# Patient Record
Sex: FEMALE | Race: WHITE | NOT HISPANIC OR LATINO | Employment: FULL TIME | URBAN - NONMETROPOLITAN AREA
[De-identification: names, ages, dates, MRNs, and addresses within clinical notes are randomized per-mention and may not be internally consistent; named-entity substitution may affect disease eponyms.]

---

## 2017-03-23 DIAGNOSIS — M79.671 BILATERAL FOOT PAIN: Primary | ICD-10-CM

## 2017-03-23 DIAGNOSIS — M79.672 BILATERAL FOOT PAIN: Primary | ICD-10-CM

## 2017-03-23 DIAGNOSIS — M25.571 BILATERAL ANKLE PAIN, UNSPECIFIED CHRONICITY: ICD-10-CM

## 2017-03-23 DIAGNOSIS — M25.572 BILATERAL ANKLE PAIN, UNSPECIFIED CHRONICITY: ICD-10-CM

## 2017-03-24 ENCOUNTER — OFFICE VISIT (OUTPATIENT)
Dept: ORTHOPEDIC SURGERY | Facility: CLINIC | Age: 42
End: 2017-03-24

## 2017-03-24 VITALS — HEIGHT: 63 IN | RESPIRATION RATE: 19 BRPM | WEIGHT: 215.8 LBS | BODY MASS INDEX: 38.24 KG/M2

## 2017-03-24 DIAGNOSIS — M25.572 BILATERAL ANKLE PAIN, UNSPECIFIED CHRONICITY: ICD-10-CM

## 2017-03-24 DIAGNOSIS — M79.672 BILATERAL FOOT PAIN: Primary | ICD-10-CM

## 2017-03-24 DIAGNOSIS — M25.571 BILATERAL ANKLE PAIN, UNSPECIFIED CHRONICITY: ICD-10-CM

## 2017-03-24 DIAGNOSIS — M79.671 BILATERAL FOOT PAIN: Primary | ICD-10-CM

## 2017-03-24 PROCEDURE — 99204 OFFICE O/P NEW MOD 45 MIN: CPT | Performed by: PODIATRIST

## 2017-03-24 RX ORDER — VITAMIN E 268 MG
800 CAPSULE ORAL DAILY
COMMUNITY
End: 2020-01-22

## 2017-03-24 RX ORDER — METHYLPREDNISOLONE 4 MG/1
TABLET ORAL
Qty: 21 TABLET | Refills: 0 | Status: SHIPPED | OUTPATIENT
Start: 2017-03-24 | End: 2017-05-01

## 2017-03-24 RX ORDER — CLOBETASOL PROPIONATE 0.05 G/100ML
SHAMPOO TOPICAL
Refills: 0 | COMMUNITY
Start: 2017-01-23 | End: 2022-09-18

## 2017-03-24 RX ORDER — VALACYCLOVIR HYDROCHLORIDE 1 G/1
1000 TABLET, FILM COATED ORAL 2 TIMES DAILY
COMMUNITY
End: 2017-07-11

## 2017-03-24 RX ORDER — ONDANSETRON 4 MG/1
4 TABLET, FILM COATED ORAL EVERY 8 HOURS PRN
COMMUNITY
End: 2021-01-14

## 2017-03-24 RX ORDER — IBUPROFEN 600 MG/1
600 TABLET ORAL EVERY 6 HOURS PRN
COMMUNITY
End: 2019-04-04 | Stop reason: SDUPTHER

## 2017-03-24 RX ORDER — PANTOPRAZOLE SODIUM 40 MG/1
TABLET, DELAYED RELEASE ORAL
COMMUNITY
Start: 2017-02-23 | End: 2017-10-20

## 2017-03-24 RX ORDER — SUMATRIPTAN SUCC/NAPROXEN SOD 85MG-500MG
TABLET ORAL
COMMUNITY
Start: 2017-02-17 | End: 2018-10-04 | Stop reason: SDUPTHER

## 2017-03-24 RX ORDER — MELOXICAM 7.5 MG/1
7.5 TABLET ORAL DAILY
Qty: 30 TABLET | Refills: 2 | Status: SHIPPED | OUTPATIENT
Start: 2017-03-24 | End: 2018-02-19 | Stop reason: ALTCHOICE

## 2017-03-24 RX ORDER — VITAMINS AND MINERALS 1; 1000; 100; 400; 30; 3; 3; 15; 20; 12; 7; 200; 29; 20 MG/1; [IU]/1; MG/1; [IU]/1; [IU]/1; MG/1; MG/1; MG/1; MG/1; UG/1; MG/1; MG/1; MG/1; MG/1
TABLET, CHEWABLE ORAL
COMMUNITY
Start: 2017-03-13 | End: 2018-02-19

## 2017-03-24 NOTE — PROGRESS NOTES
Subjective   Patient ID: Renae Bolton is a 41 y.o. female   Pain and Edema of the Right Foot and Pain and Edema of the Left Foot   she states about 4 weeks ago she was walking outside her house in the dark and tripped and fell on the bottom step.  She says she landed on her feet but they both gave out on her and didn't hold her up.  She states she went down on her left side hitting her knee and hip as well.  She states that side her to ever since.  She says the left side is worse than the right.  Says she takes ibuprofen on occasion at night for it.  States she has a significant hard time walking.  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  She got a supportive ankle brace for the left side.  History of Present Illness    Review of Systems   Constitutional: Negative for diaphoresis, fever and unexpected weight change.   HENT: Negative for dental problem and sore throat.    Eyes: Negative for visual disturbance.   Respiratory: Negative for shortness of breath.    Cardiovascular: Negative for chest pain.   Gastrointestinal: Negative for abdominal pain, constipation, diarrhea, nausea and vomiting.   Genitourinary: Negative for difficulty urinating and frequency.   Musculoskeletal: Positive for arthralgias.   Neurological: Negative for headaches.   Hematological: Does not bruise/bleed easily.   All other systems reviewed and are negative.      Past Medical History:   Diagnosis Date   • Arrhythmia    • Depression    • GERD (gastroesophageal reflux disease)    • Migraine         Past Surgical History:   Procedure Laterality Date   • BLADDER SURGERY     • BREAST LUMPECTOMY Left    • CHOLECYSTECTOMY     • HYSTERECTOMY     • WISDOM TOOTH EXTRACTION         Allergies   Allergen Reactions   • Penicillins Shortness Of Breath and Palpitations   • Amoxicillin    • Demerol [Meperidine]    • Latex    • Morphine And Related        @/ reviewed@    Objective   Physical Exam    Constitutional: She is oriented to person, place, and time. She appears well-developed and well-nourished.   HENT:   Head: Normocephalic and atraumatic.   Eyes: EOM are normal. Pupils are equal, round, and reactive to light.   Neck: Normal range of motion.   Cardiovascular: Normal rate.    Pulmonary/Chest: Effort normal.   Abdominal: Soft. Bowel sounds are normal.   Musculoskeletal: Normal range of motion.   Neurological: She is alert and oriented to person, place, and time. She has normal reflexes.   Skin: Skin is warm.   Psychiatric: She has a normal mood and affect. Her behavior is normal. Judgment and thought content normal.     Ortho Exam  Ortho Exam  Bilateral Lower extremity exam:  Vascular: Pulses are palpable, capillary refill time to the left lower extremity seems to be somewhat delayed but still less than 5-7 seconds.  There is no significant edema that I can appreciate either lower extremity but the left side may be slightly erythematous but this blanches.  Neuro: Gross sensation and reflexes are intact.  She seems to be a little bit hypersensitive to both sides but the left more than the right  Derm: Skin is warm proximally but she does seem to get cooler distally towards the digits and this is slightly more noticeable on the left side.  MSK: She is tender globally bilaterally from the distal leg across the ankle and dorsal foot.  I cannot pinpoint one specific area that hurts her.  She complains everywhere from areas ranging from the anterior crest of the tibia across anterior ankle joint to the dorsal rear foot and forefoot and even out towards digits.    Assessment/Plan  bilateral lower extremity/foot and ankle pain of unknown origin  Independent Review of Radiographic Studies:      Laboratory and Other Studies:     Medical Decision Making:        Procedures  [] No procedures were performed in office today.    Renae was seen today for pain, edema, pain and edema.    Diagnoses and all orders for  this visit:    Bilateral foot pain    Bilateral ankle pain, unspecified chronicity    Other orders  -     MethylPREDNISolone (MEDROL, THADDEUS,) 4 MG tablet; Use as directed by package instructions  -     meloxicam (MOBIC) 7.5 MG tablet; Take 1 tablet by mouth Daily.        Recommendations/Plan:  I discussed with her the strangeness of this and that due to her complaining of pain globally I'm unable to give her a definitive diagnosis.  I discussed with her that part of it sounds like the beginnings of an early stage of RSD/CRPS but I can't definitively say that at this point.  I discussed with her that her x-rays are normal.  To start with, to put her on a Medrol Dosepak and then when she finishes that start her on meloxicam.  Really get her a boot to try to wear her on at least the left foot and the right as well she wishes.  I recommend she try to wear the boot and work and see how she tolerates it.  If that does not alleviate her symptoms and pain enough to make her comfortable at work and she may need to take a few days off.  I'm going to see her back in a couple weeks and see if she's noticed any difference on the medicines and if not at that point we may send her for MRI or bone scan.    Return in about 2 weeks (around 4/7/2017).  Patient agreeable to call or return sooner for any concerns.

## 2017-04-11 ENCOUNTER — OFFICE VISIT (OUTPATIENT)
Dept: ORTHOPEDIC SURGERY | Facility: CLINIC | Age: 42
End: 2017-04-11

## 2017-04-11 VITALS — HEIGHT: 63 IN | BODY MASS INDEX: 38.09 KG/M2 | WEIGHT: 215 LBS | RESPIRATION RATE: 18 BRPM

## 2017-04-11 DIAGNOSIS — M76.72 PERONEAL TENDONITIS OF LEFT LOWER EXTREMITY: Primary | ICD-10-CM

## 2017-04-11 DIAGNOSIS — M76.72 PERONEAL TENDINITIS, LEFT: ICD-10-CM

## 2017-04-11 PROCEDURE — 99213 OFFICE O/P EST LOW 20 MIN: CPT | Performed by: PODIATRIST

## 2017-04-11 NOTE — PROGRESS NOTES
Subjective   Patient ID: Renae Bolton is a 41 y.o. female   Pain and Follow-up of the Right Foot and Pain and Follow-up of the Left Foot   she returns for follow-up for a bilateral foot and ankle pain.  She states she thinks her right sides better and has been giving her much issue but the left side is definitely still giving her issues and pain.  She states she's wearing her ankle brace when she's at work but she can't wear the boot at work.  She wears the boot when she is at home and anywhere else.  She states she thinks the meloxicam has helped some as the stabbing pain is not so bad but it still hurts and aches quite a bit.    History of Present Illness    Review of Systems   Constitutional: Negative for diaphoresis, fever and unexpected weight change.   HENT: Negative for dental problem and sore throat.    Eyes: Negative for visual disturbance.   Respiratory: Negative for shortness of breath.    Cardiovascular: Negative for chest pain.   Gastrointestinal: Negative for abdominal pain, constipation, diarrhea, nausea and vomiting.   Genitourinary: Negative for difficulty urinating and frequency.   Neurological: Negative for headaches.   Hematological: Does not bruise/bleed easily.   All other systems reviewed and are negative.      Past Medical History:   Diagnosis Date   • Arrhythmia    • Depression    • GERD (gastroesophageal reflux disease)    • Migraine         Past Surgical History:   Procedure Laterality Date   • BLADDER SURGERY     • BREAST LUMPECTOMY Left    • CHOLECYSTECTOMY     • HYSTERECTOMY     • WISDOM TOOTH EXTRACTION         Allergies   Allergen Reactions   • Penicillins Shortness Of Breath and Palpitations   • Amoxicillin    • Demerol [Meperidine]    • Latex    • Morphine And Related            Objective   Physical Exam   Constitutional: She is oriented to person, place, and time. She appears well-developed and well-nourished.   HENT:   Head: Normocephalic and atraumatic.   Eyes: EOM are  normal. Pupils are equal, round, and reactive to light.   Neck: Normal range of motion.   Cardiovascular: Normal rate.    Pulmonary/Chest: Effort normal.   Abdominal: Soft. Bowel sounds are normal.   Musculoskeletal: Normal range of motion.   Neurological: She is alert and oriented to person, place, and time. She has normal reflexes.   Skin: Skin is warm.   Psychiatric: She has a normal mood and affect. Her behavior is normal. Judgment and thought content normal.     Ortho Exam  Ortho Exam  Left Lower extremity exam:  Vascular: pulses are palpable, pedal hair is noted mild edema is noted about the left lateral ankle in the lateral gutter as well as the distal fibula and posterior to the distal fibula along the peroneal tendon sheath.  Neuro: Gross sensations intact  Derm: No wound sores or lesions are noted, skin is warm and supple  MSK: She is tender to palpation posterior to the distal fibula.  She's tender roughly the distal 5-8 cm posterior to the distal fibula.  There is no significant pain from the distal tip of the fibula and the fifth metatarsal base.  There is some tenderness in the lateral gutter and ATFL.  There is pain with resisted plantarflexion and eversion.  There is pain with stressed inversion of the ankle.    Assessment/Plan  left peroneal tendon injury, possible lateral ankle sprain.  Independent Review of Radiographic Studies:      Laboratory and Other Studies:     Medical Decision Making:        Procedures  [] No procedures were performed in office today.    Renae was seen today for pain, follow-up, pain and follow-up.    Diagnoses and all orders for this visit:    Peroneal tendonitis of left lower extremity  -     MRI Ankle Left Without Contrast; Future  -     Ambulatory Referral to Physical Therapy Evaluate and treat    Peroneal tendinitis, left        Recommendations/Plan:  Now she is able to much more definitively pinpoint areas of pain making her exam much easier.  I discussed with her  this is in the proximal portion of her peroneal tendons and while this is a not typically location injury, she states this is where she felt something pull or pop when she rolled her ankle.  I'm send her for an MRI to evaluate the extent of any injury.  I want her to continue her anti-inflammatories.  Continue use of the ankle brace and boot.  I discussed with her that based off MRI findings we may need to consider corticosteroid injection but I think physical therapy will be a good modality to go ahead and start.  I discussed that if there is a gross tear or rupture that this may be something he needs to be fixed surgically.    Return after mri.  Patient agreeable to call or return sooner for any concerns.

## 2017-04-24 ENCOUNTER — APPOINTMENT (OUTPATIENT)
Dept: MRI IMAGING | Facility: HOSPITAL | Age: 42
End: 2017-04-24
Attending: PODIATRIST

## 2017-04-28 ENCOUNTER — OFFICE VISIT (OUTPATIENT)
Dept: ORTHOPEDIC SURGERY | Facility: CLINIC | Age: 42
End: 2017-04-28

## 2017-04-28 VITALS — HEIGHT: 63 IN | BODY MASS INDEX: 38.09 KG/M2 | RESPIRATION RATE: 18 BRPM | WEIGHT: 215 LBS

## 2017-04-28 DIAGNOSIS — M76.72 PERONEAL TENDINITIS, LEFT: Primary | ICD-10-CM

## 2017-04-28 DIAGNOSIS — M79.672 BILATERAL FOOT PAIN: ICD-10-CM

## 2017-04-28 DIAGNOSIS — M79.671 BILATERAL FOOT PAIN: ICD-10-CM

## 2017-04-28 PROCEDURE — 99213 OFFICE O/P EST LOW 20 MIN: CPT | Performed by: PODIATRIST

## 2017-04-28 NOTE — PROGRESS NOTES
Subjective   Patient ID: Renae Bolton is a 41 y.o. female   Follow-up and Pain of the Left Foot  She states that she hasn't started therapy yet because she is waiting to have the MRI done.  She says she had to stop using the spandex brace she had due to her latex allergy they were significantly irritating her.  She said she had a few days off work and she can move around and walk little bit and felt a little bit better but when she went back to work standing on it 12 hours a day it instantly began bothering her again.  She says they're throbbing very bad right now.  She says now that she's about 2 weeks and taking the Mobic she can tell little bit of a difference as well.    History of Present Illness    Review of Systems   Constitutional: Negative for fever.   HENT: Negative for voice change.    Eyes: Negative for visual disturbance.   Respiratory: Negative for shortness of breath.    Cardiovascular: Negative for chest pain.   Gastrointestinal: Negative for abdominal distention and abdominal pain.   Genitourinary: Negative for dysuria.   Musculoskeletal: Positive for arthralgias. Negative for gait problem and joint swelling.   Skin: Negative for rash.   Neurological: Negative for speech difficulty.   Hematological: Does not bruise/bleed easily.   Psychiatric/Behavioral: Negative for confusion.       Past Medical History:   Diagnosis Date   • Arrhythmia    • Depression    • GERD (gastroesophageal reflux disease)    • Migraine         Past Surgical History:   Procedure Laterality Date   • BLADDER SURGERY     • BREAST LUMPECTOMY Left    • CHOLECYSTECTOMY     • HYSTERECTOMY     • WISDOM TOOTH EXTRACTION         Allergies   Allergen Reactions   • Penicillins Shortness Of Breath and Palpitations   • Amoxicillin    • Demerol [Meperidine]    • Latex    • Morphine And Related          Objective   Physical Exam   Constitutional: She is oriented to person, place, and time. She appears well-developed and well-nourished.    HENT:   Head: Normocephalic and atraumatic.   Eyes: EOM are normal. Pupils are equal, round, and reactive to light.   Neck: Normal range of motion.   Cardiovascular: Normal rate.    Pulmonary/Chest: Effort normal.   Abdominal: Soft. Bowel sounds are normal.   Musculoskeletal: Normal range of motion.   Neurological: She is alert and oriented to person, place, and time. She has normal reflexes.   Skin: Skin is warm.   Psychiatric: She has a normal mood and affect. Her behavior is normal. Judgment and thought content normal.     Ortho Exam  Ortho Exam  Left Lower extremity exam:  Vascular: Pulses are palpable, pedal hair is noted, edema is noted to the left lateral ankle  Neuro: Gross sensation reflexes normal and intact  Derm: No changes  MSK: Tilt tender to palpation along the peroneal tendons and posterior to the fibula.  Still complains of pain radiating up the lateral leg.    Assessment/Plan left peroneal tendinitis, tenosynovitis, possible split tear  Independent Review of Radiographic Studies:    I reviewed her MRI from an outside open MRI facility in [place] and other imaging is lacking to say the least.  There is definitely fluid and inflammation and the entire peroneal tendon sheath.  There is only 1-2 slices that I can actually visualize the tendons very well.  It's difficult to tell if there is any split tearing but there is definitely some thinned areas and bulbous areas.  Laboratory and Other Studies:     Medical Decision Making:        Procedures  [] No procedures were performed in office today.    Renae was seen today for follow-up and pain.    Diagnoses and all orders for this visit:    Peroneal tendinitis, left    Bilateral foot pain        Recommendations/Plan:  I reviewed the MRI and discussed results with her.  She states she would add the MRI done here there significant cost difference versus the open MRI.  For now we discussed or ganglion have her start physical therapy.  She'll resume the  meloxicam.  I will get her a good lace up ankle brace to help splint support her.  We discussed injection but she's going to do 2 weeks of physical therapy first to see how that may help her.  If that's not really helping much then we'll give her an injection to help boost the treatment.  I will see how full course of therapy and this other conservative treatment does her.  I'm I explained if that doesn't help then I last resort would be surgical repair debridement of the tendons.,  See her back in 2 or 3 weeks and we'll go from there.    Return in about 3 weeks (around 5/19/2017).  Patient agreeable to call or return sooner for any concerns.

## 2017-05-01 ENCOUNTER — OFFICE VISIT (OUTPATIENT)
Dept: OBSTETRICS AND GYNECOLOGY | Facility: CLINIC | Age: 42
End: 2017-05-01

## 2017-05-01 VITALS
WEIGHT: 213 LBS | SYSTOLIC BLOOD PRESSURE: 132 MMHG | HEIGHT: 63 IN | BODY MASS INDEX: 37.74 KG/M2 | DIASTOLIC BLOOD PRESSURE: 74 MMHG

## 2017-05-01 DIAGNOSIS — A60.09 HERPES GENITALIS IN WOMEN: Primary | ICD-10-CM

## 2017-05-01 PROCEDURE — 99213 OFFICE O/P EST LOW 20 MIN: CPT | Performed by: OBSTETRICS & GYNECOLOGY

## 2017-05-01 RX ORDER — ACYCLOVIR 50 MG/G
OINTMENT TOPICAL
Qty: 30 G | Refills: 6 | Status: SHIPPED | OUTPATIENT
Start: 2017-05-01 | End: 2019-07-22 | Stop reason: SDUPTHER

## 2017-05-01 RX ORDER — FAMCICLOVIR 250 MG/1
250 TABLET ORAL 3 TIMES DAILY
Qty: 90 TABLET | Refills: 11 | Status: SHIPPED | OUTPATIENT
Start: 2017-05-01 | End: 2017-06-01 | Stop reason: SDUPTHER

## 2017-06-01 ENCOUNTER — TELEPHONE (OUTPATIENT)
Dept: OBSTETRICS AND GYNECOLOGY | Facility: CLINIC | Age: 42
End: 2017-06-01

## 2017-06-01 RX ORDER — FAMCICLOVIR 250 MG/1
250 TABLET ORAL 2 TIMES DAILY
Qty: 90 TABLET | Refills: 11 | Status: SHIPPED | OUTPATIENT
Start: 2017-06-01 | End: 2017-06-02 | Stop reason: SDUPTHER

## 2017-06-01 NOTE — TELEPHONE ENCOUNTER
----- Message from Kayla Byrne sent at 6/1/2017  4:04 PM EDT -----  Contact: PT  THIS IS DR BYRD'S PT.  SHE CALLED ABOUT HER RX FOR FAMCYCLOVIR 250MG.  [facility] SENT A REFILL REQUEST, BUT IT WAS SENT BACK TO TAKE 1 TABLET DAILY FOR 90 DAYS.  PT HAS BEEN TAKING THIS BID AND NEEDS NEW RX SENT IN.  THANKS

## 2017-06-02 RX ORDER — FAMCICLOVIR 250 MG/1
250 TABLET ORAL 2 TIMES DAILY
Qty: 180 TABLET | Refills: 3 | Status: SHIPPED | OUTPATIENT
Start: 2017-06-02 | End: 2017-10-06 | Stop reason: SDUPTHER

## 2017-06-02 RX ORDER — FAMCICLOVIR 250 MG/1
250 TABLET ORAL 2 TIMES DAILY
Qty: 90 TABLET | Refills: 11 | Status: SHIPPED | OUTPATIENT
Start: 2017-06-02 | End: 2017-06-02 | Stop reason: SDUPTHER

## 2017-06-09 ENCOUNTER — OFFICE VISIT (OUTPATIENT)
Dept: ORTHOPEDIC SURGERY | Facility: CLINIC | Age: 42
End: 2017-06-09

## 2017-06-09 VITALS — RESPIRATION RATE: 16 BRPM | HEIGHT: 63 IN | BODY MASS INDEX: 37.88 KG/M2 | WEIGHT: 213.8 LBS

## 2017-06-09 DIAGNOSIS — M79.672 BILATERAL FOOT PAIN: ICD-10-CM

## 2017-06-09 DIAGNOSIS — M79.671 BILATERAL FOOT PAIN: ICD-10-CM

## 2017-06-09 DIAGNOSIS — M25.572 ACUTE LEFT ANKLE PAIN: Primary | ICD-10-CM

## 2017-06-09 PROCEDURE — 20605 DRAIN/INJ JOINT/BURSA W/O US: CPT | Performed by: PODIATRIST

## 2017-06-09 PROCEDURE — 99213 OFFICE O/P EST LOW 20 MIN: CPT | Performed by: PODIATRIST

## 2017-06-09 RX ORDER — DEXAMETHASONE SODIUM PHOSPHATE 10 MG/ML
10 INJECTION INTRAMUSCULAR; INTRAVENOUS
Status: COMPLETED | OUTPATIENT
Start: 2017-06-09 | End: 2017-06-09

## 2017-06-09 RX ORDER — BUPIVACAINE HYDROCHLORIDE 5 MG/ML
1 INJECTION, SOLUTION EPIDURAL; INTRACAUDAL
Status: COMPLETED | OUTPATIENT
Start: 2017-06-09 | End: 2017-06-09

## 2017-06-09 RX ORDER — NARATRIPTAN 2.5 MG/1
TABLET ORAL
COMMUNITY
Start: 2017-05-23 | End: 2017-10-20 | Stop reason: ALTCHOICE

## 2017-06-09 RX ORDER — LIDOCAINE HYDROCHLORIDE 10 MG/ML
1 INJECTION, SOLUTION INFILTRATION; PERINEURAL
Status: COMPLETED | OUTPATIENT
Start: 2017-06-09 | End: 2017-06-09

## 2017-06-09 RX ADMIN — BUPIVACAINE HYDROCHLORIDE 1 ML: 5 INJECTION, SOLUTION EPIDURAL; INTRACAUDAL at 09:56

## 2017-06-09 RX ADMIN — LIDOCAINE HYDROCHLORIDE 1 ML: 10 INJECTION, SOLUTION INFILTRATION; PERINEURAL at 09:56

## 2017-06-09 RX ADMIN — DEXAMETHASONE SODIUM PHOSPHATE 10 MG: 10 INJECTION INTRAMUSCULAR; INTRAVENOUS at 09:56

## 2017-06-09 NOTE — PROGRESS NOTES
Subjective   Patient ID: Renae Bolton is a 42 y.o. female   Follow-up of the Left Ankle and Follow-up of the Right Ankle  She comes in today stating that she went to [facility] visits at [facility] and then the left and has been at [facility] ever since.  She says she's had 5 visits there and the doesn't really think any thinks helped much so far.  She says that they are really sore when she first started.  She also now complains of the right ankle bothering her and hurting her.  She says they're both really achy and sore.  She says it's the ankle on the left side and now it hurts and she doesn't have any pain on the outside    History of Present Illness    Review of Systems   Constitutional: Negative for diaphoresis, fever and unexpected weight change.   HENT: Negative for dental problem and sore throat.    Eyes: Negative for visual disturbance.   Respiratory: Negative for shortness of breath.    Cardiovascular: Negative for chest pain.   Gastrointestinal: Negative for abdominal pain, constipation, diarrhea, nausea and vomiting.   Genitourinary: Negative for difficulty urinating and frequency.   Musculoskeletal: Positive for arthralgias.   Neurological: Negative for headaches.   Hematological: Does not bruise/bleed easily.   All other systems reviewed and are negative.      Past Medical History:   Diagnosis Date   • Arrhythmia    • Depression    • Genital HSV    • GERD (gastroesophageal reflux disease)    • Migraine         Past Surgical History:   Procedure Laterality Date   • ANTERIOR AND POSTERIOR VAGINAL REPAIR     • BREAST LUMPECTOMY Left    • CHOLECYSTECTOMY     • CYSTOSCOPY      DMSO   • HYSTERECTOMY     • WISDOM TOOTH EXTRACTION         Allergies   Allergen Reactions   • Penicillins Shortness Of Breath and Palpitations   • Amoxicillin    • Demerol [Meperidine]    • Latex    • Morphine And Related        Family History   Problem Relation Age of Onset   • Diabetes Father    • Breast cancer Sister    • Hypertension  Maternal Grandmother    • Alzheimer's disease Maternal Grandmother    • Hyperlipidemia Other    • Gout Other    • Migraines Other        Objective   Physical Exam   Constitutional: She is oriented to person, place, and time. She appears well-developed and well-nourished.   HENT:   Head: Normocephalic and atraumatic.   Eyes: EOM are normal. Pupils are equal, round, and reactive to light.   Neck: Normal range of motion.   Cardiovascular: Normal rate.    Pulmonary/Chest: Effort normal.   Abdominal: Soft. Bowel sounds are normal.   Musculoskeletal: Normal range of motion.   Neurological: She is alert and oriented to person, place, and time. She has normal reflexes.   Skin: Skin is warm.   Psychiatric: She has a normal mood and affect. Her behavior is normal. Judgment and thought content normal.     Ortho Exam  Ortho Exam  biLateral Lower extremity exam:  Vascular: Pedal pulses are palpable, edema is noted slightly to both feet and ankles.  Pedal hair is noted capillary refill times brisk  Neuro: Sensations intact.  Reflexes are normal.  No positive Tinel signs noted over the nerves of the foot  Derm: No dermatological concerns.  No wounds or sores or lesions.  Skins with normal turgor temperature  MSK: Today she is tender to palpation directly over the anterior aspect of both ankles.  Both ankles have normal range of motion.  There is no anterior drawer or increased inversion.  She has no tenderness palpation along the peroneal tendons on the left side today.    Assessment/Plan bilateral ankle pain of unknown origin  Independent Review of Radiographic Studies:      Laboratory and Other Studies:     Medical Decision Making:        Medium Joint Arthrocentesis  Date/Time: 6/9/2017 9:56 AM  Consent given by: patient  Site marked: site marked  Timeout: Immediately prior to procedure a time out was called to verify the correct patient, procedure, equipment, support staff and site/side marked as required   Supporting  Documentation  Indications: pain and joint swelling   Procedure Details  Location: ankle - L ankle  Needle size: 25 G  Approach: anterolateral  Medications administered: 1 mL bupivacaine (PF) 0.5 %; 1 mL lidocaine 1 %; 10 mg dexamethasone 10 MG/ML  Patient tolerance: patient tolerated the procedure well with no immediate complications        [] No procedures were performed in office today.    Renae was seen today for follow-up and follow-up.    Diagnoses and all orders for this visit:    Acute left ankle pain  -     Medium Joint Arthrocentesis    Bilateral foot pain        Recommendations/Plan:  Pain now seems to be migratory and moving to different places.  She had an really complained of pain in the anterior ankle previously.,  See how the injection in the left side does her today and recommend she continue therapy.  She can continue anti-inflammatories and Rice breast as needed.  I'll see her back in 3 or 4 weeks and repeat evaluation at that time.  At this point I'm perplexed as to exactly what really bothering her and is a some type of migratory arthritis or that she had more some type of RSD or CRPS.  We may have to proceed with a more advanced studies next visit.    Return in about 4 weeks (around 7/7/2017).  Patient agreeable to call or return sooner for any concerns.

## 2017-07-11 ENCOUNTER — OFFICE VISIT (OUTPATIENT)
Dept: ORTHOPEDIC SURGERY | Facility: CLINIC | Age: 42
End: 2017-07-11

## 2017-07-11 VITALS — HEIGHT: 63 IN | RESPIRATION RATE: 16 BRPM | WEIGHT: 213 LBS | BODY MASS INDEX: 37.74 KG/M2

## 2017-07-11 DIAGNOSIS — M25.572 BILATERAL ANKLE PAIN, UNSPECIFIED CHRONICITY: Primary | ICD-10-CM

## 2017-07-11 DIAGNOSIS — M25.571 BILATERAL ANKLE PAIN, UNSPECIFIED CHRONICITY: Primary | ICD-10-CM

## 2017-07-11 PROCEDURE — 99213 OFFICE O/P EST LOW 20 MIN: CPT | Performed by: PODIATRIST

## 2017-07-11 NOTE — PROGRESS NOTES
Subjective   Patient ID: Renae Bolton is a 42 y.o. female   Follow-up of the Right Ankle and Follow-up of the Left Ankle  She comes back in today stating that the she is somewhat better.  She is finished with therapy on Wednesday.  She says she is slowly getting better and thinks the that what she's been doing is helping.  He said her biggest pain is at the end of the day when she gets off of it and placed down.  She says she thinks that heat nice and elevation of helped more than anything.    History of Present Illness    Review of Systems   Constitutional: Negative for diaphoresis, fever and unexpected weight change.   HENT: Negative for dental problem and sore throat.    Eyes: Negative for visual disturbance.   Respiratory: Negative for shortness of breath.    Cardiovascular: Negative for chest pain.   Gastrointestinal: Negative for abdominal pain, constipation, diarrhea, nausea and vomiting.   Genitourinary: Negative for difficulty urinating and frequency.   Neurological: Negative for headaches.   Hematological: Does not bruise/bleed easily.   All other systems reviewed and are negative.      Past Medical History:   Diagnosis Date   • Arrhythmia    • Depression    • Genital HSV    • GERD (gastroesophageal reflux disease)    • Migraine         Past Surgical History:   Procedure Laterality Date   • ANTERIOR AND POSTERIOR VAGINAL REPAIR     • BREAST LUMPECTOMY Left    • CHOLECYSTECTOMY     • CYSTOSCOPY      DMSO   • HYSTERECTOMY     • WISDOM TOOTH EXTRACTION         Allergies   Allergen Reactions   • Penicillins Shortness Of Breath and Palpitations   • Amoxicillin    • Demerol [Meperidine]    • Latex    • Morphine And Related        Family History   Problem Relation Age of Onset   • Diabetes Father    • Breast cancer Sister    • Hypertension Maternal Grandmother    • Alzheimer's disease Maternal Grandmother    • Hyperlipidemia Other    • Gout Other    • Migraines Other        Objective   Physical Exam    Constitutional: She is oriented to person, place, and time. She appears well-developed and well-nourished.   HENT:   Head: Normocephalic and atraumatic.   Eyes: EOM are normal. Pupils are equal, round, and reactive to light.   Neck: Normal range of motion.   Cardiovascular: Normal rate.    Pulmonary/Chest: Effort normal.   Abdominal: Soft. Bowel sounds are normal.   Musculoskeletal: Normal range of motion.   Neurological: She is alert and oriented to person, place, and time. She has normal reflexes.   Skin: Skin is warm.   Psychiatric: She has a normal mood and affect. Her behavior is normal. Judgment and thought content normal.     Ortho Exam  Ortho Exam  Left Lower extremity exam:  Vascular: Pulses palpable, CFT is brisk, pedal hair noted  Neuro: Gross sensation intact  Derm: Skin has normal turgor and temperature.  MSK: She  to palpation across anterior ankle joint line.  There is good range of motion of the ankle joint but tenderness anteriorly.    Assessment/Plan bilateral ankle pain, left peroneal tendon tear  Independent Review of Radiographic Studies:      Laboratory and Other Studies:     Medical Decision Making:        Procedures  [] No procedures were performed in office today.    Renae was seen today for follow-up and follow-up.    Diagnoses and all orders for this visit:    Bilateral ankle pain, unspecified chronicity        Recommendations/Plan:  Right now is too soon to give her any additional injection and she is definitely not willing to proceed with anything more invasive so I think her best course is to have her get finishing continue her therapy and then continue her exercises at home.  We discussed possibly a TENS unit for home but I think for now will just ache with her regimen as she has been.  We'll see her back in about 6 weeks unless she needs me sooner.  If she still having lingering pain at that point and I'm probably develop it sending her for other advanced imaging and a  full laboratory workup.    Return in about 6 weeks (around 8/22/2017).  Patient agreeable to call or return sooner for any concerns.

## 2017-08-29 ENCOUNTER — OFFICE VISIT (OUTPATIENT)
Dept: ORTHOPEDIC SURGERY | Facility: CLINIC | Age: 42
End: 2017-08-29

## 2017-08-29 VITALS — RESPIRATION RATE: 18 BRPM | BODY MASS INDEX: 37.74 KG/M2 | WEIGHT: 213 LBS | HEIGHT: 63 IN

## 2017-08-29 DIAGNOSIS — M19.079 ARTHRITIS OF ANKLE: Primary | ICD-10-CM

## 2017-08-29 DIAGNOSIS — M25.572 ACUTE LEFT ANKLE PAIN: ICD-10-CM

## 2017-08-29 DIAGNOSIS — M76.72 PERONEAL TENDINITIS, LEFT: ICD-10-CM

## 2017-08-29 PROCEDURE — 20605 DRAIN/INJ JOINT/BURSA W/O US: CPT | Performed by: PODIATRIST

## 2017-08-29 PROCEDURE — 99213 OFFICE O/P EST LOW 20 MIN: CPT | Performed by: PODIATRIST

## 2017-08-29 RX ORDER — METHYLPREDNISOLONE 4 MG/1
TABLET ORAL
Refills: 0 | COMMUNITY
Start: 2017-08-14 | End: 2017-10-06

## 2017-08-29 RX ORDER — CYCLOBENZAPRINE HCL 5 MG
TABLET ORAL
Refills: 0 | COMMUNITY
Start: 2017-08-14 | End: 2019-04-04 | Stop reason: SDUPTHER

## 2017-08-29 RX ORDER — BUPIVACAINE HYDROCHLORIDE 5 MG/ML
1 INJECTION, SOLUTION EPIDURAL; INTRACAUDAL
Status: COMPLETED | OUTPATIENT
Start: 2017-08-29 | End: 2017-08-29

## 2017-08-29 RX ORDER — OMEPRAZOLE 40 MG/1
CAPSULE, DELAYED RELEASE ORAL
COMMUNITY
Start: 2017-07-24 | End: 2019-04-04 | Stop reason: SDUPTHER

## 2017-08-29 RX ORDER — LIDOCAINE HYDROCHLORIDE 10 MG/ML
1 INJECTION, SOLUTION INFILTRATION; PERINEURAL
Status: COMPLETED | OUTPATIENT
Start: 2017-08-29 | End: 2017-08-29

## 2017-08-29 RX ADMIN — LIDOCAINE HYDROCHLORIDE 1 ML: 10 INJECTION, SOLUTION INFILTRATION; PERINEURAL at 08:25

## 2017-08-29 RX ADMIN — BUPIVACAINE HYDROCHLORIDE 1 ML: 5 INJECTION, SOLUTION EPIDURAL; INTRACAUDAL at 08:25

## 2017-09-22 ENCOUNTER — APPOINTMENT (OUTPATIENT)
Dept: CT IMAGING | Facility: HOSPITAL | Age: 42
End: 2017-09-22

## 2017-09-22 ENCOUNTER — HOSPITAL ENCOUNTER (EMERGENCY)
Facility: HOSPITAL | Age: 42
Discharge: HOME | End: 2017-09-23
Attending: STUDENT IN AN ORGANIZED HEALTH CARE EDUCATION/TRAINING PROGRAM | Admitting: STUDENT IN AN ORGANIZED HEALTH CARE EDUCATION/TRAINING PROGRAM

## 2017-09-22 VITALS
OXYGEN SATURATION: 99 % | WEIGHT: 200 LBS | HEART RATE: 64 BPM | DIASTOLIC BLOOD PRESSURE: 74 MMHG | HEIGHT: 63 IN | BODY MASS INDEX: 35.44 KG/M2 | TEMPERATURE: 97.7 F | SYSTOLIC BLOOD PRESSURE: 121 MMHG | RESPIRATION RATE: 18 BRPM

## 2017-09-22 DIAGNOSIS — S80.01XA CONTUSION OF RIGHT KNEE, INITIAL ENCOUNTER: ICD-10-CM

## 2017-09-22 DIAGNOSIS — S00.83XA FACIAL CONTUSION, INITIAL ENCOUNTER: Primary | ICD-10-CM

## 2017-09-22 DIAGNOSIS — S00.33XA CONTUSION OF NOSE, INITIAL ENCOUNTER: ICD-10-CM

## 2017-09-22 DIAGNOSIS — S16.1XXA ACUTE STRAIN OF NECK MUSCLE, INITIAL ENCOUNTER: ICD-10-CM

## 2017-09-22 DIAGNOSIS — S00.33XA NASAL CONTUSION: ICD-10-CM

## 2017-09-22 PROCEDURE — 70486 CT MAXILLOFACIAL W/O DYE: CPT

## 2017-09-22 PROCEDURE — 72125 CT NECK SPINE W/O DYE: CPT

## 2017-09-22 PROCEDURE — 99283 EMERGENCY DEPT VISIT LOW MDM: CPT

## 2017-09-22 PROCEDURE — 70450 CT HEAD/BRAIN W/O DYE: CPT

## 2017-09-22 RX ORDER — ONDANSETRON 4 MG/1
4 TABLET, ORALLY DISINTEGRATING ORAL ONCE
Status: COMPLETED | OUTPATIENT
Start: 2017-09-22 | End: 2017-09-22

## 2017-09-22 RX ORDER — TRAMADOL HYDROCHLORIDE 50 MG/1
50 TABLET ORAL EVERY 6 HOURS PRN
Qty: 10 TABLET | Refills: 0 | Status: SHIPPED | OUTPATIENT
Start: 2017-09-22 | End: 2017-10-20

## 2017-09-22 RX ORDER — HYDROCODONE BITARTRATE AND ACETAMINOPHEN 5; 325 MG/1; MG/1
1 TABLET ORAL ONCE
Status: COMPLETED | OUTPATIENT
Start: 2017-09-22 | End: 2017-09-22

## 2017-09-22 RX ADMIN — HYDROCODONE BITARTRATE AND ACETAMINOPHEN 1 TABLET: 5; 325 TABLET ORAL at 22:57

## 2017-09-22 RX ADMIN — ONDANSETRON 4 MG: 4 TABLET, ORALLY DISINTEGRATING ORAL at 22:57

## 2017-09-23 ENCOUNTER — APPOINTMENT (OUTPATIENT)
Dept: GENERAL RADIOLOGY | Facility: HOSPITAL | Age: 42
End: 2017-09-23

## 2017-09-23 PROCEDURE — 73562 X-RAY EXAM OF KNEE 3: CPT

## 2017-09-23 NOTE — DISCHARGE INSTRUCTIONS
If symptoms worsen, please return to the ER. Do not drive or operate machinery while taking the Tramadol.

## 2017-09-23 NOTE — ED PROVIDER NOTES
Subjective   History of Present Illness  42-year-old female who presents after standing on her porch swing tonight to adjust something and the porch swing flipped causing her to fall off landing on her nose and face.  She states that she did not have loss of consciousness.  She was quite nauseated.  She now has a frontal headache and the nose is swollen with pain.  She is also reporting that she has some posterior neck pain.  She denies any other injuries.  No arm, leg or lower back pain.  No chest pain or shortness of breath.  Review of Systems   All other systems reviewed and are negative.      Past Medical History:   Diagnosis Date   • Arrhythmia    • Depression    • Genital HSV    • GERD (gastroesophageal reflux disease)    • Migraine        Allergies   Allergen Reactions   • Penicillins Shortness Of Breath and Palpitations   • Amoxicillin    • Demerol [Meperidine]    • Latex    • Morphine And Related        Past Surgical History:   Procedure Laterality Date   • ANTERIOR AND POSTERIOR VAGINAL REPAIR     • BREAST LUMPECTOMY Left    • CHOLECYSTECTOMY     • CYSTOSCOPY      DMSO   • HYSTERECTOMY     • WISDOM TOOTH EXTRACTION         Family History   Problem Relation Age of Onset   • Diabetes Father    • Breast cancer Sister    • Hypertension Maternal Grandmother    • Alzheimer's disease Maternal Grandmother    • Hyperlipidemia Other    • Gout Other    • Migraines Other        Social History     Social History   • Marital status:      Spouse name: N/A   • Number of children: N/A   • Years of education: N/A     Occupational History   • 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 She appears  well-developed and well-nourished.   HENT:   Head: Normocephalic.   Mouth/Throat: Oropharynx is clear and moist.   She does have bruising started around her eyes as well as over the bridge of her nose.  No palpable deformities of her skull.  No sinus tenderness bilaterally.  TMs are clear bilaterally.  Nares are slightly swollen with dried blood bilaterally.  She has patency of each nostril.   Eyes: Conjunctivae and EOM are normal. Pupils are equal, round, and reactive to light.   Neck: Normal range of motion. Neck supple.   Complains of tenderness along the posterior cervical spine.   Cardiovascular: Normal rate, regular rhythm, normal heart sounds and intact distal pulses.    Pulmonary/Chest: Effort normal and breath sounds normal.   Musculoskeletal: Normal range of motion.   Neurological: She is alert and oriented to person, place, and time.   Skin: Skin is warm and dry.   Psychiatric: She has a normal mood and affect. Her behavior is normal. Judgment and thought content normal.   Nursing note and vitals reviewed.      Procedures         ED Course  ED Course   Comment By Time   When I went in to discharge the patient she indicated that she was also having pain in her right knee.  She had initially told me when she arrived that she had no other injuries other than the facial injury and neck injury.  She said when she went to stand up she remembered that her right knee hurt.  It is swollen and ecchymotic and she has point tenderness over the right patella.  We will get an x-ray before discharge. Malena Ellis, APRN 09/23 0012        X-ray of the right knee is negative for any acute abnormality.  Her discharge plan will remain the same with addition of icing and elevating the right knee.  She will follow-up with her primary care provider if her symptoms are not improving, if they worsen she'll return to the emergency department.          MDM    Final diagnoses:   Facial contusion, initial encounter   Nasal  contusion   Contusion of nose, initial encounter   Acute strain of neck muscle, initial encounter            Malena Ellis, APRN  09/23/17 [id number]

## 2017-09-28 ENCOUNTER — OFFICE VISIT (OUTPATIENT)
Dept: ORTHOPEDIC SURGERY | Facility: CLINIC | Age: 42
End: 2017-09-28

## 2017-09-28 VITALS — BODY MASS INDEX: 37.21 KG/M2 | WEIGHT: 210 LBS | RESPIRATION RATE: 16 BRPM | HEIGHT: 63 IN

## 2017-09-28 DIAGNOSIS — M19.079 ARTHRITIS OF ANKLE: Primary | ICD-10-CM

## 2017-09-28 DIAGNOSIS — M76.72 PERONEAL TENDONITIS OF LEFT LOWER EXTREMITY: ICD-10-CM

## 2017-09-28 PROCEDURE — 99213 OFFICE O/P EST LOW 20 MIN: CPT | Performed by: PODIATRIST

## 2017-09-28 NOTE — PROGRESS NOTES
Subjective   Patient ID: Renae Bolton is a 42 y.o. female   Follow-up and Pain of the Right Ankle and Follow-up and Pain of the Left Ankle  She comes in for follow-up on her ankle pain.  She recently had a fall at home off of a chair while trying to hang a wintertime and fractured her nose and suffered facial injuries as well as a rather large knee abrasion and contusion.  She says the injection that I gave her last time may have helped for about 18 hours but she is not even really sure.  She suffered a concussion with her recent fall and she says her memories a little bit foggy.  She says she still been taking the meloxicam and wonders if she should still continue.    History of Present Illness    Pain Score: 5  Pain Location: Ankle  Pain Orientation: Right, Left     Pain Descriptors: Aching  Pain Frequency: Constant/continuous           Aggravating Factors: Walking, Standing        Pain Intervention(s): Rest          Review of Systems   Constitutional: Negative for diaphoresis, fever and unexpected weight change.   HENT: Negative for dental problem and sore throat.    Eyes: Negative for visual disturbance.   Respiratory: Negative for shortness of breath.    Cardiovascular: Negative for chest pain.   Gastrointestinal: Negative for abdominal pain, constipation, diarrhea, nausea and vomiting.   Genitourinary: Negative for difficulty urinating and frequency.   Neurological: Negative for headaches.   Hematological: Does not bruise/bleed easily.   All other systems reviewed and are negative.      Past Medical History:   Diagnosis Date   • Arrhythmia    • Depression    • Genital HSV    • GERD (gastroesophageal reflux disease)    • Migraine         Past Surgical History:   Procedure Laterality Date   • ANTERIOR AND POSTERIOR VAGINAL REPAIR     • BREAST LUMPECTOMY Left    • CHOLECYSTECTOMY     • CYSTOSCOPY      DMSO   • HYSTERECTOMY     • WISDOM TOOTH EXTRACTION         Allergies   Allergen Reactions   • Penicillins  Shortness Of Breath and Palpitations   • Amoxicillin    • Demerol [Meperidine]    • Latex    • Morphine And Related        Family History   Problem Relation Age of Onset   • Diabetes Father    • Breast cancer Sister    • Hypertension Maternal Grandmother    • Alzheimer's disease Maternal Grandmother    • Hyperlipidemia Other    • Gout Other    • Migraines Other        Social History     Social History   • Marital status:      Spouse name: N/A   • Number of children: N/A   • Years of education: N/A     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  Objective   Physical Exam   Constitutional: She is oriented to person, place, and time. She appears well-developed and well-nourished.   HENT:   Head: Normocephalic and atraumatic.   Eyes: EOM are normal. Pupils are equal, round, and reactive to light.   Neck: Normal range of motion.   Cardiovascular: Normal rate.    Pulmonary/Chest: Effort normal.   Abdominal: Soft. Bowel sounds are normal.   Musculoskeletal: Normal range of motion.   Neurological: She is alert and oriented to person, place, and time. She has normal reflexes.   Skin: Skin is warm.   Psychiatric: She has a normal mood and affect. Her behavior is normal. Judgment and thought content normal.     Ortho Exam  Ortho Exam Vascular: Pulses are palpable, pedal hair noted, minimal edema noted  Neuro: Sensation intact  Derm: Normal turgor temperature.  MSK: She still has slightly increased inversion of the left ankle.  Slightly positive anterior drawer with slight pucker.  She's minimally tender in the anterior  lateral ankle gutter but primarily over the anterior ankle just deep to the extensor hallucis longus and extensor digitorum longus tendons.  She has no significant pain along the peroneal tendons     Assessment/Plan bilateral ankle pain  Independent Review of Radiographic Studies:      Laboratory and Other Studies:     Medical Decision Making:        Procedures  [] No procedures were performed in office today.    Renae was seen today for follow-up, pain, follow-up and pain.    Diagnoses and all orders for this visit:    Arthritis of ankle    Peroneal tendonitis of left lower extremity        Recommendations/Plan:  I had a long discussion with her today about her ankles and from my standpoint we essentially exhausted conservative therapy.  She can continue her bracing and anti-inflammatories.  I told her she could stop taking the anti-inflammatories and see how this felt to see if it helped or did not.  If she doesn't see a difference than there is no need and taking anti-inflammatories.  If it does help she can resume them.  I discussed ankle arthroscopy and the procedure as well as down and explained I think if she is wanting to consider anything else it's probably her next step.  She still seems that if the majority of her pain is across the anterior ankle joint more so than the peroneal tendons so I would recommend arthroscopic evaluation and debridement.  Due to her knee contusion I recommend she see one of the other practitioners in the office.  We will allow her to think about this for a while and see how she does and call me when she wants to return.    Return if symptoms worsen or fail to improve.  Patient agreeable to call or return sooner for any concerns.

## 2017-10-06 ENCOUNTER — OFFICE VISIT (OUTPATIENT)
Dept: OBSTETRICS AND GYNECOLOGY | Facility: CLINIC | Age: 42
End: 2017-10-06

## 2017-10-06 VITALS
SYSTOLIC BLOOD PRESSURE: 130 MMHG | BODY MASS INDEX: 37.74 KG/M2 | DIASTOLIC BLOOD PRESSURE: 70 MMHG | WEIGHT: 213 LBS | HEIGHT: 63 IN

## 2017-10-06 DIAGNOSIS — Z01.419 ENCOUNTER FOR GYNECOLOGICAL EXAMINATION WITHOUT ABNORMAL FINDING: Primary | ICD-10-CM

## 2017-10-06 DIAGNOSIS — B00.9 RECURRENT HSV (HERPES SIMPLEX VIRUS): ICD-10-CM

## 2017-10-06 PROCEDURE — 99396 PREV VISIT EST AGE 40-64: CPT | Performed by: OBSTETRICS & GYNECOLOGY

## 2017-10-06 RX ORDER — FAMCICLOVIR 250 MG/1
250 TABLET ORAL 3 TIMES DAILY
Qty: 240 TABLET | Refills: 4 | Status: SHIPPED | OUTPATIENT
Start: 2017-10-06 | End: 2018-11-28 | Stop reason: SDUPTHER

## 2017-10-06 NOTE — PROGRESS NOTES
Subjective  Chief Complaint   Patient presents with   • Gynecologic Exam     Patient is 42 y.o.  here for annual examination.  Pt doing well other than recent fall in which she had concussion and fractured nose.  Pt still with frequent outbreaks HSV.  Pt on famvir.  Pt did have MMG in July.    History  Past Medical History:   Diagnosis Date   • Arrhythmia    • Depression    • Genital HSV    • GERD (gastroesophageal reflux disease)    • Migraine    • Normal vaginal Papanicolaou smear in patient with history of hysterectomy 2016     Current Outpatient Prescriptions on File Prior to Visit   Medication Sig Dispense Refill   • acyclovir (ZOVIRAX) 5 % ointment Apply  topically Every 3 (Three) Hours. 30 g 6   • aspirin 81 MG EC tablet Take 81 mg by mouth Daily.     • cholecalciferol (VITAMIN D3) 1000 UNITS tablet Take 1,000 Units by mouth Daily.     • clobetasol propionate (CLOBEX) 0.05 % shampoo apply TO HAIR AND SCALP as directed  0   • cyclobenzaprine (FLEXERIL) 5 MG tablet take 1 tablet by mouth three times a day if needed  0   • DULoxetine (CYMBALTA) 60 MG capsule Take 60 mg by mouth Daily.     • Echinacea-Goldenseal (IMMUNE HEALTH BLEND PO) Take  by mouth.     • ibuprofen (ADVIL,MOTRIN) 600 MG tablet Take 600 mg by mouth Every 6 (Six) Hours As Needed for Mild Pain (1-3).     • Lysine 1000 MG tablet Take 2,000 mg by mouth.     • meloxicam (MOBIC) 7.5 MG tablet Take 1 tablet by mouth Daily. 30 tablet 2   • metoprolol succinate XL (TOPROL-XL) 50 MG 24 hr tablet Take 50 mg by mouth Daily.     • Mirabegron ER (MYRBETRIQ) 50 MG tablet sustained-release 24 hour Take  by mouth.     • Multiple Vitamins-Minerals (MULTIVITAMIN ADULT PO) Take  by mouth.     • naratriptan (AMERGE) 2.5 MG tablet      • omeprazole (priLOSEC) 40 MG capsule      • ondansetron (ZOFRAN) 4 MG tablet Take 4 mg by mouth Every 8 (Eight) Hours As Needed for Nausea or Vomiting.     • pantoprazole (PROTONIX) 40 MG EC tablet      • Prenatal Vit-Fe  Fumarate-FA (SE- 19) 29-1 MG chewable tablet      • Probiotic Product (PROBIOTIC PO) Take  by mouth.     • PSYLLIUM HUSK PO Take  by mouth.     • topiramate (TOPAMAX) 100 MG tablet Take 100 mg by mouth 2 (Two) Times a Day.     • traMADol (ULTRAM) 50 MG tablet Take 1 tablet by mouth Every 6 (Six) Hours As Needed for Moderate Pain . 10 tablet 0   • TREXIMET  MG per tablet      • triamcinolone (KENALOG) 0.1 % ointment apply SPARINGLY to affected area twice a day if needed  1   • vitamin E 400 UNIT capsule Take 800 Units by mouth Daily.     • [DISCONTINUED] famciclovir (FAMVIR) 250 MG tablet Take 1 tablet by mouth 2 (Two) Times a Day. 180 tablet 3   • [DISCONTINUED] MethylPREDNISolone (MEDROL, THADDEUS,) 4 MG tablet take by mouth with food as directed 6-5-4-3-2-1  0     No current facility-administered medications on file prior to visit.      Allergies   Allergen Reactions   • Penicillins Shortness Of Breath and Palpitations   • Amoxicillin    • Demerol [Meperidine]    • Latex    • Morphine And Related      Past Surgical History:   Procedure Laterality Date   • ANTERIOR AND POSTERIOR VAGINAL REPAIR  2010    DR EDIN BYRD   • BREAST LUMPECTOMY Left    • CHOLECYSTECTOMY     • CYSTOSCOPY      DR EDIN BYRD   • CYSTOSCOPY, DIMETHYL SULFOXIDE COCKTAIL  2011    WITH HYDRODISTENTION (DR AVINASH MADDOX)   • HYSTERECTOMY  2010    [facility] (DR EDIN BYRD)   • TRANSVAGINAL TAPING SUSPENSION WITH OBTURATOR  2010    DR EDIN BYRD   • WISDOM TOOTH EXTRACTION       Family History   Problem Relation Age of Onset   • Diabetes Father    • Breast cancer Sister    • Hypertension Maternal Grandmother    • Alzheimer's disease Maternal Grandmother    • Hyperlipidemia Other    • Gout Other    • Migraines Other      Social History     Social History   • Marital status:      Spouse name: N/A   • Number of children: N/A   • Years of education: N/A     Occupational History   • 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[employer]  "    Social History Main Topics   • Smoking status: Never Smoker   • Smokeless tobacco: Never Used   • Alcohol use No   • Drug use: No   • Sexual activity: Yes     Partners: Male     Other Topics Concern   • None     Social History Narrative     Review of Systems  The following systems were reviewed and negative:  constitution, eyes, ENT, respiratory, cardiovascular, gastrointestinal, genitourinary, integument, breast, hematologic / lymphatic, musculoskeletal, neurological, behavioral/psych, endocrine and allergies / immunologic     Objective  Vitals:    10/06/17 1351   BP: 130/70   Weight: 213 lb (96.6 kg)   Height: 63\" (160 cm)     Physical Exam:  General Appearance: alert, appears stated age and cooperative  Head: normocephalic, without obvious abnormality and atraumatic  Eyes: lids and lashes normal, conjunctivae and sclerae normal, no icterus, no pallor, corneas clear and PERRLA  Ears: ears appear intact with no abnormalities noted  Nose: nares normal, septum midline, mucosa normal and no drainage  Neck: suppple, trachea midline and no thyromegaly  Lungs: clear to auscultation, respirations regular, respirations even and respirations unlabored  Heart: regular rhythm and normal rate, normal S1, S2, no murmur, gallop, or rubs and no click  Breasts: Examined in supine position  Symmetric without masses or skin dimpling  Nipples normal without inversion, lesions or discharge  There are no palpable axillary nodes  Abdomen: normal bowel sounds, no masses, no hepatomegaly, no splenomegaly, soft non-tender, no guarding and no rebound tenderness  Pelvic: Clinical staff was present for exam  External genitalia:  normal appearance of the external genitalia including Bartholin's and [place]'s glands.  :  urethral meatus normal;  Vaginal:  normal pink mucosa without prolapse or lesions.  Cervix:  absent.  Uterus:  absent.  Adnexa:  normal bimanual exam of the adnexa.  Extremities: moves extremities well, no edema, no " cyanosis and no redness  Skin: no bleeding, bruising or rash and no lesions noted  Lymph Nodes: no palpable adenopathy  Psych: normal mood and affect, oriented to person, time and place, thought content organized and appropriate judgment    Lab Review   No data reviewed    Imaging   Mammogram report    Assessment/Plan    Problem List Items Addressed This Visit     None      Visit Diagnoses     Encounter for gynecological examination without abnormal finding    -  Primary  It is recommended per ACOG, for women at average risk to start annual mammogram screening at the age of 40 until the age of 75 and an individualized decision be made for women after age 75.  She was encouraged to continue getting yearly mammograms.  She should report any palpable breast lump(s) or skin changes regardless of mammographic findings.  I explained to Renae that notification regarding her mammogram results will come from the center performing the study.  Our office will not be routinely calling with mammogram results.  It is her responsibility to make sure that the results from the mammogram are communicated to her by the breast center.  If she has any questions about the results, she is welcome to call our office anytime.  MMG done.  I explained to Renae that the Pap smears are no longer recommended in patients after hysterectomy unless the patient has a history of DAMI 2 or higher in the past 20 years or have a history of cervical cancer.    I stressed to Renae that she still should be seen to be seen yearly for a full physical including breast and pelvic exam. In women with no risk factors as noted, cytology screening has a small chance of detecting an abnormality and primary vaginal cancer is a rare gynecologic malignancy.  For this reason, Renae has elected pap smear screening this year.    Recurrent HSV (herpes simplex virus)      Rx given as noted.  Pt to take bid x 6 months and increase if recurrent episodes.    Relevant  Medications    famciclovir (FAMVIR) 250 MG tablet        Follow up 1 year or prn     This note was electronically signed.  Shabana Hedrick M.D.

## 2017-10-09 ENCOUNTER — TRANSCRIBE ORDERS (OUTPATIENT)
Dept: ADMINISTRATIVE | Facility: HOSPITAL | Age: 42
End: 2017-10-09

## 2017-10-09 DIAGNOSIS — R51.9 NONINTRACTABLE HEADACHE, UNSPECIFIED CHRONICITY PATTERN, UNSPECIFIED HEADACHE TYPE: Primary | ICD-10-CM

## 2017-10-20 ENCOUNTER — OFFICE VISIT (OUTPATIENT)
Dept: NEUROLOGY | Facility: CLINIC | Age: 42
End: 2017-10-20

## 2017-10-20 VITALS
WEIGHT: 213 LBS | HEART RATE: 78 BPM | SYSTOLIC BLOOD PRESSURE: 128 MMHG | HEIGHT: 63 IN | OXYGEN SATURATION: 98 % | BODY MASS INDEX: 37.74 KG/M2 | DIASTOLIC BLOOD PRESSURE: 70 MMHG

## 2017-10-20 DIAGNOSIS — G44.209 TENSION HEADACHE: ICD-10-CM

## 2017-10-20 DIAGNOSIS — M79.18 MYOFASCIAL MUSCLE PAIN: ICD-10-CM

## 2017-10-20 DIAGNOSIS — G43.011 INTRACTABLE MIGRAINE WITHOUT AURA AND WITH STATUS MIGRAINOSUS: Primary | ICD-10-CM

## 2017-10-20 DIAGNOSIS — F07.81 POSTCONCUSSION SYNDROME: ICD-10-CM

## 2017-10-20 PROCEDURE — 99244 OFF/OP CNSLTJ NEW/EST MOD 40: CPT | Performed by: PSYCHIATRY & NEUROLOGY

## 2017-10-20 RX ORDER — AMITRIPTYLINE HYDROCHLORIDE 25 MG/1
25 TABLET, FILM COATED ORAL NIGHTLY
Qty: 30 TABLET | Refills: 3 | Status: SHIPPED | OUTPATIENT
Start: 2017-10-20 | End: 2017-11-15 | Stop reason: SDUPTHER

## 2017-10-20 NOTE — PROGRESS NOTES
[facility] NEUROLOGY [place] CONSULTATION   History of Present Illness     Date: 10/21/2017    Patient Identification  Renae Bolton is a 42 y.o. female.    Patient information was obtained from patient.  History/Exam limitations: none.    CONSULTATION requested by: Renae Beard*      Chief Complaint   Concussion (Pt in office for consult, pt was outside standing on a porchswing putting up a windchime on 9/22/17. 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  Patient was in her usual state of health until a month ago on September 22, 2017, while she was trying to put up a windchime while standing on the swing.  Patient fell and fall facedown planted on the concrete and the garbage can and broken her nose.  Patient was treated in the emergency room but since then patient be completed having headache, associated with blurry vision light sensitivity and noise sensitivity, difficulty with concentration, trouble with memory since the accident.  Patient reported that her headache is usually occur at the end of the day.      PMH:   Past Medical History:   Diagnosis Date   • Arrhythmia    • Depression    • Genital HSV    • GERD (gastroesophageal reflux disease)    • Migraine    • Normal vaginal Papanicolaou smear in patient with history of hysterectomy 09/16/2016       Past Surgical History:   Past Surgical History:   Procedure Laterality Date   • ANTERIOR AND POSTERIOR VAGINAL REPAIR  06/23/2010    DR EDIN BYRD   • BREAST LUMPECTOMY Left    • CHOLECYSTECTOMY     • CYSTOSCOPY  6/23/210    DR EDIN BYRD   • CYSTOSCOPY, DIMETHYL SULFOXIDE COCKTAIL  03/14/2011    WITH HYDRODISTENTION (DR AVINASH MADDOX)   • HYSTERECTOMY  06/23/2010    [facility] (DR JESUS  CARSON)   • TRANSVAGINAL TAPING SUSPENSION WITH OBTURATOR  06/23/2010    DR EDIN BYRD   • WISDOM TOOTH EXTRACTION         Family Hisotry:   Family History   Problem Relation Age of Onset   • Diabetes Father    • Breast cancer Sister    • Hypertension Maternal Grandmother    • Alzheimer's disease Maternal Grandmother    • Breast cancer Maternal Grandmother    • Heart disease Maternal Grandmother    • Hyperlipidemia Other    • Gout Other    • Migraines Other    • Breast cancer Maternal Aunt    • Stroke Maternal Grandfather    • Diabetes Paternal Grandfather    • Hypertension Paternal Grandfather        Social History:   Social History     Social History   • Marital status:      Spouse name: N/A   • Number of children: N/A   • Years of education: N/A     Occupational History   • 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[employer]     Social History Main Topics   • Smoking status: Never Smoker   • Smokeless tobacco: Never Used   • Alcohol use No   • Drug use: No   • Sexual activity: Yes     Partners: Male     Other Topics Concern   • Not on file     Social History Narrative       Medications:   Current Outpatient Prescriptions   Medication Sig Dispense Refill   • acyclovir (ZOVIRAX) 5 % ointment Apply  topically Every 3 (Three) Hours. 30 g 6   • aspirin 81 MG EC tablet Take 81 mg by mouth Daily.     • cholecalciferol (VITAMIN D3) 1000 UNITS tablet Take 1,000 Units by mouth Daily.     • clobetasol propionate (CLOBEX) 0.05 % shampoo apply TO HAIR AND SCALP as directed  0   • cyclobenzaprine (FLEXERIL) 5 MG tablet take 1 tablet by mouth three times a day if needed  0   • DULoxetine (CYMBALTA) 60 MG capsule Take 60 mg by mouth Daily.     • famciclovir (FAMVIR) 250 MG tablet Take 1 tablet by mouth 3 (Three) Times a Day. (Patient taking differently: Take 250 mg by mouth Daily.) 240 tablet 4   • ibuprofen (ADVIL,MOTRIN) 600 MG tablet Take 600 mg by mouth Every 6 (Six) Hours As Needed for Mild Pain (1-3).     • Lysine 1000 MG tablet Take  1,000 mg by mouth.     • meloxicam (MOBIC) 7.5 MG tablet Take 1 tablet by mouth Daily. 30 tablet 2   • metoprolol succinate XL (TOPROL-XL) 50 MG 24 hr tablet Take 50 mg by mouth Daily.     • Mirabegron ER (MYRBETRIQ) 50 MG tablet sustained-release 24 hour Take  by mouth.     • omeprazole (priLOSEC) 40 MG capsule      • ondansetron (ZOFRAN) 4 MG tablet Take 4 mg by mouth Every 8 (Eight) Hours As Needed for Nausea or Vomiting.     • Prenatal Vit-Fe Fumarate-FA (SE-MELVI 19) 29-1 MG chewable tablet      • Probiotic Product (PROBIOTIC PO) Take  by mouth.     • PSYLLIUM HUSK PO Take  by mouth.     • topiramate (TOPAMAX) 100 MG tablet Take 100 mg by mouth 2 (Two) Times a Day.     • TREXIMET  MG per tablet      • triamcinolone (KENALOG) 0.1 % ointment apply SPARINGLY to affected area twice a day if needed  1   • vitamin E 400 UNIT capsule Take 800 Units by mouth Daily.     • amitriptyline (ELAVIL) 25 MG tablet Take 1 tablet by mouth Every Night. 30 tablet 3     No current facility-administered medications for this visit.        Allergy:   Allergies   Allergen Reactions   • Penicillins Shortness Of Breath and Palpitations   • Amoxicillin    • Demerol [Meperidine]    • Latex    • Morphine And Related        Review of Systems:  Review of Systems   Constitutional: Negative for chills and fever.   HENT: Negative for congestion, ear pain, hearing loss, rhinorrhea and sore throat.    Eyes: Negative for pain, discharge and redness.   Respiratory: Negative for cough, shortness of breath, wheezing and stridor.    Cardiovascular: Negative for chest pain, palpitations and leg swelling.   Gastrointestinal: Negative for abdominal pain, constipation, nausea and vomiting.   Endocrine: Negative for cold intolerance, heat intolerance and polyphagia.   Genitourinary: Negative for dysuria, flank pain, frequency and urgency.   Musculoskeletal: Negative for joint swelling, myalgias, neck pain and neck stiffness.   Skin: Negative for  "pallor, rash and wound.   Allergic/Immunologic: Negative for environmental allergies.   Neurological: Positive for dizziness and headaches. Negative for tremors, seizures, syncope, facial asymmetry, speech difficulty, weakness, light-headedness and numbness.   Hematological: Negative for adenopathy.   Psychiatric/Behavioral: Positive for confusion, decreased concentration and sleep disturbance. Negative for hallucinations. The patient is not nervous/anxious.        Physical Exam     Vitals:    10/20/17 1458   BP: 128/70   Pulse: 78   SpO2: 98%   Weight: 213 lb (96.6 kg)   Height: 63\" (160 cm)     GENERAL: Patient is pleasant, cooperative, appears to be stated age.  Body habitus is endomorphic.  SKIN AND EXTREMITIES:  No skin rashes or lesions are noted.  No cyanosis, clubbing or edema of the extremities.    HEAD:  Head is normocephalic and atraumatic.    NECK: Neck are non-tender without thyromegaly or adenopathy.  Carotic upstrokes are 1+/4.  No cranial or cervical bruits.  The neck is supple with a full range of motion.   ENT: palate elevate symmetrically, no evidence of high arch palate, tongue midline erythema in posterior pharynx, Mallampati Classification Class III   CARDIOVASCULAR:  Regular rate and rhythm with normal S1 and S2 without rub or gallop.  RESPIRATORY:  Clear to auscultation without wheezes or crackle   ABDOMEN:  Soft and non-tender, positive bowel sound without hepatosplenomegaly  BACK:  Back is straight without midline defect.    PSYCH:  Higher cortical function/mental status:  The patient is alert.  She is oriented x3 to time, place and person.  Recent and the remote memory appear normal.  The patient has a good fund of knowledge.  There is no visual or auditory hallucination or suicidal or homicidal ideation.  SPEECH:There is no gross evidence of aphasia, dysarthria or agnosia.      CRANIAL NERVES:  Pupils are 4mm, equal round reactive to light, reacting briskly to 2mm without afferent " pupillary defect.  Visual fields are intact to confrontation testing.  Fundoscopic examination reveals sharp disk margins with normal vasculature.  No papilledema, hemorrhages or exudates.  Extraocular movements are full and smooth with normal pursuits and saccades.  No nystagmus noted.  The face is symmetric. palate elevate symmetrically, Tongue midline, positive gag reflex. The remainder of the cranial nerves are intact and symmetrical.    MOTOR: Strength is 5/5 throughout with normal tone and bulk with the following exceptions, 4/5 intrinsic muscles of the hands and feet.  No involuntary movements noted.    Deep Tendon Reflexes: are 2/4 and symmetrical in the upper extremities, 2/4 and symmetrical at the knees and 1/4 and symmetrical at the Achilles tendon.  Plantar responses were down-going bilaterally.    SENSATION:  Intact to pinprick, light touch, vibration and proprioception.  Coordination:  The patient normally performs finger-nose-finger, heel-to-knee-to-shin and rapid alternating movements in symmetrical fashion.    COORDINATION AND GAIT:  The patient walks with a narrow-based gait.  Patient is able to heel-toe and tandem walk forward and backwards without difficulty.  Romberg and monopedal  Romberg are negative.    MUSCULOSKELETAL: Range of motion normal, no clubbing, cyanosis, or edema.  No joint swelling.            Studies: I have personally reviewed the following and discussed with the patient.  Results for orders placed or performed during the hospital encounter of 02/18/17   POCT Influenza A/B   Result Value Ref Range    Rapid Influenza A Ag Negative     Rapid Influenza B Ag Negative     Internal Control Passed Passed    Lot Number [id number]     Expiration Date 10/18        Review of Imaging: I have personally reviewed the following images and discussed with the patient.  CT scan of head, CT scan of cervical spine, totally unremarkable    Records Reviewed: I have personally reviewed her previous  "medical record.    Renae was seen today for concussion, headache and blurred vision.    Diagnoses and all orders for this visit:    Intractable migraine without aura and with status migrainosus  -     Inject Trigger Points, > 3    Tension headache  -     Inject Trigger Points, > 3    Myofascial muscle pain  -     Inject Trigger Points, > 3    Postconcussion syndrome  -     Inject Trigger Points, > 3    Other orders  -     amitriptyline (ELAVIL) 25 MG tablet; Take 1 tablet by mouth Every Night.        Treatments:    1.  We'll schedule this patient to have trigger point injection  2.  Status post patient with 25 mg Elavil 1 by mouth suppertime  3.  Counseled patient extensively on migraine headache as follow  Migraine Headache  Migraine headaches are a major public health problem affecting more than 28 million persons in this country. Nearly 25 percent of women and 9 percent of men experience disabling migraines.    Migraine treatment depends on the duration and severity of pain, associated symptoms, degree of disability, and initial response to therapy.  A widely prescribed and effective class of medications for migraines is the 5-HT1 receptor-specific agonists (\"triptans\").  Contraindications to their use include ischemic vascular conditions, vasospastic coronary disease, uncontrolled hypertension, or other significant cardiovascular disease.  Following appropriate management of acute migraine, patients should be evaluated for initiation of preventive therapy. Factors that should prompt consideration of preventive therapy include the occurrence of two or more migraines per month with disability lasting three or more days per month; failure of, contraindication for, or adverse events from acute treatments; use of abortive medication more than twice per week; and uncommon migraine conditions (e.g., hemiplegic migraine, migraine with prolonged aura, migrainous infarction). Patient preference and cost also should be " considered.  Evidence consistently supports the use of the beta blocker propranolol (Inderal) in migraine prophylaxis. Amitriptyline is a first-line agent for migraine prophylaxis and is the only antidepressant with consistent evidence supporting its effectiveness for this use. Divalproex (Depakote) and sodium valproate are well supported by evidence for use in migraine prevention.  Topamax has also been studies for migraine prophylaxis  in open label studies and double blind studies. Evidence does not support the use of diltiazem (Cardizem) in migraine prevention, and the evidence for several other calcium channel blockers, such as nifedipine (Procardia), is poor and suggests only modest effect  Menstrual Migraine can present a challenge to clinician.  Estrogen withdrawal has been shown to precipitate migraine headaches, and a sustained elevated level of estrogen will postpone the migraine. Transdermal estrogen started just before menstruation can provide a sustained low level of estrogen, decreasing the degree of estrogen decline, and thus may prevent induction of migraines  4.  Counseled patient on post concussion syndrome  Symptoms of PCS, which is the most common entity to be diagnosed in people who have suffered TBI, may occur in 38-80% of mild head injuries.  The condition can cause a variety of symptoms: physical, such as headache; cognitive, such as difficulty concentrating; and emotional and behavioral, such as irritability.  The prognosis for PCS is generally considered excellent, with total resolution of symptoms in the large majority of cases. For most people, post-concussion symptoms go away within a few days to several weeks after the original injury occurs.   In others, symptoms may remain for three to six months, ]but evidence indicates that most cases are completely resolved within that time.   Symptoms are largely gone in about half of people with concussion one month after the injury, and about  two thirds of people with minor head trauma are symptom-free within three months.    This Document is signed by Kit Su MD, FAAN, FAASM October 21, 20179:03 PM

## 2017-10-30 ENCOUNTER — TELEPHONE (OUTPATIENT)
Dept: NEUROLOGY | Facility: CLINIC | Age: 42
End: 2017-10-30

## 2017-10-30 ENCOUNTER — PROCEDURE VISIT (OUTPATIENT)
Dept: NEUROLOGY | Facility: CLINIC | Age: 42
End: 2017-10-30

## 2017-10-30 VITALS
WEIGHT: 213 LBS | BODY MASS INDEX: 37.74 KG/M2 | SYSTOLIC BLOOD PRESSURE: 126 MMHG | HEART RATE: 84 BPM | OXYGEN SATURATION: 98 % | DIASTOLIC BLOOD PRESSURE: 68 MMHG | HEIGHT: 63 IN

## 2017-10-30 DIAGNOSIS — G44.209 TENSION HEADACHE: Primary | ICD-10-CM

## 2017-10-30 DIAGNOSIS — M54.2 NECK PAIN: ICD-10-CM

## 2017-10-30 DIAGNOSIS — M79.18 MYOFASCIAL MUSCLE PAIN: ICD-10-CM

## 2017-10-30 PROCEDURE — 20553 NJX 1/MLT TRIGGER POINTS 3/>: CPT | Performed by: PSYCHIATRY & NEUROLOGY

## 2017-10-30 RX ORDER — METHYLPREDNISOLONE ACETATE 40 MG/ML
200 INJECTION, SUSPENSION INTRA-ARTICULAR; INTRALESIONAL; INTRAMUSCULAR; SOFT TISSUE ONCE
Status: COMPLETED | OUTPATIENT
Start: 2017-10-30 | End: 2017-10-30

## 2017-10-30 RX ORDER — BUPIVACAINE HYDROCHLORIDE 7.5 MG/ML
5 INJECTION, SOLUTION EPIDURAL; RETROBULBAR ONCE
Status: DISCONTINUED | OUTPATIENT
Start: 2017-10-30 | End: 2019-06-25 | Stop reason: HOSPADM

## 2017-10-30 RX ORDER — TOLTERODINE 4 MG/1
1 CAPSULE, EXTENDED RELEASE ORAL DAILY
COMMUNITY
Start: 2017-10-21 | End: 2018-02-19 | Stop reason: ALTCHOICE

## 2017-10-30 RX ADMIN — METHYLPREDNISOLONE ACETATE 200 MG: 40 INJECTION, SUSPENSION INTRA-ARTICULAR; INTRALESIONAL; INTRAMUSCULAR; SOFT TISSUE at 20:06

## 2017-10-30 NOTE — TELEPHONE ENCOUNTER
Patient wanted to know if she should continue taking Elavil since she is getting Trigger injections.    Also she needs to know if she needs to extend her Short Term Disability.  Her Short Term Disability is scheduled to end on Monday, 11/06.    Please call her back and let her know.

## 2017-10-30 NOTE — PROGRESS NOTES
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  Despite medical management including amitriptyline 25 mg along with Mobic, Flexeril, Toprol and Topamax.  Patient continue to experience persistent headache.  Patient's Jose underwent the trigger point injection in today visit.    Procedure note  Patient is suffering from headache and myofacial pain syndrome. Risks and benefits of the Trigger point injection procedure have been explained to the patient.  Patient has no contraindications such as bleed diathesis, recent acute trauma at the muscle sites, anesthetic allergy or anticoagulation.  Mechanism of trigger point injection has been explained to patient.     Procedure Note:  1.         Patient was positioned comfortably  2.         Before injections are started, 10 Trigger Points sites are identified throughout the bilateral upper trapezius muscle, levator scapulae, and erector spinae muscles.    3.         Overlying skin area was cleaned with Alcohol swab                                                                                                              4.         Before injection, trigger points sites were palpated for local twitch and referred pain to confirms placement                         5.         Local anesthetic was mixed with Depo-Medrol (5 cc of Marcaine 0.5% mixed with 5 cc of Depo-Medrol at 40 mg/ml - for a total of 10 cc)  6.         30 gauge ½ inch needle was utilized to ensure patient comfort          7.         I started with the most tender spot in above mentioned Trigger Points   1.   Localize most tender spot within taut muscle-fibers  2.   Fix tender spot between fingers (1-2 cm in size)   1.   Prevents from rolling away from needle  2.   Controls subcutaneous bleeding  3.   Before injection, patient was instructed of possible pain on injection  4.   Direct needle at 30 degree angle off  skin   8.         Insert needle into skin 1-2 cm from Trigger Point   9.         Advance needle into Trigger Point   10.       Use 1cc anesthetic at each Trigger Points identified in step #2  11.       Redirect needle and reinject                                                                                              1.   Withdraw needle to subcutaneous tissue  2.   Redirect needle into adjacent tender areas  3.   Repeat until local twitch or tautness resolves  12.   After each of the 10 injections I held direct pressure at injection site for 2 minutes to prevents hematoma formation  13.   The same procedure was repeated for other Tender Points located in the upper trapezius muscle, levator scapulae and erector spinae bilaterally  14.   Patient was instructed to gently stretch injected areas to full active range of motion in all directions and was instructed to repeat range of motion three times after injection  15.   Post-Procedure patient was instructed to avoid over-using injected area for 3-4 days   1.   Maintain active range of motion of injected muscle  2.   Patient applies ice to injected areas for a few hours  3.   Anticipate post-injection soreness for 3-4 days  There was no evidence of complications from injection was noted such as local Skin Infection  at injection site or local hematoma at injection site      Marcaine lot #[id number]  Expiration date February 1, 2019    Depo-Medrol lots number [id number]  Expiration date April 2020    Kit Su MD, FAAN  10/30/2017

## 2017-10-31 NOTE — TELEPHONE ENCOUNTER
Yes.  She need to continue to take amitriptylline to prevent her headache and neck pain.  ONce her headache and neck pain improved then we will wean her off.

## 2017-10-31 NOTE — TELEPHONE ENCOUNTER
Pt informed that she should continue to take amitriptyline.     Per Dr Su- pt short term disability does not need to be extended, pt also informed of this.     Verbalized understanding

## 2017-11-15 ENCOUNTER — OFFICE VISIT (OUTPATIENT)
Dept: NEUROLOGY | Facility: CLINIC | Age: 42
End: 2017-11-15

## 2017-11-15 ENCOUNTER — TELEPHONE (OUTPATIENT)
Dept: OBSTETRICS AND GYNECOLOGY | Facility: CLINIC | Age: 42
End: 2017-11-15

## 2017-11-15 VITALS
SYSTOLIC BLOOD PRESSURE: 124 MMHG | DIASTOLIC BLOOD PRESSURE: 72 MMHG | OXYGEN SATURATION: 98 % | HEIGHT: 63 IN | WEIGHT: 213 LBS | BODY MASS INDEX: 37.74 KG/M2 | HEART RATE: 86 BPM

## 2017-11-15 DIAGNOSIS — M79.18 MYOFASCIAL MUSCLE PAIN: ICD-10-CM

## 2017-11-15 DIAGNOSIS — M54.2 NECK PAIN: ICD-10-CM

## 2017-11-15 DIAGNOSIS — G44.209 TENSION HEADACHE: Primary | ICD-10-CM

## 2017-11-15 PROCEDURE — 20553 NJX 1/MLT TRIGGER POINTS 3/>: CPT | Performed by: PSYCHIATRY & NEUROLOGY

## 2017-11-15 RX ORDER — BUPIVACAINE HYDROCHLORIDE 7.5 MG/ML
5 INJECTION, SOLUTION EPIDURAL; RETROBULBAR ONCE
Status: DISCONTINUED | OUTPATIENT
Start: 2017-11-15 | End: 2019-06-25 | Stop reason: HOSPADM

## 2017-11-15 RX ORDER — PROMETHAZINE HYDROCHLORIDE 25 MG/1
TABLET ORAL
Refills: 0 | COMMUNITY
Start: 2017-11-06 | End: 2019-04-04 | Stop reason: SDUPTHER

## 2017-11-15 RX ORDER — METHYLPREDNISOLONE ACETATE 40 MG/ML
200 INJECTION, SUSPENSION INTRA-ARTICULAR; INTRALESIONAL; INTRAMUSCULAR; SOFT TISSUE ONCE
Status: COMPLETED | OUTPATIENT
Start: 2017-11-15 | End: 2017-11-15

## 2017-11-15 RX ORDER — AMITRIPTYLINE HYDROCHLORIDE 25 MG/1
25 TABLET, FILM COATED ORAL NIGHTLY
Qty: 90 TABLET | Refills: 0 | Status: SHIPPED | OUTPATIENT
Start: 2017-11-15 | End: 2018-01-31 | Stop reason: SDUPTHER

## 2017-11-15 RX ADMIN — METHYLPREDNISOLONE ACETATE 200 MG: 40 INJECTION, SUSPENSION INTRA-ARTICULAR; INTRALESIONAL; INTRAMUSCULAR; SOFT TISSUE at 20:57

## 2017-11-15 NOTE — TELEPHONE ENCOUNTER
----- Message from Sunshine Garg sent at 11/15/2017 10:44 AM EST -----  Contact: PATIENT  Patient sees Dr. Hedrick, she has tried three medications now and none have worked as well as the myrbetriq, she is asking if she can get a prior auth on her insurance and have this [facility] if possible? Best number to reach her is [phone].

## 2017-11-16 NOTE — PROGRESS NOTES
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  Patient has benefited from trigger point injection but the symptoms recurred patient would like to have another trigger point injections today visit.    Procedure note  Patient is suffering from headache and myofacial pain syndrome. Risks and benefits of the Trigger point injection procedure have been explained to the patient.  Patient has no contraindications such as bleed diathesis, recent acute trauma at the muscle sites, anesthetic allergy or anticoagulation.  Mechanism of trigger point injection has been explained to patient.     Procedure Note:  1.         Patient was positioned comfortably  2.         Before injections are started, 10 Trigger Points sites are identified throughout the bilateral upper trapezius muscle, levator scapulae, and erector spinae muscles.    3.         Overlying skin area was cleaned with Alcohol swab                                                                                                              4.         Before injection, trigger points sites were palpated for local twitch and referred pain to confirms placement                         5.         Local anesthetic was mixed with Depo-Medrol (5 cc of Marcaine 0.5% mixed with 5 cc of Depo-Medrol at 40 mg/ml - for a total of 10 cc)  6.         30 gauge ½ inch needle was utilized to ensure patient comfort          7.         I started with the most tender spot in above mentioned Trigger Points   1.   Localize most tender spot within taut muscle-fibers  2.   Fix tender spot between fingers (1-2 cm in size)   1.   Prevents from rolling away from needle  2.   Controls subcutaneous bleeding  3.   Before injection, patient was instructed of possible pain on injection  4.   Direct  needle at 30 degree angle off skin   8.         Insert needle into skin 1-2 cm from Trigger Point   9.         Advance needle into Trigger Point   10.       Use 1cc anesthetic at each Trigger Points identified in step #2  11.       Redirect needle and reinject                                                                                              1.   Withdraw needle to subcutaneous tissue  2.   Redirect needle into adjacent tender areas  3.   Repeat until local twitch or tautness resolves  12.   After each of the 10 injections I held direct pressure at injection site for 2 minutes to prevents hematoma formation  13.   The same procedure was repeated for other Tender Points located in the upper trapezius muscle, levator scapulae and erector spinae bilaterally  14.   Patient was instructed to gently stretch injected areas to full active range of motion in all directions and was instructed to repeat range of motion three times after injection  15.   Post-Procedure patient was instructed to avoid over-using injected area for 3-4 days   1.   Maintain active range of motion of injected muscle  2.   Patient applies ice to injected areas for a few hours  3.   Anticipate post-injection soreness for 3-4 days  There was no evidence of complications from injection was noted such as local Skin Infection  at injection site or local hematoma at injection site      Marcaine lots #[id number]  Expiration date November 1, 2019  NDC numbers 0409-161 [id number]    Depo-Medrol lots number [id number]  Expiration date April 2020  NDC #0009-028 0-02    Kit Su MD, FAAN  11/15/2017

## 2018-01-31 RX ORDER — AMITRIPTYLINE HYDROCHLORIDE 25 MG/1
TABLET, FILM COATED ORAL
Qty: 90 TABLET | Refills: 0 | Status: SHIPPED | OUTPATIENT
Start: 2018-01-31 | End: 2018-04-10 | Stop reason: SDUPTHER

## 2018-02-19 ENCOUNTER — PROCEDURE VISIT (OUTPATIENT)
Dept: NEUROLOGY | Facility: CLINIC | Age: 43
End: 2018-02-19

## 2018-02-19 VITALS
BODY MASS INDEX: 37.74 KG/M2 | WEIGHT: 213 LBS | SYSTOLIC BLOOD PRESSURE: 126 MMHG | DIASTOLIC BLOOD PRESSURE: 62 MMHG | HEART RATE: 80 BPM | HEIGHT: 63 IN | OXYGEN SATURATION: 98 %

## 2018-02-19 DIAGNOSIS — G44.209 TENSION HEADACHE: Primary | ICD-10-CM

## 2018-02-19 DIAGNOSIS — M79.18 MYOFASCIAL MUSCLE PAIN: ICD-10-CM

## 2018-02-19 DIAGNOSIS — M54.2 NECK PAIN: ICD-10-CM

## 2018-02-19 PROCEDURE — 20553 NJX 1/MLT TRIGGER POINTS 3/>: CPT | Performed by: PSYCHIATRY & NEUROLOGY

## 2018-02-19 RX ORDER — METHYLPREDNISOLONE ACETATE 40 MG/ML
200 INJECTION, SUSPENSION INTRA-ARTICULAR; INTRALESIONAL; INTRAMUSCULAR; SOFT TISSUE ONCE
Status: COMPLETED | OUTPATIENT
Start: 2018-02-19 | End: 2018-02-19

## 2018-02-19 RX ORDER — BUPIVACAINE HYDROCHLORIDE 7.5 MG/ML
5 INJECTION, SOLUTION EPIDURAL; RETROBULBAR ONCE
Status: DISCONTINUED | OUTPATIENT
Start: 2018-02-19 | End: 2019-06-25 | Stop reason: HOSPADM

## 2018-02-19 RX ADMIN — METHYLPREDNISOLONE ACETATE 200 MG: 40 INJECTION, SUSPENSION INTRA-ARTICULAR; INTRALESIONAL; INTRAMUSCULAR; SOFT TISSUE at 20:10

## 2018-02-20 NOTE — PROGRESS NOTES
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  Patient had benefited from trigger point injection previously is is a repeat trigger point injection.    Procedure note  Patient is suffering from headache and myofacial pain syndrome. Risks and benefits of the Trigger point injection procedure have been explained to the patient.  Patient has no contraindications such as bleed diathesis, recent acute trauma at the muscle sites, anesthetic allergy or anticoagulation.  Mechanism of trigger point injection has been explained to patient.     Procedure Note:  1.         Patient was positioned comfortably  2.         Before injections are started, 10 Trigger Points sites are identified throughout the bilateral upper trapezius muscle, levator scapulae, and erector spinae muscles.    3.         Overlying skin area was cleaned with Alcohol swab                                                                                                              4.         Before injection, trigger points sites were palpated for local twitch and referred pain to confirms placement                         5.         Local anesthetic was mixed with Depo-Medrol (5 cc of Marcaine 0.5% mixed with 5 cc of Depo-Medrol at 40 mg/ml - for a total of 10 cc)  6.         30 gauge ½ inch needle was utilized to ensure patient comfort          7.         I started with the most tender spot in above mentioned Trigger Points   1.   Localize most tender spot within taut muscle-fibers  2.   Fix tender spot between fingers (1-2 cm in size)   1.   Prevents from rolling away from needle  2.   Controls subcutaneous bleeding  3.   Before injection, patient was instructed of possible pain on injection  4.   Direct needle at 30 degree angle off skin   8.         Insert needle into skin  1-2 cm from Trigger Point   9.         Advance needle into Trigger Point   10.       Use 1cc anesthetic at each Trigger Points identified in step #2  11.       Redirect needle and reinject                                                                                              1.   Withdraw needle to subcutaneous tissue  2.   Redirect needle into adjacent tender areas  3.   Repeat until local twitch or tautness resolves  12.   After each of the 10 injections I held direct pressure at injection site for 2 minutes to prevents hematoma formation  13.   The same procedure was repeated for other Tender Points located in the upper trapezius muscle, levator scapulae and erector spinae bilaterally  14.   Patient was instructed to gently stretch injected areas to full active range of motion in all directions and was instructed to repeat range of motion three times after injection  15.   Post-Procedure patient was instructed to avoid over-using injected area for 3-4 days   1.   Maintain active range of motion of injected muscle  2.   Patient applies ice to injected areas for a few hours  3.   Anticipate post-injection soreness for 3-4 days  There was no evidence of complications from injection was noted such as local Skin Infection  at injection site or local hematoma at injection site        Marcaine lots number [id number]  Expiration date October 2019  NDC #5515 0-1 6 9-1 0    Depo-Medrol NDC #0009-028 0-02  Lots number [id number]  Expiration date July 2020    Kit Su MD, FAAN  02/19/2018

## 2018-04-10 RX ORDER — AMITRIPTYLINE HYDROCHLORIDE 25 MG/1
TABLET, FILM COATED ORAL
Qty: 90 TABLET | Refills: 0 | Status: SHIPPED | OUTPATIENT
Start: 2018-04-10 | End: 2018-07-31 | Stop reason: SDUPTHER

## 2018-05-02 ENCOUNTER — TELEPHONE (OUTPATIENT)
Dept: OBSTETRICS AND GYNECOLOGY | Facility: CLINIC | Age: 43
End: 2018-05-02

## 2018-05-02 ENCOUNTER — RESULTS ENCOUNTER (OUTPATIENT)
Dept: OBSTETRICS AND GYNECOLOGY | Facility: CLINIC | Age: 43
End: 2018-05-02

## 2018-05-02 DIAGNOSIS — N39.0 URINARY TRACT INFECTION WITHOUT HEMATURIA, SITE UNSPECIFIED: ICD-10-CM

## 2018-05-02 DIAGNOSIS — N39.0 URINARY TRACT INFECTION WITHOUT HEMATURIA, SITE UNSPECIFIED: Primary | ICD-10-CM

## 2018-05-02 RX ORDER — NITROFURANTOIN 25; 75 MG/1; MG/1
100 CAPSULE ORAL 2 TIMES DAILY
Qty: 14 CAPSULE | Refills: 0 | Status: SHIPPED | OUTPATIENT
Start: 2018-05-02 | End: 2018-05-09

## 2018-05-02 RX ORDER — PHENAZOPYRIDINE HYDROCHLORIDE 200 MG/1
200 TABLET, FILM COATED ORAL 3 TIMES DAILY PRN
Qty: 20 TABLET | Refills: 0 | Status: SHIPPED | OUTPATIENT
Start: 2018-05-02 | End: 2019-04-04

## 2018-05-02 NOTE — TELEPHONE ENCOUNTER
Pt c/o urgency and frequency with burning with urination   Request macrobid   & pyridium   And ordered   Encourage to consider u/a C&S.

## 2018-05-27 ENCOUNTER — APPOINTMENT (OUTPATIENT)
Dept: GENERAL RADIOLOGY | Facility: HOSPITAL | Age: 43
End: 2018-05-27

## 2018-05-27 ENCOUNTER — HOSPITAL ENCOUNTER (EMERGENCY)
Facility: HOSPITAL | Age: 43
Discharge: HOME | End: 2018-05-27
Attending: EMERGENCY MEDICINE | Admitting: EMERGENCY MEDICINE

## 2018-05-27 VITALS
HEIGHT: 63 IN | SYSTOLIC BLOOD PRESSURE: 110 MMHG | OXYGEN SATURATION: 96 % | TEMPERATURE: 98.7 F | WEIGHT: 200 LBS | RESPIRATION RATE: 16 BRPM | DIASTOLIC BLOOD PRESSURE: 62 MMHG | HEART RATE: 77 BPM | BODY MASS INDEX: 35.44 KG/M2

## 2018-05-27 DIAGNOSIS — I49.3 PVC (PREMATURE VENTRICULAR CONTRACTION): ICD-10-CM

## 2018-05-27 DIAGNOSIS — R00.2 PALPITATIONS: Primary | ICD-10-CM

## 2018-05-27 LAB
ALBUMIN SERPL-MCNC: 4.5 G/DL (ref 3.5–5)
ALBUMIN/GLOB SERPL: 1.1 G/DL (ref 1–2)
ALP SERPL-CCNC: 75 U/L (ref 38–126)
ALT SERPL W P-5'-P-CCNC: 40 U/L (ref 13–69)
ANION GAP SERPL CALCULATED.3IONS-SCNC: 14.2 MMOL/L (ref 10–20)
AST SERPL-CCNC: 48 U/L (ref 15–46)
BASOPHILS # BLD AUTO: 0.05 10*3/MM3 (ref 0–0.2)
BASOPHILS NFR BLD AUTO: 0.5 % (ref 0–2.5)
BILIRUB SERPL-MCNC: 0.6 MG/DL (ref 0.2–1.3)
BUN BLD-MCNC: 20 MG/DL (ref 7–20)
BUN/CREAT SERPL: 22.2 (ref 7.1–23.5)
CALCIUM SPEC-SCNC: 9.3 MG/DL (ref 8.4–10.2)
CHLORIDE SERPL-SCNC: 108 MMOL/L (ref 98–107)
CO2 SERPL-SCNC: 25 MMOL/L (ref 26–30)
CREAT BLD-MCNC: 0.9 MG/DL (ref 0.6–1.3)
DEPRECATED RDW RBC AUTO: 42.8 FL (ref 37–54)
EOSINOPHIL # BLD AUTO: 0.35 10*3/MM3 (ref 0–0.7)
EOSINOPHIL NFR BLD AUTO: 3.6 % (ref 0–7)
ERYTHROCYTE [DISTWIDTH] IN BLOOD BY AUTOMATED COUNT: 12.5 % (ref 11.5–14.5)
GFR SERPL CREATININE-BSD FRML MDRD: 68 ML/MIN/1.73
GLOBULIN UR ELPH-MCNC: 4 GM/DL
GLUCOSE BLD-MCNC: 80 MG/DL (ref 74–98)
HCT VFR BLD AUTO: 43 % (ref 37–47)
HGB BLD-MCNC: 14.7 G/DL (ref 12–16)
HOLD SPECIMEN: NORMAL
HOLD SPECIMEN: NORMAL
IMM GRANULOCYTES # BLD: 0.03 10*3/MM3 (ref 0–0.06)
IMM GRANULOCYTES NFR BLD: 0.3 % (ref 0–0.6)
LYMPHOCYTES # BLD AUTO: 2.46 10*3/MM3 (ref 0.6–3.4)
LYMPHOCYTES NFR BLD AUTO: 25.6 % (ref 10–50)
MCH RBC QN AUTO: 32.2 PG (ref 27–31)
MCHC RBC AUTO-ENTMCNC: 34.2 G/DL (ref 30–37)
MCV RBC AUTO: 94.3 FL (ref 81–99)
MONOCYTES # BLD AUTO: 0.72 10*3/MM3 (ref 0–0.9)
MONOCYTES NFR BLD AUTO: 7.5 % (ref 0–12)
NEUTROPHILS # BLD AUTO: 5.99 10*3/MM3 (ref 2–6.9)
NEUTROPHILS NFR BLD AUTO: 62.5 % (ref 37–80)
NRBC BLD MANUAL-RTO: 0 /100 WBC (ref 0–0)
PLATELET # BLD AUTO: 294 10*3/MM3 (ref 130–400)
PMV BLD AUTO: 10.3 FL (ref 6–12)
POTASSIUM BLD-SCNC: 4.2 MMOL/L (ref 3.5–5.1)
PROT SERPL-MCNC: 8.5 G/DL (ref 6.3–8.2)
RBC # BLD AUTO: 4.56 10*6/MM3 (ref 4.2–5.4)
SODIUM BLD-SCNC: 143 MMOL/L (ref 137–145)
TROPONIN I SERPL-MCNC: 0.02 NG/ML (ref 0–0.05)
TROPONIN I SERPL-MCNC: <0.012 NG/ML (ref 0–0.03)
TROPONIN I SERPL-MCNC: <0.012 NG/ML (ref 0–0.03)
WBC NRBC COR # BLD: 9.6 10*3/MM3 (ref 4.8–10.8)
WHOLE BLOOD HOLD SPECIMEN: NORMAL
WHOLE BLOOD HOLD SPECIMEN: NORMAL

## 2018-05-27 PROCEDURE — 71045 X-RAY EXAM CHEST 1 VIEW: CPT

## 2018-05-27 PROCEDURE — 99284 EMERGENCY DEPT VISIT MOD MDM: CPT

## 2018-05-27 PROCEDURE — 93005 ELECTROCARDIOGRAM TRACING: CPT | Performed by: EMERGENCY MEDICINE

## 2018-05-27 PROCEDURE — 85025 COMPLETE CBC W/AUTO DIFF WBC: CPT | Performed by: EMERGENCY MEDICINE

## 2018-05-27 PROCEDURE — 80053 COMPREHEN METABOLIC PANEL: CPT | Performed by: EMERGENCY MEDICINE

## 2018-05-27 PROCEDURE — 84484 ASSAY OF TROPONIN QUANT: CPT | Performed by: EMERGENCY MEDICINE

## 2018-05-27 RX ORDER — SODIUM CHLORIDE 0.9 % (FLUSH) 0.9 %
10 SYRINGE (ML) INJECTION AS NEEDED
Status: DISCONTINUED | OUTPATIENT
Start: 2018-05-27 | End: 2018-05-27 | Stop reason: HOSPADM

## 2018-05-27 RX ORDER — METOPROLOL TARTRATE 5 MG/5ML
5 INJECTION INTRAVENOUS ONCE
Status: DISCONTINUED | OUTPATIENT
Start: 2018-05-27 | End: 2018-05-27

## 2018-05-27 RX ADMIN — METOPROLOL TARTRATE 25 MG: 25 TABLET ORAL at 02:05

## 2018-06-25 ENCOUNTER — PROCEDURE VISIT (OUTPATIENT)
Dept: NEUROLOGY | Facility: CLINIC | Age: 43
End: 2018-06-25

## 2018-06-25 VITALS
SYSTOLIC BLOOD PRESSURE: 108 MMHG | WEIGHT: 200 LBS | OXYGEN SATURATION: 99 % | HEIGHT: 63 IN | HEART RATE: 75 BPM | DIASTOLIC BLOOD PRESSURE: 64 MMHG | BODY MASS INDEX: 35.44 KG/M2

## 2018-06-25 DIAGNOSIS — M79.18 MYOFASCIAL MUSCLE PAIN: ICD-10-CM

## 2018-06-25 DIAGNOSIS — M54.2 NECK PAIN: Primary | ICD-10-CM

## 2018-06-25 DIAGNOSIS — G44.209 TENSION HEADACHE: ICD-10-CM

## 2018-06-25 PROCEDURE — 20553 NJX 1/MLT TRIGGER POINTS 3/>: CPT | Performed by: PSYCHIATRY & NEUROLOGY

## 2018-06-25 RX ORDER — BUPIVACAINE HYDROCHLORIDE 2.5 MG/ML
5 INJECTION, SOLUTION INFILTRATION; PERINEURAL ONCE
Status: DISCONTINUED | OUTPATIENT
Start: 2018-06-25 | End: 2019-06-25 | Stop reason: HOSPADM

## 2018-06-25 RX ORDER — METHYLPREDNISOLONE ACETATE 40 MG/ML
200 INJECTION, SUSPENSION INTRA-ARTICULAR; INTRALESIONAL; INTRAMUSCULAR; SOFT TISSUE ONCE
Status: COMPLETED | OUTPATIENT
Start: 2018-06-25 | End: 2018-06-25

## 2018-06-25 RX ADMIN — METHYLPREDNISOLONE ACETATE 200 MG: 40 INJECTION, SUSPENSION INTRA-ARTICULAR; INTRALESIONAL; INTRAMUSCULAR; SOFT TISSUE at 19:22

## 2018-06-25 NOTE — PROGRESS NOTES
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  Patient has benefited from trigger point injection previously.  Patient reports to be pain-free over the last 3 month until 4 days prior to the new injections.  Patient requests to have a repeat trigger point injection today visit    Procedure note  Patient is suffering from headache and myofacial pain syndrome. Risks and benefits of the Trigger point injection procedure have been explained to the patient.  Patient has no contraindications such as bleed diathesis, recent acute trauma at the muscle sites, anesthetic allergy or anticoagulation.  Mechanism of trigger point injection has been explained to patient.     Procedure Note:  1.         Patient was positioned comfortably  2.         Before injections are started, 10 Trigger Points sites are identified throughout the bilateral upper trapezius muscle, levator scapulae, and erector spinae muscles.    3.         Overlying skin area was cleaned with Alcohol swab                                                                                                              4.         Before injection, trigger points sites were palpated for local twitch and referred pain to confirms placement                         5.         Local anesthetic was mixed with Depo-Medrol (5 cc of Marcaine 0.5% mixed with 5 cc of Depo-Medrol at 40 mg/ml - for a total of 10 cc)  6.         30 gauge ½ inch needle was utilized to ensure patient comfort          7.         I started with the most tender spot in above mentioned Trigger Points   1.   Localize most tender spot within taut muscle-fibers  2.   Fix tender spot between fingers (1-2 cm in size)   1.   Prevents from rolling away from needle  2.   Controls subcutaneous bleeding  3.   Before injection,  patient was instructed of possible pain on injection  4.   Direct needle at 30 degree angle off skin   8.         Insert needle into skin 1-2 cm from Trigger Point   9.         Advance needle into Trigger Point   10.       Use 1cc anesthetic at each Trigger Points identified in step #2  11.       Redirect needle and reinject                                                                                              1.   Withdraw needle to subcutaneous tissue  2.   Redirect needle into adjacent tender areas  3.   Repeat until local twitch or tautness resolves  12.   After each of the 10 injections I held direct pressure at injection site for 2 minutes to prevents hematoma formation  13.   The same procedure was repeated for other Tender Points located in the upper trapezius muscle, levator scapulae and erector spinae bilaterally  14.   Patient was instructed to gently stretch injected areas to full active range of motion in all directions and was instructed to repeat range of motion three times after injection  15.   Post-Procedure patient was instructed to avoid over-using injected area for 3-4 days   1.   Maintain active range of motion of injected muscle  2.   Patient applies ice to injected areas for a few hours  3.   Anticipate post-injection soreness for 3-4 days  There was no evidence of complications from injection was noted such as local Skin Infection  at injection site or local hematoma at injection site      Marcaine  NDC #0409-116 0-1 8  Lots number [id number]  Expiration date January 1, 2020    Depo-Medrol  NDC #0009-028 0-02  Lots number [id number]  Expiration date July 2020    Kit Su MD, FAAN  06/25/2018

## 2018-08-01 RX ORDER — AMITRIPTYLINE HYDROCHLORIDE 25 MG/1
TABLET, FILM COATED ORAL
Qty: 90 TABLET | Refills: 1 | Status: SHIPPED | OUTPATIENT
Start: 2018-08-01 | End: 2018-10-04 | Stop reason: SDUPTHER

## 2018-10-04 ENCOUNTER — OFFICE VISIT (OUTPATIENT)
Dept: NEUROLOGY | Facility: CLINIC | Age: 43
End: 2018-10-04

## 2018-10-04 VITALS
HEART RATE: 78 BPM | HEIGHT: 63 IN | OXYGEN SATURATION: 99 % | SYSTOLIC BLOOD PRESSURE: 106 MMHG | DIASTOLIC BLOOD PRESSURE: 62 MMHG

## 2018-10-04 DIAGNOSIS — G43.009 MIGRAINE WITHOUT AURA AND WITHOUT STATUS MIGRAINOSUS, NOT INTRACTABLE: Primary | ICD-10-CM

## 2018-10-04 PROCEDURE — 99213 OFFICE O/P EST LOW 20 MIN: CPT | Performed by: PSYCHIATRY & NEUROLOGY

## 2018-10-04 RX ORDER — AMITRIPTYLINE HYDROCHLORIDE 25 MG/1
TABLET, FILM COATED ORAL
Qty: 90 TABLET | Refills: 1 | Status: SHIPPED | OUTPATIENT
Start: 2018-10-04 | End: 2018-10-04 | Stop reason: SDUPTHER

## 2018-10-04 RX ORDER — TOPIRAMATE 100 MG/1
100 TABLET, FILM COATED ORAL DAILY
Qty: 90 TABLET | Refills: 3 | Status: SHIPPED | OUTPATIENT
Start: 2018-10-04 | End: 2018-10-09

## 2018-10-04 RX ORDER — SUMATRIPTAN SUCC/NAPROXEN SOD 85MG-500MG
TABLET ORAL
Qty: 27 TABLET | Refills: 0 | Status: SHIPPED | OUTPATIENT
Start: 2018-10-04 | End: 2019-04-04 | Stop reason: SDUPTHER

## 2018-10-04 RX ORDER — AMITRIPTYLINE HYDROCHLORIDE 25 MG/1
TABLET, FILM COATED ORAL
Qty: 90 TABLET | Refills: 1 | Status: SHIPPED | OUTPATIENT
Start: 2018-10-04 | End: 2019-01-17 | Stop reason: SDUPTHER

## 2018-10-04 RX ORDER — TOPIRAMATE 100 MG/1
100 TABLET, FILM COATED ORAL DAILY
Qty: 60 TABLET | Refills: 3 | Status: SHIPPED | OUTPATIENT
Start: 2018-10-04 | End: 2018-10-04 | Stop reason: SDUPTHER

## 2018-10-04 RX ORDER — SUMATRIPTAN SUCC/NAPROXEN SOD 85MG-500MG
1 TABLET ORAL ONCE
Qty: 1 TABLET | Refills: 0 | Status: SHIPPED | OUTPATIENT
Start: 2018-10-04 | End: 2018-10-04 | Stop reason: SDUPTHER

## 2018-10-04 NOTE — PROGRESS NOTES
[facility] NEUROLOGY [place] CONSULTATION   History of Present Illness     Date: 10/6/2018    Patient Identification  Renae Bolton is a 43 y.o. female.    Patient information was obtained from patient.  History/Exam limitations: none.    CONSULTATION requested by: Renae Beard MD    Chief Complaint   Migraine (Pt in office today to discuss change in treatment. Pt no longer wants to do Trigger injections )      History of Present Illness     Patient has a history of chronic migraines recently exacerbated by a concussion in September of last year. She developed a constant headache afterwards. She describes the headache as focused on the left frontal area which is described as a sharp 9/10 which can radiate towards the right frontal area. There is associated photophobia, nausea, vomiting, and phonophobia. She describes no aura. She develops these headaches about once per week. Triggers are sleep pattern disturbance, changes in weather, and consumption of sugary foods.  She feels that the triggerpoint injections are not helping with her migraine symptoms. Amitriptyline, Topamax, and treximet helps.       PMH:   Past Medical History:   Diagnosis Date   • Arrhythmia    • Depression    • Genital HSV    • GERD (gastroesophageal reflux disease)    • Migraine    • Normal vaginal Papanicolaou smear in patient with history of hysterectomy 09/16/2016       Past Surgical History:   Past Surgical History:   Procedure Laterality Date   • ANTERIOR AND POSTERIOR VAGINAL REPAIR  06/23/2010    DR EDIN BYRD   • BREAST LUMPECTOMY Left    • CHOLECYSTECTOMY     • CYSTOSCOPY  6/23/210    DR EDIN BYRD   • CYSTOSCOPY, DIMETHYL SULFOXIDE COCKTAIL  03/14/2011    WITH HYDRODISTENTION (DR AVINASH MADDOX)   • HYSTERECTOMY  06/23/2010    [facility] (DR EDIN BYRD)   • TRANSVAGINAL TAPING SUSPENSION WITH OBTURATOR  06/23/2010    DR EDIN BYRD   • WISDOM TOOTH EXTRACTION         Family Hisotry:   Family History   Problem Relation Age of  Onset   • Diabetes Father    • Breast cancer Sister    • Hypertension Maternal Grandmother    • Alzheimer's disease Maternal Grandmother    • Breast cancer Maternal Grandmother    • Heart disease Maternal Grandmother    • Hyperlipidemia Other    • Gout Other    • Migraines Other    • Breast cancer Maternal Aunt    • Stroke Maternal Grandfather    • Diabetes Paternal Grandfather    • Hypertension Paternal Grandfather        Social History:   Social History     Social History   • Marital status:      Spouse name: N/A   • Number of children: N/A   • Years of education: N/A     Occupational History   • 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[employer]     Social History Main Topics   • Smoking status: Never Smoker   • Smokeless tobacco: Never Used   • Alcohol use No   • Drug use: No   • Sexual activity: Yes     Partners: Male     Other Topics Concern   • Not on file     Social History Narrative   • No narrative on file       Medications:   Current Outpatient Prescriptions   Medication Sig Dispense Refill   • acyclovir (ZOVIRAX) 5 % ointment Apply  topically Every 3 (Three) Hours. 30 g 6   • amitriptyline (ELAVIL) 25 MG tablet Use as directed 90 tablet 1   • aspirin 81 MG EC tablet Take 81 mg by mouth Daily.     • cholecalciferol (VITAMIN D3) 1000 UNITS tablet Take 1,000 Units by mouth Daily.     • clobetasol propionate (CLOBEX) 0.05 % shampoo apply TO HAIR AND SCALP as directed  0   • cyclobenzaprine (FLEXERIL) 5 MG tablet take 1 tablet by mouth three times a day if needed  0   • DULoxetine (CYMBALTA) 60 MG capsule Take 60 mg by mouth Daily.     • famciclovir (FAMVIR) 250 MG tablet Take 1 tablet by mouth 3 (Three) Times a Day. (Patient taking differently: Take 250 mg by mouth Daily.) 240 tablet 4   • ibuprofen (ADVIL,MOTRIN) 600 MG tablet Take 600 mg by mouth Every 6 (Six) Hours As Needed for Mild Pain (1-3).     • Lysine 1000 MG tablet Take 1,000 mg by mouth.     • metoprolol succinate XL (TOPROL-XL) 50 MG 24 hr tablet Take 50 mg  by mouth Daily.     • Mirabegron ER (MYRBETRIQ) 50 MG tablet sustained-release 24 hour 24 hr tablet Take 50 mg by mouth Daily. 90 tablet 3   • omeprazole (priLOSEC) 40 MG capsule      • ondansetron (ZOFRAN) 4 MG tablet Take 4 mg by mouth Every 8 (Eight) Hours As Needed for Nausea or Vomiting.     • phenazopyridine (PYRIDIUM) 200 MG tablet Take 1 tablet by mouth 3 (Three) Times a Day As Needed for bladder spasms for up to 20 doses. 20 tablet 0   • Probiotic Product (PROBIOTIC PO) Take  by mouth.     • promethazine (PHENERGAN) 25 MG tablet take 1 tablet by mouth every 6 hours if needed for nausea  0   • PSYLLIUM HUSK PO Take  by mouth.     • topiramate (TOPAMAX) 100 MG tablet Take 1 tablet by mouth Daily. 90 tablet 3   • TREXIMET  MG per tablet Take one tablet at onset of headache. May repeat dose one time in 2 hours if headache not relieved. 27 tablet 0   • triamcinolone (KENALOG) 0.1 % ointment apply SPARINGLY to affected area twice a day if needed  1   • vitamin E 400 UNIT capsule Take 800 Units by mouth Daily.       Current Facility-Administered Medications   Medication Dose Route Frequency Provider Last Rate Last Dose   • bupivacaine (MARCAINE) 0.25 % injection 5 mL  5 mL Injection Once Kit Su MD, FAAN       • bupivacaine PF (MARCAINE) 0.75 % injection 37.5 mg  5 mL Injection Once Kit Su MD, FAAN       • bupivacaine PF (MARCAINE) 0.75 % injection 37.5 mg  5 mL Injection Once Kit Su MD, FAAN       • bupivacaine PF (MARCAINE) 0.75 % injection 37.5 mg  5 mL Injection Once Kit Su MD, FAAN           Allergy:   Allergies   Allergen Reactions   • Penicillins Shortness Of Breath and Palpitations   • Amoxicillin    • Demerol [Meperidine]    • Latex    • Morphine And Related        Review of Systems:  Review of Systems   Constitutional: Negative for chills and fever.   HENT: Negative for congestion, ear pain, hearing loss, rhinorrhea and sore throat.    Eyes: Negative for pain,  "discharge and redness.   Respiratory: Negative for cough, shortness of breath, wheezing and stridor.    Cardiovascular: Negative for chest pain, palpitations and leg swelling.   Gastrointestinal: Negative for abdominal pain, constipation, nausea and vomiting.   Endocrine: Negative for cold intolerance, heat intolerance and polyphagia.   Genitourinary: Negative for dysuria, flank pain, frequency and urgency.   Musculoskeletal: Negative for joint swelling, myalgias, neck pain and neck stiffness.   Skin: Negative for pallor, rash and wound.   Allergic/Immunologic: Negative for environmental allergies.   Neurological: Positive for headaches. Negative for dizziness, tremors, seizures, syncope, facial asymmetry, speech difficulty, weakness, light-headedness and numbness.   Hematological: Negative for adenopathy.   Psychiatric/Behavioral: Positive for sleep disturbance. Negative for confusion and hallucinations. The patient is not nervous/anxious.        Physical Exam     Vitals:    10/04/18 0943   BP: 106/62   Pulse: 78   SpO2: 99%   Height: 160 cm (63\")     GENERAL: Patient is pleasant, cooperative, appears to be stated age.  Body habitus is endomorphic.  SKIN AND EXTREMITIES:  No skin rashes or lesions are noted.  No cyanosis, clubbing or edema of the extremities.    HEAD:  Head is normocephalic and atraumatic.    NECK: Neck are non-tender without thyromegaly or adenopathy.  Carotic upstrokes are 1+/4.  No cranial or cervical bruits.  The neck is supple with a full range of motion.   ENT: palate elevate symmetrically, no evidence of high arch palate, tongue midline erythema in posterior pharynx, Mallampati Classification Class III   CARDIOVASCULAR:  Regular rate and rhythm with normal S1 and S2 without rub or gallop.  RESPIRATORY:  Clear to auscultation without wheezes or crackle   ABDOMEN:  Soft and non-tender, positive bowel sound without hepatosplenomegaly  BACK:  Back is straight without midline defect.    PSYCH:  " Higher cortical function/mental status:  The patient is alert.  She is oriented x3 to time, place and person.  Recent and the remote memory appear normal.  The patient has a good fund of knowledge.  There is no visual or auditory hallucination or suicidal or homicidal ideation.  SPEECH:There is no gross evidence of aphasia, dysarthria or agnosia.      CRANIAL NERVES:  Pupils are 4mm, equal round reactive to light, reacting briskly to 2mm without afferent pupillary defect.  Visual fields are intact to confrontation testing.  Fundoscopic examination reveals sharp disk margins with normal vasculature.  No papilledema, hemorrhages or exudates.  Extraocular movements are full and smooth with normal pursuits and saccades.  No nystagmus noted.  The face is symmetric. palate elevate symmetrically, Tongue midline, positive gag reflex. The remainder of the cranial nerves are intact and symmetrical.    MOTOR: Strength is 5/5 throughout with normal tone and bulk with the following exceptions, 4/5 intrinsic muscles of the hands and feet.  No involuntary movements noted.    Deep Tendon Reflexes: are 2/4 and symmetrical in the upper extremities, 2/4 and symmetrical at the knees and 1/4 and symmetrical at the Achilles tendon.  Plantar responses were down-going bilaterally.    SENSATION:  Intact to pinprick, light touch, vibration and proprioception.  Coordination:  The patient normally performs finger-nose-finger, heel-to-knee-to-shin and rapid alternating movements in symmetrical fashion.    COORDINATION AND GAIT:  The patient walks with a narrow-based gait.  Patient is able to heel-toe and tandem walk forward and backwards without difficulty.  Romberg and monopedal  Romberg are negative.    MUSCULOSKELETAL: Range of motion normal, no clubbing, cyanosis, or edema.  No joint swelling.            Records Reviewed: I have personally reviewed her previous medical record.    Renae was seen today for migraine.    Diagnoses and all  "orders for this visit:    Migraine without aura and without status migrainosus, not intractable    Other orders  -     Discontinue: TREXIMET  MG per tablet; Take 1 tablet by mouth 1 (One) Time for 1 dose.  -     Discontinue: amitriptyline (ELAVIL) 25 MG tablet; Use as directed  -     Discontinue: topiramate (TOPAMAX) 100 MG tablet; Take 1 tablet by mouth Daily.  -     amitriptyline (ELAVIL) 25 MG tablet; Use as directed  -     topiramate (TOPAMAX) 100 MG tablet; Take 1 tablet by mouth Daily.  -     TREXIMET  MG per tablet; Take one tablet at onset of headache. May repeat dose one time in 2 hours if headache not relieved.        Treatments:    Discussion:  Migraine Headache  Migraine headaches are a major public health problem affecting more than 28 million persons in this country. Nearly 25 percent of women and 9 percent of men experience disabling migraines.    Migraine treatment depends on the duration and severity of pain, associated symptoms, degree of disability, and initial response to therapy.  A widely prescribed and effective class of medications for migraines is the 5-HT1 receptor-specific agonists (\"triptans\").  Contraindications to their use include ischemic vascular conditions, vasospastic coronary disease, uncontrolled hypertension, or other significant cardiovascular disease.  Following appropriate management of acute migraine, patients should be evaluated for initiation of preventive therapy. Factors that should prompt consideration of preventive therapy include the occurrence of two or more migraines per month with disability lasting three or more days per month; failure of, contraindication for, or adverse events from acute treatments; use of abortive medication more than twice per week; and uncommon migraine conditions (e.g., hemiplegic migraine, migraine with prolonged aura, migrainous infarction). Patient preference and cost also should be considered.  Evidence consistently supports " the use of the beta blocker propranolol (Inderal) in migraine prophylaxis. Amitriptyline is a first-line agent for migraine prophylaxis and is the only antidepressant with consistent evidence supporting its effectiveness for this use. Divalproex (Depakote) and sodium valproate are well supported by evidence for use in migraine prevention.  Topamax has also been studies for migraine prophylaxis  in open label studies and double blind studies. Evidence does not support the use of diltiazem (Cardizem) in migraine prevention, and the evidence for several other calcium channel blockers, such as nifedipine (Procardia), is poor and suggests only modest effect  Menstrual Migraine can present a challenge to clinician.  Estrogen withdrawal has been shown to precipitate migraine headaches, and a sustained elevated level of estrogen will postpone the migraine. Transdermal estrogen started just before menstruation can provide a sustained low level of estrogen, decreasing the degree of estrogen decline, and thus may prevent induction of migraines    This Document is signed by Kit Su MD, FAAN, FAASM October 6, 20187:41 PM

## 2018-10-09 RX ORDER — TOPIRAMATE 100 MG
100 TABLET ORAL DAILY
Qty: 90 TABLET | Refills: 1 | Status: SHIPPED | OUTPATIENT
Start: 2018-10-09 | End: 2018-10-15 | Stop reason: SDUPTHER

## 2018-10-15 ENCOUNTER — TELEPHONE (OUTPATIENT)
Dept: NEUROLOGY | Facility: CLINIC | Age: 43
End: 2018-10-15

## 2018-10-15 RX ORDER — TOPIRAMATE 100 MG
100 TABLET ORAL 2 TIMES DAILY
Qty: 60 TABLET | Refills: 5 | Status: SHIPPED | OUTPATIENT
Start: 2018-10-15 | End: 2018-10-15 | Stop reason: SDUPTHER

## 2018-10-15 RX ORDER — TOPIRAMATE 100 MG
100 TABLET ORAL 2 TIMES DAILY
Qty: 180 TABLET | Refills: 1 | Status: SHIPPED | OUTPATIENT
Start: 2018-10-15 | End: 2019-04-04 | Stop reason: SDUPTHER

## 2018-10-15 NOTE — TELEPHONE ENCOUNTER
Pt called wanting to have it added to her chart that she has to have the brand name and not the generic. Also Dr Su wants her to take it 2 times a day and when it was called in she only had enough for once a day.      Thanks

## 2018-11-28 ENCOUNTER — OFFICE VISIT (OUTPATIENT)
Dept: OBSTETRICS AND GYNECOLOGY | Facility: CLINIC | Age: 43
End: 2018-11-28

## 2018-11-28 VITALS
BODY MASS INDEX: 38.62 KG/M2 | DIASTOLIC BLOOD PRESSURE: 66 MMHG | HEIGHT: 63 IN | SYSTOLIC BLOOD PRESSURE: 128 MMHG | WEIGHT: 218 LBS

## 2018-11-28 DIAGNOSIS — Z01.419 ENCOUNTER FOR GYNECOLOGICAL EXAMINATION WITHOUT ABNORMAL FINDING: Primary | ICD-10-CM

## 2018-11-28 DIAGNOSIS — N32.81 OVERACTIVE BLADDER: ICD-10-CM

## 2018-11-28 DIAGNOSIS — B00.9 RECURRENT HSV (HERPES SIMPLEX VIRUS): ICD-10-CM

## 2018-11-28 DIAGNOSIS — Z12.39 ENCOUNTER FOR BREAST CANCER SCREENING OTHER THAN MAMMOGRAM: ICD-10-CM

## 2018-11-28 PROCEDURE — 99396 PREV VISIT EST AGE 40-64: CPT | Performed by: OBSTETRICS & GYNECOLOGY

## 2018-11-28 RX ORDER — FAMCICLOVIR 250 MG/1
250 TABLET ORAL 3 TIMES DAILY
Qty: 270 TABLET | Refills: 4 | Status: SHIPPED | OUTPATIENT
Start: 2018-11-28 | End: 2019-12-12 | Stop reason: SDUPTHER

## 2018-11-28 NOTE — PROGRESS NOTES
Subjective  Chief Complaint   Patient presents with   • Gynecologic Exam     Patient is 43 y.o.  here for annual examination.  Pt doing well.  Pt continues to take valtrex.  Pt also on myrbetriq doing well and desires to continue.  Pt had mmg done in August.  Pt see's Dr. Beard.    History  Past Medical History:   Diagnosis Date   • Arrhythmia    • Depression    • Genital HSV    • GERD (gastroesophageal reflux disease)    • Migraine    • Normal vaginal Papanicolaou smear in patient with history of hysterectomy 2016     Current Outpatient Medications on File Prior to Visit   Medication Sig Dispense Refill   • acyclovir (ZOVIRAX) 5 % ointment Apply  topically Every 3 (Three) Hours. 30 g 6   • amitriptyline (ELAVIL) 25 MG tablet Use as directed 90 tablet 1   • aspirin 81 MG EC tablet Take 81 mg by mouth Daily.     • cholecalciferol (VITAMIN D3) 1000 UNITS tablet Take 1,000 Units by mouth Daily.     • clobetasol propionate (CLOBEX) 0.05 % shampoo apply TO HAIR AND SCALP as directed  0   • cyclobenzaprine (FLEXERIL) 5 MG tablet take 1 tablet by mouth three times a day if needed  0   • DULoxetine (CYMBALTA) 60 MG capsule Take 60 mg by mouth Daily.     • ibuprofen (ADVIL,MOTRIN) 600 MG tablet Take 600 mg by mouth Every 6 (Six) Hours As Needed for Mild Pain (1-3).     • Lysine 1000 MG tablet Take 1,000 mg by mouth.     • metoprolol succinate XL (TOPROL-XL) 50 MG 24 hr tablet Take 50 mg by mouth Daily.     • omeprazole (priLOSEC) 40 MG capsule      • ondansetron (ZOFRAN) 4 MG tablet Take 4 mg by mouth Every 8 (Eight) Hours As Needed for Nausea or Vomiting.     • phenazopyridine (PYRIDIUM) 200 MG tablet Take 1 tablet by mouth 3 (Three) Times a Day As Needed for bladder spasms for up to 20 doses. 20 tablet 0   • Probiotic Product (PROBIOTIC PO) Take  by mouth.     • promethazine (PHENERGAN) 25 MG tablet take 1 tablet by mouth every 6 hours if needed for nausea  0   • PSYLLIUM HUSK PO Take  by mouth.     •  TOPAMAX 100 MG tablet Take 1 tablet by mouth 2 (Two) Times a Day. 180 tablet 1   • TREXIMET  MG per tablet Take one tablet at onset of headache. May repeat dose one time in 2 hours if headache not relieved. 27 tablet 0   • triamcinolone (KENALOG) 0.1 % ointment apply SPARINGLY to affected area twice a day if needed  1   • vitamin E 400 UNIT capsule Take 800 Units by mouth Daily.       Current Facility-Administered Medications on File Prior to Visit   Medication Dose Route Frequency Provider Last Rate Last Dose   • bupivacaine (MARCAINE) 0.25 % injection 5 mL  5 mL Injection Once Kit Su MD, FAAN       • bupivacaine PF (MARCAINE) 0.75 % injection 37.5 mg  5 mL Injection Once Kit Su MD, FAAN       • bupivacaine PF (MARCAINE) 0.75 % injection 37.5 mg  5 mL Injection Once Kit Su MD, FAAN       • bupivacaine PF (MARCAINE) 0.75 % injection 37.5 mg  5 mL Injection Once Kit Su MD, FAAN         Allergies   Allergen Reactions   • Penicillins Shortness Of Breath and Palpitations   • Amoxicillin    • Demerol [Meperidine]    • Latex    • Morphine And Related    • Topiramate Other (See Comments)     Pt states she can take NAME BRAND ONLY     Past Surgical History:   Procedure Laterality Date   • ANTERIOR AND POSTERIOR VAGINAL REPAIR  06/23/2010    DR EDIN BYRD   • BREAST LUMPECTOMY Left    • CHOLECYSTECTOMY     • CYSTOSCOPY  6/23/210    DR EDIN BYRD   • CYSTOSCOPY, DIMETHYL SULFOXIDE COCKTAIL  03/14/2011    WITH HYDRODISTENTION (DR AVINASH MADDOX)   • HYSTERECTOMY  06/23/2010    [facility] (DR EDIN BYRD)   • TRANSVAGINAL TAPING SUSPENSION WITH OBTURATOR  06/23/2010    DR EDIN BYRD   • WISDOM TOOTH EXTRACTION       Family History   Problem Relation Age of Onset   • Diabetes Father    • Breast cancer Sister    • Hypertension Maternal Grandmother    • Alzheimer's disease Maternal Grandmother    • Breast cancer Maternal Grandmother    • Heart disease Maternal Grandmother    • Hyperlipidemia Other    •  "Gout Other    • Migraines Other    • Breast cancer Maternal Aunt    • Stroke Maternal Grandfather    • Diabetes Paternal Grandfather    • Hypertension Paternal Grandfather      Social History     Socioeconomic History   • Marital status:      Spouse name: Not on file   • Number of children: Not on file   • Years of education: Not on file   • Highest education level: Not on file   Occupational History   • Occupation: 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Employer: [employer]   Tobacco Use   • Smoking status: Never Smoker   • Smokeless tobacco: Never Used   Substance and Sexual Activity   • Alcohol use: No   • Drug use: No   • Sexual activity: Yes     Partners: Male     Review of Systems  The following systems were reviewed and negative:  constitution, eyes, ENT, respiratory, cardiovascular, gastrointestinal, genitourinary, integument, breast, hematologic / lymphatic, musculoskeletal, neurological, behavioral/psych, endocrine and allergies / immunologic     Objective  Vitals:    11/28/18 1356   BP: 128/66   Weight: 98.9 kg (218 lb)   Height: 160 cm (63\")     Physical Exam:  General Appearance: alert, appears stated age and cooperative  Head: normocephalic, without obvious abnormality and atraumatic  Eyes: lids and lashes normal, conjunctivae and sclerae normal, no icterus, no pallor, corneas clear and PERRLA  Ears: ears appear intact with no abnormalities noted  Nose: nares normal, septum midline, mucosa normal and no drainage  Neck: suppple, trachea midline and no thyromegaly  Lungs: clear to auscultation, respirations regular, respirations even and respirations unlabored  Heart: regular rhythm and normal rate, normal S1, S2, no murmur, gallop, or rubs and no click  Breasts: Examined in supine position  Symmetric without masses or skin dimpling  Nipples normal without inversion, lesions or discharge  There are no palpable axillary nodes  Abdomen: normal bowel sounds, no masses, no hepatomegaly, no splenomegaly, soft " non-tender, no guarding and no rebound tenderness  Pelvic: Clinical staff was present for exam  External genitalia:  normal appearance of the external genitalia including Bartholin's and Galien's glands.  :  urethral meatus normal;  Vaginal:  atrophic mucosal changes are present;  Cervix:  absent.  Uterus:  absent.  Adnexa:  non palpable bilaterally.  Extremities: moves extremities well, no edema, no cyanosis and no redness  Skin: no bleeding, bruising or rash and no lesions noted  Lymph Nodes: no palpable adenopathy  Neuro: CN II-X grossly intact; sensation intact  Psych: normal mood and affect, oriented to person, time and place, thought content organized and appropriate judgment  Lab Review   No data reviewed    Imaging   Mammogram report    Assessment/Plan  Problem List Items Addressed This Visit     None      Visit Diagnoses     Encounter for gynecological examination without abnormal finding    -  Primary  Pap was done today.  If she does not receive the results of the Pap within 2 weeks  time, she was instructed to call to find out the results.  I explained to Renae that the recommendations for Pap smear interval in a low risk patient has lengthened to 3 years time if cytology alone normal or  5 years time if both cytology and HPV testing were normal.  I encouraged her to be seen yearly for a full physical exam including breast and pelvic exam even during the off years when PAP's will not be performed.    Relevant Orders    Pap IG, Rfx HPV ASCU    Recurrent HSV (herpes simplex virus)      Rx given as noted.    Relevant Medications    famciclovir (FAMVIR) 250 MG tablet    Encounter for breast cancer screening other than mammogram      Renae was counseled regarding having clinical breast exams and breast self-awareness.  Women aged 29-39 years of age should have clinical breast exams every 1-3 years and yearly aged 40 and older.  The patient was counseled regarding breast self-awareness focusing on having  a sense of what is normal for her breasts so that she can tell if there are changes.  Even small changes should be reported to provider.    It is recommended per ACOG, for women at average risk to start annual mammogram screening at the age of 40 until the age of 75 and an individualized decision be made for women after age 75.  She was encouraged to continue getting yearly mammograms.  She should report any palpable breast lump(s) or skin changes regardless of mammographic findings.  I explained to Renae that notification regarding her mammogram results will come from the center performing the study.  Our office will not be routinely calling with mammogram results.  It is her responsibility to make sure that the results from the mammogram are communicated to her by the breast center.  If she has any questions about the results, she is welcome to call our office anytime.  MMG done.    Overactive bladder      Rx given as noted.    Relevant Medications    Mirabegron ER (MYRBETRIQ) 50 MG tablet sustained-release 24 hour 24 hr tablet        Follow up as discussed/scheduled  This note was electronically signed.  Shabana Hedrick M.D.

## 2018-12-04 DIAGNOSIS — Z01.419 ENCOUNTER FOR GYNECOLOGICAL EXAMINATION WITHOUT ABNORMAL FINDING: ICD-10-CM

## 2019-01-09 NOTE — TELEPHONE ENCOUNTER
"[facility] CALLED STATING THEY NEED A NEW PRESCRIPTION FOR THE AMITRIPTYLINE BECAUSE THE INSTRUCTIONS ARE \"USE AS DIRECTED\"  "

## 2019-01-17 RX ORDER — AMITRIPTYLINE HYDROCHLORIDE 25 MG/1
25 TABLET, FILM COATED ORAL NIGHTLY
Qty: 90 TABLET | Refills: 1 | Status: SHIPPED | OUTPATIENT
Start: 2019-01-17 | End: 2019-08-21 | Stop reason: SDUPTHER

## 2019-01-17 RX ORDER — AMITRIPTYLINE HYDROCHLORIDE 25 MG/1
25 TABLET, FILM COATED ORAL NIGHTLY
Qty: 90 TABLET | Refills: 1 | Status: SHIPPED | OUTPATIENT
Start: 2019-01-17 | End: 2019-01-17 | Stop reason: SDUPTHER

## 2019-04-01 ENCOUNTER — HOSPITAL ENCOUNTER (OUTPATIENT)
Dept: GENERAL RADIOLOGY | Facility: HOSPITAL | Age: 44
Discharge: HOME | End: 2019-04-01
Admitting: FAMILY MEDICINE

## 2019-04-01 ENCOUNTER — TRANSCRIBE ORDERS (OUTPATIENT)
Dept: ADMINISTRATIVE | Facility: HOSPITAL | Age: 44
End: 2019-04-01

## 2019-04-01 DIAGNOSIS — R05.9 COUGH: ICD-10-CM

## 2019-04-01 DIAGNOSIS — R05.9 COUGH: Primary | ICD-10-CM

## 2019-04-01 PROCEDURE — 71046 X-RAY EXAM CHEST 2 VIEWS: CPT

## 2019-04-04 ENCOUNTER — OFFICE VISIT (OUTPATIENT)
Dept: NEUROLOGY | Facility: CLINIC | Age: 44
End: 2019-04-04

## 2019-04-04 VITALS
HEIGHT: 63 IN | OXYGEN SATURATION: 99 % | SYSTOLIC BLOOD PRESSURE: 108 MMHG | HEART RATE: 77 BPM | DIASTOLIC BLOOD PRESSURE: 70 MMHG | BODY MASS INDEX: 38.79 KG/M2

## 2019-04-04 DIAGNOSIS — G43.011 INTRACTABLE MIGRAINE WITHOUT AURA AND WITH STATUS MIGRAINOSUS: ICD-10-CM

## 2019-04-04 DIAGNOSIS — M54.2 NECK PAIN: ICD-10-CM

## 2019-04-04 DIAGNOSIS — G44.209 TENSION HEADACHE: Primary | ICD-10-CM

## 2019-04-04 DIAGNOSIS — M79.18 MYOFASCIAL MUSCLE PAIN: ICD-10-CM

## 2019-04-04 PROCEDURE — 99214 OFFICE O/P EST MOD 30 MIN: CPT | Performed by: PSYCHIATRY & NEUROLOGY

## 2019-04-04 NOTE — PROGRESS NOTES
[facility] NEUROLOGY [place] CONSULTATION   History of Present Illness     Date: 2019    Patient Identification  Renae Bolton is a 43 y.o. female.    Patient information was obtained from patient.  History/Exam limitations: none.    CONSULTATION requested by: Renae Beard MD    Chief Complaint   Migraine (Pt in office today for follow up appt)      History of Present Illness   Patient is a pleasant 43-year-old referred to [facility] neurology [facility] for evaluation of migraine headache.  Patient has been receiving trigger point injection for persistent tension headache migraine headache myofascial pain and neck pain.  In today's visit patient reports that her headache has improved since last visit.  We will continue patient on prophylactic treatment with Topamax 100 mg 1 pill twice a day and continue to schedule patient of trigger point injection.  Continue patient on Treximet for headache .    PMH:   Past Medical History:   Diagnosis Date   • Arrhythmia    • Depression    • Genital HSV    • GERD (gastroesophageal reflux disease)    • Migraine    • Normal vaginal Papanicolaou smear in patient with history of hysterectomy 2016       Past Surgical History:   Past Surgical History:   Procedure Laterality Date   • ANTERIOR AND POSTERIOR VAGINAL REPAIR  2010    DR EDIN BYRD   • BREAST LUMPECTOMY Left    • CHOLECYSTECTOMY     • CYSTOSCOPY      DR EDIN BYRD   • CYSTOSCOPY, DIMETHYL SULFOXIDE COCKTAIL  2011    WITH HYDRODISTENTION (DR AVINASH MADDOX)   • HYSTERECTOMY  2010    [facility] (DR EDIN BYRD)   • TRANSVAGINAL TAPING SUSPENSION WITH OBTURATOR  2010    DR EDIN BYRD   • WISDOM TOOTH EXTRACTION         Family Hisotry:   Family History   Problem Relation Age of Onset   • Diabetes Father    • Breast cancer Sister    • Hypertension Maternal Grandmother    • Alzheimer's disease Maternal Grandmother    • Breast cancer Maternal Grandmother    • Heart  disease Maternal Grandmother    • Hyperlipidemia Other    • Gout Other    • Migraines Other    • Breast cancer Maternal Aunt    • Stroke Maternal Grandfather    • Diabetes Paternal Grandfather    • Hypertension Paternal Grandfather        Social History:   Social History     Socioeconomic History   • Marital status:      Spouse name: Not on file   • Number of children: Not on file   • Years of education: Not on file   • Highest education level: Not on file   Occupational History   • Occupation: 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Employer: [employer]   Tobacco Use   • Smoking status: Never Smoker   • Smokeless tobacco: Never Used   Substance and Sexual Activity   • Alcohol use: No   • Drug use: No   • Sexual activity: Yes     Partners: Male       Medications:   Current Outpatient Medications   Medication Sig Dispense Refill   • acyclovir (ZOVIRAX) 5 % ointment Apply  topically Every 3 (Three) Hours. 30 g 6   • albuterol sulfate  (90 Base) MCG/ACT inhaler INHALE 1 TO 2 PUFFS BY MOUTH EVERY 4 TO 6 HOURS AS NEEDED. 8.5 g 3   • amitriptyline (ELAVIL) 25 MG tablet Take 1 tablet by mouth Every Night as directed 90 tablet 1   • aspirin 81 MG EC tablet Take 81 mg by mouth Daily.     • cholecalciferol (VITAMIN D3) 1000 UNITS tablet Take 1,000 Units by mouth Daily.     • clobetasol propionate (CLOBEX) 0.05 % shampoo apply TO HAIR AND SCALP as directed  0   • cyclobenzaprine (FLEXERIL) 5 MG tablet Take 1 tablet by mouth 3 (Three) Times a Day As Needed. 270 tablet 3   • doxycycline (MONODOX) 100 MG capsule Take 1 capsule by mouth Every 12 (Twelve) Hours. 20 capsule 0   • DULoxetine (CYMBALTA) 60 MG capsule Take 1 capsule by mouth Daily. 90 capsule 1   • famciclovir (FAMVIR) 250 MG tablet Take 1 tablet by mouth 3 (Three) Times a Day. 270 tablet 4   • ibuprofen (ADVIL,MOTRIN) 600 MG tablet Take 1 tablet by mouth Every 6 (Six) Hours with meals 360 tablet 3   • INNOPRAN  MG 24 hr capsule   0   • Lysine 1000 MG tablet Take  1,000 mg by mouth.     • methylPREDNISolone (MEDROL, THADDEUS,) 4 MG tablet Take as directed per package 21 tablet 0   • Mirabegron ER (MYRBETRIQ) 50 MG tablet sustained-release 24 hour 24 hr tablet Take 1 Tablet by mouth Daily. 90 tablet 3   • omeprazole (priLOSEC) 40 MG capsule Take 1 capsule by mouth Daily. 90 capsule 1   • ondansetron (ZOFRAN) 4 MG tablet Take 4 mg by mouth Every 8 (Eight) Hours As Needed for Nausea or Vomiting.     • Probiotic Product (PROBIOTIC PO) Take  by mouth.     • promethazine (PHENERGAN) 25 MG tablet Take 1 tablet by mouth Every 6 (Six) Hours As Needed for nausea 30 tablet 4   • propranolol (INDERAL) 40 MG tablet Take 2 tablets by mouth twice daily 360 tablet 3   • PSYLLIUM HUSK PO Take  by mouth.     • SUMAtriptan-naproxen (TREXIMET)  MG per tablet Take 1 Tablet by mouth at onset of Headache. May repeat in 2 hours as needed. (Max 2 tablets in 24 hours) 27 tablet 5   • TOPAMAX 100 MG tablet Take 1 tablet by mouth 2 (Two) Times a Day. (Patient taking differently: Take 100 mg by mouth Daily.) 180 tablet 1   • triamcinolone (KENALOG) 0.1 % ointment apply SPARINGLY to affected area twice a day if needed  1   • vitamin E 400 UNIT capsule Take 800 Units by mouth Daily.       Current Facility-Administered Medications   Medication Dose Route Frequency Provider Last Rate Last Dose   • bupivacaine (MARCAINE) 0.25 % injection 5 mL  5 mL Injection Once Kit Su MD, FAAN       • bupivacaine PF (MARCAINE) 0.75 % injection 37.5 mg  5 mL Injection Once Kit Su MD, FAAN       • bupivacaine PF (MARCAINE) 0.75 % injection 37.5 mg  5 mL Injection Once Kit Su MD, FAAN       • bupivacaine PF (MARCAINE) 0.75 % injection 37.5 mg  5 mL Injection Once Kit Su MD, FAAN           Allergy:   Allergies   Allergen Reactions   • Caffeine Arrhythmia   • Penicillins Shortness Of Breath and Palpitations   • Amoxicillin    • Demerol [Meperidine]    • Latex    • Morphine And Related    •  "Percocet [Oxycodone-Acetaminophen] Dizziness and Confusion     Disoriented    • Topiramate Other (See Comments)     Pt states she can take NAME BRAND ONLY       Review of Systems:  Review of Systems   Constitutional: Positive for fatigue. Negative for chills and fever.   HENT: Negative for congestion, ear pain, hearing loss, rhinorrhea and sore throat.    Eyes: Negative for pain, discharge and redness.   Respiratory: Negative for cough, shortness of breath, wheezing and stridor.    Cardiovascular: Negative for chest pain, palpitations and leg swelling.   Gastrointestinal: Negative for abdominal pain, constipation, nausea and vomiting.   Endocrine: Negative for cold intolerance, heat intolerance and polyphagia.   Genitourinary: Negative for dysuria, flank pain, frequency and urgency.   Musculoskeletal: Positive for neck pain and neck stiffness. Negative for joint swelling and myalgias.   Skin: Negative for pallor, rash and wound.   Allergic/Immunologic: Negative for environmental allergies.   Neurological: Positive for headaches. Negative for dizziness, tremors, seizures, syncope, facial asymmetry, speech difficulty, weakness, light-headedness and numbness.   Hematological: Negative for adenopathy.   Psychiatric/Behavioral: Positive for sleep disturbance. Negative for confusion and hallucinations. The patient is not nervous/anxious.        Physical Exam     Vitals:    04/04/19 1006   BP: 108/70   Pulse: 77   SpO2: 99%   Height: 160 cm (63\")     GENERAL: Patient is pleasant, cooperative, appears to be stated age.  Body habitus is endomorphic.  SKIN AND EXTREMITIES:  No skin rashes or lesions are noted.  No cyanosis, clubbing or edema of the extremities.    HEAD:  Head is normocephalic and atraumatic.    NECK: Nontender without thyromegaly or adenopathy.  Carotid upstrokes are 1+/4.  No cranial or cervical bruits.  The neck is supple with a full range of motion.   ENT: Palate elevates symmetrically.  No evidence of " high arch palate.  Tongue midline.  No erythema in posterior pharynx.  Mallampati Classification Class III   CARDIOVASCULAR:  Regular rate and rhythm with normal S1 and S2 without rub or gallop.  RESPIRATORY:  Clear to auscultation without wheezes or crackle   ABDOMEN:  Soft and nontender, positive bowel sound without hepatosplenomegaly  BACK:  Back is straight without midline defect.    PSYCH:  Higher cortical function/mental status:  The patient is alert.  The patient is oriented x3 to time, place and person.  Recent and the remote memory appear normal.  The patient has a good fund of knowledge.  There is no visual or auditory hallucination or suicidal or homicidal ideation.  SPEECH:There is no gross evidence of aphasia, dysarthria or agnosia.      CRANIAL NERVES:  Pupils are 4mm, equal round reactive to light, reacting briskly to 2mm without afferent pupillary defect.  Visual fields are intact to confrontation testing.  Funduscopic examination reveals sharp disc margins with normal vasculature.  No papilledema, hemorrhages or exudates.  Extraocular movements are full and smooth with normal pursuits and saccades.  No nystagmus noted.  The face is symmetric. Palate elevates symmetrically, Tongue midline, positive gag reflex. The remainder of the cranial nerves are intact and symmetrical.    MOTOR: Strength is 5/5 throughout with normal tone and bulk with the following exceptions, 4/5 intrinsic muscles of the hands and feet.  No involuntary movements noted.    Deep Tendon Reflexes: are 2/4 and symmetrical in the upper extremities, 2/4 and symmetrical at the knees and 1/4 and symmetrical at the Achilles tendon.  Plantar responses were down-going bilaterally.    SENSATION:  Intact to pinprick, light touch, vibration and proprioception.  Coordination:  The patient normally performs finger-nose-finger, heel-to-knee-to-shin and rapid alternating movements in symmetrical fashion.    COORDINATION AND GAIT:  The patient  "walks with a narrow-based gait.  Patient is able to heel-toe and tandem walk forward and backwards without difficulty.  Romberg and monopedal  Romberg are negative.    MUSCULOSKELETAL: Range of motion normal, no clubbing, cyanosis, or edema.  No joint swelling.            Records Reviewed: I have personally reviewed her previous medical record.    Renae was seen today for migraine.    Diagnoses and all orders for this visit:    Tension headache    Neck pain    Myofascial muscle pain    Intractable migraine without aura and with status migrainosus        Discussion:  In summary, 43-year-old referred to [facility] neurology [facility] for evaluation of migraine headache.  Patient has been receiving trigger point injection for persistent tension headache migraine headache myofascial pain and neck pain.  In today's visit patient reports that her headache has improved since last visit.  We will continue patient on prophylactic treatment with Topamax 100 mg 1 pill twice a day and continue to schedule patient of trigger point injection.  Continue patient on Treximet for headache .    Migraine Headache  Migraine headaches are a major public health problem affecting more than 28 million persons in this country. Nearly 25 percent of women and 9 percent of men experience disabling migraines.    Migraine treatment depends on the duration and severity of pain, associated symptoms, degree of disability, and initial response to therapy.  A widely prescribed and effective class of medications for migraines is the 5-HT1 receptor-specific agonists (\"triptans\").  Contraindications to their use include ischemic vascular conditions, vasospastic coronary disease, uncontrolled hypertension, or other significant cardiovascular disease.  Following appropriate management of acute migraine, patients should be evaluated for initiation of preventive therapy. Factors that should prompt consideration of preventive therapy include the " occurrence of two or more migraines per month with disability lasting three or more days per month; failure of, contraindication for, or adverse events from acute treatments; use of abortive medication more than twice per week; and uncommon migraine conditions (e.g., hemiplegic migraine, migraine with prolonged aura, migrainous infarction). Patient preference and cost also should be considered.  Evidence consistently supports the use of the beta blocker propranolol (Inderal) in migraine prophylaxis. Amitriptyline is a first-line agent for migraine prophylaxis and is the only antidepressant with consistent evidence supporting its effectiveness for this use. Divalproex (Depakote) and sodium valproate are well supported by evidence for use in migraine prevention.  Topamax has also been studies for migraine prophylaxis  in open label studies and double blind studies. Evidence does not support the use of diltiazem (Cardizem) in migraine prevention, and the evidence for several other calcium channel blockers, such as nifedipine (Procardia), is poor and suggests only modest effect  Menstrual Migraine can present a challenge to clinician.  Estrogen withdrawal has been shown to precipitate migraine headaches, and a sustained elevated level of estrogen will postpone the migraine. Transdermal estrogen started just before menstruation can provide a sustained low level of estrogen, decreasing the degree of estrogen decline, and thus may prevent induction of migraines    This Document is signed by Kit Su MD, FAAN, FAASM April 14, 201911:03 PM

## 2019-05-30 ENCOUNTER — OFFICE VISIT (OUTPATIENT)
Dept: UROLOGY | Facility: CLINIC | Age: 44
End: 2019-05-30

## 2019-05-30 VITALS
WEIGHT: 219 LBS | TEMPERATURE: 97.5 F | DIASTOLIC BLOOD PRESSURE: 80 MMHG | OXYGEN SATURATION: 96 % | HEIGHT: 63 IN | BODY MASS INDEX: 38.8 KG/M2 | HEART RATE: 79 BPM | SYSTOLIC BLOOD PRESSURE: 110 MMHG

## 2019-05-30 DIAGNOSIS — N39.3 STRESS INCONTINENCE: Primary | ICD-10-CM

## 2019-05-30 DIAGNOSIS — N32.81 OAB (OVERACTIVE BLADDER): ICD-10-CM

## 2019-05-30 DIAGNOSIS — N30.10 INTERSTITIAL CYSTITIS: ICD-10-CM

## 2019-05-30 LAB
BILIRUB BLD-MCNC: NEGATIVE MG/DL
CLARITY, POC: CLEAR
COLOR UR: YELLOW
GLUCOSE UR STRIP-MCNC: NEGATIVE MG/DL
KETONES UR QL: NEGATIVE
LEUKOCYTE EST, POC: NEGATIVE
NITRITE UR-MCNC: NEGATIVE MG/ML
PH UR: 6 [PH] (ref 5–8)
PROT UR STRIP-MCNC: NEGATIVE MG/DL
RBC # UR STRIP: NEGATIVE /UL
SP GR UR: 1.03 (ref 1–1.03)
UROBILINOGEN UR QL: NORMAL

## 2019-05-30 PROCEDURE — 81003 URINALYSIS AUTO W/O SCOPE: CPT | Performed by: UROLOGY

## 2019-05-30 PROCEDURE — 51798 US URINE CAPACITY MEASURE: CPT | Performed by: UROLOGY

## 2019-05-30 PROCEDURE — 99204 OFFICE O/P NEW MOD 45 MIN: CPT | Performed by: UROLOGY

## 2019-05-30 NOTE — PROGRESS NOTES
Chief Complaint  KULWINDER    HPI  Ms. Bolton is a 44 y.o. female Body mass index is 38.8 kg/m². presents with complaint of urinary incontinence for >5 years. Pt has primarily stress mixed urinary incontinence. She also says she has Interstitial Cystitis and has been in remission since a DMSO cocktail/hydrodistention in 2011.     Severity: Pt uses 2-3 pads a day (5-6 if she has a cough) and had a TOT sling with Dr. Byrd (2010) in the past.  Associated Sx: Pt has + h/o daytime frequency, urgency and nocturia x1.     No h/o poor stream, straining, hesitancy or incomplete voiding.     No h/o recurrent UTI, hematuria, nephrolithiasis    No vaginal discharge or bleeding.  No h/o something coming out of vagina   No fever/chills  No weight loss, appetite normal.  yes Constipation - she takes 2 fiber pills daily  2 History of vaginal deliveries    She has an arrythmia that is controlled with a BB    Past Medical History  Past Medical History:   Diagnosis Date   • Arrhythmia    • Depression    • Genital HSV    • GERD (gastroesophageal reflux disease)    • Migraine    • Normal vaginal Papanicolaou smear in patient with history of hysterectomy 09/16/2016       Past Surgical History  Past Surgical History:   Procedure Laterality Date   • ANTERIOR AND POSTERIOR VAGINAL REPAIR  06/23/2010    DR EDIN BYRD   • BREAST LUMPECTOMY Left    • CHOLECYSTECTOMY     • CYSTOSCOPY  6/23/210    DR EDIN BYRD   • CYSTOSCOPY, DIMETHYL SULFOXIDE COCKTAIL  03/14/2011    WITH HYDRODISTENTION (DR AVINASH MADDOX)   • HYSTERECTOMY  06/23/2010    [facility] (DR EDIN BYRD)   • TRANSVAGINAL TAPING SUSPENSION WITH OBTURATOR  06/23/2010    DR EDIN BYRD   • WISDOM TOOTH EXTRACTION         Medications  Current Outpatient Medications   Medication Sig Dispense Refill   • acyclovir (ZOVIRAX) 5 % ointment Apply  topically Every 3 (Three) Hours. 30 g 6   • albuterol sulfate  (90 Base) MCG/ACT inhaler INHALE 1 TO 2 PUFFS BY MOUTH EVERY 4 TO 6 HOURS AS NEEDED. 8.5 g 3    • amitriptyline (ELAVIL) 25 MG tablet Take 1 tablet by mouth Every Night as directed 90 tablet 1   • aspirin 81 MG EC tablet Take 81 mg by mouth Daily.     • cetirizine (zyrTEC) 10 MG tablet Take 1 tablet by mouth Daily. 90 tablet 3   • cholecalciferol (VITAMIN D3) 1000 UNITS tablet Take 1,000 Units by mouth Daily.     • clobetasol propionate (CLOBEX) 0.05 % shampoo apply TO HAIR AND SCALP as directed  0   • cyclobenzaprine (FLEXERIL) 5 MG tablet Take 1 tablet by mouth 3 (Three) Times a Day As Needed. 270 tablet 3   • doxycycline (MONODOX) 100 MG capsule Take 1 capsule by mouth Every 12 (Twelve) Hours. 20 capsule 0   • DULoxetine (CYMBALTA) 60 MG capsule Take 1 capsule by mouth Daily. 90 capsule 1   • famciclovir (FAMVIR) 250 MG tablet Take 1 tablet by mouth 3 (Three) Times a Day. 270 tablet 4   • ibuprofen (ADVIL,MOTRIN) 600 MG tablet Take 1 tablet by mouth Every 6 (Six) Hours with meals 360 tablet 3   • INNOPRAN  MG 24 hr capsule   0   • levoFLOXacin (LEVAQUIN) 500 MG tablet Take 1 tablet by mouth Daily. 10 tablet 0   • Lysine 1000 MG tablet Take 1,000 mg by mouth.     • methylPREDNISolone (MEDROL, THADDEUS,) 4 MG tablet Take by mouth as directed on package 21 tablet 0   • Mirabegron ER (MYRBETRIQ) 50 MG tablet sustained-release 24 hour 24 hr tablet Take 1 Tablet by mouth Daily. 90 tablet 3   • omeprazole (priLOSEC) 40 MG capsule Take 1 capsule by mouth Daily. 90 capsule 1   • ondansetron (ZOFRAN) 4 MG tablet Take 4 mg by mouth Every 8 (Eight) Hours As Needed for Nausea or Vomiting.     • Probiotic Product (PROBIOTIC PO) Take  by mouth.     • promethazine (PHENERGAN) 25 MG tablet Take 1 tablet by mouth Every 6 (Six) Hours As Needed for nausea 30 tablet 4   • propranolol (INDERAL) 40 MG tablet Take 2 tablets by mouth twice daily 360 tablet 3   • PSYLLIUM HUSK PO Take  by mouth.     • SUMAtriptan-naproxen (TREXIMET)  MG per tablet Take 1 Tablet by mouth at onset of Headache. May repeat in 2 hours as needed.  (Max 2 tablets in 24 hours) 27 tablet 5   • TOPAMAX 100 MG tablet Take 1 tablet by mouth 2 (Two) Times a Day. (Patient taking differently: Take 100 mg by mouth Daily.) 180 tablet 1   • triamcinolone (KENALOG) 0.1 % ointment apply SPARINGLY to affected area twice a day if needed  1   • vitamin E 400 UNIT capsule Take 800 Units by mouth Daily.       Current Facility-Administered Medications   Medication Dose Route Frequency Provider Last Rate Last Dose   • bupivacaine (MARCAINE) 0.25 % injection 5 mL  5 mL Injection Once Kit Su MD, FAAN       • bupivacaine PF (MARCAINE) 0.75 % injection 37.5 mg  5 mL Injection Once Kit Su MD, FAAN       • bupivacaine PF (MARCAINE) 0.75 % injection 37.5 mg  5 mL Injection Once Kit Su MD, FAAN       • bupivacaine PF (MARCAINE) 0.75 % injection 37.5 mg  5 mL Injection Once Kit Su MD, FAAN           Allergies  Allergies   Allergen Reactions   • Caffeine Arrhythmia   • Penicillins Shortness Of Breath and Palpitations   • Amoxicillin    • Demerol [Meperidine]    • Latex    • Morphine And Related    • Percocet [Oxycodone-Acetaminophen] Dizziness and Confusion     Disoriented    • Topiramate Other (See Comments)     Pt states she can take NAME BRAND ONLY       Social History  Social History     Socioeconomic History   • Marital status:      Spouse name: Not on file   • Number of children: Not on file   • Years of education: Not on file   • Highest education level: Not on file   Occupational History   • Occupation: 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Employer: [employer]   Tobacco Use   • Smoking status: Never Smoker   • Smokeless tobacco: Never Used   Substance and Sexual Activity   • Alcohol use: No   • Drug use: No   • Sexual activity: Yes     Partners: Male       Family History  She has no family history of bladder or kidney cancer  She has no family history of kidney stones    Review of Systems  Constitutional: No fevers or chills  Skin: Negative for  "rash  Endocrine: No heat/cold intolerance   Cardiovascular: Negative for chest pain or dyspnea on exertion  Respiratory: Negative for shortness of breath or wheezing  Gastrointestinal: No nausea or vomiting  Genitourinary: Negative for current gross hematuria.  Musculoskeletal: No flank pain  Neurological:  Negative for frequent headaches or dizziness  Lymph/Heme: Negative for leg swelling or calf pain.    Physical Exam  Visit Vitals  /80   Pulse 79   Temp 97.5 °F (36.4 °C)   Ht 160 cm (62.99\")   Wt 99.3 kg (219 lb)   SpO2 96%   BMI 38.80 kg/m²     Constitutional: NAD, WDWN  HEENT: NCAT. Conjunctivae normal  MMM  Cardiovascular: Regular rate  Pulmonary/Chest: Respirations are even and non-labored bilaterally  Abdominal: Soft with no distension, tenderness, masses, guarding or CVA tenderness  Neurological: A + O x 3,  cranial nerves II-XII grossly intact  Extremities: RACHEL x 4, warm, no clubbing, no cyanosis  Skin: Pink, warm, dry with no rash  Psychiatric:  Normal mood and affect    Genitourinary:   Deferred    Labs  Brief Urine Lab Results  (Last result in the past 365 days)      Color   Clarity   Blood   Leuk Est   Nitrite   Protein   CREAT   Urine HCG        05/30/19 1453 Yellow Clear Negative Negative Negative Negative               Lab Results   Component Value Date    GLUCOSE 80 05/27/2018    CALCIUM 9.3 05/27/2018     05/27/2018    K 4.2 05/27/2018    CO2 25.0 (L) 05/27/2018     (H) 05/27/2018    BUN 20 05/27/2018    CREATININE 0.90 05/27/2018    EGFRIFNONA 68 05/27/2018    BCR 22.2 05/27/2018    ANIONGAP 14.2 05/27/2018       Lab Results   Component Value Date    WBC 9.60 05/27/2018    HGB 14.7 05/27/2018    HCT 43.0 05/27/2018    MCV 94.3 05/27/2018     05/27/2018       PVR  Post-void residual performed by staff - 15    I have personally reviewed her labs and post void residual imaging.     Assessment  Ms. Bolton is a 44 y.o. female with recurrent FELICIANO after TOT sling in 2010.  She " also has a history of ICD that is well controlled with diet, and very mild OAB that is controlled with timed voiding and Myrbetriq.  She is currently using 2-3 pads per day.    We discussed her options, mainly urethral bulking agents and mesh sling excision with fascial pubovaginal sling.     Plan  1. FU in 1 week for cystoscopy and pelvic exam, leaning towards bulking agent.      Jose Galvan MD

## 2019-06-10 ENCOUNTER — PROCEDURE VISIT (OUTPATIENT)
Dept: UROLOGY | Facility: CLINIC | Age: 44
End: 2019-06-10

## 2019-06-10 ENCOUNTER — APPOINTMENT (OUTPATIENT)
Dept: PREADMISSION TESTING | Facility: HOSPITAL | Age: 44
End: 2019-06-10

## 2019-06-10 VITALS — BODY MASS INDEX: 38.8 KG/M2 | WEIGHT: 219 LBS | HEIGHT: 63 IN | RESPIRATION RATE: 16 BRPM

## 2019-06-10 VITALS — WEIGHT: 229.25 LBS | BODY MASS INDEX: 40.62 KG/M2 | HEIGHT: 63 IN

## 2019-06-10 DIAGNOSIS — N39.3 STRESS INCONTINENCE: ICD-10-CM

## 2019-06-10 DIAGNOSIS — N39.3 STRESS INCONTINENCE: Primary | ICD-10-CM

## 2019-06-10 LAB
ANION GAP SERPL CALCULATED.3IONS-SCNC: 13 MMOL/L (ref 10–20)
BILIRUB UR QL STRIP: NEGATIVE
BUN BLD-MCNC: 12 MG/DL (ref 7–20)
BUN/CREAT SERPL: 13.3 (ref 7.1–23.5)
CALCIUM SPEC-SCNC: 9 MG/DL (ref 8.4–10.2)
CHLORIDE SERPL-SCNC: 108 MMOL/L (ref 98–107)
CLARITY UR: ABNORMAL
CO2 SERPL-SCNC: 22 MMOL/L (ref 26–30)
COLOR UR: YELLOW
CREAT BLD-MCNC: 0.9 MG/DL (ref 0.6–1.3)
DEPRECATED RDW RBC AUTO: 42.9 FL (ref 37–54)
ERYTHROCYTE [DISTWIDTH] IN BLOOD BY AUTOMATED COUNT: 12.7 % (ref 12.3–15.4)
GFR SERPL CREATININE-BSD FRML MDRD: 67 ML/MIN/1.73
GLUCOSE BLD-MCNC: 100 MG/DL (ref 74–98)
GLUCOSE UR STRIP-MCNC: NEGATIVE MG/DL
HCT VFR BLD AUTO: 42.7 % (ref 34–46.6)
HGB BLD-MCNC: 14.5 G/DL (ref 12–15.9)
HGB UR QL STRIP.AUTO: NEGATIVE
KETONES UR QL STRIP: NEGATIVE
LEUKOCYTE ESTERASE UR QL STRIP.AUTO: NEGATIVE
MCH RBC QN AUTO: 31.5 PG (ref 26.6–33)
MCHC RBC AUTO-ENTMCNC: 34 G/DL (ref 31.5–35.7)
MCV RBC AUTO: 92.8 FL (ref 79–97)
NITRITE UR QL STRIP: NEGATIVE
PH UR STRIP.AUTO: 7.5 [PH] (ref 5–8)
PLATELET # BLD AUTO: 267 10*3/MM3 (ref 140–450)
PMV BLD AUTO: 10.7 FL (ref 6–12)
POTASSIUM BLD-SCNC: 4 MMOL/L (ref 3.5–5.1)
PROT UR QL STRIP: NEGATIVE
RBC # BLD AUTO: 4.6 10*6/MM3 (ref 3.77–5.28)
SODIUM BLD-SCNC: 139 MMOL/L (ref 137–145)
SP GR UR STRIP: 1.02 (ref 1–1.03)
UROBILINOGEN UR QL STRIP: ABNORMAL
WBC NRBC COR # BLD: 7.88 10*3/MM3 (ref 3.4–10.8)

## 2019-06-10 PROCEDURE — 80048 BASIC METABOLIC PNL TOTAL CA: CPT | Performed by: UROLOGY

## 2019-06-10 PROCEDURE — 52000 CYSTOURETHROSCOPY: CPT | Performed by: UROLOGY

## 2019-06-10 PROCEDURE — 36415 COLL VENOUS BLD VENIPUNCTURE: CPT

## 2019-06-10 PROCEDURE — 85027 COMPLETE CBC AUTOMATED: CPT | Performed by: UROLOGY

## 2019-06-10 PROCEDURE — 99213 OFFICE O/P EST LOW 20 MIN: CPT | Performed by: UROLOGY

## 2019-06-10 PROCEDURE — 81003 URINALYSIS AUTO W/O SCOPE: CPT | Performed by: UROLOGY

## 2019-06-10 PROCEDURE — 93005 ELECTROCARDIOGRAM TRACING: CPT | Performed by: UROLOGY

## 2019-06-10 RX ORDER — SODIUM CHLORIDE, SODIUM LACTATE, POTASSIUM CHLORIDE, CALCIUM CHLORIDE 600; 310; 30; 20 MG/100ML; MG/100ML; MG/100ML; MG/100ML
100 INJECTION, SOLUTION INTRAVENOUS CONTINUOUS
Status: CANCELLED | OUTPATIENT
Start: 2019-06-10

## 2019-06-10 NOTE — PAT
"Pt here for PAT for upcoming procedure on 6/25/19 with Dr. Galvan.  Pt states that with her last two surgeries, she was told that the CRNA had to use a \"lidocaine lollipop\" with intubation.  Pt states she doesn't remember exactly why they had to do this.  Called anesthesia float phone and spoke with Mike Early, CRNA.  Informed him regarding above.  Mike stated to make a note of this in pt's chart.  Also placed on the nursing hand off communication sheet.   "

## 2019-06-10 NOTE — H&P (VIEW-ONLY)
Chief Complaint  KULWINDER    HPI  Ms. Bolton is a 44 y.o. female Body mass index is 38.8 kg/m². presents with complaint of urinary incontinence for >5 years. Pt has primarily stress mixed urinary incontinence. She also says she has Interstitial Cystitis and has been in remission since a DMSO cocktail/hydrodistention in 2011.     She presents today for follow-up.  She says she continues to still have large amounts of stress urinary incontinence, and the decision was made to perform cystoscopy today.    Severity: Pt uses 2-3 pads a day (5-6 if she has a cough) and had a TOT sling with Dr. Byrd (2010) in the past.  Associated Sx: Pt has + h/o daytime frequency, urgency and nocturia x1.     No h/o poor stream, straining, hesitancy or incomplete voiding.     No h/o recurrent UTI, hematuria, nephrolithiasis    No vaginal discharge or bleeding.  No h/o something coming out of vagina   No fever/chills  No weight loss, appetite normal.  yes Constipation - she takes 2 fiber pills daily  2 History of vaginal deliveries    She has an arrythmia that is controlled with a BB    Past Medical History  Past Medical History:   Diagnosis Date   • Arrhythmia    • Depression    • Genital HSV    • GERD (gastroesophageal reflux disease)    • Migraine    • Normal vaginal Papanicolaou smear in patient with history of hysterectomy 09/16/2016       Past Surgical History  Past Surgical History:   Procedure Laterality Date   • ANTERIOR AND POSTERIOR VAGINAL REPAIR  06/23/2010    DR EDIN BYRD   • BREAST LUMPECTOMY Left    • CHOLECYSTECTOMY     • CYSTOSCOPY  6/23/210    DR EDIN BYRD   • CYSTOSCOPY, DIMETHYL SULFOXIDE COCKTAIL  03/14/2011    WITH HYDRODISTENTION (DR AVINASH MADDOX)   • HYSTERECTOMY  06/23/2010    [facility] (DR EDIN BYRD)   • TRANSVAGINAL TAPING SUSPENSION WITH OBTURATOR  06/23/2010    DR EDIN BYRD   • WISDOM TOOTH EXTRACTION         Medications  Current Outpatient Medications   Medication Sig Dispense Refill   • acyclovir (ZOVIRAX) 5 %  ointment Apply  topically Every 3 (Three) Hours. 30 g 6   • albuterol sulfate  (90 Base) MCG/ACT inhaler INHALE 1 TO 2 PUFFS BY MOUTH EVERY 4 TO 6 HOURS AS NEEDED. 8.5 g 3   • amitriptyline (ELAVIL) 25 MG tablet Take 1 tablet by mouth Every Night as directed 90 tablet 1   • aspirin 81 MG EC tablet Take 81 mg by mouth Daily.     • cetirizine (zyrTEC) 10 MG tablet Take 1 tablet by mouth Daily. 90 tablet 3   • cholecalciferol (VITAMIN D3) 1000 UNITS tablet Take 1,000 Units by mouth Daily.     • clobetasol propionate (CLOBEX) 0.05 % shampoo apply TO HAIR AND SCALP as directed  0   • cyclobenzaprine (FLEXERIL) 5 MG tablet Take 1 tablet by mouth 3 (Three) Times a Day As Needed. 270 tablet 3   • doxycycline (MONODOX) 100 MG capsule Take 1 capsule by mouth Every 12 (Twelve) Hours. 20 capsule 0   • DULoxetine (CYMBALTA) 60 MG capsule Take 1 capsule by mouth Daily. 90 capsule 1   • famciclovir (FAMVIR) 250 MG tablet Take 1 tablet by mouth 3 (Three) Times a Day. 270 tablet 4   • ibuprofen (ADVIL,MOTRIN) 600 MG tablet Take 1 tablet by mouth Every 6 (Six) Hours with meals 360 tablet 3   • INNOPRAN  MG 24 hr capsule   0   • levoFLOXacin (LEVAQUIN) 500 MG tablet Take 1 tablet by mouth Daily. 10 tablet 0   • Lysine 1000 MG tablet Take 1,000 mg by mouth.     • methylPREDNISolone (MEDROL, THADDEUS,) 4 MG tablet Take by mouth as directed on package 21 tablet 0   • Mirabegron ER (MYRBETRIQ) 50 MG tablet sustained-release 24 hour 24 hr tablet Take 1 Tablet by mouth Daily. 90 tablet 3   • omeprazole (priLOSEC) 40 MG capsule Take 1 capsule by mouth Daily. 90 capsule 1   • ondansetron (ZOFRAN) 4 MG tablet Take 4 mg by mouth Every 8 (Eight) Hours As Needed for Nausea or Vomiting.     • Probiotic Product (PROBIOTIC PO) Take  by mouth.     • promethazine (PHENERGAN) 25 MG tablet Take 1 tablet by mouth Every 6 (Six) Hours As Needed for nausea 30 tablet 4   • propranolol (INDERAL) 40 MG tablet Take 2 tablets by mouth twice daily 360  tablet 3   • PSYLLIUM HUSK PO Take  by mouth.     • SUMAtriptan-naproxen (TREXIMET)  MG per tablet Take 1 Tablet by mouth at onset of Headache. May repeat in 2 hours as needed. (Max 2 tablets in 24 hours) 27 tablet 5   • TOPAMAX 100 MG tablet Take 1 tablet by mouth 2 (Two) Times a Day. (Patient taking differently: Take 100 mg by mouth Daily.) 180 tablet 1   • triamcinolone (KENALOG) 0.1 % ointment apply SPARINGLY to affected area twice a day if needed  1   • vitamin E 400 UNIT capsule Take 800 Units by mouth Daily.       Current Facility-Administered Medications   Medication Dose Route Frequency Provider Last Rate Last Dose   • bupivacaine (MARCAINE) 0.25 % injection 5 mL  5 mL Injection Once Kit Su MD, FAAN       • bupivacaine PF (MARCAINE) 0.75 % injection 37.5 mg  5 mL Injection Once Kit Su MD, FAAN       • bupivacaine PF (MARCAINE) 0.75 % injection 37.5 mg  5 mL Injection Once Kit Su MD, FAAN       • bupivacaine PF (MARCAINE) 0.75 % injection 37.5 mg  5 mL Injection Once Kit Su MD, FAAN           Allergies  Allergies   Allergen Reactions   • Caffeine Arrhythmia   • Penicillins Shortness Of Breath and Palpitations   • Amoxicillin    • Demerol [Meperidine]    • Latex    • Morphine And Related    • Percocet [Oxycodone-Acetaminophen] Dizziness and Confusion     Disoriented    • Topiramate Other (See Comments)     Pt states she can take NAME BRAND ONLY       Social History  Social History     Socioeconomic History   • Marital status:      Spouse name: Not on file   • Number of children: Not on file   • Years of education: Not on file   • Highest education level: Not on file   Occupational History   • Occupation: 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Employer: [employer]   Tobacco Use   • Smoking status: Never Smoker   • Smokeless tobacco: Never Used   Substance and Sexual Activity   • Alcohol use: No   • Drug use: No   • Sexual activity: Yes     Partners: Male       Family History  She  "has no family history of bladder or kidney cancer  She has no family history of kidney stones    Review of Systems  Constitutional: No fevers or chills  Skin: Negative for rash  Endocrine: No heat/cold intolerance   Cardiovascular: Negative for chest pain or dyspnea on exertion  Respiratory: Negative for shortness of breath or wheezing  Gastrointestinal: No nausea or vomiting  Genitourinary: Negative for current gross hematuria.  Musculoskeletal: No flank pain  Neurological:  Negative for frequent headaches or dizziness  Lymph/Heme: Negative for leg swelling or calf pain.    Physical Exam  Visit Vitals  Resp 16   Ht 160 cm (62.99\")   Wt 99.3 kg (219 lb)   BMI 38.80 kg/m²     Constitutional: NAD, WDWN  HEENT: NCAT. Conjunctivae normal  MMM  Cardiovascular: Regular rate  Pulmonary/Chest: Respirations are even and non-labored bilaterally  Abdominal: Soft with no distension, tenderness, masses, guarding or CVA tenderness  Neurological: A + O x 3,  cranial nerves II-XII grossly intact  Extremities: RACHEL x 4, warm, no clubbing, no cyanosis  Skin: Pink, warm, dry with no rash  Psychiatric:  Normal mood and affect    Genitourinary:   No mesh extrusion into the vagina or any vaginal pain with palpation.  No major pelvic organ prolapse, only mild grade 1 cystocele.  Minimal urethral hypermobility, but positive large stress urinary incontinence with cough leak test with full bladder    Labs  Brief Urine Lab Results  (Last result in the past 365 days)      Color   Clarity   Blood   Leuk Est   Nitrite   Protein   CREAT   Urine HCG        05/30/19 1453 Yellow Clear Negative Negative Negative Negative               Lab Results   Component Value Date    GLUCOSE 80 05/27/2018    CALCIUM 9.3 05/27/2018     05/27/2018    K 4.2 05/27/2018    CO2 25.0 (L) 05/27/2018     (H) 05/27/2018    BUN 20 05/27/2018    CREATININE 0.90 05/27/2018    EGFRIFNONA 68 05/27/2018    BCR 22.2 05/27/2018    ANIONGAP 14.2 05/27/2018       Lab " Results   Component Value Date    WBC 9.60 05/27/2018    HGB 14.7 05/27/2018    HCT 43.0 05/27/2018    MCV 94.3 05/27/2018     05/27/2018       PVR  Post-void residual performed by staff in the past- 15    Preprocedure diagnosis  Lower urinary tract symptoms/stress urinary incontinence    Postprocedure diagnosis  same    Procedure  Flexible Cystourethroscopy    Attending surgeon  Jose Galvan MD    Anesthesia  2% lidocaine jelly intraurethrally    Complications  None    Indications  44 y.o. female undergoing a flexible cystoscopy for the above mentioned indications.    Informed consent was obtained.      Findings  Cystoscopy revealed one right and left ureteral orifice in the normal anatomic position, normal bladder mucosa and no tumors, masses or stones.  There was no mesh erosion    Procedure  The patient was placed in supine position and prepped and draped in sterile fashion with lidocaine jelly per urethra for anesthesia.  A timeout was performed.  The 14F flexible cystoscope was lubricated and gently placed through the urethra and into the bladder.  The bladder was completely visualized.  The cystoscope was retroflexed and the bladder neck visualized.  The scope was withdrawn and the procedure terminated.  The patient tolerated the procedure well.        I have personally reviewed her labs and post void residual imaging.     Assessment  Ms. Bolton is a 44 y.o. female with recurrent FELICIANO after TOT sling in 2010.  She also has a history of IC that is well controlled with diet, and very mild OAB that is controlled with timed voiding and Myrbetriq.  She is currently using 2-3 pads per day.    We discussed her options, mainly urethral bulking agents and mesh sling excision with fascial pubovaginal sling.  I informed her the risks, benefits, and alternatives to urethral bulking agent injection.  She asked appropriate questions, voiced understanding and wished to proceed.    Plan  1.  Schedule for  cystoscopy with urethral bulking agent injection on 6/25/2019      Jose Galvan MD

## 2019-06-10 NOTE — PROGRESS NOTES
Chief Complaint  KULWINDER    HPI  Ms. Bolton is a 44 y.o. female Body mass index is 38.8 kg/m². presents with complaint of urinary incontinence for >5 years. Pt has primarily stress mixed urinary incontinence. She also says she has Interstitial Cystitis and has been in remission since a DMSO cocktail/hydrodistention in 2011.     She presents today for follow-up.  She says she continues to still have large amounts of stress urinary incontinence, and the decision was made to perform cystoscopy today.    Severity: Pt uses 2-3 pads a day (5-6 if she has a cough) and had a TOT sling with Dr. Byrd (2010) in the past.  Associated Sx: Pt has + h/o daytime frequency, urgency and nocturia x1.     No h/o poor stream, straining, hesitancy or incomplete voiding.     No h/o recurrent UTI, hematuria, nephrolithiasis    No vaginal discharge or bleeding.  No h/o something coming out of vagina   No fever/chills  No weight loss, appetite normal.  yes Constipation - she takes 2 fiber pills daily  2 History of vaginal deliveries    She has an arrythmia that is controlled with a BB    Past Medical History  Past Medical History:   Diagnosis Date   • Arrhythmia    • Depression    • Genital HSV    • GERD (gastroesophageal reflux disease)    • Migraine    • Normal vaginal Papanicolaou smear in patient with history of hysterectomy 09/16/2016       Past Surgical History  Past Surgical History:   Procedure Laterality Date   • ANTERIOR AND POSTERIOR VAGINAL REPAIR  06/23/2010    DR EDIN BYRD   • BREAST LUMPECTOMY Left    • CHOLECYSTECTOMY     • CYSTOSCOPY  6/23/210    DR EDIN BYRD   • CYSTOSCOPY, DIMETHYL SULFOXIDE COCKTAIL  03/14/2011    WITH HYDRODISTENTION (DR AVINASH MADDOX)   • HYSTERECTOMY  06/23/2010    [facility] (DR EDIN BYRD)   • TRANSVAGINAL TAPING SUSPENSION WITH OBTURATOR  06/23/2010    DR EDIN BYRD   • WISDOM TOOTH EXTRACTION         Medications  Current Outpatient Medications   Medication Sig Dispense Refill   • acyclovir (ZOVIRAX) 5 %  ointment Apply  topically Every 3 (Three) Hours. 30 g 6   • albuterol sulfate  (90 Base) MCG/ACT inhaler INHALE 1 TO 2 PUFFS BY MOUTH EVERY 4 TO 6 HOURS AS NEEDED. 8.5 g 3   • amitriptyline (ELAVIL) 25 MG tablet Take 1 tablet by mouth Every Night as directed 90 tablet 1   • aspirin 81 MG EC tablet Take 81 mg by mouth Daily.     • cetirizine (zyrTEC) 10 MG tablet Take 1 tablet by mouth Daily. 90 tablet 3   • cholecalciferol (VITAMIN D3) 1000 UNITS tablet Take 1,000 Units by mouth Daily.     • clobetasol propionate (CLOBEX) 0.05 % shampoo apply TO HAIR AND SCALP as directed  0   • cyclobenzaprine (FLEXERIL) 5 MG tablet Take 1 tablet by mouth 3 (Three) Times a Day As Needed. 270 tablet 3   • doxycycline (MONODOX) 100 MG capsule Take 1 capsule by mouth Every 12 (Twelve) Hours. 20 capsule 0   • DULoxetine (CYMBALTA) 60 MG capsule Take 1 capsule by mouth Daily. 90 capsule 1   • famciclovir (FAMVIR) 250 MG tablet Take 1 tablet by mouth 3 (Three) Times a Day. 270 tablet 4   • ibuprofen (ADVIL,MOTRIN) 600 MG tablet Take 1 tablet by mouth Every 6 (Six) Hours with meals 360 tablet 3   • INNOPRAN  MG 24 hr capsule   0   • levoFLOXacin (LEVAQUIN) 500 MG tablet Take 1 tablet by mouth Daily. 10 tablet 0   • Lysine 1000 MG tablet Take 1,000 mg by mouth.     • methylPREDNISolone (MEDROL, THADDEUS,) 4 MG tablet Take by mouth as directed on package 21 tablet 0   • Mirabegron ER (MYRBETRIQ) 50 MG tablet sustained-release 24 hour 24 hr tablet Take 1 Tablet by mouth Daily. 90 tablet 3   • omeprazole (priLOSEC) 40 MG capsule Take 1 capsule by mouth Daily. 90 capsule 1   • ondansetron (ZOFRAN) 4 MG tablet Take 4 mg by mouth Every 8 (Eight) Hours As Needed for Nausea or Vomiting.     • Probiotic Product (PROBIOTIC PO) Take  by mouth.     • promethazine (PHENERGAN) 25 MG tablet Take 1 tablet by mouth Every 6 (Six) Hours As Needed for nausea 30 tablet 4   • propranolol (INDERAL) 40 MG tablet Take 2 tablets by mouth twice daily 360  tablet 3   • PSYLLIUM HUSK PO Take  by mouth.     • SUMAtriptan-naproxen (TREXIMET)  MG per tablet Take 1 Tablet by mouth at onset of Headache. May repeat in 2 hours as needed. (Max 2 tablets in 24 hours) 27 tablet 5   • TOPAMAX 100 MG tablet Take 1 tablet by mouth 2 (Two) Times a Day. (Patient taking differently: Take 100 mg by mouth Daily.) 180 tablet 1   • triamcinolone (KENALOG) 0.1 % ointment apply SPARINGLY to affected area twice a day if needed  1   • vitamin E 400 UNIT capsule Take 800 Units by mouth Daily.       Current Facility-Administered Medications   Medication Dose Route Frequency Provider Last Rate Last Dose   • bupivacaine (MARCAINE) 0.25 % injection 5 mL  5 mL Injection Once Kit Su MD, FAAN       • bupivacaine PF (MARCAINE) 0.75 % injection 37.5 mg  5 mL Injection Once Kit Su MD, FAAN       • bupivacaine PF (MARCAINE) 0.75 % injection 37.5 mg  5 mL Injection Once Kit Su MD, FAAN       • bupivacaine PF (MARCAINE) 0.75 % injection 37.5 mg  5 mL Injection Once Kit Su MD, FAAN           Allergies  Allergies   Allergen Reactions   • Caffeine Arrhythmia   • Penicillins Shortness Of Breath and Palpitations   • Amoxicillin    • Demerol [Meperidine]    • Latex    • Morphine And Related    • Percocet [Oxycodone-Acetaminophen] Dizziness and Confusion     Disoriented    • Topiramate Other (See Comments)     Pt states she can take NAME BRAND ONLY       Social History  Social History     Socioeconomic History   • Marital status:      Spouse name: Not on file   • Number of children: Not on file   • Years of education: Not on file   • Highest education level: Not on file   Occupational History   • Occupation: 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Employer: [employer]   Tobacco Use   • Smoking status: Never Smoker   • Smokeless tobacco: Never Used   Substance and Sexual Activity   • Alcohol use: No   • Drug use: No   • Sexual activity: Yes     Partners: Male       Family History  She  "has no family history of bladder or kidney cancer  She has no family history of kidney stones    Review of Systems  Constitutional: No fevers or chills  Skin: Negative for rash  Endocrine: No heat/cold intolerance   Cardiovascular: Negative for chest pain or dyspnea on exertion  Respiratory: Negative for shortness of breath or wheezing  Gastrointestinal: No nausea or vomiting  Genitourinary: Negative for current gross hematuria.  Musculoskeletal: No flank pain  Neurological:  Negative for frequent headaches or dizziness  Lymph/Heme: Negative for leg swelling or calf pain.    Physical Exam  Visit Vitals  Resp 16   Ht 160 cm (62.99\")   Wt 99.3 kg (219 lb)   BMI 38.80 kg/m²     Constitutional: NAD, WDWN  HEENT: NCAT. Conjunctivae normal  MMM  Cardiovascular: Regular rate  Pulmonary/Chest: Respirations are even and non-labored bilaterally  Abdominal: Soft with no distension, tenderness, masses, guarding or CVA tenderness  Neurological: A + O x 3,  cranial nerves II-XII grossly intact  Extremities: RACHEL x 4, warm, no clubbing, no cyanosis  Skin: Pink, warm, dry with no rash  Psychiatric:  Normal mood and affect    Genitourinary:   No mesh extrusion into the vagina or any vaginal pain with palpation.  No major pelvic organ prolapse, only mild grade 1 cystocele.  Minimal urethral hypermobility, but positive large stress urinary incontinence with cough leak test with full bladder    Labs  Brief Urine Lab Results  (Last result in the past 365 days)      Color   Clarity   Blood   Leuk Est   Nitrite   Protein   CREAT   Urine HCG        05/30/19 1453 Yellow Clear Negative Negative Negative Negative               Lab Results   Component Value Date    GLUCOSE 80 05/27/2018    CALCIUM 9.3 05/27/2018     05/27/2018    K 4.2 05/27/2018    CO2 25.0 (L) 05/27/2018     (H) 05/27/2018    BUN 20 05/27/2018    CREATININE 0.90 05/27/2018    EGFRIFNONA 68 05/27/2018    BCR 22.2 05/27/2018    ANIONGAP 14.2 05/27/2018       Lab " Results   Component Value Date    WBC 9.60 05/27/2018    HGB 14.7 05/27/2018    HCT 43.0 05/27/2018    MCV 94.3 05/27/2018     05/27/2018       PVR  Post-void residual performed by staff in the past- 15    Preprocedure diagnosis  Lower urinary tract symptoms/stress urinary incontinence    Postprocedure diagnosis  same    Procedure  Flexible Cystourethroscopy    Attending surgeon  Jose Galvan MD    Anesthesia  2% lidocaine jelly intraurethrally    Complications  None    Indications  44 y.o. female undergoing a flexible cystoscopy for the above mentioned indications.    Informed consent was obtained.      Findings  Cystoscopy revealed one right and left ureteral orifice in the normal anatomic position, normal bladder mucosa and no tumors, masses or stones.  There was no mesh erosion    Procedure  The patient was placed in supine position and prepped and draped in sterile fashion with lidocaine jelly per urethra for anesthesia.  A timeout was performed.  The 14F flexible cystoscope was lubricated and gently placed through the urethra and into the bladder.  The bladder was completely visualized.  The cystoscope was retroflexed and the bladder neck visualized.  The scope was withdrawn and the procedure terminated.  The patient tolerated the procedure well.        I have personally reviewed her labs and post void residual imaging.     Assessment  Ms. Bolton is a 44 y.o. female with recurrent FELICIANO after TOT sling in 2010.  She also has a history of IC that is well controlled with diet, and very mild OAB that is controlled with timed voiding and Myrbetriq.  She is currently using 2-3 pads per day.    We discussed her options, mainly urethral bulking agents and mesh sling excision with fascial pubovaginal sling.  I informed her the risks, benefits, and alternatives to urethral bulking agent injection.  She asked appropriate questions, voiced understanding and wished to proceed.    Plan  1.  Schedule for  cystoscopy with urethral bulking agent injection on 6/25/2019      Jose Galvan MD

## 2019-06-19 RX ORDER — TOPIRAMATE 100 MG
100 TABLET ORAL 2 TIMES DAILY
Qty: 180 TABLET | Refills: 0 | Status: SHIPPED | OUTPATIENT
Start: 2019-06-19 | End: 2020-01-08 | Stop reason: SDUPTHER

## 2019-06-20 ENCOUNTER — OFFICE VISIT (OUTPATIENT)
Dept: BARIATRICS/WEIGHT MGMT | Facility: CLINIC | Age: 44
End: 2019-06-20

## 2019-06-20 VITALS
TEMPERATURE: 97.8 F | SYSTOLIC BLOOD PRESSURE: 120 MMHG | BODY MASS INDEX: 39.35 KG/M2 | DIASTOLIC BLOOD PRESSURE: 74 MMHG | RESPIRATION RATE: 18 BRPM | OXYGEN SATURATION: 99 % | HEIGHT: 64 IN | HEART RATE: 84 BPM | WEIGHT: 230.5 LBS

## 2019-06-20 DIAGNOSIS — Z53.21 PATIENT LEFT WITHOUT BEING SEEN: Primary | ICD-10-CM

## 2019-06-25 ENCOUNTER — ANESTHESIA (OUTPATIENT)
Dept: PERIOP | Facility: HOSPITAL | Age: 44
End: 2019-06-25

## 2019-06-25 ENCOUNTER — ANESTHESIA EVENT (OUTPATIENT)
Dept: PERIOP | Facility: HOSPITAL | Age: 44
End: 2019-06-25

## 2019-06-25 ENCOUNTER — HOSPITAL ENCOUNTER (OUTPATIENT)
Facility: HOSPITAL | Age: 44
Setting detail: HOSPITAL OUTPATIENT SURGERY
Discharge: HOME | End: 2019-06-25
Attending: UROLOGY | Admitting: UROLOGY

## 2019-06-25 VITALS
TEMPERATURE: 98.4 F | DIASTOLIC BLOOD PRESSURE: 51 MMHG | OXYGEN SATURATION: 99 % | SYSTOLIC BLOOD PRESSURE: 104 MMHG | HEART RATE: 73 BPM | RESPIRATION RATE: 18 BRPM

## 2019-06-25 DIAGNOSIS — N39.3 STRESS INCONTINENCE: ICD-10-CM

## 2019-06-25 PROCEDURE — 25010000002 DEXAMETHASONE PER 1 MG: Performed by: NURSE ANESTHETIST, CERTIFIED REGISTERED

## 2019-06-25 PROCEDURE — L8606 SYNTHETIC IMPLNT URINARY 1ML: HCPCS | Performed by: UROLOGY

## 2019-06-25 PROCEDURE — 51715 ENDOSCOPIC INJECTION/IMPLANT: CPT | Performed by: UROLOGY

## 2019-06-25 PROCEDURE — 25010000002 PROMETHAZINE PER 50 MG: Performed by: NURSE ANESTHETIST, CERTIFIED REGISTERED

## 2019-06-25 PROCEDURE — 25010000002 ONDANSETRON PER 1 MG: Performed by: NURSE ANESTHETIST, CERTIFIED REGISTERED

## 2019-06-25 PROCEDURE — 25010000002 PROPOFOL 200 MG/20ML EMULSION: Performed by: NURSE ANESTHETIST, CERTIFIED REGISTERED

## 2019-06-25 PROCEDURE — 25010000002 FENTANYL CITRATE (PF) 100 MCG/2ML SOLUTION: Performed by: NURSE ANESTHETIST, CERTIFIED REGISTERED

## 2019-06-25 PROCEDURE — 25010000002 LEVOFLOXACIN PER 250 MG: Performed by: UROLOGY

## 2019-06-25 PROCEDURE — 25010000002 DIPHENHYDRAMINE PER 50 MG: Performed by: NURSE ANESTHETIST, CERTIFIED REGISTERED

## 2019-06-25 DEVICE — INJECTABLE BULKING AGENT
Type: IMPLANTABLE DEVICE | Site: BLADDER | Status: FUNCTIONAL
Brand: DURASPHERE ® EXP

## 2019-06-25 RX ORDER — LORAZEPAM 2 MG/ML
0.5 INJECTION INTRAMUSCULAR ONCE
Status: CANCELLED | OUTPATIENT
Start: 2019-06-25

## 2019-06-25 RX ORDER — ACETAMINOPHEN 500 MG
1000 TABLET ORAL ONCE
Status: COMPLETED | OUTPATIENT
Start: 2019-06-25 | End: 2019-06-25

## 2019-06-25 RX ORDER — IPRATROPIUM BROMIDE AND ALBUTEROL SULFATE 2.5; .5 MG/3ML; MG/3ML
3 SOLUTION RESPIRATORY (INHALATION) ONCE AS NEEDED
Status: CANCELLED | OUTPATIENT
Start: 2019-06-25

## 2019-06-25 RX ORDER — PROMETHAZINE HYDROCHLORIDE 25 MG/1
25 SUPPOSITORY RECTAL ONCE AS NEEDED
Status: CANCELLED | OUTPATIENT
Start: 2019-06-25

## 2019-06-25 RX ORDER — LIDOCAINE HYDROCHLORIDE 20 MG/ML
INJECTION, SOLUTION INFILTRATION; PERINEURAL AS NEEDED
Status: DISCONTINUED | OUTPATIENT
Start: 2019-06-25 | End: 2019-06-25 | Stop reason: HOSPADM

## 2019-06-25 RX ORDER — SODIUM CHLORIDE, SODIUM LACTATE, POTASSIUM CHLORIDE, CALCIUM CHLORIDE 600; 310; 30; 20 MG/100ML; MG/100ML; MG/100ML; MG/100ML
100 INJECTION, SOLUTION INTRAVENOUS CONTINUOUS
Status: DISCONTINUED | OUTPATIENT
Start: 2019-06-25 | End: 2019-06-25 | Stop reason: HOSPADM

## 2019-06-25 RX ORDER — FENTANYL CITRATE 50 UG/ML
INJECTION, SOLUTION INTRAMUSCULAR; INTRAVENOUS AS NEEDED
Status: DISCONTINUED | OUTPATIENT
Start: 2019-06-25 | End: 2019-06-25 | Stop reason: SURG

## 2019-06-25 RX ORDER — PROPOFOL 10 MG/ML
INJECTION, EMULSION INTRAVENOUS AS NEEDED
Status: DISCONTINUED | OUTPATIENT
Start: 2019-06-25 | End: 2019-06-25 | Stop reason: SURG

## 2019-06-25 RX ORDER — ONDANSETRON 2 MG/ML
INJECTION INTRAMUSCULAR; INTRAVENOUS AS NEEDED
Status: DISCONTINUED | OUTPATIENT
Start: 2019-06-25 | End: 2019-06-25 | Stop reason: SURG

## 2019-06-25 RX ORDER — PROMETHAZINE HYDROCHLORIDE 25 MG/ML
INJECTION, SOLUTION INTRAMUSCULAR; INTRAVENOUS AS NEEDED
Status: DISCONTINUED | OUTPATIENT
Start: 2019-06-25 | End: 2019-06-25 | Stop reason: SURG

## 2019-06-25 RX ORDER — METOCLOPRAMIDE HYDROCHLORIDE 5 MG/ML
10 INJECTION INTRAMUSCULAR; INTRAVENOUS ONCE AS NEEDED
Status: CANCELLED | OUTPATIENT
Start: 2019-06-25

## 2019-06-25 RX ORDER — PROMETHAZINE HYDROCHLORIDE 25 MG/ML
6.25 INJECTION, SOLUTION INTRAMUSCULAR; INTRAVENOUS ONCE AS NEEDED
Status: CANCELLED | OUTPATIENT
Start: 2019-06-25

## 2019-06-25 RX ORDER — PROMETHAZINE HYDROCHLORIDE 25 MG/1
25 TABLET ORAL ONCE AS NEEDED
Status: CANCELLED | OUTPATIENT
Start: 2019-06-25

## 2019-06-25 RX ORDER — LEVOFLOXACIN 5 MG/ML
500 INJECTION, SOLUTION INTRAVENOUS ONCE
Status: COMPLETED | OUTPATIENT
Start: 2019-06-25 | End: 2019-06-25

## 2019-06-25 RX ORDER — DIPHENHYDRAMINE HYDROCHLORIDE 50 MG/ML
INJECTION INTRAMUSCULAR; INTRAVENOUS AS NEEDED
Status: DISCONTINUED | OUTPATIENT
Start: 2019-06-25 | End: 2019-06-25 | Stop reason: SURG

## 2019-06-25 RX ORDER — IBUPROFEN 600 MG/1
600 TABLET ORAL EVERY 6 HOURS
Qty: 30 TABLET | Refills: 0 | Status: SHIPPED | OUTPATIENT
Start: 2019-06-25 | End: 2019-06-28

## 2019-06-25 RX ORDER — LIDOCAINE HYDROCHLORIDE 20 MG/ML
INJECTION, SOLUTION INTRAVENOUS AS NEEDED
Status: DISCONTINUED | OUTPATIENT
Start: 2019-06-25 | End: 2019-06-25 | Stop reason: SURG

## 2019-06-25 RX ORDER — MAGNESIUM HYDROXIDE 1200 MG/15ML
LIQUID ORAL AS NEEDED
Status: DISCONTINUED | OUTPATIENT
Start: 2019-06-25 | End: 2019-06-25 | Stop reason: HOSPADM

## 2019-06-25 RX ORDER — SCOLOPAMINE TRANSDERMAL SYSTEM 1 MG/1
1 PATCH, EXTENDED RELEASE TRANSDERMAL CONTINUOUS
Status: DISCONTINUED | OUTPATIENT
Start: 2019-06-25 | End: 2019-06-25 | Stop reason: HOSPADM

## 2019-06-25 RX ORDER — PHENAZOPYRIDINE HYDROCHLORIDE 100 MG/1
100 TABLET, FILM COATED ORAL 3 TIMES DAILY PRN
Qty: 20 TABLET | Refills: 0 | Status: SHIPPED | OUTPATIENT
Start: 2019-06-25 | End: 2019-12-13

## 2019-06-25 RX ORDER — SODIUM CHLORIDE 0.9 % (FLUSH) 0.9 %
3 SYRINGE (ML) INJECTION AS NEEDED
Status: DISCONTINUED | OUTPATIENT
Start: 2019-06-25 | End: 2019-06-25 | Stop reason: HOSPADM

## 2019-06-25 RX ORDER — SODIUM CHLORIDE, SODIUM LACTATE, POTASSIUM CHLORIDE, CALCIUM CHLORIDE 600; 310; 30; 20 MG/100ML; MG/100ML; MG/100ML; MG/100ML
1000 INJECTION, SOLUTION INTRAVENOUS CONTINUOUS
Status: DISCONTINUED | OUTPATIENT
Start: 2019-06-25 | End: 2019-06-25 | Stop reason: HOSPADM

## 2019-06-25 RX ORDER — ACETAMINOPHEN 325 MG/1
650 TABLET ORAL EVERY 6 HOURS
Qty: 30 TABLET | Refills: 0 | Status: SHIPPED | OUTPATIENT
Start: 2019-06-25 | End: 2019-06-28

## 2019-06-25 RX ORDER — ONDANSETRON 2 MG/ML
4 INJECTION INTRAMUSCULAR; INTRAVENOUS ONCE AS NEEDED
Status: CANCELLED | OUTPATIENT
Start: 2019-06-25

## 2019-06-25 RX ORDER — DEXAMETHASONE SODIUM PHOSPHATE 4 MG/ML
INJECTION, SOLUTION INTRA-ARTICULAR; INTRALESIONAL; INTRAMUSCULAR; INTRAVENOUS; SOFT TISSUE AS NEEDED
Status: DISCONTINUED | OUTPATIENT
Start: 2019-06-25 | End: 2019-06-25 | Stop reason: SURG

## 2019-06-25 RX ADMIN — PROMETHAZINE HYDROCHLORIDE 6.25 MG: 25 INJECTION INTRAMUSCULAR; INTRAVENOUS at 07:36

## 2019-06-25 RX ADMIN — LIDOCAINE HYDROCHLORIDE 60 MG: 20 INJECTION, SOLUTION INTRAVENOUS at 07:36

## 2019-06-25 RX ADMIN — FENTANYL CITRATE 50 MCG: 50 INJECTION, SOLUTION INTRAMUSCULAR; INTRAVENOUS at 07:36

## 2019-06-25 RX ADMIN — PROPOFOL 150 MG: 10 INJECTION, EMULSION INTRAVENOUS at 07:36

## 2019-06-25 RX ADMIN — SCOPALAMINE 1 PATCH: 1 PATCH, EXTENDED RELEASE TRANSDERMAL at 07:18

## 2019-06-25 RX ADMIN — FENTANYL CITRATE 50 MCG: 50 INJECTION, SOLUTION INTRAMUSCULAR; INTRAVENOUS at 07:51

## 2019-06-25 RX ADMIN — LEVOFLOXACIN 500 MG: 5 INJECTION, SOLUTION INTRAVENOUS at 07:19

## 2019-06-25 RX ADMIN — ACETAMINOPHEN 1000 MG: 500 TABLET, FILM COATED ORAL at 07:18

## 2019-06-25 RX ADMIN — SODIUM CHLORIDE, POTASSIUM CHLORIDE, SODIUM LACTATE AND CALCIUM CHLORIDE: 600; 310; 30; 20 INJECTION, SOLUTION INTRAVENOUS at 07:35

## 2019-06-25 RX ADMIN — DIPHENHYDRAMINE HYDROCHLORIDE 10 MG: 50 INJECTION INTRAMUSCULAR; INTRAVENOUS at 07:36

## 2019-06-25 RX ADMIN — DEXAMETHASONE SODIUM PHOSPHATE 10 MG: 4 INJECTION, SOLUTION INTRAMUSCULAR; INTRAVENOUS at 07:36

## 2019-06-25 RX ADMIN — SODIUM CHLORIDE, POTASSIUM CHLORIDE, SODIUM LACTATE AND CALCIUM CHLORIDE 1000 ML: 600; 310; 30; 20 INJECTION, SOLUTION INTRAVENOUS at 07:17

## 2019-06-25 RX ADMIN — ONDANSETRON 4 MG: 2 INJECTION INTRAMUSCULAR; INTRAVENOUS at 07:36

## 2019-06-25 NOTE — OP NOTE
Preoperative diagnosis  Stress urinary incontinence    Postoperative diagnosis  Same    Procedure performed  1.  Rigid cystoscopy  2.  Injection of urethral bulking agent, Durasphere    Surgeon  Jose Galvan MD    Anesthesia  Monitored anesthesia care  Lidocaine 2% intraurethral injection    EBL  Minimal    Complications  None    Specimen  None    Findings  Cystoscopy revealed good coaptation of the urethra at the end.    Indications  44 y.o. female agreed to undergo the above named procedure after discussion of the alternatives, risks and benefits.  Informed consent was obtained.      Procedure  The patient was taken to the operating room and placed supine on the operating table.  Pre-operative antibiotics were administered.  Bilateral lower extremity SCDs were placed.  After induction of general anesthesia the patient was positioned in dorsal lithotomy.  A time-out was performed. The patient was prepped and draped in a sterile fashion.      A anyone Fr rigid resectoscope was introduced into the bladder under direct vision.      Formal rigid cystoscopy was performed next with the aid of the 30 degree lens with findings described above.  We then injected 1.5 cc of percent lidocaine at 3 and 9:00 in the proximal urethra for local anesthesia.  The needle was then primed with the Durasphere bulking agent, the needle was retracted back to the more distal mid urethra, and injections were performed on both sides at 3 and 9:00 as well, with excellent coaptation of the urethra.  We used 2 syringes total.     The patient tolerated the procedure well, was awakened and transferred to the recovery room in stable condition.    Plan  The patient will follow up with me in 2 weeks with postvoid residual

## 2019-06-25 NOTE — ANESTHESIA PROCEDURE NOTES
Airway  Urgency: elective    Airway not difficult    General Information and Staff    Patient location during procedure: OR  CRNA: Erik Soler CRNA    Indications and Patient Condition  Indications for airway management: airway protection    Preoxygenated: yes  MILS maintained throughout  Mask difficulty assessment: 1 - vent by mask    Final Airway Details  Final airway type: supraglottic airway      Successful airway: unique  Size 4    Number of attempts at approach: 1    Additional Comments  LMA placed easily without trauma. Dentition and lips as noted pre-induction. Factory cuff pressure to minimal occlusive pressure.

## 2019-06-25 NOTE — ANESTHESIA PREPROCEDURE EVALUATION
Anesthesia Evaluation     Patient summary reviewed and Nursing notes reviewed   history of anesthetic complications: PONV difficult airway  NPO Solid Status: > 8 hours  NPO Liquid Status: > 8 hours           Airway   Mallampati: II  TM distance: >3 FB  Neck ROM: full  Difficult intubation highly probable  Dental - normal exam     Pulmonary - normal exam   (+) asthma,   Cardiovascular - normal exam    (+) dysrhythmias Tachycardia,   (-) hypertension      Neuro/Psych  (+) headaches, psychiatric history Depression,     GI/Hepatic/Renal/Endo    (+) obesity,  GERD,      Musculoskeletal (-) negative ROS    Abdominal  - normal exam    Bowel sounds: normal.   Substance History - negative use     OB/GYN negative ob/gyn ROS         Other                        Anesthesia Plan    ASA 3     general and MAC   (Severe PONV requiring overnight hospitalization after Lap Brittny)  intravenous induction   Anesthetic plan, all risks, benefits, and alternatives have been provided, discussed and informed consent has been obtained with: patient.

## 2019-06-25 NOTE — DISCHARGE INSTRUCTIONS
Please follow all post op instructions and follow up appointment time from your physician's office included in your discharge packet.    Rest today  No pushing,pulling,tugging,heavy lifting, or strenuous activity   No major decision making,driving,or drinking alcoholic beverages for 24 hours due to the sedation you received  Always use good hand hygiene/washing technique  No driving on pain medication.    To assist you in voiding:  Drink plenty of fluids  Listen to running water while attempting to void.    If you are unable to urinate and you have an uncomfortable urge to void or it has been   6 hours since you were discharged, return to the Emergency Room.          Home Care After bulking agent  The following instructions will help you care for yourself, or be cared for upon your return home today.    These are guidelines for your care right after surgery only.     Diet  Drink plenty of liquids and eat light meals today.    Start your regular diet tomorrow.    Activity  Start normal activities in twenty-four (24) hours.    Wound Care and Hygiene  No restrictions, start normal routine.    Anesthesia Precautions & Expectations  After anesthesia, rest for 24 hours.    Do not drive, drink alcoholic beverages or make any important decisions during this time.  General anesthesia may cause a sore throat, jaw discomfort or muscle aches.    These symptoms can last for one or two days.     What to Expect after Surgery  Mild pain with voiding.  Frequency or urgency.  Bladder cramps.  Minimal bleeding with voiding.    Call your Doctor  Passing clots in urine preventing bladder emptying  Severe pain not controlled by oral medication  Temperature above 101.5 degrees  Inability to urinate within eight (8) hours after surgery    Other Contacts  Urology Office:  [address] [facility] [facility] [address]   [place] [id number] [phone] office  [phone] fax    Follow up Appointment  Please call Dr. Galvan's office to make a follow-up  appointment with him in 2 weeks

## 2019-06-25 NOTE — ANESTHESIA POSTPROCEDURE EVALUATION
Patient: Renae Bolton    Procedure Summary     Date:  06/25/19 Room / Location:  [facility] /  [facility] OR    Anesthesia Start:  0731 Anesthesia Stop:  0814    Procedure:  CYSTOSCOPY WITH URETHRAL  BULKING AGENT INJECTION (N/A ) Diagnosis:       Stress incontinence      (Stress incontinence [N39.3])    Surgeon:  Jose Galvan MD Provider:  Erik Soler CRNA    Anesthesia Type:  general, MAC ASA Status:  3          Anesthesia Type: general, MAC  Last vitals  BP   118/82 @ 0814 6/25/2019   Temp 97.0   Pulse 86   Resp 17   SpO2 99%     Post Anesthesia Care and Evaluation    Patient location during evaluation: PACU  Patient participation: complete - patient participated  Level of consciousness: awake  Pain management: adequate  Airway patency: patent  Anesthetic complications: No anesthetic complications  PONV Status: none  Cardiovascular status: acceptable  Respiratory status: acceptable, face mask, spontaneous ventilation and nonlabored ventilation  Hydration status: acceptable

## 2019-06-25 NOTE — BRIEF OP NOTE
CYSTOSCOPY WITH BULKING AGENT INJECTION  Progress Note    Renae Bolton  6/25/2019    Pre-op Diagnosis:   Stress incontinence [N39.3]       Post-Op Diagnosis Codes:     * Stress incontinence [N39.3]    Procedure/CPT® Codes:      Procedure(s):  CYSTOSCOPY WITH URETHRAL  BULKING AGENT INJECTION    Surgeon(s):  Jose Galvan MD    Anesthesia: General    Staff:   Circulator: Tegan Abad RN  Scrub Person: Raz Gerardo; Shannon Flynn    Estimated Blood Loss: 0 mL    Urine Voided: 0 mL    Specimens:                None          Drains:      Findings: Good coaptation at the end    Complications: None      Jose Galvan MD     Date: 6/25/2019  Time: 9:21 AM

## 2019-07-11 ENCOUNTER — OFFICE VISIT (OUTPATIENT)
Dept: UROLOGY | Facility: CLINIC | Age: 44
End: 2019-07-11

## 2019-07-11 VITALS — HEIGHT: 64 IN | WEIGHT: 230 LBS | RESPIRATION RATE: 16 BRPM | BODY MASS INDEX: 39.27 KG/M2

## 2019-07-11 DIAGNOSIS — N39.3 STRESS INCONTINENCE: ICD-10-CM

## 2019-07-11 PROCEDURE — 99213 OFFICE O/P EST LOW 20 MIN: CPT | Performed by: UROLOGY

## 2019-07-11 PROCEDURE — 52000 CYSTOURETHROSCOPY: CPT | Performed by: UROLOGY

## 2019-07-11 NOTE — PROGRESS NOTES
"Chief Complaint  KULWINDER    HPI  Ms. Bolton is a 44 y.o. female Body mass index is 39.46 kg/m². presents with complaint of urinary incontinence for >5 years. Pt has primarily stress mixed urinary incontinence. She also says she has Interstitial Cystitis and has been in remission since a DMSO cocktail/hydrodistention in 2011.     She presents today for follow-up.  She is overall very happy with her treatment after the bulking agent injections in terms her stress urinary incontinence.  She barely uses 1 pad per day, only a panty liner.  She does still have urgency and frequency, and admits to holding her urine.    Severity: Pt uses 2-3 pads a day (5-6 if she has a cough) and had a TOT sling with Dr. Byrd (2010) in the past.  Associated Sx: Pt has + h/o daytime frequency, urgency and nocturia x1.     No h/o poor stream, straining, hesitancy or incomplete voiding.     No h/o recurrent UTI, hematuria, nephrolithiasis    No vaginal discharge or bleeding.  No h/o something coming out of vagina   No fever/chills  No weight loss, appetite normal.  yes Constipation - she takes 2 fiber pills daily  2 History of vaginal deliveries    She has an arrythmia that is controlled with a BB    Past Medical History  Past Medical History:   Diagnosis Date   • Arrhythmia    • Asthma     states is questionable   • Bronchitis    • Depression    • Genital HSV    • GERD (gastroesophageal reflux disease)    • Hard to intubate     states used a \"lidocaine lollipop\".  states doesn't remember exactly.     • Interstitial cystitis    • Migraine    • Normal vaginal Papanicolaou smear in patient with history of hysterectomy 09/16/2016   • Painful joint    • PONV (postoperative nausea and vomiting)    • Stress incontinence    • Wears contact lenses        Past Surgical History  Past Surgical History:   Procedure Laterality Date   • ANTERIOR AND POSTERIOR VAGINAL REPAIR  06/23/2010    DR EDIN BYRD   • BREAST LUMPECTOMY Left     benign-states was " pre-cancerous   • CHOLECYSTECTOMY     • CYSTOSCOPY  6/23/210    DR EDIN BYRD   • CYSTOSCOPY W/ BULKING AGENT INJECTION N/A 6/25/2019    Procedure: CYSTOSCOPY WITH URETHRAL  BULKING AGENT INJECTION;  Surgeon: Jose Galvan MD;  Location: [facility];  Service: Urology   • CYSTOSCOPY, DIMETHYL SULFOXIDE COCKTAIL  03/14/2011    WITH HYDRODISTENTION (DR AVINASH MADDOX)   • HYSTERECTOMY  06/23/2010    [facility] (DR EDIN BYRD)   • TRANSVAGINAL TAPING SUSPENSION WITH OBTURATOR  06/23/2010    DR EDIN BYRD   • WISDOM TOOTH EXTRACTION         Medications  Current Outpatient Medications   Medication Sig Dispense Refill   • acyclovir (ZOVIRAX) 5 % ointment Apply  topically Every 3 (Three) Hours. (Patient taking differently: Apply 1 application topically to the appropriate area as directed 2 (Two) Times a Day As Needed.) 30 g 6   • albuterol sulfate  (90 Base) MCG/ACT inhaler INHALE 1 TO 2 PUFFS BY MOUTH EVERY 4 TO 6 HOURS AS NEEDED. (Patient taking differently: Inhale 1-2 puffs.) 8.5 g 3   • amitriptyline (ELAVIL) 25 MG tablet Take 1 tablet by mouth Every Night as directed 90 tablet 1   • aspirin 81 MG EC tablet Take 81 mg by mouth Daily.     • cetirizine (zyrTEC) 10 MG tablet Take 1 tablet by mouth Daily. 90 tablet 3   • cholecalciferol (VITAMIN D3) 1000 UNITS tablet Take 1,000 Units by mouth Daily.     • clobetasol propionate (CLOBEX) 0.05 % shampoo apply TO HAIR AND SCALP as directed  0   • cyclobenzaprine (FLEXERIL) 5 MG tablet Take 1 tablet by mouth 3 (Three) Times a Day As Needed. 270 tablet 3   • DULoxetine (CYMBALTA) 60 MG capsule Take 1 capsule by mouth Daily. 90 capsule 1   • famciclovir (FAMVIR) 250 MG tablet Take 1 tablet by mouth 3 (Three) Times a Day. (Patient taking differently: Take 250 mg by mouth 2 (Two) Times a Day.) 270 tablet 4   • ibuprofen (ADVIL,MOTRIN) 600 MG tablet Take 1 tablet by mouth Every 6 (Six) Hours with meals 360 tablet 3   • Lysine 1000 MG tablet Take 1,000 mg by mouth.     •  Mirabegron ER (MYRBETRIQ) 50 MG tablet sustained-release 24 hour 24 hr tablet Take 1 Tablet by mouth Daily. 90 tablet 3   • omeprazole (priLOSEC) 40 MG capsule Take 1 capsule by mouth Daily. 90 capsule 1   • ondansetron (ZOFRAN) 4 MG tablet Take 4 mg by mouth Every 8 (Eight) Hours As Needed for Nausea or Vomiting.     • phenazopyridine (PYRIDIUM) 100 MG tablet Take 1 tablet by mouth 3 (Three) Times a Day As Needed for bladder spasms. 20 tablet 0   • Probiotic Product (PROBIOTIC PO) Take 1 capsule by mouth Daily.     • promethazine (PHENERGAN) 25 MG tablet Take 1 tablet by mouth Every 6 (Six) Hours As Needed for nausea 30 tablet 4   • propranolol (INDERAL) 40 MG tablet Take 2 tablets by mouth twice daily (Patient taking differently: Take 80 mg by mouth 2 (Two) Times a Day.) 360 tablet 3   • PSYLLIUM HUSK PO Take 1 capsule by mouth Daily.     • SUMAtriptan-naproxen (TREXIMET)  MG per tablet Take 1 Tablet by mouth at onset of Headache. May repeat in 2 hours as needed. (Max 2 tablets in 24 hours) (Patient taking differently: Take 1 tablet by mouth 1 (One) Time As Needed.) 27 tablet 5   • TOPAMAX 100 MG tablet Take 1 tablet by mouth 2 (Two) Times a Day. 180 tablet 0   • vitamin E 400 UNIT capsule Take 800 Units by mouth Daily.       No current facility-administered medications for this visit.        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  Never actually taken it internally.   • Morphine And Related Other (See Comments)     Rash (if gets on skin).  Never taken it internally   • Topiramate Other (See Comments)     Pt states she can take NAME BRAND ONLY.  Generic  "brand \"doesn't work\"       Social History  Social History     Socioeconomic History   • Marital status:      Spouse name: Not on file   • Number of children: Not on file   • Years of education: Not on file   • Highest education level: Not on file   Occupational History   • Occupation: 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Employer: [employer]   Tobacco Use   • Smoking status: Never Smoker   • Smokeless tobacco: Never Used   Substance and Sexual Activity   • Alcohol use: No   • Drug use: No   • Sexual activity: Defer       Family History  She has no family history of bladder or kidney cancer  She has no family history of kidney stones    Review of Systems  Constitutional: No fevers or chills  Skin: Negative for rash  Endocrine: No heat/cold intolerance   Cardiovascular: Negative for chest pain or dyspnea on exertion  Respiratory: Negative for shortness of breath or wheezing  Gastrointestinal: No nausea or vomiting  Genitourinary: Negative for current gross hematuria.  Musculoskeletal: No flank pain  Neurological:  Negative for frequent headaches or dizziness  Lymph/Heme: Negative for leg swelling or calf pain.    Physical Exam  Visit Vitals  Resp 16   Ht 162.6 cm (64.02\")   Wt 104 kg (230 lb)   BMI 39.46 kg/m²     Constitutional: NAD, WDWN  HEENT: NCAT. Conjunctivae normal  MMM  Cardiovascular: Regular rate  Pulmonary/Chest: Respirations are even and non-labored bilaterally  Abdominal: Soft with no distension, tenderness, masses, guarding or CVA tenderness  Neurological: A + O x 3,  cranial nerves II-XII grossly intact  Extremities: RACHEL x 4, warm, no clubbing, no cyanosis  Skin: Pink, warm, dry with no rash  Psychiatric:  Normal mood and affect    Genitourinary:   No mesh extrusion into the vagina or any vaginal pain with palpation.  No major pelvic organ prolapse, only mild grade 1 cystocele.  Minimal urethral hypermobility, but positive large stress urinary incontinence with cough leak test with full " bladder    Labs  Brief Urine Lab Results  (Last result in the past 365 days)      Color   Clarity   Blood   Leuk Est   Nitrite   Protein   CREAT   Urine HCG        06/10/19 1134 Yellow Cloudy Negative Negative Negative Negative               Lab Results   Component Value Date    GLUCOSE 100 (H) 06/10/2019    CALCIUM 9.0 06/10/2019     06/10/2019    K 4.0 06/10/2019    CO2 22.0 (L) 06/10/2019     (H) 06/10/2019    BUN 12 06/10/2019    CREATININE 0.90 06/10/2019    EGFRIFNONA 67 06/10/2019    BCR 13.3 06/10/2019    ANIONGAP 13.0 06/10/2019       Lab Results   Component Value Date    WBC 7.88 06/10/2019    HGB 14.5 06/10/2019    HCT 42.7 06/10/2019    MCV 92.8 06/10/2019     06/10/2019       PVR  Post-void residual performed by staff in the past- 15    Preprocedure diagnosis  Lower urinary tract symptoms/stress urinary incontinence    Postprocedure diagnosis  same    Procedure  Flexible Cystourethroscopy    Attending surgeon  Jose Galvan MD    Anesthesia  2% lidocaine jelly intraurethrally    Complications  None    Indications  44 y.o. female undergoing a flexible cystoscopy for the above mentioned indications.    Informed consent was obtained.      Findings  Cystoscopy revealed one right and left ureteral orifice in the normal anatomic position, normal bladder mucosa and no tumors, masses or stones.  There was no mesh erosion    Procedure  The patient was placed in supine position and prepped and draped in sterile fashion with lidocaine jelly per urethra for anesthesia.  A timeout was performed.  The 14F flexible cystoscope was lubricated and gently placed through the urethra and into the bladder.  The bladder was completely visualized.  The cystoscope was retroflexed and the bladder neck visualized.  The scope was withdrawn and the procedure terminated.  The patient tolerated the procedure well.        I have personally reviewed her labs and post void residual imaging.     Assessment  Ms.  Che is a 44 y.o. female with recurrent FELICIANO after TOT sling in 2010.  She also has a history of IC that is well controlled with diet, and very mild OAB that is controlled with Myrbetriq.  She was using 2-3 pads per day and now is using a panty liner. She is s/p urethral bulking agent injection on 6/25/2019    Plan  1.  She wants to try timed voids for now in addition to myrbetriq; she watched [employer] video  2.  Follow-up in 6 months      Jose Galvan MD

## 2019-07-23 RX ORDER — ACYCLOVIR 50 MG/G
OINTMENT TOPICAL
Qty: 30 G | Refills: 6 | Status: SHIPPED | OUTPATIENT
Start: 2019-07-23 | End: 2022-06-22 | Stop reason: SDUPTHER

## 2019-07-31 DIAGNOSIS — N32.81 OVERACTIVE BLADDER: ICD-10-CM

## 2019-08-22 ENCOUNTER — OFFICE VISIT (OUTPATIENT)
Dept: PULMONOLOGY | Facility: CLINIC | Age: 44
End: 2019-08-22

## 2019-08-22 VITALS
WEIGHT: 228 LBS | RESPIRATION RATE: 16 BRPM | BODY MASS INDEX: 38.93 KG/M2 | DIASTOLIC BLOOD PRESSURE: 78 MMHG | HEART RATE: 78 BPM | HEIGHT: 64 IN | OXYGEN SATURATION: 98 % | SYSTOLIC BLOOD PRESSURE: 122 MMHG

## 2019-08-22 DIAGNOSIS — G47.33 OBSTRUCTIVE SLEEP APNEA: Primary | ICD-10-CM

## 2019-08-22 DIAGNOSIS — R06.83 SNORING: ICD-10-CM

## 2019-08-22 DIAGNOSIS — E66.9 OBESITY (BMI 30-39.9): ICD-10-CM

## 2019-08-22 DIAGNOSIS — G47.19 EXCESSIVE DAYTIME SLEEPINESS: ICD-10-CM

## 2019-08-22 PROCEDURE — 99244 OFF/OP CNSLTJ NEW/EST MOD 40: CPT | Performed by: INTERNAL MEDICINE

## 2019-08-22 RX ORDER — AMITRIPTYLINE HYDROCHLORIDE 25 MG/1
25 TABLET, FILM COATED ORAL NIGHTLY
Qty: 30 TABLET | Refills: 2 | Status: SHIPPED | OUTPATIENT
Start: 2019-08-22 | End: 2019-11-19 | Stop reason: SDUPTHER

## 2019-08-22 RX ORDER — PROPRANOLOL HYDROCHLORIDE 120 MG/1
120 CAPSULE, EXTENDED RELEASE ORAL DAILY
COMMUNITY
End: 2019-10-03

## 2019-08-22 NOTE — PROGRESS NOTES
"CONSULT NOTE    Requested by:   Renae Beard Jessica Abney, MD      Chief Complaint   Patient presents with   • Consult   • Sleeping Problem       Subjective:  Renae Bolton is a 44 y.o. female.     History of Present Illness   Patient was sent in today for evaluation of sleep disturbance. Patient says that for the past few years, she has had trouble with snoring.     Patient says that she feels somewhat tired in the morning after waking up. she is also complaining of occasionally feeling sleepy while watching TV and sometimes while reading a book. She also \"grinds teeth\" in her sleep.     she is complaining of occasional headaches.    Patient's sleep schedule was reviewed. she does not have a family history of MAREN.     Patient is under management for hypertension & arrythmias.     Patient's Epsworth Sleepiness score was 11/24.      The following portions of the patient's history were reviewed and updated as appropriate: allergies, current medications, past family history, past medical history, past social history and past surgical history.    Review of Systems   Constitutional: Positive for fatigue. Negative for chills and fever.   HENT: Negative for sinus pressure, sneezing and sore throat.    Respiratory: Positive for shortness of breath. Negative for cough, chest tightness and wheezing.    Cardiovascular: Positive for palpitations and leg swelling.   Psychiatric/Behavioral: Positive for sleep disturbance.   All other systems reviewed and are negative.      Past Medical History:   Diagnosis Date   • Arrhythmia    • Asthma     states is questionable   • Bronchitis    • Depression    • Genital HSV    • GERD (gastroesophageal reflux disease)    • Hard to intubate     states used a \"lidocaine lollipop\".  states doesn't remember exactly.     • Interstitial cystitis    • Migraine    • Normal vaginal Papanicolaou smear in patient with history of hysterectomy 09/16/2016   • Painful joint  " "  • PONV (postoperative nausea and vomiting)    • Stress incontinence    • Wears contact lenses        Social History     Tobacco Use   • Smoking status: Never Smoker   • Smokeless tobacco: Never Used   Substance Use Topics   • Alcohol use: No         Objective:  Visit Vitals  /78   Pulse 78   Resp 16   Ht 162.6 cm (64.02\")   Wt 103 kg (228 lb)   SpO2 98%   BMI 39.12 kg/m²       Physical Exam   Constitutional: She is oriented to person, place, and time. She appears well-developed and well-nourished.   HENT:   Head: Atraumatic.   Crowded Oropharynx.    Eyes: EOM are normal. Pupils are equal, round, and reactive to light.   Neck: No JVD present. No tracheal deviation present. No thyromegaly present.   Increased adipose tissue.    Cardiovascular: Normal rate and regular rhythm.   Pulmonary/Chest: Effort normal and breath sounds normal. No respiratory distress. She has no wheezes.   Musculoskeletal: Normal range of motion. She exhibits no edema.   Gait was normal.   Neurological: She is alert and oriented to person, place, and time.   Skin: Skin is warm and dry.   Psychiatric: She has a normal mood and affect. Her behavior is normal.   Vitals reviewed.        Assessment/Plan:  Renae was seen today for consult and sleeping problem.    Diagnoses and all orders for this visit:    Obstructive sleep apnea  -     Home Sleep Study; Future    Snoring  -     Home Sleep Study; Future    Excessive daytime sleepiness  -     Home Sleep Study; Future    Obesity (BMI 30-39.9)  -     Home Sleep Study; Future        Return in about 3 months (around 11/22/2019) for SleepONLY/Marcie.    DISCUSSION(if any):  Laboratory workup was also reviewed which showed   Lab Results   Component Value Date    CO2 22.0 (L) 06/10/2019     ===========================  ===========================    Sleep questionnaire was reviewed with the patient    The pathophysiology of sleep apnea was discussed, with the patient.     We will encourage her to " schedule the sleep study soon.     The patient was made aware of the limitation of the home sleep study, whereby it may underestimate the true AHI and also carries a low sensitivity.  I have informed her that even if the home sleep study is negative, we may suggest an in lab sleep study to completely and definitively rule out/in sleep apnea.  The patient has understood.  This was communicated to the patient, in case home study is to be requested.    The patient is agreeable to try CPAP/BiPAP, if needed.     Patient was educated on good sleep hygiene measures and voiced understanding of the same.     Patient was given reading material regarding sleep apnea    Patient was counseled regarding weight loss.       Dictated utilizing Dragon dictation.    This document was electronically signed by Sarah Castro MD on 08/22/19 at 9:52 AM

## 2019-08-26 ENCOUNTER — HOSPITAL ENCOUNTER (OUTPATIENT)
Dept: SLEEP MEDICINE | Facility: HOSPITAL | Age: 44
Setting detail: THERAPIES SERIES
End: 2019-08-26

## 2019-08-26 DIAGNOSIS — R06.83 SNORING: ICD-10-CM

## 2019-08-26 DIAGNOSIS — G47.33 OBSTRUCTIVE SLEEP APNEA: ICD-10-CM

## 2019-08-26 DIAGNOSIS — E66.9 OBESITY (BMI 30-39.9): ICD-10-CM

## 2019-08-26 DIAGNOSIS — G47.19 EXCESSIVE DAYTIME SLEEPINESS: ICD-10-CM

## 2019-08-26 PROCEDURE — 95806 SLEEP STUDY UNATT&RESP EFFT: CPT

## 2019-08-26 PROCEDURE — 95806 SLEEP STUDY UNATT&RESP EFFT: CPT | Performed by: INTERNAL MEDICINE

## 2019-08-29 DIAGNOSIS — G47.33 OSA (OBSTRUCTIVE SLEEP APNEA): Primary | ICD-10-CM

## 2019-09-17 RX ORDER — SUMATRIPTAN AND NAPROXEN SODIUM 85; 500 MG/1; MG/1
TABLET, FILM COATED ORAL
Qty: 27 TABLET | Refills: 5 | Status: SHIPPED | OUTPATIENT
Start: 2019-09-17 | End: 2019-12-18 | Stop reason: HOSPADM

## 2019-10-03 ENCOUNTER — OFFICE VISIT (OUTPATIENT)
Dept: BARIATRICS/WEIGHT MGMT | Facility: CLINIC | Age: 44
End: 2019-10-03

## 2019-10-03 ENCOUNTER — DOCUMENTATION (OUTPATIENT)
Dept: BARIATRICS/WEIGHT MGMT | Facility: CLINIC | Age: 44
End: 2019-10-03

## 2019-10-03 VITALS
HEIGHT: 64 IN | HEART RATE: 72 BPM | DIASTOLIC BLOOD PRESSURE: 78 MMHG | RESPIRATION RATE: 18 BRPM | OXYGEN SATURATION: 99 % | BODY MASS INDEX: 39.69 KG/M2 | SYSTOLIC BLOOD PRESSURE: 118 MMHG | WEIGHT: 232.5 LBS | TEMPERATURE: 97.5 F

## 2019-10-03 DIAGNOSIS — G47.30 SLEEP APNEA, UNSPECIFIED TYPE: Primary | ICD-10-CM

## 2019-10-03 DIAGNOSIS — E66.9 OBESITY, CLASS II, BMI 35-39.9: ICD-10-CM

## 2019-10-03 DIAGNOSIS — R60.1 GENERALIZED EDEMA: ICD-10-CM

## 2019-10-03 DIAGNOSIS — K21.9 GASTROESOPHAGEAL REFLUX DISEASE, ESOPHAGITIS PRESENCE NOT SPECIFIED: ICD-10-CM

## 2019-10-03 DIAGNOSIS — R10.13 DYSPEPSIA: ICD-10-CM

## 2019-10-03 DIAGNOSIS — R53.83 FATIGUE, UNSPECIFIED TYPE: ICD-10-CM

## 2019-10-03 PROCEDURE — 99205 OFFICE O/P NEW HI 60 MIN: CPT | Performed by: PHYSICIAN ASSISTANT

## 2019-10-03 NOTE — PROGRESS NOTES
"Weight Loss Surgery  Presurgical Nutrition Assessment     Renae Bolton  10/03/2019  [id number]  [id number]  1975  female    Surgery desired: Sleeve Gastrectomy    Ht 162.6 cm (64\"); Wt 105 kg (232.5#); BMI 39.9  Past Medical History:   Diagnosis Date   • Anxiety    • Arrhythmia     SVT w/ PVCs, on propranolol, follows w/ Dr. Liang   • Aspirin long-term use     for heart health   • Depression    • Dyspepsia    • Dyspnea on exertion    • Fatigue    • Generalized edema     r/t weight gain   • Genital HSV     Famvir for prophylaxis   • GERD (gastroesophageal reflux disease)     daily Prilosec, denies prior eval   • Hard to intubate     needed a \"lidocaine lollipop\".    • Interstitial cystitis    • Migraine     Topamax w/ prn Treximet/Phenergan/Zofran   • Normal vaginal Papanicolaou smear in patient with history of hysterectomy 09/16/2016   • Obesity    • PONV (postoperative nausea and vomiting)     scop patch helps   • Post concussion syndrome     s/p fall w/ frontal lobe head injury 9/2017, on Elavil   • Sleep apnea     newly dx, CPAP compliant   • Stress incontinence     follows w/ Dr. Galvan, on Myrbetriq, s/p cystoscopy w/ bulking agent   • Wears contact lenses      Past Surgical History:   Procedure Laterality Date   • ANTERIOR AND POSTERIOR VAGINAL REPAIR  06/23/2010    DR EDIN BYRD   • BREAST LUMPECTOMY Left 2015    benign   • CYSTOSCOPY W/ BULKING AGENT INJECTION N/A 6/25/2019    Procedure: CYSTOSCOPY WITH URETHRAL  BULKING AGENT INJECTION;  Surgeon: Jose Galvan MD;  Location: [facility];  Service: Urology   • CYSTOSCOPY, DIMETHYL SULFOXIDE COCKTAIL  03/14/2011    WITH HYDRODISTENTION (DR AVINASH MADDOX)   • HYSTERECTOMY  06/23/2010    [facility] (DR EDIN BYRD)   • LAPAROSCOPIC CHOLECYSTECTOMY  2013    for stones   • TRANSVAGINAL TAPING SUSPENSION WITH OBTURATOR  06/23/2010    DR EDIN BYRD   • WISDOM TOOTH EXTRACTION  1990     Allergies   Allergen Reactions   • Amoxicillin Shortness Of Breath and " "Palpitations     Keflex OK   • Caffeine Arrhythmia   • Demerol [Meperidine] Rash     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  Never actually taken it internally.   • Latex Anaphylaxis and Rash   • Morphine And Related Rash     Rash (if gets on skin).  Never taken it internally   • Penicillins Shortness Of Breath and Palpitations     Keflex OK   • Percocet [Oxycodone-Acetaminophen] Dizziness and Confusion     Dilaudid + Lortab OK   • Topiramate Other (See Comments)     Pt states she can take NAME BRAND ONLY.  Generic brand \"doesn't work\"       Current Outpatient Medications:   •  acyclovir (ZOVIRAX) 5 % ointment, Apply  topically to the appropriate area as directed Every 3 (Three) Hours., Disp: 30 g, Rfl: 6  •  albuterol sulfate  (90 Base) MCG/ACT inhaler, INHALE 1 TO 2 PUFFS BY MOUTH EVERY 4 TO 6 HOURS AS NEEDED. (Patient taking differently: Inhale 1-2 puffs.), Disp: 8.5 g, Rfl: 3  •  amitriptyline (ELAVIL) 25 MG tablet, Take 1 tablet by mouth Every Night as directed, Disp: 30 tablet, Rfl: 2  •  aspirin 81 MG EC tablet, Take 81 mg by mouth Daily., Disp: , Rfl:   •  cetirizine (zyrTEC) 10 MG tablet, Take 1 tablet by mouth Daily., Disp: 90 tablet, Rfl: 3  •  cholecalciferol (VITAMIN D3) 1000 UNITS tablet, Take 1,000 Units by mouth Daily., Disp: , Rfl:   •  clobetasol propionate (CLOBEX) 0.05 % shampoo, apply TO HAIR AND SCALP as directed, Disp: , Rfl: 0  •  cyclobenzaprine (FLEXERIL) 5 MG tablet, Take 1 tablet by mouth 3 (Three) Times a Day As Needed., Disp: 270 tablet, Rfl: 3  •  DULoxetine (CYMBALTA) 60 MG capsule, Take 1 capsule by mouth Daily., Disp: 90 capsule, Rfl: 1  •  famciclovir (FAMVIR) 250 MG tablet, Take 1 tablet by mouth 3 (Three) Times a Day. (Patient taking differently: Take 250 mg by mouth 2 (Two) Times a Day.), Disp: 270 tablet, Rfl: 4  •  ibuprofen (ADVIL,MOTRIN) 600 MG tablet, Take 1 tablet by mouth Every 6 (Six) Hours with meals, Disp: 360 " tablet, Rfl: 3  •  Lysine 1000 MG tablet, Take 1,000 mg by mouth., Disp: , Rfl:   •  Mirabegron ER (MYRBETRIQ) 50 MG tablet sustained-release 24 hour 24 hr tablet, Take 1 Tablet by mouth Daily., Disp: 90 tablet, Rfl: 3  •  omeprazole (priLOSEC) 40 MG capsule, Take 1 capsule by mouth Daily., Disp: 90 capsule, Rfl: 1  •  ondansetron (ZOFRAN) 4 MG tablet, Take 4 mg by mouth Every 8 (Eight) Hours As Needed for Nausea or Vomiting., Disp: , Rfl:   •  phenazopyridine (PYRIDIUM) 100 MG tablet, Take 1 tablet by mouth 3 (Three) Times a Day As Needed for bladder spasms., Disp: 20 tablet, Rfl: 0  •  Probiotic Product (PROBIOTIC PO), Take 1 capsule by mouth Daily., Disp: , Rfl:   •  promethazine (PHENERGAN) 25 MG tablet, Take 1 tablet by mouth Every 6 (Six) Hours As Needed for nausea, Disp: 30 tablet, Rfl: 4  •  propranolol LA (INDERAL LA) 120 MG 24 hr capsule, Take 1 capsule by mouth Daily., Disp: 90 capsule, Rfl: 3  •  PSYLLIUM HUSK PO, Take 1 capsule by mouth Daily., Disp: , Rfl:   •  SUMAtriptan-naproxen (TREXIMET)  MG per tablet, Take 1 Tablet by mouth at onset of Headache. May repeat in 2 hours as needed. (Max 2 tablets in 24 hours), Disp: 27 tablet, Rfl: 5  •  TOPAMAX 100 MG tablet, Take 1 tablet by mouth 2 (Two) Times a Day., Disp: 180 tablet, Rfl: 0  •  vitamin E 400 UNIT capsule, Take 800 Units by mouth Daily., Disp: , Rfl:       Nutrition Assessment    Estimated energy needs:  1675 kcal    Estimated calories for weight loss:  1200 kcal    IBW (Pounds):  145 #        Excess body weight (Pounds):  85 #       Nutrition Recall  24 Hour recall: (B) (L) (D) -  Reviewed & discussed /c pt.  Often skips brkfast.  Drinks no soda, coffee or tea. Lunch @ noon = 2-3 oz simple truth oven roasted turkey /c heart of Crow, 1 serving white cheddar cheetos,.5 brownie, water.  Snack @ 4 pm = 1 med honeycrisp apple, 1 snk cup Marzeti's caramel dip & water.  Snack @ 6 pm = 1 snk sz caramel Milky Way bar.  Dinner @ 10:30 pm = 3 oz  pork chop, 1 c steamed sugar snap peas & water.  Diet deficient in prot & includes excessive amt of processed CHO.  Pt to focus on ingesting adeq protein for wt mgt in 3 meals + 2-3 snks qd /c more vegetables & fewer sweets & chips.       Exercise  rarely      Education    Provided information packet re:  Sleeve Gastrectomy  1. Reviewed guidelines for higher protein, limited carbohydrate diet to promote weight loss.  Encouraged patient to incorporate these principles of healthy eating from now until approximately 2 weeks prior to bariatric surgery date, when an even lower carbohydrate “liver-shrinking” regimen will be followed. (Information sheet re pre-op diet given).  Explained that after recovery from surgery this diet will again be followed to ensure further loss and for weight maintenance.    2. Encouraged patient to choose an acceptable protein supplement powder or shake for post-surgery liquid diet.  Provided product guidelines and examples.    3. Explained importance of goal setting to help in changing eating behaviors that are not conducive to weight loss.  Targeted several on a worksheet which also included spaces for patient to work on issues specific to them.  4. Provided follow-up options for support, including contact information for dietitians here, if desired.  Web-based support information and apps for smart phones and computers given.  Noted that monthly support group is offered at this clinic, and that support is associated with successful weight loss.    Recommend that team proceed with surgery and follow per protocol.      Nutrition Goals   Dietary Guidelines per information packet as described above  Protein goal:  grams per day   Carbohydrate goal:  100-140 grams per day  Eliminate soda, sweet tea, etc.     Exercise Goals  Continue current exercise routine   Add 15-30 minutes of activity per day as tolerated      Danielle Gu RD  10/03/2019  3:23 PM

## 2019-10-03 NOTE — PROGRESS NOTES
"[facility] BARIATRIC SURGERY  [address]  Formerly [facility] [address]  [phone]      Patient  Name:  Renae Bolton  :  1975      Date of Visit: 10/03/2019      Chief Complaint:  weight gain; unable to maintain weight loss    History of Present Illness:  Renae Bolton is a 44 y.o. female who presents today for evaluation, education and consultation regarding weight loss surgery. The patient is interested in sleeve gastrectomy with Dr. Onofre.     Renae has been overweight for at least 16 years, has been 35 pounds or more overweight for at least 16 years, has been 100 pounds or more overweight for 10 or more years and started dieting at age 24.  Previous diet attempts include: Sugar Busters, Low Carbohydrate and Low Fat.  The most weight Renae lost was 50 pounds on Sugar Busters plan but was only able to maintain that weight loss for 2 years.  Her maximum lifetime weight is 232 pounds - current weight.    As above, patient has been overweight for many years, with numerous failed dietary/weight loss attempts.  She now has obesity related comorbidities and as such has decided to pursue weight loss surgery.  All past medical, surgical, social and family history have been obtained and discussed today, as pertinent to bariatric surgery.     Past Medical History:   Diagnosis Date   • Anxiety    • Arrhythmia     SVT w/ PVCs, on propranolol, follows w/ Dr. Liang   • Aspirin long-term use     for heart health   • Depression    • Dyspepsia    • Dyspnea on exertion    • Fatigue    • Generalized edema     r/t weight gain   • Genital HSV     Famvir for prophylaxis   • GERD (gastroesophageal reflux disease)     daily Prilosec, denies prior eval   • Hard to intubate     needed a \"lidocaine lollipop\".    • Interstitial cystitis    • Migraine     Topamax w/ prn Treximet/Phenergan/Zofran   • Normal vaginal Papanicolaou smear in patient with history of hysterectomy 2016 " "  • Obesity    • PONV (postoperative nausea and vomiting)     scop patch helps   • Post concussion syndrome     s/p fall w/ frontal lobe head injury 9/2017, on Elavil   • Sleep apnea     newly dx, CPAP compliant   • Stress incontinence     follows w/ Dr. Galvan, on Myrbetriq, s/p cystoscopy w/ bulking agent   • Wears contact lenses      Past Surgical History:   Procedure Laterality Date   • ANTERIOR AND POSTERIOR VAGINAL REPAIR  06/23/2010    DR EDIN BYRD   • BREAST LUMPECTOMY Left 2015    benign   • CYSTOSCOPY W/ BULKING AGENT INJECTION N/A 6/25/2019    Procedure: CYSTOSCOPY WITH URETHRAL  BULKING AGENT INJECTION;  Surgeon: Jose Galvan MD;  Location: [facility];  Service: Urology   • CYSTOSCOPY, DIMETHYL SULFOXIDE COCKTAIL  03/14/2011    WITH HYDRODISTENTION (DR AVINASH MADDOX)   • HYSTERECTOMY  06/23/2010    LAVH (DR EDIN BYRD)   • LAPAROSCOPIC CHOLECYSTECTOMY  2013    for stones   • TRANSVAGINAL TAPING SUSPENSION WITH OBTURATOR  06/23/2010    DR EDIN BYRD   • WISDOM TOOTH EXTRACTION  1990       Allergies   Allergen Reactions   • Amoxicillin Shortness Of Breath and Palpitations     Keflex OK   • Caffeine Arrhythmia   • Demerol [Meperidine] Rash     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  Never actually taken it internally.   • Latex Anaphylaxis and Rash   • Morphine And Related Rash     Rash (if gets on skin).  Never taken it internally   • Penicillins Shortness Of Breath and Palpitations     Keflex OK   • Percocet [Oxycodone-Acetaminophen] Dizziness and Confusion     Dilaudid + Lortab OK   • Topiramate Other (See Comments)     Pt states she can take NAME BRAND ONLY.  Generic brand \"doesn't work\"       Current Outpatient Medications:   •  albuterol sulfate  (90 Base) MCG/ACT inhaler, INHALE 1 TO 2 PUFFS BY MOUTH EVERY 4 TO 6 HOURS AS NEEDED. (Patient taking differently: Inhale 1-2 puffs.), Disp: 8.5 g, Rfl: 3  •  amitriptyline (ELAVIL) 25 MG tablet, " Take 1 tablet by mouth Every Night as directed, Disp: 30 tablet, Rfl: 2  •  aspirin 81 MG EC tablet, Take 81 mg by mouth Daily., Disp: , Rfl:   •  cetirizine (zyrTEC) 10 MG tablet, Take 1 tablet by mouth Daily., Disp: 90 tablet, Rfl: 3  •  cholecalciferol (VITAMIN D3) 1000 UNITS tablet, Take 1,000 Units by mouth Daily., Disp: , Rfl:   •  clobetasol propionate (CLOBEX) 0.05 % shampoo, apply TO HAIR AND SCALP as directed, Disp: , Rfl: 0  •  cyclobenzaprine (FLEXERIL) 5 MG tablet, Take 1 tablet by mouth 3 (Three) Times a Day As Needed., Disp: 270 tablet, Rfl: 3  •  DULoxetine (CYMBALTA) 60 MG capsule, Take 1 capsule by mouth Daily., Disp: 90 capsule, Rfl: 1  •  famciclovir (FAMVIR) 250 MG tablet, Take 1 tablet by mouth 3 (Three) Times a Day. (Patient taking differently: Take 250 mg by mouth 2 (Two) Times a Day.), Disp: 270 tablet, Rfl: 4  •  ibuprofen (ADVIL,MOTRIN) 600 MG tablet, Take 1 tablet by mouth Every 6 (Six) Hours with meals, Disp: 360 tablet, Rfl: 3  •  Lysine 1000 MG tablet, Take 1,000 mg by mouth., Disp: , Rfl:   •  Mirabegron ER (MYRBETRIQ) 50 MG tablet sustained-release 24 hour 24 hr tablet, Take 1 Tablet by mouth Daily., Disp: 90 tablet, Rfl: 3  •  omeprazole (priLOSEC) 40 MG capsule, Take 1 capsule by mouth Daily., Disp: 90 capsule, Rfl: 1  •  ondansetron (ZOFRAN) 4 MG tablet, Take 4 mg by mouth Every 8 (Eight) Hours As Needed for Nausea or Vomiting., Disp: , Rfl:   •  phenazopyridine (PYRIDIUM) 100 MG tablet, Take 1 tablet by mouth 3 (Three) Times a Day As Needed for bladder spasms., Disp: 20 tablet, Rfl: 0  •  Probiotic Product (PROBIOTIC PO), Take 1 capsule by mouth Daily., Disp: , Rfl:   •  promethazine (PHENERGAN) 25 MG tablet, Take 1 tablet by mouth Every 6 (Six) Hours As Needed for nausea, Disp: 30 tablet, Rfl: 4  •  propranolol LA (INDERAL LA) 120 MG 24 hr capsule, Take 1 capsule by mouth Daily., Disp: 90 capsule, Rfl: 3  •  PSYLLIUM HUSK PO, Take 1 capsule by mouth Daily., Disp: , Rfl:   •   SUMAtriptan-naproxen (TREXIMET)  MG per tablet, Take 1 Tablet by mouth at onset of Headache. May repeat in 2 hours as needed. (Max 2 tablets in 24 hours), Disp: 27 tablet, Rfl: 5  •  TOPAMAX 100 MG tablet, Take 1 tablet by mouth 2 (Two) Times a Day., Disp: 180 tablet, Rfl: 0  •  vitamin E 400 UNIT capsule, Take 800 Units by mouth Daily., Disp: , Rfl:   •  acyclovir (ZOVIRAX) 5 % ointment, Apply  topically to the appropriate area as directed Every 3 (Three) Hours., Disp: 30 g, Rfl: 6    Social History     Socioeconomic History   • Marital status:      Spouse name: Not on file   • Number of children: Not on file   • Years of education: Not on file   • Highest education level: Not on file   Occupational History   • Occupation: 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Employer: [employer]   Tobacco Use   • Smoking status: Never Smoker   • Smokeless tobacco: Never Used   Substance and Sexual Activity   • Alcohol use: No   • Drug use: No   • Sexual activity: Defer   Social History Narrative    Living in [place] w/  and 2 children.  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       Family History   Problem Relation Age of Onset   • Diabetes Father    • Hypertension Father    • Breast cancer Sister 34   • Hypertension Maternal Grandmother    • Alzheimer's disease Maternal Grandmother    • Breast cancer Maternal Grandmother    • Heart disease Maternal Grandmother    • Hyperlipidemia Other    • Gout Other    • Migraines Other    • Breast cancer Maternal Aunt    • Stroke Maternal Grandfather    • Skin cancer Maternal Grandfather    • Hypertension Maternal Grandfather    • Diabetes Paternal Grandfather    • Hypertension Paternal Grandfather    • Hypertension Mother        Review of Systems:  Constitutional:  reports fatigue, weight gain and denies fevers, chills.  HEENT:  denies headache, ear pain or loss of hearing, blurred or double vision, nasal discharge or sore throat.  Cardiovascular:  denies chest pain, palpitations, hx DVT.  Respiratory:   reports sleep apnea and denies cough , wheezing, hx PE.  Gastrointestinal:  reports heartburn and denies dysphagia, nausea, vomiting, IBS, liver disease.  Genitourinary:  reports incontinence and denies hematuria, dysuria, polyuria, renal insufficiency.    Musculoskeletal:  reports joint pain and denies fibromyalgia and arthritis.  Neurological:  reports migraines and denies numbness /tingling, dizziness, confusion, seizure.  Psychiatric:  reports anxiety, depression and denies bipolar disorder.  Endocrine:  denies glucose intolerance, diabetes, thyroid disease.  Hematologic: denies anemia, bleeding disorder, hx blood transfusion.  Skin:  denies rashes, hx MRSA.    Physical Exam:  Vital Signs:  Weight: 105 kg (232 lb 8 oz)   Body mass index is 39.91 kg/m².  Temp: 97.5 °F (36.4 °C)   Heart Rate: 72   BP: 118/78     Physical Exam   Constitutional: She is oriented to person, place, and time. She appears well-developed and well-nourished.   HENT:   Head: Normocephalic and atraumatic.   Eyes: Conjunctivae are normal. No scleral icterus.   Neck: Neck supple. No thyromegaly present.   Cardiovascular: Normal rate and regular rhythm.   No murmur heard.  Pulmonary/Chest: Effort normal and breath sounds normal. No respiratory distress. She has no wheezes. She has no rales.   Abdominal: Soft. Bowel sounds are normal. She exhibits no distension and no mass. There is no tenderness. No hernia.   scars: lap leo, periumbilical   Musculoskeletal: Normal range of motion. She exhibits no edema.   Neurological: She is alert and oriented to person, place, and time. Gait normal.   Skin: Skin is warm and dry. No rash noted.   Psychiatric: She has a normal mood and affect. Judgment normal.   Vitals reviewed.      Patient Active Problem List   Diagnosis   • Stress incontinence   • Fatigue   • Dyspepsia   • Dyspnea on exertion   • Obesity   • Hard to intubate   • PONV (postoperative nausea and vomiting)   • Sleep apnea   • Migraine   •  Interstitial cystitis   • GERD (gastroesophageal reflux disease)   • Depression   • Anxiety   • Post concussion syndrome   • Genital HSV   • Arrhythmia   • Generalized edema   • Aspirin long-term use       Assessment:    Renae Bolton is a 44 y.o. female with medically complicated obesity pursuing sleeve gastrectomy.    Weight loss surgery is deemed medically necessary given the following obesity related comorbidities including sleep apnea and GERD with current Weight: 105 kg (232 lb 8 oz) and Body mass index is 39.91 kg/m².    Plan:  Patient understands that bariatric surgery is not cosmetic surgery but rather a tool to help make a lifelong commitment to lifestyle changes including diet, exercise, behavior modifications, and healthy habits.  The patient has been educated today on those expected postoperative lifestyle changes.  The surgical procedure was discussed and all questions were answered.  Instructions on how to access [employer] [facility] (an internet based site w/ educational surgical videos) were given to the patient.  Recommended vitamin supplementation was reviewed.  The importance of avoiding ASA/ NSAIDS/ steroids/ tobacco/ hormones/ immunomodulators perioperatively was discussed in detail.  Psychological and Nutritional evaluations will be arranged prior to surgery.  The patient was advised to start on a high protein and low carbohydrate diet in preparation for surgery.      Preop testing will include: CBC, CMP, Lipids, TSH, H.Pylori stool, CXR, EKG and EGD.    The risks and benefits of the upper endoscopy were discussed with the patient in detail and all questions were answered.  Possibility of perforation, bleeding, aspiration, and anesthesia reaction were reviewed.  Patient agrees to proceed.    Additional preop clearances required prior to surgery: Cardiac.      Patient is an acceptable candidate for surgery pending the above results and final consultation w/ Dr. Onofre.    Dina Ibarra,  PA        MDM: New problem w/ further workup planned.  Labs, imaging, additional testing planned, old records (op notes) obtained /reviewed, all to be discussed further w/ surgeon.  HIGH complexity.

## 2019-10-17 ENCOUNTER — LAB (OUTPATIENT)
Dept: LAB | Facility: HOSPITAL | Age: 44
End: 2019-10-17

## 2019-10-17 DIAGNOSIS — K21.9 GASTROESOPHAGEAL REFLUX DISEASE, ESOPHAGITIS PRESENCE NOT SPECIFIED: ICD-10-CM

## 2019-10-17 DIAGNOSIS — G47.30 SLEEP APNEA, UNSPECIFIED TYPE: ICD-10-CM

## 2019-10-17 DIAGNOSIS — R60.1 GENERALIZED EDEMA: ICD-10-CM

## 2019-10-17 DIAGNOSIS — R53.83 FATIGUE, UNSPECIFIED TYPE: ICD-10-CM

## 2019-10-17 DIAGNOSIS — R10.13 DYSPEPSIA: ICD-10-CM

## 2019-10-17 LAB
ALBUMIN SERPL-MCNC: 3.9 G/DL (ref 3.5–5.2)
ALBUMIN/GLOB SERPL: 1.3 G/DL
ALP SERPL-CCNC: 79 U/L (ref 39–117)
ALT SERPL W P-5'-P-CCNC: 28 U/L (ref 1–33)
ANION GAP SERPL CALCULATED.3IONS-SCNC: 10.9 MMOL/L (ref 5–15)
AST SERPL-CCNC: 15 U/L (ref 1–32)
BASOPHILS # BLD AUTO: 0.06 10*3/MM3 (ref 0–0.2)
BASOPHILS NFR BLD AUTO: 0.8 % (ref 0–1.5)
BILIRUB SERPL-MCNC: 0.2 MG/DL (ref 0.2–1.2)
BUN BLD-MCNC: 16 MG/DL (ref 6–20)
BUN/CREAT SERPL: 18.2 (ref 7–25)
CALCIUM SPEC-SCNC: 8.9 MG/DL (ref 8.6–10.5)
CHLORIDE SERPL-SCNC: 104 MMOL/L (ref 98–107)
CHOLEST SERPL-MCNC: 193 MG/DL (ref 0–200)
CO2 SERPL-SCNC: 22.1 MMOL/L (ref 22–29)
CREAT BLD-MCNC: 0.88 MG/DL (ref 0.57–1)
DEPRECATED RDW RBC AUTO: 42.7 FL (ref 37–54)
EOSINOPHIL # BLD AUTO: 0.24 10*3/MM3 (ref 0–0.4)
EOSINOPHIL NFR BLD AUTO: 3.2 % (ref 0.3–6.2)
ERYTHROCYTE [DISTWIDTH] IN BLOOD BY AUTOMATED COUNT: 12.7 % (ref 12.3–15.4)
GFR SERPL CREATININE-BSD FRML MDRD: 69 ML/MIN/1.73
GLOBULIN UR ELPH-MCNC: 3 GM/DL
GLUCOSE BLD-MCNC: 100 MG/DL (ref 65–99)
HCT VFR BLD AUTO: 41 % (ref 34–46.6)
HDLC SERPL-MCNC: 32 MG/DL (ref 40–60)
HGB BLD-MCNC: 13.9 G/DL (ref 12–15.9)
IMM GRANULOCYTES # BLD AUTO: 0.03 10*3/MM3 (ref 0–0.05)
IMM GRANULOCYTES NFR BLD AUTO: 0.4 % (ref 0–0.5)
LDLC SERPL CALC-MCNC: 115 MG/DL (ref 0–100)
LDLC/HDLC SERPL: 3.61 {RATIO}
LYMPHOCYTES # BLD AUTO: 1.98 10*3/MM3 (ref 0.7–3.1)
LYMPHOCYTES NFR BLD AUTO: 26.3 % (ref 19.6–45.3)
MCH RBC QN AUTO: 31.2 PG (ref 26.6–33)
MCHC RBC AUTO-ENTMCNC: 33.9 G/DL (ref 31.5–35.7)
MCV RBC AUTO: 92.1 FL (ref 79–97)
MONOCYTES # BLD AUTO: 0.63 10*3/MM3 (ref 0.1–0.9)
MONOCYTES NFR BLD AUTO: 8.4 % (ref 5–12)
NEUTROPHILS # BLD AUTO: 4.58 10*3/MM3 (ref 1.7–7)
NEUTROPHILS NFR BLD AUTO: 60.9 % (ref 42.7–76)
NRBC BLD AUTO-RTO: 0 /100 WBC (ref 0–0.2)
PLATELET # BLD AUTO: 303 10*3/MM3 (ref 140–450)
PMV BLD AUTO: 11.4 FL (ref 6–12)
POTASSIUM BLD-SCNC: 3.9 MMOL/L (ref 3.5–5.2)
PROT SERPL-MCNC: 6.9 G/DL (ref 6–8.5)
RBC # BLD AUTO: 4.45 10*6/MM3 (ref 3.77–5.28)
SODIUM BLD-SCNC: 137 MMOL/L (ref 136–145)
TRIGL SERPL-MCNC: 228 MG/DL (ref 0–150)
TSH SERPL DL<=0.05 MIU/L-ACNC: 3.46 UIU/ML (ref 0.27–4.2)
VLDLC SERPL-MCNC: 45.6 MG/DL (ref 5–40)
WBC NRBC COR # BLD: 7.52 10*3/MM3 (ref 3.4–10.8)

## 2019-10-17 PROCEDURE — 36415 COLL VENOUS BLD VENIPUNCTURE: CPT

## 2019-10-17 PROCEDURE — 80061 LIPID PANEL: CPT

## 2019-10-17 PROCEDURE — 84443 ASSAY THYROID STIM HORMONE: CPT

## 2019-10-17 PROCEDURE — 80053 COMPREHEN METABOLIC PANEL: CPT

## 2019-10-17 PROCEDURE — 85025 COMPLETE CBC W/AUTO DIFF WBC: CPT

## 2019-10-23 PROCEDURE — 87338 HPYLORI STOOL AG IA: CPT

## 2019-10-24 ENCOUNTER — LAB REQUISITION (OUTPATIENT)
Dept: LAB | Facility: HOSPITAL | Age: 44
End: 2019-10-24

## 2019-10-24 ENCOUNTER — OUTSIDE FACILITY SERVICE (OUTPATIENT)
Dept: BARIATRICS/WEIGHT MGMT | Facility: CLINIC | Age: 44
End: 2019-10-24

## 2019-10-24 DIAGNOSIS — K21.9 GASTRO-ESOPHAGEAL REFLUX DISEASE WITHOUT ESOPHAGITIS: ICD-10-CM

## 2019-10-24 PROCEDURE — 43239 EGD BIOPSY SINGLE/MULTIPLE: CPT | Performed by: SURGERY

## 2019-10-24 PROCEDURE — 88305 TISSUE EXAM BY PATHOLOGIST: CPT | Performed by: SURGERY

## 2019-10-26 LAB
CYTO UR: NORMAL
H PYLORI AG STL QL IA: NEGATIVE
LAB AP CASE REPORT: NORMAL
LAB AP CLINICAL INFORMATION: NORMAL
PATH REPORT.FINAL DX SPEC: NORMAL
PATH REPORT.GROSS SPEC: NORMAL

## 2019-11-07 ENCOUNTER — OFFICE VISIT (OUTPATIENT)
Dept: PULMONOLOGY | Facility: CLINIC | Age: 44
End: 2019-11-07

## 2019-11-07 VITALS
RESPIRATION RATE: 18 BRPM | HEART RATE: 78 BPM | HEIGHT: 64 IN | SYSTOLIC BLOOD PRESSURE: 112 MMHG | WEIGHT: 234 LBS | OXYGEN SATURATION: 98 % | BODY MASS INDEX: 39.95 KG/M2 | DIASTOLIC BLOOD PRESSURE: 64 MMHG

## 2019-11-07 DIAGNOSIS — R06.83 SNORING: ICD-10-CM

## 2019-11-07 DIAGNOSIS — G47.19 EXCESSIVE DAYTIME SLEEPINESS: ICD-10-CM

## 2019-11-07 DIAGNOSIS — G47.33 OSA (OBSTRUCTIVE SLEEP APNEA): Primary | ICD-10-CM

## 2019-11-07 PROCEDURE — 99213 OFFICE O/P EST LOW 20 MIN: CPT | Performed by: NURSE PRACTITIONER

## 2019-11-07 NOTE — PROGRESS NOTES
"Chief Complaint   Patient presents with   • Follow-up   • Sleeping Problem         Subjective   Renae Bolton is a 44 y.o. female.     History of Present Illness   The patient comes in today for follow-up of obstructive sleep apnea.    I reviewed her sleep study and discussed the results with her today.  The sleep study revealed severe sleep apnea with an apnea hypopnea index of 51 per hour.     She has been set up with AutoPAP at a pressure of 8/16.  She is becoming more regular with using the machine.  She has more energy at the end of the day.  The swelling in her lower extremities seems to be somewhat better as well.  She is not sure about it helping the headaches because she has headaches associated with the weather changes.    The following portions of the patient's history were reviewed and updated as appropriate: allergies, current medications, past family history, past medical history, past social history and past surgical history.    Review of Systems   Constitutional: Negative for chills and fever.   HENT: Negative for rhinorrhea, sinus pressure and sore throat.    Respiratory: Negative for cough, chest tightness, shortness of breath and wheezing.    Psychiatric/Behavioral: Negative for sleep disturbance.       Objective   Visit Vitals  /64   Pulse 78   Resp 18   Ht 162.6 cm (64\")   Wt 106 kg (234 lb)   SpO2 98%   BMI 40.17 kg/m²     Physical Exam   Constitutional: She is oriented to person, place, and time. She appears well-developed and well-nourished.   HENT:   Head: Atraumatic.   Crowded oropharynx.    Abdominal:   Obese.   Musculoskeletal:   Gait was normal.   Neurological: She is alert and oriented to person, place, and time.   Psychiatric: She has a normal mood and affect.   Vitals reviewed.          Assessment/Plan   Renae was seen today for follow-up and sleeping problem.    Diagnoses and all orders for this visit:    MAREN (obstructive sleep apnea)    Excessive daytime " sleepiness    Snoring           Return in about 5 months (around 4/7/2020) for Recheck, For Dr. Castro, Sleep ONLY.    DISCUSSION (if any):  Continue treatment with AutoPAP at a pressure of 8/16, with a nasal pillows.    Patient's compliance data was reviewed and the compliance is greater than 95%.    Humidification setup, hose and mask care discussed.    Weight loss advised.    Use every night for at least 4 hours stressed.      Dictated utilizing Dragon dictation.    This document was electronically signed by LAURITA Heredia November 7, 2019  2:20 PM

## 2019-11-19 RX ORDER — AMITRIPTYLINE HYDROCHLORIDE 25 MG/1
25 TABLET, FILM COATED ORAL NIGHTLY
Qty: 90 TABLET | Refills: 3 | Status: SHIPPED | OUTPATIENT
Start: 2019-11-19 | End: 2020-03-17

## 2019-11-20 ENCOUNTER — HOSPITAL ENCOUNTER (OUTPATIENT)
Dept: GENERAL RADIOLOGY | Facility: HOSPITAL | Age: 44
Discharge: HOME | End: 2019-11-20
Admitting: PHYSICIAN ASSISTANT

## 2019-11-20 ENCOUNTER — LAB (OUTPATIENT)
Dept: LAB | Facility: HOSPITAL | Age: 44
End: 2019-11-20

## 2019-11-20 DIAGNOSIS — R53.83 FATIGUE, UNSPECIFIED TYPE: ICD-10-CM

## 2019-11-20 DIAGNOSIS — R06.00 DYSPNEA, UNSPECIFIED TYPE: ICD-10-CM

## 2019-11-20 DIAGNOSIS — R06.00 DYSPNEA, UNSPECIFIED TYPE: Primary | ICD-10-CM

## 2019-11-20 LAB
ALBUMIN SERPL-MCNC: 4.2 G/DL (ref 3.5–5.2)
ALBUMIN/GLOB SERPL: 1.3 G/DL
ALP SERPL-CCNC: 79 U/L (ref 39–117)
ALT SERPL W P-5'-P-CCNC: 28 U/L (ref 1–33)
ANION GAP SERPL CALCULATED.3IONS-SCNC: 11.9 MMOL/L (ref 5–15)
AST SERPL-CCNC: 16 U/L (ref 1–32)
BASOPHILS # BLD AUTO: 0.06 10*3/MM3 (ref 0–0.2)
BASOPHILS NFR BLD AUTO: 0.8 % (ref 0–1.5)
BILIRUB SERPL-MCNC: 0.2 MG/DL (ref 0.2–1.2)
BUN BLD-MCNC: 19 MG/DL (ref 6–20)
BUN/CREAT SERPL: 25 (ref 7–25)
CALCIUM SPEC-SCNC: 9.3 MG/DL (ref 8.6–10.5)
CHLORIDE SERPL-SCNC: 111 MMOL/L (ref 98–107)
CO2 SERPL-SCNC: 20.1 MMOL/L (ref 22–29)
CREAT BLD-MCNC: 0.76 MG/DL (ref 0.57–1)
DEPRECATED RDW RBC AUTO: 42.6 FL (ref 37–54)
EOSINOPHIL # BLD AUTO: 0.24 10*3/MM3 (ref 0–0.4)
EOSINOPHIL NFR BLD AUTO: 3.2 % (ref 0.3–6.2)
ERYTHROCYTE [DISTWIDTH] IN BLOOD BY AUTOMATED COUNT: 12.4 % (ref 12.3–15.4)
GFR SERPL CREATININE-BSD FRML MDRD: 82 ML/MIN/1.73
GLOBULIN UR ELPH-MCNC: 3.2 GM/DL
GLUCOSE BLD-MCNC: 95 MG/DL (ref 65–99)
HCT VFR BLD AUTO: 40.1 % (ref 34–46.6)
HGB BLD-MCNC: 13.9 G/DL (ref 12–15.9)
IMM GRANULOCYTES # BLD AUTO: 0.02 10*3/MM3 (ref 0–0.05)
IMM GRANULOCYTES NFR BLD AUTO: 0.3 % (ref 0–0.5)
LYMPHOCYTES # BLD AUTO: 2.08 10*3/MM3 (ref 0.7–3.1)
LYMPHOCYTES NFR BLD AUTO: 27.7 % (ref 19.6–45.3)
MCH RBC QN AUTO: 32.3 PG (ref 26.6–33)
MCHC RBC AUTO-ENTMCNC: 34.7 G/DL (ref 31.5–35.7)
MCV RBC AUTO: 93 FL (ref 79–97)
MONOCYTES # BLD AUTO: 0.68 10*3/MM3 (ref 0.1–0.9)
MONOCYTES NFR BLD AUTO: 9 % (ref 5–12)
NEUTROPHILS # BLD AUTO: 4.44 10*3/MM3 (ref 1.7–7)
NEUTROPHILS NFR BLD AUTO: 59 % (ref 42.7–76)
NRBC BLD AUTO-RTO: 0 /100 WBC (ref 0–0.2)
PLATELET # BLD AUTO: 308 10*3/MM3 (ref 140–450)
PMV BLD AUTO: 11.5 FL (ref 6–12)
POTASSIUM BLD-SCNC: 4.3 MMOL/L (ref 3.5–5.2)
PROT SERPL-MCNC: 7.4 G/DL (ref 6–8.5)
RBC # BLD AUTO: 4.31 10*6/MM3 (ref 3.77–5.28)
SODIUM BLD-SCNC: 143 MMOL/L (ref 136–145)
WBC NRBC COR # BLD: 7.52 10*3/MM3 (ref 3.4–10.8)

## 2019-11-20 PROCEDURE — 36415 COLL VENOUS BLD VENIPUNCTURE: CPT

## 2019-11-20 PROCEDURE — 71046 X-RAY EXAM CHEST 2 VIEWS: CPT

## 2019-11-20 PROCEDURE — 80053 COMPREHEN METABOLIC PANEL: CPT

## 2019-11-20 PROCEDURE — 85025 COMPLETE CBC W/AUTO DIFF WBC: CPT

## 2019-11-25 ENCOUNTER — CONSULT (OUTPATIENT)
Dept: BARIATRICS/WEIGHT MGMT | Facility: CLINIC | Age: 44
End: 2019-11-25

## 2019-11-25 VITALS
HEART RATE: 66 BPM | OXYGEN SATURATION: 98 % | WEIGHT: 227.5 LBS | HEIGHT: 64 IN | SYSTOLIC BLOOD PRESSURE: 126 MMHG | RESPIRATION RATE: 18 BRPM | TEMPERATURE: 97.3 F | DIASTOLIC BLOOD PRESSURE: 76 MMHG | BODY MASS INDEX: 38.84 KG/M2

## 2019-11-25 DIAGNOSIS — K21.9 GASTROESOPHAGEAL REFLUX DISEASE, ESOPHAGITIS PRESENCE NOT SPECIFIED: ICD-10-CM

## 2019-11-25 DIAGNOSIS — R60.1 GENERALIZED EDEMA: ICD-10-CM

## 2019-11-25 DIAGNOSIS — E66.9 OBESITY, CLASS II, BMI 35-39.9: ICD-10-CM

## 2019-11-25 DIAGNOSIS — R53.83 FATIGUE, UNSPECIFIED TYPE: Primary | ICD-10-CM

## 2019-11-25 DIAGNOSIS — G47.30 SLEEP APNEA, UNSPECIFIED TYPE: ICD-10-CM

## 2019-11-25 PROCEDURE — 99215 OFFICE O/P EST HI 40 MIN: CPT | Performed by: SURGERY

## 2019-12-01 ENCOUNTER — PREP FOR SURGERY (OUTPATIENT)
Dept: OTHER | Facility: HOSPITAL | Age: 44
End: 2019-12-01

## 2019-12-01 DIAGNOSIS — E66.9 OBESITY, CLASS II, BMI 35-39.9: Primary | ICD-10-CM

## 2019-12-01 RX ORDER — SCOLOPAMINE TRANSDERMAL SYSTEM 1 MG/1
1 PATCH, EXTENDED RELEASE TRANSDERMAL ONCE
Status: CANCELLED | OUTPATIENT
Start: 2019-12-01 | End: 2019-12-01

## 2019-12-01 RX ORDER — CHLORHEXIDINE GLUCONATE 0.12 MG/ML
15 RINSE ORAL
Status: CANCELLED | OUTPATIENT
Start: 2019-12-01 | End: 2019-12-01

## 2019-12-01 RX ORDER — SODIUM CHLORIDE 9 MG/ML
150 INJECTION, SOLUTION INTRAVENOUS CONTINUOUS
Status: CANCELLED | OUTPATIENT
Start: 2019-12-01

## 2019-12-01 RX ORDER — SODIUM CHLORIDE 0.9 % (FLUSH) 0.9 %
3 SYRINGE (ML) INJECTION EVERY 12 HOURS SCHEDULED
Status: CANCELLED | OUTPATIENT
Start: 2019-12-01

## 2019-12-01 RX ORDER — ACETAMINOPHEN 500 MG
1000 TABLET ORAL ONCE
Status: CANCELLED | OUTPATIENT
Start: 2019-12-01 | End: 2019-12-01

## 2019-12-01 RX ORDER — LEVOFLOXACIN 5 MG/ML
500 INJECTION, SOLUTION INTRAVENOUS ONCE
Status: CANCELLED | OUTPATIENT
Start: 2019-12-01 | End: 2019-12-01

## 2019-12-01 RX ORDER — GABAPENTIN 300 MG/1
600 CAPSULE ORAL ONCE
Status: CANCELLED | OUTPATIENT
Start: 2019-12-01 | End: 2019-12-01

## 2019-12-01 RX ORDER — PANTOPRAZOLE SODIUM 40 MG/10ML
40 INJECTION, POWDER, LYOPHILIZED, FOR SOLUTION INTRAVENOUS ONCE
Status: CANCELLED | OUTPATIENT
Start: 2019-12-01 | End: 2019-12-01

## 2019-12-01 RX ORDER — SODIUM CHLORIDE 0.9 % (FLUSH) 0.9 %
3-10 SYRINGE (ML) INJECTION AS NEEDED
Status: CANCELLED | OUTPATIENT
Start: 2019-12-01

## 2019-12-01 NOTE — H&P (VIEW-ONLY)
"[facility] BARIATRIC SURGERY  [address]  [facility] [id number] [phone]      Patient  Name:  Renae Bolton  :  1975      Date of Visit: 2019      Chief Complaint:  weight gain; unable to maintain weight loss    History of Present Illness:  Renae Bolton is a 44 y.o. female who presents today for evaluation, education and consultation regarding weight loss surgery.     Patient has been overweight for many years, with numerous failed dietary/weight loss attempts.  She now has obesity related comorbidities of depression, fatigue, peripheral edema, GERD, migraines, MAREN and as such has decided to pursue weight loss surgery.    Since LOV, new dx of mitral valve insufficiency, MAREN, and venous insufficiency.  She was not losing very fast during her medical wt loss, and her cardiologist did workup for water retention.  The MVI was seen on Echo.  Has started on CPAP, changed diet, and started on beta blocker, and peripheral edema and dyspnea have improved.    Hx of SVT controlled on propranolol.    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    She has migraines, and takes treximet, which is naproxen/sumatriptan.    Was told in the past she was a difficulty intubation.  All surgeries except lumpectomy have been at [facility], and has mostly had no problem.  Has used a \"lidocaine lollipop\" before intubation in the past.        No personal or family hx of VTE or clotting d/o.  No liver, lung, heart, or renal disease      Review of data:    ALLI: nothing  CBC: nl  CMP: nl  EGD: 10/26/19 PQ no HH, Ge junction 39 cm, patulous hiatus  HP neg  Cardiac clearance:cleared    EKG: nl  CXR: nl      Last tobacco: n/a  Last NSAIDs: this week (Treximet)  Last ASA: yesterday  Last steroids: n/a  Last hormones: n/a      Past Medical History:   Diagnosis Date   • Anxiety    • Arrhythmia     SVT w/ PVCs, on propranolol, follows w/ Dr. Liang   • Aspirin long-term use     for heart " "health   • Depression    • Dyspepsia    • Dyspnea on exertion    • Fatigue    • Generalized edema     r/t weight gain   • Genital HSV     Famvir for prophylaxis   • GERD (gastroesophageal reflux disease)     daily Prilosec, denies prior eval   • Hard to intubate     needed a \"lidocaine lollipop\".    • Interstitial cystitis    • Migraine     Topamax w/ prn Treximet/Phenergan/Zofran   • Mitral valve prolapse    • Normal vaginal Papanicolaou smear in patient with history of hysterectomy 09/16/2016   • Obesity    • MAREN (obstructive sleep apnea)    • Peripheral edema    • PONV (postoperative nausea and vomiting)     scop patch helps   • Post concussion syndrome     s/p fall w/ frontal lobe head injury 9/2017, on Elavil   • Sleep apnea     newly dx, CPAP compliant   • Stress incontinence     follows w/ Dr. Galvan, on Myrbetriq, s/p cystoscopy w/ bulking agent   • Wears contact lenses      Past Surgical History:   Procedure Laterality Date   • ANTERIOR AND POSTERIOR VAGINAL REPAIR  06/23/2010    DR EDIN BYRD   • BREAST LUMPECTOMY Left 2015    benign   • CYSTOSCOPY W/ BULKING AGENT INJECTION N/A 6/25/2019    Procedure: CYSTOSCOPY WITH URETHRAL  BULKING AGENT INJECTION;  Surgeon: Jose Galvan MD;  Location: [facility];  Service: Urology   • CYSTOSCOPY, DIMETHYL SULFOXIDE COCKTAIL  03/14/2011    WITH HYDRODISTENTION (DR AVINASH MADDOX)   • HYSTERECTOMY  06/23/2010    [facility]VH (DR EDIN BYRD)   • LAPAROSCOPIC CHOLECYSTECTOMY  2013    for stones   • TRANSVAGINAL TAPING SUSPENSION WITH OBTURATOR  06/23/2010    DR EDIN BYRD   • WISDOM TOOTH EXTRACTION  1990       Allergies   Allergen Reactions   • Amoxicillin Shortness Of Breath and Palpitations     Keflex OK   • Caffeine Arrhythmia   • Demerol [Meperidine] Rash     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  Never actually taken it internally.   • Latex Anaphylaxis and Rash   • Morphine And Related Rash     Rash (if gets on skin). " " Never taken it internally   • Penicillins Shortness Of Breath and Palpitations     Keflex OK   • Percocet [Oxycodone-Acetaminophen] Dizziness and Confusion     Dilaudid + Lortab OK   • Topiramate Other (See Comments)     Pt states she can take NAME BRAND ONLY.  Generic brand \"doesn't work\"       Current Outpatient Medications:   •  acyclovir (ZOVIRAX) 5 % ointment, Apply  topically to the appropriate area as directed Every 3 (Three) Hours., Disp: 30 g, Rfl: 6  •  albuterol sulfate  (90 Base) MCG/ACT inhaler, INHALE 1 TO 2 PUFFS BY MOUTH EVERY 4 TO 6 HOURS AS NEEDED. (Patient taking differently: Inhale 1-2 puffs.), Disp: 8.5 g, Rfl: 3  •  amitriptyline (ELAVIL) 25 MG tablet, Take 1 tablet by mouth Every Night as directed, Disp: 90 tablet, Rfl: 3  •  aspirin 81 MG EC tablet, Take 81 mg by mouth Daily., Disp: , Rfl:   •  cetirizine (zyrTEC) 10 MG tablet, Take 1 tablet by mouth Daily., Disp: 90 tablet, Rfl: 3  •  cholecalciferol (VITAMIN D3) 1000 UNITS tablet, Take 1,000 Units by mouth Daily., Disp: , Rfl:   •  clobetasol propionate (CLOBEX) 0.05 % shampoo, apply TO HAIR AND SCALP as directed, Disp: , Rfl: 0  •  cyclobenzaprine (FLEXERIL) 5 MG tablet, Take 1 tablet by mouth 3 (Three) Times a Day As Needed., Disp: 270 tablet, Rfl: 3  •  DULoxetine (CYMBALTA) 60 MG capsule, Take 1 capsule by mouth Daily., Disp: 90 capsule, Rfl: 1  •  famciclovir (FAMVIR) 250 MG tablet, Take 1 tablet by mouth 3 (Three) Times a Day. (Patient taking differently: Take 250 mg by mouth 2 (Two) Times a Day.), Disp: 270 tablet, Rfl: 4  •  ibuprofen (ADVIL,MOTRIN) 600 MG tablet, Take 1 tablet by mouth Every 6 (Six) Hours with meals, Disp: 360 tablet, Rfl: 3  •  Lysine 1000 MG tablet, Take 1,000 mg by mouth., Disp: , Rfl:   •  Mirabegron ER (MYRBETRIQ) 50 MG tablet sustained-release 24 hour 24 hr tablet, Take 1 Tablet by mouth Daily., Disp: 90 tablet, Rfl: 3  •  omeprazole (priLOSEC) 40 MG capsule, Take 1 capsule by mouth Daily., Disp: 90 " capsule, Rfl: 1  •  ondansetron (ZOFRAN) 4 MG tablet, Take 4 mg by mouth Every 8 (Eight) Hours As Needed for Nausea or Vomiting., Disp: , Rfl:   •  ondansetron (ZOFRAN) 4 MG tablet, Take 1 tablet by mouth Every 6 (Six) Hours As Needed., Disp: 120 tablet, Rfl: 3  •  Prenatal Vit-Fe Fumarate-FA (SE-MELVI 19) 29-1 MG chewable tablet, Chew 1 tablet by mouth Daily., Disp: 30 tablet, Rfl: 2  •  Probiotic Product (PROBIOTIC PO), Take 1 capsule by mouth Daily., Disp: , Rfl:   •  promethazine (PHENERGAN) 25 MG tablet, Take 1 tablet by mouth Every 6 (Six) Hours As Needed for nausea, Disp: 30 tablet, Rfl: 4  •  propranolol LA (INDERAL LA) 120 MG 24 hr capsule, Take 1 capsule by mouth Daily., Disp: 90 capsule, Rfl: 3  •  PSYLLIUM HUSK PO, Take 1 capsule by mouth Daily., Disp: , Rfl:   •  SUMAtriptan-naproxen (TREXIMET)  MG per tablet, Take 1 Tablet by mouth at onset of Headache. May repeat in 2 hours as needed. (Max 2 tablets in 24 hours), Disp: 27 tablet, Rfl: 5  •  TOPAMAX 100 MG tablet, Take 1 tablet by mouth 2 (Two) Times a Day., Disp: 180 tablet, Rfl: 0  •  vitamin E 400 UNIT capsule, Take 800 Units by mouth Daily., Disp: , Rfl:   •  phenazopyridine (PYRIDIUM) 100 MG tablet, Take 1 tablet by mouth 3 (Three) Times a Day As Needed for bladder spasms., Disp: 20 tablet, Rfl: 0  •  neomycin-polymyxin-dexamethasone (MAXITROL) 0.1 % ophthalmic suspension, Instill 1 drop in right eye 3 times daily, Disp: 5 mL, Rfl: 0    Social History     Socioeconomic History   • Marital status:      Spouse name: Not on file   • Number of children: Not on file   • Years of education: Not on file   • Highest education level: Not on file   Occupational History   • Occupation: 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Employer: [employer]   Tobacco Use   • Smoking status: Never Smoker   • Smokeless tobacco: Never Used   Substance and Sexual Activity   • Alcohol use: No   • Drug use: No   • Sexual activity: Defer   Social History Narrative    Living in  [place] w/  and 2 children.  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       Family History   Problem Relation Age of Onset   • Diabetes Father    • Hypertension Father    • Breast cancer Sister 34   • Hypertension Maternal Grandmother    • Alzheimer's disease Maternal Grandmother    • Breast cancer Maternal Grandmother    • Heart disease Maternal Grandmother    • Hyperlipidemia Other    • Gout Other    • Migraines Other    • Breast cancer Maternal Aunt    • Stroke Maternal Grandfather    • Skin cancer Maternal Grandfather    • Hypertension Maternal Grandfather    • Diabetes Paternal Grandfather    • Hypertension Paternal Grandfather    • Hypertension Mother        Review of Systems   Constitutional: Positive for fatigue and unexpected weight gain. Negative for chills, diaphoresis, fever and unexpected weight loss.   HENT: Negative for congestion and facial swelling.    Eyes: Negative for blurred vision, double vision and discharge.   Respiratory: Negative for chest tightness, shortness of breath and stridor.    Cardiovascular: Negative for chest pain, palpitations and leg swelling.   Gastrointestinal: Negative for blood in stool.   Endocrine: Negative for polydipsia.   Genitourinary: Negative for hematuria.   Musculoskeletal: Positive for arthralgias.   Skin: Negative for color change.   Allergic/Immunologic: Negative for immunocompromised state.   Neurological: Negative for confusion.   Psychiatric/Behavioral: Negative for self-injury.       Physical Exam:  Vital Signs:  Weight: 103 kg (227 lb 8 oz)   Body mass index is 39.05 kg/m².  Temp: 97.3 °F (36.3 °C)   Heart Rate: 66   BP: 126/76     Physical Exam   Constitutional: She is oriented to person, place, and time. She appears well-developed and well-nourished.   HENT:   Head: Normocephalic and atraumatic.   Nose: Nose normal.   Eyes: Conjunctivae and EOM are normal. Pupils are equal, round, and reactive to light.   Neck: Normal range of motion. Neck supple. Carotid bruit is not  present. No tracheal deviation present. No thyromegaly present.   Cardiovascular: Normal rate, regular rhythm and normal heart sounds.   Slight systolic murmur   Pulmonary/Chest: Effort normal and breath sounds normal. No respiratory distress.   Abdominal: Soft. She exhibits no distension. There is no hepatosplenomegaly. There is no tenderness.   Lap scars   Musculoskeletal: Normal range of motion. She exhibits no edema or deformity.   Neurological: She is alert and oriented to person, place, and time. No cranial nerve deficit. Coordination normal.   Skin: Skin is warm and dry. No rash noted.   Psychiatric: She has a normal mood and affect. Her behavior is normal. Judgment and thought content normal.   Vitals reviewed.      Patient Active Problem List   Diagnosis   • Stress incontinence   • Fatigue   • Dyspepsia   • Dyspnea on exertion   • Obesity   • Hard to intubate   • PONV (postoperative nausea and vomiting)   • Sleep apnea   • Migraine   • Interstitial cystitis   • GERD (gastroesophageal reflux disease)   • Depression   • Anxiety   • Post concussion syndrome   • Genital HSV   • Arrhythmia   • Generalized edema   • Aspirin long-term use       Assessment:    Renae Bolton is a 44 y.o. year old female with medically complicated obesity.    Weight loss surgery is deemed medically necessary given the following obesity related comorbidities including  depression, fatigue, peripheral edema, GERD, migraines, MAREN with current Weight: 103 kg (227 lb 8 oz) and Body mass index is 39.05 kg/m²..    Patient is aware that surgery is a tool, and that weight loss is not guaranteed but only seen in the context of appropriate use, follow up and exercise.    The patient was present for an approximately a 2.5 hour discussion of the purpose of weight loss surgery, how WLS is a tool to assist in achieving weight loss goals, the most common complications and how best to avoid them, and the strategies for short and long term  "weight loss.  Ample opportunity to discuss questions was available both in group and during the time of individual examination.    I reviewed all available preop labs, Xrays, tests, clearances, etc and signed off on these in the record.  All of this in addition to the patient's unique history and exam has been taken into consideration in determining their appropriate candidacy for weight loss surgery.    Complications  of laparoscopic/possible robotic gastric sleeve were discussed. The patient is well aware of the potential complications of surgery that include but not limited to bleeding, infections, deep venous thrombosis, pulmonary embolism, pulmonary complications such as pneumonia, cardiac events, hernias, small bowel obstruction, damage to the spleen or other organs, bowel injury, disfiguring scars, failure to lose weight, need for additional surgery, conversion to an open procedure, and death. Patient is also aware of complications which apply in this particular procedure that can include but are not limited to a \"leak\" at the staple line which in some instances may require conversion to gastric bypass.    The patient is aware if a hiatal hernia is encountered, it likely will be repaired.  R/B/A Rx to hiatal hernia repair were discussed as outlined in our long consent form.  Briefly risks in addition to those for LSG include recurrent hernia, ABEL, dysphagia, esophageal injury, pneumothorax, injury to the vagus nerves, injury to the thoracic duct, aorta or vena cava.    Greater than 3 minutes was spent with the patient discussing avoiding all tobacco products and second hand smoke at least 2 weeks pre-operatively and 6 weeks post-operatively to minimize the risk of sleeve leak.  This included discussing the importance of avoiding even secondhand smoke as the risk of leak is increased.  Examples discussed:  I made it very clear that the patient understands they should avoid even riding in a car where someone " has previously smoked in the last 2 weeks, living in a house where someone smokes (even if it's in a separate room/patio/attached garage, etc.) we discussed that they should not have a conversation with a group of people who are smoking even if it's outside.  They can be around wood burning fires and barbecue.  I told them I do not know if marijuana has a same effects but my overall recommendation is to avoid it for 2 weeks prior in 6 weeks after surgery.  They also are aware that nicotine may also increase the risk of leak and I strongly encouraged him to avoid that as well for 2 weeks prior in 6 weeks after surgery.    Discussed the risks, benefits and alternative therapies at great length as outlined in our extensive consent forms, consent videos, and educational teaching process under the direction of the center's .    A copy of the patient's signed informed consent is on file.    Plan:  Laparoscopic sleeve gastrectomy with possible HHR at [facility].      R/B/A Rx discussed to postop anticoagulation incl but not limited to bleeding, drug reaction, venothromboembolic events, etc. and she declined.    MDM high:  Elective procedure with the following risk factors: morbid obesity, MAREN  4+ chronic medical problems reviewed.      Tracee Onofre MD

## 2019-12-01 NOTE — H&P
"[facility] BARIATRIC SURGERY  [address]  [facility] [id number] [phone]      Patient  Name:  Renae Bolton  :  1975      Date of Visit: 2019      Chief Complaint:  weight gain; unable to maintain weight loss    History of Present Illness:  Renae Bolton is a 44 y.o. female who presents today for evaluation, education and consultation regarding weight loss surgery.     Patient has been overweight for many years, with numerous failed dietary/weight loss attempts.  She now has obesity related comorbidities of depression, fatigue, peripheral edema, GERD, migraines, MAREN and as such has decided to pursue weight loss surgery.    Since LOV, new dx of mitral valve insufficiency, MAREN, and venous insufficiency.  She was not losing very fast during her medical wt loss, and her cardiologist did workup for water retention.  The MVI was seen on Echo.  Has started on CPAP, changed diet, and started on beta blocker, and peripheral edema and dyspnea have improved.    Hx of SVT controlled on propranolol.    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    She has migraines, and takes treximet, which is naproxen/sumatriptan.    Was told in the past she was a difficulty intubation.  All surgeries except lumpectomy have been at [facility], and has mostly had no problem.  Has used a \"lidocaine lollipop\" before intubation in the past.        No personal or family hx of VTE or clotting d/o.  No liver, lung, heart, or renal disease      Review of data:    ALLI: nothing  CBC: nl  CMP: nl  EGD: 10/26/19 PQ no HH, Ge junction 39 cm, patulous hiatus  HP neg  Cardiac clearance:cleared    EKG: nl  CXR: nl      Last tobacco: n/a  Last NSAIDs: this week (Treximet)  Last ASA: yesterday  Last steroids: n/a  Last hormones: n/a      Past Medical History:   Diagnosis Date   • Anxiety    • Arrhythmia     SVT w/ PVCs, on propranolol, follows w/ Dr. Liang   • Aspirin long-term use     for heart " "health   • Depression    • Dyspepsia    • Dyspnea on exertion    • Fatigue    • Generalized edema     r/t weight gain   • Genital HSV     Famvir for prophylaxis   • GERD (gastroesophageal reflux disease)     daily Prilosec, denies prior eval   • Hard to intubate     needed a \"lidocaine lollipop\".    • Interstitial cystitis    • Migraine     Topamax w/ prn Treximet/Phenergan/Zofran   • Mitral valve prolapse    • Normal vaginal Papanicolaou smear in patient with history of hysterectomy 09/16/2016   • Obesity    • MAREN (obstructive sleep apnea)    • Peripheral edema    • PONV (postoperative nausea and vomiting)     scop patch helps   • Post concussion syndrome     s/p fall w/ frontal lobe head injury 9/2017, on Elavil   • Sleep apnea     newly dx, CPAP compliant   • Stress incontinence     follows w/ Dr. Galvan, on Myrbetriq, s/p cystoscopy w/ bulking agent   • Wears contact lenses      Past Surgical History:   Procedure Laterality Date   • ANTERIOR AND POSTERIOR VAGINAL REPAIR  06/23/2010    DR EIDN BYRD   • BREAST LUMPECTOMY Left 2015    benign   • CYSTOSCOPY W/ BULKING AGENT INJECTION N/A 6/25/2019    Procedure: CYSTOSCOPY WITH URETHRAL  BULKING AGENT INJECTION;  Surgeon: Jose Galvan MD;  Location: [facility];  Service: Urology   • CYSTOSCOPY, DIMETHYL SULFOXIDE COCKTAIL  03/14/2011    WITH HYDRODISTENTION (DR AVINASH MADDOX)   • HYSTERECTOMY  06/23/2010    [facility] (DR EDIN BYRD)   • LAPAROSCOPIC CHOLECYSTECTOMY  2013    for stones   • TRANSVAGINAL TAPING SUSPENSION WITH OBTURATOR  06/23/2010    DR EDIN BYRD   • WISDOM TOOTH EXTRACTION  1990       Allergies   Allergen Reactions   • Amoxicillin Shortness Of Breath and Palpitations     Keflex OK   • Caffeine Arrhythmia   • Demerol [Meperidine] Rash     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  Never actually taken it internally.   • Latex Anaphylaxis and Rash   • Morphine And Related Rash     Rash (if gets on skin). " " Never taken it internally   • Penicillins Shortness Of Breath and Palpitations     Keflex OK   • Percocet [Oxycodone-Acetaminophen] Dizziness and Confusion     Dilaudid + Lortab OK   • Topiramate Other (See Comments)     Pt states she can take NAME BRAND ONLY.  Generic brand \"doesn't work\"       Current Outpatient Medications:   •  acyclovir (ZOVIRAX) 5 % ointment, Apply  topically to the appropriate area as directed Every 3 (Three) Hours., Disp: 30 g, Rfl: 6  •  albuterol sulfate  (90 Base) MCG/ACT inhaler, INHALE 1 TO 2 PUFFS BY MOUTH EVERY 4 TO 6 HOURS AS NEEDED. (Patient taking differently: Inhale 1-2 puffs.), Disp: 8.5 g, Rfl: 3  •  amitriptyline (ELAVIL) 25 MG tablet, Take 1 tablet by mouth Every Night as directed, Disp: 90 tablet, Rfl: 3  •  aspirin 81 MG EC tablet, Take 81 mg by mouth Daily., Disp: , Rfl:   •  cetirizine (zyrTEC) 10 MG tablet, Take 1 tablet by mouth Daily., Disp: 90 tablet, Rfl: 3  •  cholecalciferol (VITAMIN D3) 1000 UNITS tablet, Take 1,000 Units by mouth Daily., Disp: , Rfl:   •  clobetasol propionate (CLOBEX) 0.05 % shampoo, apply TO HAIR AND SCALP as directed, Disp: , Rfl: 0  •  cyclobenzaprine (FLEXERIL) 5 MG tablet, Take 1 tablet by mouth 3 (Three) Times a Day As Needed., Disp: 270 tablet, Rfl: 3  •  DULoxetine (CYMBALTA) 60 MG capsule, Take 1 capsule by mouth Daily., Disp: 90 capsule, Rfl: 1  •  famciclovir (FAMVIR) 250 MG tablet, Take 1 tablet by mouth 3 (Three) Times a Day. (Patient taking differently: Take 250 mg by mouth 2 (Two) Times a Day.), Disp: 270 tablet, Rfl: 4  •  ibuprofen (ADVIL,MOTRIN) 600 MG tablet, Take 1 tablet by mouth Every 6 (Six) Hours with meals, Disp: 360 tablet, Rfl: 3  •  Lysine 1000 MG tablet, Take 1,000 mg by mouth., Disp: , Rfl:   •  Mirabegron ER (MYRBETRIQ) 50 MG tablet sustained-release 24 hour 24 hr tablet, Take 1 Tablet by mouth Daily., Disp: 90 tablet, Rfl: 3  •  omeprazole (priLOSEC) 40 MG capsule, Take 1 capsule by mouth Daily., Disp: 90 " capsule, Rfl: 1  •  ondansetron (ZOFRAN) 4 MG tablet, Take 4 mg by mouth Every 8 (Eight) Hours As Needed for Nausea or Vomiting., Disp: , Rfl:   •  ondansetron (ZOFRAN) 4 MG tablet, Take 1 tablet by mouth Every 6 (Six) Hours As Needed., Disp: 120 tablet, Rfl: 3  •  Prenatal Vit-Fe Fumarate-FA (SE-MELVI 19) 29-1 MG chewable tablet, Chew 1 tablet by mouth Daily., Disp: 30 tablet, Rfl: 2  •  Probiotic Product (PROBIOTIC PO), Take 1 capsule by mouth Daily., Disp: , Rfl:   •  promethazine (PHENERGAN) 25 MG tablet, Take 1 tablet by mouth Every 6 (Six) Hours As Needed for nausea, Disp: 30 tablet, Rfl: 4  •  propranolol LA (INDERAL LA) 120 MG 24 hr capsule, Take 1 capsule by mouth Daily., Disp: 90 capsule, Rfl: 3  •  PSYLLIUM HUSK PO, Take 1 capsule by mouth Daily., Disp: , Rfl:   •  SUMAtriptan-naproxen (TREXIMET)  MG per tablet, Take 1 Tablet by mouth at onset of Headache. May repeat in 2 hours as needed. (Max 2 tablets in 24 hours), Disp: 27 tablet, Rfl: 5  •  TOPAMAX 100 MG tablet, Take 1 tablet by mouth 2 (Two) Times a Day., Disp: 180 tablet, Rfl: 0  •  vitamin E 400 UNIT capsule, Take 800 Units by mouth Daily., Disp: , Rfl:   •  phenazopyridine (PYRIDIUM) 100 MG tablet, Take 1 tablet by mouth 3 (Three) Times a Day As Needed for bladder spasms., Disp: 20 tablet, Rfl: 0  •  neomycin-polymyxin-dexamethasone (MAXITROL) 0.1 % ophthalmic suspension, Instill 1 drop in right eye 3 times daily, Disp: 5 mL, Rfl: 0    Social History     Socioeconomic History   • Marital status:      Spouse name: Not on file   • Number of children: Not on file   • Years of education: Not on file   • Highest education level: Not on file   Occupational History   • Occupation: 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Employer: [employer]   Tobacco Use   • Smoking status: Never Smoker   • Smokeless tobacco: Never Used   Substance and Sexual Activity   • Alcohol use: No   • Drug use: No   • Sexual activity: Defer   Social History Narrative    Living in  [place] w/  and 2 children.  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       Family History   Problem Relation Age of Onset   • Diabetes Father    • Hypertension Father    • Breast cancer Sister 34   • Hypertension Maternal Grandmother    • Alzheimer's disease Maternal Grandmother    • Breast cancer Maternal Grandmother    • Heart disease Maternal Grandmother    • Hyperlipidemia Other    • Gout Other    • Migraines Other    • Breast cancer Maternal Aunt    • Stroke Maternal Grandfather    • Skin cancer Maternal Grandfather    • Hypertension Maternal Grandfather    • Diabetes Paternal Grandfather    • Hypertension Paternal Grandfather    • Hypertension Mother        Review of Systems   Constitutional: Positive for fatigue and unexpected weight gain. Negative for chills, diaphoresis, fever and unexpected weight loss.   HENT: Negative for congestion and facial swelling.    Eyes: Negative for blurred vision, double vision and discharge.   Respiratory: Negative for chest tightness, shortness of breath and stridor.    Cardiovascular: Negative for chest pain, palpitations and leg swelling.   Gastrointestinal: Negative for blood in stool.   Endocrine: Negative for polydipsia.   Genitourinary: Negative for hematuria.   Musculoskeletal: Positive for arthralgias.   Skin: Negative for color change.   Allergic/Immunologic: Negative for immunocompromised state.   Neurological: Negative for confusion.   Psychiatric/Behavioral: Negative for self-injury.       Physical Exam:  Vital Signs:  Weight: 103 kg (227 lb 8 oz)   Body mass index is 39.05 kg/m².  Temp: 97.3 °F (36.3 °C)   Heart Rate: 66   BP: 126/76     Physical Exam   Constitutional: She is oriented to person, place, and time. She appears well-developed and well-nourished.   HENT:   Head: Normocephalic and atraumatic.   Nose: Nose normal.   Eyes: Conjunctivae and EOM are normal. Pupils are equal, round, and reactive to light.   Neck: Normal range of motion. Neck supple. Carotid bruit is not  present. No tracheal deviation present. No thyromegaly present.   Cardiovascular: Normal rate, regular rhythm and normal heart sounds.   Slight systolic murmur   Pulmonary/Chest: Effort normal and breath sounds normal. No respiratory distress.   Abdominal: Soft. She exhibits no distension. There is no hepatosplenomegaly. There is no tenderness.   Lap scars   Musculoskeletal: Normal range of motion. She exhibits no edema or deformity.   Neurological: She is alert and oriented to person, place, and time. No cranial nerve deficit. Coordination normal.   Skin: Skin is warm and dry. No rash noted.   Psychiatric: She has a normal mood and affect. Her behavior is normal. Judgment and thought content normal.   Vitals reviewed.      Patient Active Problem List   Diagnosis   • Stress incontinence   • Fatigue   • Dyspepsia   • Dyspnea on exertion   • Obesity   • Hard to intubate   • PONV (postoperative nausea and vomiting)   • Sleep apnea   • Migraine   • Interstitial cystitis   • GERD (gastroesophageal reflux disease)   • Depression   • Anxiety   • Post concussion syndrome   • Genital HSV   • Arrhythmia   • Generalized edema   • Aspirin long-term use       Assessment:    Renae Bolton is a 44 y.o. year old female with medically complicated obesity.    Weight loss surgery is deemed medically necessary given the following obesity related comorbidities including  depression, fatigue, peripheral edema, GERD, migraines, MAREN with current Weight: 103 kg (227 lb 8 oz) and Body mass index is 39.05 kg/m²..    Patient is aware that surgery is a tool, and that weight loss is not guaranteed but only seen in the context of appropriate use, follow up and exercise.    The patient was present for an approximately a 2.5 hour discussion of the purpose of weight loss surgery, how WLS is a tool to assist in achieving weight loss goals, the most common complications and how best to avoid them, and the strategies for short and long term  "weight loss.  Ample opportunity to discuss questions was available both in group and during the time of individual examination.    I reviewed all available preop labs, Xrays, tests, clearances, etc and signed off on these in the record.  All of this in addition to the patient's unique history and exam has been taken into consideration in determining their appropriate candidacy for weight loss surgery.    Complications  of laparoscopic/possible robotic gastric sleeve were discussed. The patient is well aware of the potential complications of surgery that include but not limited to bleeding, infections, deep venous thrombosis, pulmonary embolism, pulmonary complications such as pneumonia, cardiac events, hernias, small bowel obstruction, damage to the spleen or other organs, bowel injury, disfiguring scars, failure to lose weight, need for additional surgery, conversion to an open procedure, and death. Patient is also aware of complications which apply in this particular procedure that can include but are not limited to a \"leak\" at the staple line which in some instances may require conversion to gastric bypass.    The patient is aware if a hiatal hernia is encountered, it likely will be repaired.  R/B/A Rx to hiatal hernia repair were discussed as outlined in our long consent form.  Briefly risks in addition to those for LSG include recurrent hernia, ABEL, dysphagia, esophageal injury, pneumothorax, injury to the vagus nerves, injury to the thoracic duct, aorta or vena cava.    Greater than 3 minutes was spent with the patient discussing avoiding all tobacco products and second hand smoke at least 2 weeks pre-operatively and 6 weeks post-operatively to minimize the risk of sleeve leak.  This included discussing the importance of avoiding even secondhand smoke as the risk of leak is increased.  Examples discussed:  I made it very clear that the patient understands they should avoid even riding in a car where someone " has previously smoked in the last 2 weeks, living in a house where someone smokes (even if it's in a separate room/patio/attached garage, etc.) we discussed that they should not have a conversation with a group of people who are smoking even if it's outside.  They can be around wood burning fires and barbecue.  I told them I do not know if marijuana has a same effects but my overall recommendation is to avoid it for 2 weeks prior in 6 weeks after surgery.  They also are aware that nicotine may also increase the risk of leak and I strongly encouraged him to avoid that as well for 2 weeks prior in 6 weeks after surgery.    Discussed the risks, benefits and alternative therapies at great length as outlined in our extensive consent forms, consent videos, and educational teaching process under the direction of the center's .    A copy of the patient's signed informed consent is on file.    Plan:  Laparoscopic sleeve gastrectomy with possible HHR at [facility].      R/B/A Rx discussed to postop anticoagulation incl but not limited to bleeding, drug reaction, venothromboembolic events, etc. and she declined.    MDM high:  Elective procedure with the following risk factors: morbid obesity, MAREN  4+ chronic medical problems reviewed.      Tracee Onofre MD

## 2019-12-02 PROBLEM — E66.812 OBESITY, CLASS II, BMI 35-39.9: Status: ACTIVE | Noted: 2019-12-02

## 2019-12-02 PROBLEM — E66.9 OBESITY, CLASS II, BMI 35-39.9: Status: ACTIVE | Noted: 2019-12-02

## 2019-12-06 ENCOUNTER — ANESTHESIA EVENT (OUTPATIENT)
Dept: PERIOP | Facility: HOSPITAL | Age: 44
End: 2019-12-06

## 2019-12-12 ENCOUNTER — OFFICE VISIT (OUTPATIENT)
Dept: OBSTETRICS AND GYNECOLOGY | Facility: CLINIC | Age: 44
End: 2019-12-12

## 2019-12-12 VITALS
WEIGHT: 227 LBS | BODY MASS INDEX: 38.76 KG/M2 | HEIGHT: 64 IN | SYSTOLIC BLOOD PRESSURE: 128 MMHG | DIASTOLIC BLOOD PRESSURE: 70 MMHG

## 2019-12-12 DIAGNOSIS — Z01.419 ENCOUNTER FOR GYNECOLOGICAL EXAMINATION WITHOUT ABNORMAL FINDING: Primary | ICD-10-CM

## 2019-12-12 DIAGNOSIS — Z12.39 ENCOUNTER FOR BREAST CANCER SCREENING OTHER THAN MAMMOGRAM: ICD-10-CM

## 2019-12-12 DIAGNOSIS — B00.9 RECURRENT HSV (HERPES SIMPLEX VIRUS): ICD-10-CM

## 2019-12-12 PROCEDURE — 99396 PREV VISIT EST AGE 40-64: CPT | Performed by: OBSTETRICS & GYNECOLOGY

## 2019-12-12 RX ORDER — FAMCICLOVIR 250 MG/1
250 TABLET ORAL 3 TIMES DAILY
Qty: 270 TABLET | Refills: 4 | Status: SHIPPED | OUTPATIENT
Start: 2019-12-12 | End: 2021-01-14 | Stop reason: SDUPTHER

## 2019-12-13 ENCOUNTER — APPOINTMENT (OUTPATIENT)
Dept: PREADMISSION TESTING | Facility: HOSPITAL | Age: 44
End: 2019-12-13

## 2019-12-13 VITALS
SYSTOLIC BLOOD PRESSURE: 104 MMHG | DIASTOLIC BLOOD PRESSURE: 51 MMHG | HEIGHT: 64 IN | HEART RATE: 69 BPM | BODY MASS INDEX: 38.96 KG/M2

## 2019-12-13 PROCEDURE — 93005 ELECTROCARDIOGRAM TRACING: CPT

## 2019-12-13 NOTE — PROGRESS NOTES
"Subjective  Chief Complaint   Patient presents with   • Gynecologic Exam     No  new complaints/concerns    • Med Refill     needs yearly refill on Famvir      Patient is 44 y.o.  here for her annual examination.  Patient is without complaints.  Patient will have a gastric sleeve next week.  Patient has been on a low carb diet.  Patient reports improvement in her HSV flareups.  Patient desires to continue her medication at present.  Patient did have a mammogram at [facility] in September.  Patient sees Dr. Beard for primary care.  Patient also had a urethral bulking agent placed with Dr. Galvan earlier this year.  Patient reports improvement with her symptoms.    History  Past Medical History:   Diagnosis Date   • Anxiety    • Arrhythmia     SVT w/ PVCs, on propranolol, follows w/ Dr. Liang   • Aspirin long-term use     for heart health   • Depression    • Dyspepsia    • Dyspnea on exertion    • Fatigue    • Generalized edema     r/t weight gain   • Genital HSV     Famvir for prophylaxis   • GERD (gastroesophageal reflux disease)     daily Prilosec, denies prior eval   • Hard to intubate     needed a \"lidocaine lollipop\".    • Interstitial cystitis    • Migraine     Topamax w/ prn Treximet/Phenergan/Zofran   • Mitral valve prolapse    • Normal vaginal Papanicolaou smear in patient with history of hysterectomy 2016   • Obesity    • MAREN (obstructive sleep apnea)    • Peripheral edema    • PONV (postoperative nausea and vomiting)     scop patch helps   • Post concussion syndrome     s/p fall w/ frontal lobe head injury 2017, on Elavil   • Sleep apnea     newly dx, CPAP compliant   • Stress incontinence     follows w/ Dr. Galvan, on Myrbetriq, s/p cystoscopy w/ bulking agent   • Wears contact lenses      Current Outpatient Medications on File Prior to Visit   Medication Sig Dispense Refill   • acyclovir (ZOVIRAX) 5 % ointment Apply  topically to the appropriate area as directed Every 3 (Three) Hours. 30 g " 6   • albuterol sulfate  (90 Base) MCG/ACT inhaler INHALE 1 TO 2 PUFFS BY MOUTH EVERY 4 TO 6 HOURS AS NEEDED. (Patient taking differently: Inhale 1-2 puffs.) 8.5 g 3   • amitriptyline (ELAVIL) 25 MG tablet Take 1 tablet by mouth Every Night as directed 90 tablet 3   • aspirin 81 MG EC tablet Take 81 mg by mouth Daily.     • cetirizine (zyrTEC) 10 MG tablet Take 1 tablet by mouth Daily. 90 tablet 3   • cholecalciferol (VITAMIN D3) 1000 UNITS tablet Take 1,000 Units by mouth Daily.     • clobetasol propionate (CLOBEX) 0.05 % shampoo apply TO HAIR AND SCALP as directed  0   • cyclobenzaprine (FLEXERIL) 5 MG tablet Take 1 tablet by mouth 3 (Three) Times a Day As Needed. 270 tablet 3   • DULoxetine (CYMBALTA) 60 MG capsule Take 1 capsule by mouth Daily. 90 capsule 1   • ibuprofen (ADVIL,MOTRIN) 600 MG tablet Take 1 tablet by mouth Every 6 (Six) Hours with meals 360 tablet 3   • Lysine 1000 MG tablet Take 1,000 mg by mouth.     • Mirabegron ER (MYRBETRIQ) 50 MG tablet sustained-release 24 hour 24 hr tablet Take 1 Tablet by mouth Daily. 90 tablet 3   • omeprazole (priLOSEC) 40 MG capsule Take 1 capsule by mouth Daily. 90 capsule 1   • ondansetron (ZOFRAN) 4 MG tablet Take 4 mg by mouth Every 8 (Eight) Hours As Needed for Nausea or Vomiting.     • ondansetron (ZOFRAN) 4 MG tablet Take 1 tablet by mouth Every 6 (Six) Hours As Needed. 120 tablet 3   • phenazopyridine (PYRIDIUM) 100 MG tablet Take 1 tablet by mouth 3 (Three) Times a Day As Needed for bladder spasms. 20 tablet 0   • Prenatal Vit-Fe Fumarate-FA (SE-MELVI 19) 29-1 MG chewable tablet Chew 1 tablet by mouth Daily. 30 tablet 2   • Probiotic Product (PROBIOTIC PO) Take 1 capsule by mouth Daily.     • promethazine (PHENERGAN) 25 MG tablet Take 1 tablet by mouth Every 6 (Six) Hours As Needed for nausea 30 tablet 4   • propranolol LA (INDERAL LA) 120 MG 24 hr capsule Take 1 capsule by mouth Daily. 90 capsule 3   • PSYLLIUM HUSK PO Take 1 capsule by mouth Daily.    "  • SUMAtriptan-naproxen (TREXIMET)  MG per tablet Take 1 Tablet by mouth at onset of Headache. May repeat in 2 hours as needed. (Max 2 tablets in 24 hours) 27 tablet 5   • neomycin-polymyxin-dexamethasone (MAXITROL) 0.1 % ophthalmic suspension Instill 1 drop in right eye 3 times daily 5 mL 0   • TOPAMAX 100 MG tablet Take 1 tablet by mouth 2 (Two) Times a Day. 180 tablet 0   • vitamin E 400 UNIT capsule Take 800 Units by mouth Daily.       No current facility-administered medications on file prior to visit.      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  Never actually taken it internally.   • Latex Anaphylaxis and Rash   • Morphine And Related Rash     Rash (if gets on skin).  Never taken it internally   • Penicillins Shortness Of Breath and Palpitations     Keflex OK   • Percocet [Oxycodone-Acetaminophen] Dizziness and Confusion     Dilaudid + Lortab OK   • Topiramate Other (See Comments)     Pt states she can take NAME BRAND ONLY.  Generic brand \"doesn't work\"     Past Surgical History:   Procedure Laterality Date   • ANTERIOR AND POSTERIOR VAGINAL REPAIR  06/23/2010    DR EDIN BYRD   • BREAST LUMPECTOMY Left 2015    benign   • CYSTOSCOPY W/ BULKING AGENT INJECTION N/A 6/25/2019    Procedure: CYSTOSCOPY WITH URETHRAL  BULKING AGENT INJECTION;  Surgeon: Jose Galvan MD;  Location: [facility];  Service: Urology   • CYSTOSCOPY, DIMETHYL SULFOXIDE COCKTAIL  03/14/2011    WITH HYDRODISTENTION (DR AVINASH MADDOX)   • HYSTERECTOMY  06/23/2010    [facility] (DR EDIN BYRD)   • LAPAROSCOPIC CHOLECYSTECTOMY  2013    for stones   • TRANSVAGINAL TAPING SUSPENSION WITH OBTURATOR  06/23/2010    DR EDIN BYRD   • WISDOM TOOTH EXTRACTION  1990     Family History   Problem Relation Age of Onset   • Diabetes Father    • " "Hypertension Father    • Breast cancer Sister 34   • Hypertension Maternal Grandmother    • Alzheimer's disease Maternal Grandmother    • Breast cancer Maternal Grandmother    • Heart disease Maternal Grandmother    • Hyperlipidemia Other    • Gout Other    • Migraines Other    • Breast cancer Maternal Aunt    • Stroke Maternal Grandfather    • Skin cancer Maternal Grandfather    • Hypertension Maternal Grandfather    • Diabetes Paternal Grandfather    • Hypertension Paternal Grandfather    • Hypertension Mother      Social History     Socioeconomic History   • Marital status:      Spouse name: Not on file   • Number of children: Not on file   • Years of education: Not on file   • Highest education level: Not on file   Occupational History   • Occupation: 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Employer: [employer]   Tobacco Use   • Smoking status: Never Smoker   • Smokeless tobacco: Never Used   Substance and Sexual Activity   • Alcohol use: No   • Drug use: No   • Sexual activity: Defer   Social History Narrative    Living in [place] w/  and 2 children.  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       Review of Systems  The following systems were reviewed and negative:  constitution, eyes, ENT, respiratory, cardiovascular, gastrointestinal, genitourinary, integument, breast, hematologic / lymphatic, musculoskeletal, neurological, behavioral/psych, endocrine and allergies / immunologic     Objective  Vitals:    12/12/19 0955   BP: 128/70   Weight: 103 kg (227 lb)   Height: 162.6 cm (64\")     Physical Exam:  General Appearance: alert, appears stated age and cooperative  Head: normocephalic, without obvious abnormality and atraumatic  Eyes: lids and lashes normal, conjunctivae and sclerae normal, no icterus, no pallor, corneas clear and PERRLA  Ears: ears appear intact with no abnormalities noted  Nose: nares normal, septum midline, mucosa normal and no drainage  Neck: suppple, trachea midline and no thyromegaly  Lungs: clear to " auscultation, respirations regular, respirations even and respirations unlabored  Heart: regular rhythm and normal rate, normal S1, S2, no murmur, gallop, or rubs and no click  Breasts: Examined in supine position  Symmetric without masses or skin dimpling  Nipples normal without inversion, lesions or discharge  There are no palpable axillary nodes  Abdomen: normal bowel sounds, no masses, no hepatomegaly, no splenomegaly, soft non-tender, no guarding and no rebound tenderness  Pelvic: Clinical staff was present for exam  External genitalia:  normal appearance of the external genitalia including Bartholin's and [place]'s glands.  :  urethral meatus normal;  Vaginal:  normal pink mucosa without prolapse or lesions.  Cervix:  absent.  Uterus:  absent.  Adnexa:  non palpable bilaterally.  Pap smear done and specimen sent using Thin-Prep technique  Extremities: moves extremities well, no edema, no cyanosis and no redness  Skin: no bleeding, bruising or rash and no lesions noted  Lymph Nodes: no palpable adenopathy  Neuro: CN II-X grossly intact; sensation intact  Psych: normal mood and affect, oriented to person, time and place, thought content organized and appropriate judgment  Lab Review   No data reviewed    Imaging   Mammogram report    Decision to Obtain Medical Records  No    Summary of Medical Records  No    Assessment/Plan  Problem List Items Addressed This Visit     None      Visit Diagnoses     Encounter for gynecological examination without abnormal finding    -  Primary  Pap was done today.  If she does not receive the results of the Pap within 2 weeks  time, she was instructed to call to find out the results.  I explained to Renae that the recommendations for Pap smear interval in a low risk patient has lengthened to 3 years time if cytology alone normal or  5 years time if both cytology and HPV testing were normal.  I encouraged her to be seen yearly for a full physical exam including breast and pelvic  exam even during the off years when PAP's will not be performed.    Relevant Orders    Pap IG, Rfx HPV ASCU    Recurrent HSV (herpes simplex virus)        Relevant Medications    famciclovir (FAMVIR) 250 MG tablet    Encounter for breast cancer screening other than mammogram      It is recommended per ACOG, for women at average risk to start annual mammogram screening at the age of 40 until the age of 75 and an individualized decision be made for women after age 75.  She was encouraged to continue getting yearly mammograms.  She should report any palpable breast lump(s) or skin changes regardless of mammographic findings.  I explained to Renae that notification regarding her mammogram results will come from the center performing the study.  Our office will not be routinely calling with mammogram results.  It is her responsibility to make sure that the results from the mammogram are communicated to her by the breast center.  If she has any questions about the results, she is welcome to call our office anytime.  MMG done.    Renae was counseled regarding having clinical breast exams and breast self-awareness.  Women aged 29-39 years of age should have clinical breast exams every 1-3 years and yearly aged 40 and older.  The patient was counseled regarding breast self-awareness focusing on having a sense of what is normal for her breasts so that she can tell if there are changes.  Even small changes should be reported to provider.    Relevant Orders    Mammo Screening Digital Tomosynthesis Bilateral With CAD        Follow up as discussed/scheduled  Note: Speech recognition transcription software may have been used to dictate portions of this document.  An attempt at proofreading has been made though minor errors in transcription may still be present.  This note was electronically signed.  Shabana Hedrick M.D.

## 2019-12-13 NOTE — PAT
"Called Mike Early, CRNA R/T pt states that she is a difficult intubation with surgery.  States that she was told this when she had a hysterectomy in 2010.  States that she was given a \"lidocaine lollipop\".  Informed CRNA that this RN documented this in the EMR and the nursing report sheet.  CRNA verbalized understanding.    "

## 2019-12-17 ENCOUNTER — HOSPITAL ENCOUNTER (INPATIENT)
Facility: HOSPITAL | Age: 44
LOS: 1 days | Discharge: HOME | End: 2019-12-18
Attending: SURGERY | Admitting: SURGERY

## 2019-12-17 ENCOUNTER — ANESTHESIA (OUTPATIENT)
Dept: PERIOP | Facility: HOSPITAL | Age: 44
End: 2019-12-17

## 2019-12-17 DIAGNOSIS — E66.9 OBESITY, CLASS II, BMI 35-39.9: ICD-10-CM

## 2019-12-17 PROBLEM — E66.01 MORBID OBESITY (HCC): Status: ACTIVE | Noted: 2019-12-17

## 2019-12-17 PROCEDURE — 25010000002 HYDROMORPHONE 1 MG/ML SOLUTION

## 2019-12-17 PROCEDURE — 25010000002 PROPOFOL 200 MG/20ML EMULSION: Performed by: NURSE ANESTHETIST, CERTIFIED REGISTERED

## 2019-12-17 PROCEDURE — 0DB64Z3 EXCISION OF STOMACH, PERCUTANEOUS ENDOSCOPIC APPROACH, VERTICAL: ICD-10-PCS | Performed by: SURGERY

## 2019-12-17 PROCEDURE — 25010000002 HALOPERIDOL LACTATE PER 5 MG: Performed by: NURSE ANESTHETIST, CERTIFIED REGISTERED

## 2019-12-17 PROCEDURE — 25010000002 PROPOFOL 10 MG/ML EMULSION: Performed by: NURSE ANESTHETIST, CERTIFIED REGISTERED

## 2019-12-17 PROCEDURE — 25010000002 ONDANSETRON PER 1 MG: Performed by: SURGERY

## 2019-12-17 PROCEDURE — 25010000002 ONDANSETRON PER 1 MG: Performed by: NURSE ANESTHETIST, CERTIFIED REGISTERED

## 2019-12-17 PROCEDURE — 25010000002 LEVOFLOXACIN PER 250 MG: Performed by: SURGERY

## 2019-12-17 PROCEDURE — 25010000002 PROMETHAZINE PER 50 MG: Performed by: SURGERY

## 2019-12-17 PROCEDURE — 0BQT4ZZ REPAIR DIAPHRAGM, PERCUTANEOUS ENDOSCOPIC APPROACH: ICD-10-PCS | Performed by: SURGERY

## 2019-12-17 PROCEDURE — 25010000002 SUCCINYLCHOLINE PER 20 MG: Performed by: NURSE ANESTHETIST, CERTIFIED REGISTERED

## 2019-12-17 PROCEDURE — 25010000002 METOCLOPRAMIDE PER 10 MG: Performed by: SURGERY

## 2019-12-17 PROCEDURE — 25010000002 DEXAMETHASONE PER 1 MG: Performed by: NURSE ANESTHETIST, CERTIFIED REGISTERED

## 2019-12-17 PROCEDURE — 25010000002 HYDROMORPHONE 1 MG/ML SOLUTION: Performed by: SURGERY

## 2019-12-17 PROCEDURE — 25010000002 PROMETHAZINE PER 50 MG: Performed by: NURSE ANESTHETIST, CERTIFIED REGISTERED

## 2019-12-17 PROCEDURE — 43775 LAP SLEEVE GASTRECTOMY: CPT | Performed by: SURGERY

## 2019-12-17 PROCEDURE — 25010000002 FENTANYL CITRATE (PF) 250 MCG/5ML SOLUTION: Performed by: NURSE ANESTHETIST, CERTIFIED REGISTERED

## 2019-12-17 PROCEDURE — 25010000002 ENOXAPARIN PER 10 MG: Performed by: SURGERY

## 2019-12-17 DEVICE — SEALANT WND FIBRIN TISSEEL PREFIL/SYR/PRIMAFZ 4ML: Type: IMPLANTABLE DEVICE | Site: ABDOMEN | Status: FUNCTIONAL

## 2019-12-17 DEVICE — REINFORCED INTELLIGENT RELOAD, FOR USE WITH SIGNIA STAPLING SYSTEM
Type: IMPLANTABLE DEVICE | Site: ABDOMEN | Status: FUNCTIONAL
Brand: TRI-STAPLE 2.0

## 2019-12-17 DEVICE — BLACK REINFORCED INTELLIGENT RELOAD, FOR USE WITH SIGNIA STAPLING SYSTEM
Type: IMPLANTABLE DEVICE | Site: ABDOMEN | Status: FUNCTIONAL
Brand: TRI-STAPLE 2.0

## 2019-12-17 RX ORDER — DEXAMETHASONE SODIUM PHOSPHATE 10 MG/ML
INJECTION, SOLUTION INTRAMUSCULAR; INTRAVENOUS
Status: DISPENSED
Start: 2019-12-17 | End: 2019-12-17

## 2019-12-17 RX ORDER — DEXAMETHASONE SODIUM PHOSPHATE 4 MG/ML
INJECTION, SOLUTION INTRA-ARTICULAR; INTRALESIONAL; INTRAMUSCULAR; INTRAVENOUS; SOFT TISSUE AS NEEDED
Status: DISCONTINUED | OUTPATIENT
Start: 2019-12-17 | End: 2019-12-17 | Stop reason: SURG

## 2019-12-17 RX ORDER — CYCLOBENZAPRINE HCL 10 MG
5 TABLET ORAL 3 TIMES DAILY PRN
Status: DISCONTINUED | OUTPATIENT
Start: 2019-12-17 | End: 2019-12-18 | Stop reason: HOSPADM

## 2019-12-17 RX ORDER — ONDANSETRON 4 MG/1
4 TABLET, FILM COATED ORAL EVERY 6 HOURS PRN
Status: DISCONTINUED | OUTPATIENT
Start: 2019-12-18 | End: 2019-12-18 | Stop reason: HOSPADM

## 2019-12-17 RX ORDER — BUPIVACAINE HYDROCHLORIDE 2.5 MG/ML
INJECTION, SOLUTION EPIDURAL; INFILTRATION; INTRACAUDAL
Status: COMPLETED | OUTPATIENT
Start: 2019-12-17 | End: 2019-12-17

## 2019-12-17 RX ORDER — SUCCINYLCHOLINE CHLORIDE 20 MG/ML
INJECTION INTRAMUSCULAR; INTRAVENOUS AS NEEDED
Status: DISCONTINUED | OUTPATIENT
Start: 2019-12-17 | End: 2019-12-17 | Stop reason: SURG

## 2019-12-17 RX ORDER — KETAMINE HYDROCHLORIDE 50 MG/ML
INJECTION, SOLUTION, CONCENTRATE INTRAMUSCULAR; INTRAVENOUS AS NEEDED
Status: DISCONTINUED | OUTPATIENT
Start: 2019-12-17 | End: 2019-12-17 | Stop reason: SURG

## 2019-12-17 RX ORDER — GABAPENTIN 100 MG/1
100 CAPSULE ORAL 3 TIMES DAILY
Status: DISCONTINUED | OUTPATIENT
Start: 2019-12-17 | End: 2019-12-18 | Stop reason: HOSPADM

## 2019-12-17 RX ORDER — PROPRANOLOL HCL 60 MG
120 CAPSULE, EXTENDED RELEASE 24HR ORAL DAILY
Status: DISCONTINUED | OUTPATIENT
Start: 2019-12-18 | End: 2019-12-18 | Stop reason: HOSPADM

## 2019-12-17 RX ORDER — DIPHENHYDRAMINE HYDROCHLORIDE 50 MG/ML
25 INJECTION INTRAMUSCULAR; INTRAVENOUS EVERY 4 HOURS PRN
Status: DISCONTINUED | OUTPATIENT
Start: 2019-12-17 | End: 2019-12-18 | Stop reason: HOSPADM

## 2019-12-17 RX ORDER — SODIUM CHLORIDE 9 MG/ML
INJECTION, SOLUTION INTRAVENOUS AS NEEDED
Status: DISCONTINUED | OUTPATIENT
Start: 2019-12-17 | End: 2019-12-17 | Stop reason: HOSPADM

## 2019-12-17 RX ORDER — ROCURONIUM BROMIDE 10 MG/ML
INJECTION, SOLUTION INTRAVENOUS AS NEEDED
Status: DISCONTINUED | OUTPATIENT
Start: 2019-12-17 | End: 2019-12-17 | Stop reason: SURG

## 2019-12-17 RX ORDER — DULOXETIN HYDROCHLORIDE 30 MG/1
60 CAPSULE, DELAYED RELEASE ORAL DAILY
Status: DISCONTINUED | OUTPATIENT
Start: 2019-12-18 | End: 2019-12-18 | Stop reason: HOSPADM

## 2019-12-17 RX ORDER — ACYCLOVIR 200 MG/1
200 CAPSULE ORAL 3 TIMES DAILY
Status: DISCONTINUED | OUTPATIENT
Start: 2019-12-17 | End: 2019-12-18 | Stop reason: HOSPADM

## 2019-12-17 RX ORDER — TOPIRAMATE 100 MG/1
100 TABLET, FILM COATED ORAL 2 TIMES DAILY
Status: DISCONTINUED | OUTPATIENT
Start: 2019-12-17 | End: 2019-12-18 | Stop reason: HOSPADM

## 2019-12-17 RX ORDER — SODIUM CHLORIDE, SODIUM LACTATE, POTASSIUM CHLORIDE, CALCIUM CHLORIDE 600; 310; 30; 20 MG/100ML; MG/100ML; MG/100ML; MG/100ML
150 INJECTION, SOLUTION INTRAVENOUS CONTINUOUS
Status: ACTIVE | OUTPATIENT
Start: 2019-12-17 | End: 2019-12-18

## 2019-12-17 RX ORDER — CHLORHEXIDINE GLUCONATE 0.12 MG/ML
15 RINSE ORAL
Status: COMPLETED | OUTPATIENT
Start: 2019-12-17 | End: 2019-12-17

## 2019-12-17 RX ORDER — ALPRAZOLAM 0.25 MG/1
0.25 TABLET ORAL 2 TIMES DAILY PRN
Status: DISCONTINUED | OUTPATIENT
Start: 2019-12-17 | End: 2019-12-18 | Stop reason: HOSPADM

## 2019-12-17 RX ORDER — NALOXONE HCL 0.4 MG/ML
0.1 VIAL (ML) INJECTION
Status: DISCONTINUED | OUTPATIENT
Start: 2019-12-17 | End: 2019-12-18 | Stop reason: HOSPADM

## 2019-12-17 RX ORDER — SCOLOPAMINE TRANSDERMAL SYSTEM 1 MG/1
1 PATCH, EXTENDED RELEASE TRANSDERMAL ONCE
Status: DISCONTINUED | OUTPATIENT
Start: 2019-12-17 | End: 2019-12-17 | Stop reason: SDUPTHER

## 2019-12-17 RX ORDER — SODIUM CHLORIDE 0.9 % (FLUSH) 0.9 %
3-10 SYRINGE (ML) INJECTION AS NEEDED
Status: DISCONTINUED | OUTPATIENT
Start: 2019-12-17 | End: 2019-12-17 | Stop reason: HOSPADM

## 2019-12-17 RX ORDER — PROMETHAZINE HYDROCHLORIDE 25 MG/ML
12.5 INJECTION, SOLUTION INTRAMUSCULAR; INTRAVENOUS EVERY 6 HOURS PRN
Status: DISCONTINUED | OUTPATIENT
Start: 2019-12-17 | End: 2019-12-18 | Stop reason: HOSPADM

## 2019-12-17 RX ORDER — SUMATRIPTAN 50 MG/1
75 TABLET, FILM COATED ORAL
Status: DISCONTINUED | OUTPATIENT
Start: 2019-12-17 | End: 2019-12-18 | Stop reason: HOSPADM

## 2019-12-17 RX ORDER — CYANOCOBALAMIN 1000 UG/ML
1000 INJECTION, SOLUTION INTRAMUSCULAR; SUBCUTANEOUS ONCE
Status: COMPLETED | OUTPATIENT
Start: 2019-12-18 | End: 2019-12-18

## 2019-12-17 RX ORDER — LABETALOL HYDROCHLORIDE 5 MG/ML
10 INJECTION, SOLUTION INTRAVENOUS
Status: DISCONTINUED | OUTPATIENT
Start: 2019-12-17 | End: 2019-12-18 | Stop reason: HOSPADM

## 2019-12-17 RX ORDER — SIMETHICONE 80 MG
80 TABLET,CHEWABLE ORAL 4 TIMES DAILY PRN
Status: DISCONTINUED | OUTPATIENT
Start: 2019-12-17 | End: 2019-12-18 | Stop reason: HOSPADM

## 2019-12-17 RX ORDER — SODIUM CHLORIDE 9 MG/ML
150 INJECTION, SOLUTION INTRAVENOUS CONTINUOUS
Status: DISCONTINUED | OUTPATIENT
Start: 2019-12-17 | End: 2019-12-17 | Stop reason: HOSPADM

## 2019-12-17 RX ORDER — SCOLOPAMINE TRANSDERMAL SYSTEM 1 MG/1
1 PATCH, EXTENDED RELEASE TRANSDERMAL ONCE
Status: DISCONTINUED | OUTPATIENT
Start: 2019-12-17 | End: 2019-12-18 | Stop reason: HOSPADM

## 2019-12-17 RX ORDER — HYDROMORPHONE HYDROCHLORIDE 2 MG/1
2 TABLET ORAL
Status: DISCONTINUED | OUTPATIENT
Start: 2019-12-17 | End: 2019-12-18 | Stop reason: SDUPTHER

## 2019-12-17 RX ORDER — MAGNESIUM HYDROXIDE 1200 MG/15ML
LIQUID ORAL AS NEEDED
Status: DISCONTINUED | OUTPATIENT
Start: 2019-12-17 | End: 2019-12-17 | Stop reason: HOSPADM

## 2019-12-17 RX ORDER — SODIUM CHLORIDE 0.9 % (FLUSH) 0.9 %
3 SYRINGE (ML) INJECTION EVERY 12 HOURS SCHEDULED
Status: DISCONTINUED | OUTPATIENT
Start: 2019-12-17 | End: 2019-12-17 | Stop reason: HOSPADM

## 2019-12-17 RX ORDER — ACETAMINOPHEN 500 MG
1000 TABLET ORAL EVERY 12 HOURS
Status: DISCONTINUED | OUTPATIENT
Start: 2019-12-17 | End: 2019-12-18 | Stop reason: HOSPADM

## 2019-12-17 RX ORDER — ACETAMINOPHEN 500 MG
1000 TABLET ORAL ONCE
Status: COMPLETED | OUTPATIENT
Start: 2019-12-17 | End: 2019-12-17

## 2019-12-17 RX ORDER — ALBUTEROL SULFATE 2.5 MG/3ML
2.5 SOLUTION RESPIRATORY (INHALATION) EVERY 6 HOURS PRN
Status: DISCONTINUED | OUTPATIENT
Start: 2019-12-17 | End: 2019-12-18 | Stop reason: HOSPADM

## 2019-12-17 RX ORDER — GABAPENTIN 300 MG/1
600 CAPSULE ORAL ONCE
Status: COMPLETED | OUTPATIENT
Start: 2019-12-17 | End: 2019-12-17

## 2019-12-17 RX ORDER — BUPIVACAINE HYDROCHLORIDE 2.5 MG/ML
INJECTION, SOLUTION EPIDURAL; INFILTRATION; INTRACAUDAL
Status: COMPLETED
Start: 2019-12-17 | End: 2019-12-17

## 2019-12-17 RX ORDER — FIBRINOGEN HUMAN AND THROMBIN HUMAN 10 ML
KIT TOPICAL AS NEEDED
Status: DISCONTINUED | OUTPATIENT
Start: 2019-12-17 | End: 2019-12-17 | Stop reason: HOSPADM

## 2019-12-17 RX ORDER — PROPOFOL 10 MG/ML
INJECTION, EMULSION INTRAVENOUS AS NEEDED
Status: DISCONTINUED | OUTPATIENT
Start: 2019-12-17 | End: 2019-12-17 | Stop reason: SURG

## 2019-12-17 RX ORDER — PROMETHAZINE HYDROCHLORIDE 25 MG/ML
INJECTION, SOLUTION INTRAMUSCULAR; INTRAVENOUS AS NEEDED
Status: DISCONTINUED | OUTPATIENT
Start: 2019-12-17 | End: 2019-12-17 | Stop reason: SURG

## 2019-12-17 RX ORDER — PANTOPRAZOLE SODIUM 40 MG/10ML
40 INJECTION, POWDER, LYOPHILIZED, FOR SOLUTION INTRAVENOUS ONCE
Status: COMPLETED | OUTPATIENT
Start: 2019-12-17 | End: 2019-12-17

## 2019-12-17 RX ORDER — CETIRIZINE HYDROCHLORIDE 10 MG/1
10 TABLET ORAL DAILY
Status: DISCONTINUED | OUTPATIENT
Start: 2019-12-18 | End: 2019-12-18 | Stop reason: HOSPADM

## 2019-12-17 RX ORDER — SODIUM CHLORIDE 0.9 % (FLUSH) 0.9 %
1-10 SYRINGE (ML) INJECTION AS NEEDED
Status: DISCONTINUED | OUTPATIENT
Start: 2019-12-17 | End: 2019-12-18 | Stop reason: HOSPADM

## 2019-12-17 RX ORDER — HALOPERIDOL 5 MG/ML
INJECTION INTRAMUSCULAR AS NEEDED
Status: DISCONTINUED | OUTPATIENT
Start: 2019-12-17 | End: 2019-12-17 | Stop reason: SURG

## 2019-12-17 RX ORDER — FENTANYL CITRATE 50 UG/ML
INJECTION, SOLUTION INTRAMUSCULAR; INTRAVENOUS AS NEEDED
Status: DISCONTINUED | OUTPATIENT
Start: 2019-12-17 | End: 2019-12-17 | Stop reason: SURG

## 2019-12-17 RX ORDER — AMITRIPTYLINE HYDROCHLORIDE 25 MG/1
25 TABLET, FILM COATED ORAL NIGHTLY
Status: DISCONTINUED | OUTPATIENT
Start: 2019-12-17 | End: 2019-12-18 | Stop reason: HOSPADM

## 2019-12-17 RX ORDER — LEVOFLOXACIN 5 MG/ML
500 INJECTION, SOLUTION INTRAVENOUS ONCE
Status: COMPLETED | OUTPATIENT
Start: 2019-12-17 | End: 2019-12-17

## 2019-12-17 RX ORDER — SODIUM CHLORIDE 0.9 % (FLUSH) 0.9 %
3 SYRINGE (ML) INJECTION EVERY 12 HOURS SCHEDULED
Status: DISCONTINUED | OUTPATIENT
Start: 2019-12-17 | End: 2019-12-18 | Stop reason: HOSPADM

## 2019-12-17 RX ORDER — ONDANSETRON 2 MG/ML
4 INJECTION INTRAMUSCULAR; INTRAVENOUS EVERY 6 HOURS
Status: DISPENSED | OUTPATIENT
Start: 2019-12-17 | End: 2019-12-18

## 2019-12-17 RX ORDER — HYDROCODONE BITARTRATE AND ACETAMINOPHEN 5; 325 MG/1; MG/1
1 TABLET ORAL EVERY 4 HOURS PRN
Status: DISCONTINUED | OUTPATIENT
Start: 2019-12-18 | End: 2019-12-18 | Stop reason: HOSPADM

## 2019-12-17 RX ORDER — METOCLOPRAMIDE HYDROCHLORIDE 5 MG/ML
10 INJECTION INTRAMUSCULAR; INTRAVENOUS EVERY 6 HOURS PRN
Status: DISCONTINUED | OUTPATIENT
Start: 2019-12-17 | End: 2019-12-18 | Stop reason: HOSPADM

## 2019-12-17 RX ORDER — PROMETHAZINE HYDROCHLORIDE 12.5 MG/1
12.5 TABLET ORAL EVERY 6 HOURS PRN
Status: DISCONTINUED | OUTPATIENT
Start: 2019-12-17 | End: 2019-12-18 | Stop reason: HOSPADM

## 2019-12-17 RX ORDER — ONDANSETRON 2 MG/ML
INJECTION INTRAMUSCULAR; INTRAVENOUS AS NEEDED
Status: DISCONTINUED | OUTPATIENT
Start: 2019-12-17 | End: 2019-12-17 | Stop reason: SURG

## 2019-12-17 RX ORDER — SODIUM CHLORIDE AND POTASSIUM CHLORIDE 150; 450 MG/100ML; MG/100ML
100 INJECTION, SOLUTION INTRAVENOUS CONTINUOUS
Status: DISCONTINUED | OUTPATIENT
Start: 2019-12-18 | End: 2019-12-18 | Stop reason: HOSPADM

## 2019-12-17 RX ADMIN — PROPOFOL 150 MG: 10 INJECTION, EMULSION INTRAVENOUS at 07:49

## 2019-12-17 RX ADMIN — SUGAMMADEX 250 MG: 100 INJECTION, SOLUTION INTRAVENOUS at 09:14

## 2019-12-17 RX ADMIN — ACETAMINOPHEN 1000 MG: 500 TABLET, FILM COATED ORAL at 06:56

## 2019-12-17 RX ADMIN — SUCCINYLCHOLINE CHLORIDE 140 MG: 20 INJECTION, SOLUTION INTRAMUSCULAR; INTRAVENOUS at 07:49

## 2019-12-17 RX ADMIN — ACYCLOVIR 200 MG: 200 CAPSULE ORAL at 14:37

## 2019-12-17 RX ADMIN — SIMETHICONE CHEW TAB 80 MG 80 MG: 80 TABLET ORAL at 15:40

## 2019-12-17 RX ADMIN — BUPIVACAINE HYDROCHLORIDE 30 ML: 2.5 INJECTION, SOLUTION EPIDURAL; INFILTRATION; INTRACAUDAL; PERINEURAL at 07:50

## 2019-12-17 RX ADMIN — ONDANSETRON 4 MG: 2 INJECTION INTRAMUSCULAR; INTRAVENOUS at 07:59

## 2019-12-17 RX ADMIN — DEXAMETHASONE SODIUM PHOSPHATE 8 MG: 4 INJECTION, SOLUTION INTRAMUSCULAR; INTRAVENOUS at 07:59

## 2019-12-17 RX ADMIN — GABAPENTIN 600 MG: 300 CAPSULE ORAL at 06:56

## 2019-12-17 RX ADMIN — PROMETHAZINE HYDROCHLORIDE 12.5 MG: 25 INJECTION INTRAMUSCULAR; INTRAVENOUS at 08:07

## 2019-12-17 RX ADMIN — GABAPENTIN 100 MG: 100 CAPSULE ORAL at 18:26

## 2019-12-17 RX ADMIN — SCOPALAMINE 1 PATCH: 1 PATCH, EXTENDED RELEASE TRANSDERMAL at 06:57

## 2019-12-17 RX ADMIN — LEVOFLOXACIN 500 MG: 5 INJECTION, SOLUTION INTRAVENOUS at 07:46

## 2019-12-17 RX ADMIN — KETAMINE HYDROCHLORIDE 20 MG: 50 INJECTION, SOLUTION INTRAMUSCULAR; INTRAVENOUS at 07:48

## 2019-12-17 RX ADMIN — SODIUM CHLORIDE, PRESERVATIVE FREE 3 ML: 5 INJECTION INTRAVENOUS at 21:01

## 2019-12-17 RX ADMIN — HYDROMORPHONE HYDROCHLORIDE 0.5 MG: 1 INJECTION, SOLUTION INTRAMUSCULAR; INTRAVENOUS; SUBCUTANEOUS at 12:27

## 2019-12-17 RX ADMIN — ACYCLOVIR 200 MG: 200 CAPSULE ORAL at 21:00

## 2019-12-17 RX ADMIN — ONDANSETRON 4 MG: 2 INJECTION INTRAMUSCULAR; INTRAVENOUS at 14:37

## 2019-12-17 RX ADMIN — ROCURONIUM BROMIDE 30 MG: 10 INJECTION INTRAVENOUS at 07:59

## 2019-12-17 RX ADMIN — PROPOFOL 50 MCG/KG/MIN: 10 INJECTION, EMULSION INTRAVENOUS at 07:58

## 2019-12-17 RX ADMIN — METOCLOPRAMIDE 10 MG: 5 INJECTION, SOLUTION INTRAMUSCULAR; INTRAVENOUS at 18:23

## 2019-12-17 RX ADMIN — ONDANSETRON 4 MG: 2 INJECTION INTRAMUSCULAR; INTRAVENOUS at 22:28

## 2019-12-17 RX ADMIN — PROMETHAZINE HYDROCHLORIDE 12.5 MG: 25 INJECTION INTRAMUSCULAR; INTRAVENOUS at 12:28

## 2019-12-17 RX ADMIN — GABAPENTIN 100 MG: 100 CAPSULE ORAL at 21:00

## 2019-12-17 RX ADMIN — Medication 0.5 MG: at 10:08

## 2019-12-17 RX ADMIN — SODIUM CHLORIDE: 9 INJECTION, SOLUTION INTRAVENOUS at 08:11

## 2019-12-17 RX ADMIN — SODIUM CHLORIDE, POTASSIUM CHLORIDE, SODIUM LACTATE AND CALCIUM CHLORIDE 150 ML/HR: 600; 310; 30; 20 INJECTION, SOLUTION INTRAVENOUS at 12:50

## 2019-12-17 RX ADMIN — TOPIRAMATE 100 MG: 100 TABLET, FILM COATED ORAL at 22:26

## 2019-12-17 RX ADMIN — GABAPENTIN 100 MG: 100 CAPSULE ORAL at 14:37

## 2019-12-17 RX ADMIN — ACETAMINOPHEN 1000 MG: 500 TABLET, FILM COATED ORAL at 22:27

## 2019-12-17 RX ADMIN — SODIUM CHLORIDE, POTASSIUM CHLORIDE, SODIUM LACTATE AND CALCIUM CHLORIDE 150 ML/HR: 600; 310; 30; 20 INJECTION, SOLUTION INTRAVENOUS at 19:52

## 2019-12-17 RX ADMIN — AMITRIPTYLINE HYDROCHLORIDE 25 MG: 25 TABLET, FILM COATED ORAL at 21:00

## 2019-12-17 RX ADMIN — HYDROMORPHONE HYDROCHLORIDE 0.5 MG: 1 INJECTION, SOLUTION INTRAMUSCULAR; INTRAVENOUS; SUBCUTANEOUS at 10:08

## 2019-12-17 RX ADMIN — PANTOPRAZOLE SODIUM 40 MG: 40 INJECTION, POWDER, FOR SOLUTION INTRAVENOUS at 06:51

## 2019-12-17 RX ADMIN — SODIUM CHLORIDE 1000 ML: 9 INJECTION, SOLUTION INTRAVENOUS at 06:50

## 2019-12-17 RX ADMIN — HALOPERIDOL LACTATE 0.5 MG: 5 INJECTION, SOLUTION INTRAMUSCULAR at 08:07

## 2019-12-17 RX ADMIN — CHLORHEXIDINE GLUCONATE 0.12% ORAL RINSE 15 ML: 1.2 LIQUID ORAL at 07:08

## 2019-12-17 RX ADMIN — SIMETHICONE CHEW TAB 80 MG 80 MG: 80 TABLET ORAL at 21:01

## 2019-12-17 RX ADMIN — SODIUM CHLORIDE: 9 INJECTION, SOLUTION INTRAVENOUS at 07:37

## 2019-12-17 RX ADMIN — CHLORHEXIDINE GLUCONATE 0.12% ORAL RINSE 15 ML: 1.2 LIQUID ORAL at 07:03

## 2019-12-17 RX ADMIN — FENTANYL CITRATE 100 MCG: 50 INJECTION, SOLUTION INTRAMUSCULAR; INTRAVENOUS at 07:47

## 2019-12-17 NOTE — ANESTHESIA POSTPROCEDURE EVALUATION
Patient: Renae Bolton    Procedure Summary     Date:  12/17/19 Room / Location:  [facility] OR  /  [facility] OR    Anesthesia Start:  0748 Anesthesia Stop:  0943    Procedures:       GASTRIC SLEEVE LAPAROSCOPIC (N/A Abdomen)      ESOPHAGOGASTRODUODENOSCOPY (N/A )      HIATAL HERNIA REPAIR LAPAROSCOPIC (N/A Abdomen) Diagnosis:       Obesity, Class II, BMI 35-39.9      (Obesity, Class II, BMI 35-39.9 [E66.9])    Surgeon:  Tracee Onofre MD Provider:  Chico Johansen CRNA    Anesthesia Type:  general ASA Status:  3          Anesthesia Type: general    Vitals  Vitals Value Taken Time   /72 12/17/2019  9:43 AM   Temp     Pulse 66 12/17/2019  9:43 AM   Resp     SpO2 100 % 12/17/2019  9:43 AM   Vitals shown include unvalidated device data.        Post Anesthesia Care and Evaluation    Patient location during evaluation: PACU  Patient participation: complete - patient participated  Level of consciousness: awake and alert and sleepy but conscious  Pain score: 2  Pain management: adequate  Airway patency: patent  Anesthetic complications: No anesthetic complications  PONV Status: none  Cardiovascular status: acceptable  Respiratory status: acceptable and face mask  Hydration status: acceptable

## 2019-12-17 NOTE — ANESTHESIA PROCEDURE NOTES
Airway  Urgency: elective    Date/Time: 12/17/2019 7:51 AM  Airway not difficult    General Information and Staff    Patient location during procedure: OR  CRNA: Chico Johansen CRNA    Indications and Patient Condition  Indications for airway management: airway protection    Preoxygenated: yes      Final Airway Details  Final airway type: endotracheal airway      Successful airway: ETT  Cuffed: yes   Successful intubation technique: direct laryngoscopy and video laryngoscopy  Endotracheal tube insertion site: oral  Blade: Nany  Blade size: 3  ETT size (mm): 7.5  Cormack-Lehane Classification: grade I - full view of glottis  Placement verified by: chest auscultation and capnometry   Number of attempts at approach: 1  Assessment: lips, teeth, and gum same as pre-op and atraumatic intubation

## 2019-12-17 NOTE — BRIEF OP NOTE
GASTRIC SLEEVE LAPAROSCOPIC, ESOPHAGOGASTRODUODENOSCOPY, HIATAL HERNIA REPAIR LAPAROSCOPIC  Progress Note    Renae Bolton  12/17/2019    Pre-op Diagnosis:   Obesity, Class II, BMI 35-39.9 [E66.9]       Post-Op Diagnosis Codes:     * Obesity, Class II, BMI 35-39.9 [E66.9]    Procedure/CPT® Codes:  NE LAP, STEVEN RESTRICT PROC, LONGITUDINAL GASTRECTOMY [87513]  NE ESOPHAGOGASTRODUODENOSCOPY TRANSORAL DIAGNOSTIC [29309]  NE LAP,ESOPHAGOGAST FUNDOPLASTY [19152]    Procedure(s):  GASTRIC SLEEVE LAPAROSCOPIC  ESOPHAGOGASTRODUODENOSCOPY  HIATAL HERNIA REPAIR LAPAROSCOPIC    Surgeon(s):  Tracee Onofre MD    Anesthesia: General with Block    Staff:   Circulator: Aida Humphrey RN; Chula Marquez RN  Scrub Person: Beverly Smith; Amanda Saleh CSA    Estimated Blood Loss: 25 mL    Urine Voided: * No values recorded between 12/17/2019  7:43 AM and 12/17/2019  9:20 AM *    Specimens:                Specimens     ID Source Type Tests Collected By Collected At Frozen?      A Stomach Tissue · TISSUE PATHOLOGY EXAM   Tracee Onofre MD 12/17/19 0814 No     Description: SUB-TOTAL GASTRECTOMY    This specimen was not marked as sent.                Drains: * No LDAs found *    Findings: Small hiatal hernia. Adhesions in the left upper quadrant.  Small splenic tear with minimal bleeding, controlled with FloSeal.    Complications: none      Tracee Onofre MD     Date: 12/17/2019  Time: 9:29 AM

## 2019-12-17 NOTE — ANESTHESIA PREPROCEDURE EVALUATION
Anesthesia Evaluation     Patient summary reviewed and Nursing notes reviewed   history of anesthetic complications: PONV difficult airway  NPO Solid Status: > 8 hours  NPO Liquid Status: > 8 hours           Airway   Mallampati: II  TM distance: >3 FB  Neck ROM: full  Difficult intubation highly probable  Dental - normal exam     Pulmonary - normal exam   (+) asthma,shortness of breath, sleep apnea,   Cardiovascular - normal exam    (+) valvular problems/murmurs, dysrhythmias Tachycardia,   (-) hypertension      Neuro/Psych  (+) headaches, psychiatric history Depression,     GI/Hepatic/Renal/Endo    (+) obesity,  GERD,      Musculoskeletal (-) negative ROS    Abdominal  - normal exam    Bowel sounds: normal.   Substance History - negative use     OB/GYN negative ob/gyn ROS         Other                          Anesthesia Plan    ASA 3     general   (Severe PONV requiring overnight hospitalization after Lap Brittny)  intravenous induction     Anesthetic plan, all risks, benefits, and alternatives have been provided, discussed and informed consent has been obtained with: patient.

## 2019-12-17 NOTE — SIGNIFICANT NOTE
OCCUPATIONAL THERAPY  TREATMENT   Date: 8/17/2019    Patient is a 55 year old female admitted to [facility] for left TKA on 8/15/19. At baseline, patient is Independent with ADLs and Independent with transfers. Patient reports ability to stay on main level of home with half bath, tub / shower located on second level of her home. Patient reports history of right total knee replacement and requiring short term rehab stay.    Visit # since seen by OT:  1  ASSESSMENT:   Patient is displaying fair progress as evidenced by slowly progressing activity tolerance with continued pain and limited Lt LE weightbearing.  Pt able to ambulate 8' x2 with CGA-mod A with use of 2ww, slow pace with LOB x2 requiring mod A for recovery. Pt completed toilet transfers with min A with use of elevated toilet frame over toilet and cueing for Lt LE placement.  Pt required CGA for toileting.      Performance deficits limiting patient's ability to safely and independently complete ADLs and functional mobility include: decreased range of motion, decreased strength, balance deficits, pain, decreased activity tolerance, swelling, decreased endurance.  Further skilled occupational therapy is required to address these limitation in order to maximize the patient's independence.      After today's session this therapist is recommending the following location for optimal discharge:  Recommendations for Discharge: OT: Post acute therapy  OT Identified Barriers to Discharge: pain, fall risk      Equipment for discharge: to be determined - continuing to assess needs at this time     Focus of today's therapy session:   ADL and functional mobility training    See below for further details.    Treatment Plan for Next Session:  LB dressing with dressing DME, standing tolerance, toilet transfer, toileting    Therapy Precautions:  Weight Bearing Status WBAT LLE  Falls Risk  Activity: As tolerated and up with assist  Mendez Fall Scale Score: 60  Alarms:  Chair alarm  Resting quietly. Waiting for bed assignment.   activated at end of session  Basic Lines:  Capped IV  Collaboration with: RN    SUBJECTIVE:   Pt. reported agreeable to therapy and agreeable to having spouse present for session     Pt's personal goal for therapy: return home    Pain: Pain Assessment: Exceptions to WDL (08/17/19 0901)  Comfort/Function (Pain) SCORE at Rest: 7 (08/17/19 0901)  Comfort/Function (Pain) SCORE with Activity: 10 (08/17/19 0901)     ADLs:   Grooming:  Touching/Steadying Assistance  Toileting:  Touching/Steadying Assistance  Reason for assistance:  requires increased time to complete, safety due to LLE pain, verbal cues for hand placement, fatigue, unsteadiness   Adaptive Equipment:  elevated toilet frame        MOBILITY/EXERCISE:    Bed:   Not addressed this session  Pt seated in bed side chair upon approach    Transfers:   Sit to Stand:  Touching/Steadying Assistance  Stand to Sit:  Touching/Steadying Assistance  Stand pivot:  Touching/Steadying Assistance, Minimal Assistance (Min)  Toilet:  Touching/Steadying Assistance, Minimal Assistance (Min)  Device Used:  gait belt and 2 wheeled walker  Reason for Assistance:  requires increased time to complete, safety due to pain, weakness, fatigue, unsteadiness     ADL Functional Mobility/Ambulation:  Pt ambulated 8' x2 with CGA-mod A with limited Lt LE weightbearing secondary to pain.  Pt requires increased time, cueing for sequencing with walker use.    Balance:  Static sitting: Supervision (Supv)  Dynamic sitting: Supervision (Supv)  Static standing:  Supervision (Supv)  Dynamic standing: Touching/Steadying Assistance, Minimal Assistance (Min)    Exercises:  Not addressed this session      OBSERVATION:   Edema: post-surgical left, lower extremity    Vital Signs: Vitals stable throughout therapy session.    Cognition:  Alert and Oriented x4    Activity Tolerance: limited by pain to left knee     EDUCATION:    Learner: patient and patient's spouse  Teaching Content: Transfers, Gait, Safety  Awareness, Precautions/Weight Bearing Status, Pain Management Techniques, Therapy Plan of Care and Home Safety  Please reference the patient education activity for further information regarding the patient's learning assessment.     GOALS:     Review Date: 8/22/2019  1. Patient will complete lower body dressing with Supervision (Supv).  2. Patient will complete toileting with Supervision (Supv).  3. Patient will complete toilet transfer with Supervision (Supv).  4. Patient will complete tub/shower transfer with Minimal Assistance (Min).  5. Patient will complete hygiene/grooming with Supervision (Supv).  6. Patient will complete item retrieval with Supervision (Supv)..    Rehab potential is fair  due to the following positive factors post - surgical ; and is impacted by the following negative factors pain level       PLAN OF CARE:    OT Frequency: 5 days/week  Duration: LOS  Treatment Interventions: ADL retraining, Functional transfer training, Endurance training, Patient/Family training, Equipment eval/education, Neuro muscular reeducation, Compensatory technique education    The plan of care and goals were established with the patient and she is in agreement.      OT Time Spent: 34 minutes (08/17/19 0901)

## 2019-12-17 NOTE — ANESTHESIA PROCEDURE NOTES
Peripheral Block    Pre-sedation assessment completed: 12/17/2019 7:44 AM    Patient reassessed immediately prior to procedure    Patient location during procedure: OR  Start time: 12/17/2019 7:45 AM  Stop time: 12/17/2019 7:51 AM  Performed by  CRNA: Edin Best CRNA  Preanesthetic Checklist  Completed: patient identified, site marked, surgical consent, pre-op evaluation, timeout performed, IV checked, risks and benefits discussed and monitors and equipment checked  Prep:  Pt Position: supine  Sterile barriers:cap, gloves, gown, mask and sterile barriers  Prep: ChloraPrep  Patient monitoring: blood pressure monitoring, continuous pulse oximetry and EKG  Procedure  Sedation:no  Performed under: general  Guidance:ultrasound guided  Images:still images not obtained    Laterality:Bilateral  Block Type:TAP  Injection Technique:single-shotNeedle Gauge:18 G  Resistance on Injection: none    Medications Used: bupivacaine PF (MARCAINE) injection 0.25%, 30 mL  Med admintered at 12/17/2019 7:50 AM      Post Assessment  Injection Assessment: negative aspiration for heme, no paresthesia on injection and incremental injection  Patient Tolerance:comfortable throughout block  Complications:no

## 2019-12-17 NOTE — PLAN OF CARE
Problem: Patient Care Overview  Goal: Plan of Care Review  Flowsheets (Taken 12/17/2019 1330)  Progress: improving  Plan of Care Reviewed With: patient; spouse  Outcome Summary: VSS. 2L NC. Nausea and pain controlled with ordered PRN pain medications. Resting in bed. Will walk in an hour and use incentive spirometer. Will continue to monitor.

## 2019-12-17 NOTE — ANESTHESIA POSTPROCEDURE EVALUATION
Patient: Renae Bolton    Procedure Summary     Date:  12/17/19 Room / Location:  [facility] OR  /  [facility] OR    Anesthesia Start:  0748 Anesthesia Stop:  0943    Procedures:       GASTRIC SLEEVE LAPAROSCOPIC (N/A Abdomen)      ESOPHAGOGASTRODUODENOSCOPY (N/A )      HIATAL HERNIA REPAIR LAPAROSCOPIC (N/A Abdomen) Diagnosis:       Obesity, Class II, BMI 35-39.9      (Obesity, Class II, BMI 35-39.9 [E66.9])    Surgeon:  Tracee Onofre MD Provider:  Chico Johansen CRNA    Anesthesia Type:  general ASA Status:  3          Anesthesia Type: general    Vitals  Vitals Value Taken Time   /79 12/17/2019 11:43 AM   Temp 97.5 °F (36.4 °C) 12/17/2019 11:35 AM   Pulse 73 12/17/2019 11:45 AM   Resp 24 12/17/2019 11:35 AM   SpO2 100 % 12/17/2019 11:45 AM   Vitals shown include unvalidated device data.        Anesthesia Post Evaluation

## 2019-12-17 NOTE — OP NOTE
OPERATIVE REPORT    DATE: 12/17/19    PATIENT: Renae Bolton    PREOPERATIVE DIAGNOSIS:    1. Obesity with multiple comorbidities     POSTOPERATIVE DIAGNOSIS:    1. Obesity with multiple comorbidities  2.  Hiatal hernia     PROCEDURES PERFORMED:  1. Laparoscopic sleeve gastrectomy (85% subtotal vertical gastrectomy)   2.  Esophagogastroduodenoscopy  3.  Laparoscopic hiatal hernia repair     SURGEON:  Tracee Onofre M.D.    ANESTHESIA:  General endotracheal with HEATH block    ESTIMATED BLOOD LOSS:   25 mL    FLUIDS:  Crystalloids.    SPECIMENS:  Subtotal gastrectomy.    DRAINS:  None.    COUNTS:  Correct.    COMPLICATIONS:  None.    FINDINGS: Small hiatal hernia. Adhesions in the left upper quadrant.  Small splenic tear with minimal bleeding, controlled with FloSeal.    INDICATIONS:   Renae Bolton is a 44 y.o. year old female with morbid obesity and associated comorbidities who presents for elective laparoscopic sleeve gastrectomy. The patient has has undergone our extensive preoperative education, teaching, and consent process. Everything is in order and they wish to proceed.      DESCRIPTION OF PROCEDURE:   The patient was brought to the operating room and placed supine upon the operating room table. SCDs were placed. The patient underwent uneventful general endotracheal anesthesia and bilateral HEATH blocks per the anesthesiology staff.  She received subcutaneous Lovenox and was given Levaquin. The abdomen was prepped with ChloraPrep and draped in the usual sterile fashion. An Ioban was used as well.  Timeout was performed.       A small transverse incision was made a few centimeters above and to the left of the umbilicus and the peritoneal cavity entered under direct camera visualization using a 5 mm 0° laparoscope and an Optiview trocar.  The abdomen was then insufflated to a pressure of 16 mmHg with CO2 gas. Exploratory laparoscopy revealed no evidence of injury from the entrance technique.  The gallbladder was absent.  The liver had a uniform capsule and was normal in size without evidence of gross hepatomegaly or fibrosis.  A 5 mm port was placed in the left mid abdomen laterally, a 12 mm port was placed just right of the midline about 15 cm below the costal margin, and a 15 mm port was placed just to the left of the 12 mm port.  A Ash liver retractor was placed through a small subxiphoid stab incision and the the left lobe of the liver was elevated.  There were a few omental adhesions in the left upper quadrant that were lysed with Ligasure.     The stomach was decompressed with an 18 Hong Konger orogastric tube, which was then positioned in the antrum. The greater curvature vessels were taken down with a Ligasure energy device beginning at the midpoint of the stomach and continued up to the angle of His, including the short gastrics. The left genoveva was completely exposed and there was a small hiatal hernia seen here. This was photodocumented. The GE junction fat pad was elevated to make a clear landing zone for the stapler later. The greater curvature vessels were taken down with the Ligasure extending distally within 3 cm of the pylorus. Filmy adhesions to the stomach were taken down posteriorly.     I incised the hernia sac from the left genoveva of the diaphragm.  I incised the phrenoesophageal ligament with the Ligasure. The pars flaccida was opened (there was no replaced hepatic vessel) and the right genoveva was exposed.  I incised the hernia sac from the right genoveva.  An instrument was passed under the esophagus at the base of the hiatus and brought easily out the other side.  A penrose drain was placed around the esophagus for retraction.  The esophagus was mobilized into the abdomen 5 cm.  A posterior cruroplasty was performed with three 0 silk stitches.  The crura came together without tension and repair was photodocumented.  The penrose was removed.  The time to repair the hernia was documented  and was 15 minutes.       The standard clamp and 18 [place]ian orogastric tube were used to size the gastric sleeve. The stomach was marked in the 3 positions using an ink-stained laparoscopic Kittner.  The 3 markings were at the angle of His, the incisura and antrum using 1cm, 3cm and 5cm respectively.      Using the clamp as template, a sleeve gastrectomy was performed with an articulating Signia stapler bolstered by double tissue reinforcement. The first load was a 60mm black on the Signia stapler; the next two loads were purple 60 mm, and the last load was a 45 mm purple.  The staple line was examined and was hemostatic. The gastrectomy specimen was removed through the 15 mm port site in a specimen bag. The specimen was later examined, and the staples were well-formed. Palpation of the specimen revealed no masses. The staple line of the sleeve gastrectomy revealed staples that were well-formed. The upper abdomen was flooded with saline, and endoscopy was performed.     The flexible endoscope was passed transorally down to the pylorus easily. The sleeve extended to within 6 cm proximal to the pylorus and was hemostatic. The sleeve was not narrow or twisted. There was no hiatal hernia or distal esophagitis. The rest of the esophagus was normal. The scope was removed. The leak test was normal.        I rescrubbed and suctioned the irrigation fluid from the upper abdomen, making sure it was dry. The staple line on the stomach was hemostatic.  The proximal sleeve was pexied to the left genoveva of the diaphragm with 2-0 Vicryl.   The staple line was treated with 4 ml of aerosolized Tisseel fibrin glue.  There was a small amount of blood near the spleen, and after retracting some omentum I was able to see a small tear in the splenic capsule.  There was no active bleeding.  I applied 10 mL of FloSeal with no further bleeding noted.  The liver retractor was removed easily. The 15 mm port was removed under direct visualization  and there was no bleeding. This incision was closed with an 0-vicryl transfascial suture using a suture passer under direct visualization in a horizontal mattress fashion. All other ports were removed and then replaced under direct visualization and all of these were hemostatic. The instruments were removed and the abdomen was desufflated. The ports were removed. A 2-0 vicryl was used to close the subcutaneous tissue in the 15 mm port site. 3-0 monocryl plus was used to close skin incisions with interrupted sutures. Skin Affix was placed. The patient was awakened and taken to recovery in good condition, having tolerated the procedure well. All sponge, needle, and instrument counts were correct.

## 2019-12-18 ENCOUNTER — APPOINTMENT (OUTPATIENT)
Dept: GENERAL RADIOLOGY | Facility: HOSPITAL | Age: 44
End: 2019-12-18

## 2019-12-18 VITALS
BODY MASS INDEX: 38.38 KG/M2 | DIASTOLIC BLOOD PRESSURE: 82 MMHG | SYSTOLIC BLOOD PRESSURE: 148 MMHG | WEIGHT: 224.8 LBS | OXYGEN SATURATION: 98 % | RESPIRATION RATE: 20 BRPM | TEMPERATURE: 98.2 F | HEIGHT: 64 IN | HEART RATE: 66 BPM

## 2019-12-18 LAB
ALBUMIN SERPL-MCNC: 3.7 G/DL (ref 3.5–5.2)
ALBUMIN/GLOB SERPL: 1.3 G/DL
ALP SERPL-CCNC: 82 U/L (ref 39–117)
ALT SERPL W P-5'-P-CCNC: 131 U/L (ref 1–33)
ANION GAP SERPL CALCULATED.3IONS-SCNC: 16.1 MMOL/L (ref 5–15)
AST SERPL-CCNC: 66 U/L (ref 1–32)
BASOPHILS # BLD AUTO: 0.02 10*3/MM3 (ref 0–0.2)
BASOPHILS NFR BLD AUTO: 0.1 % (ref 0–1.5)
BILIRUB SERPL-MCNC: 0.3 MG/DL (ref 0.2–1.2)
BUN BLD-MCNC: 7 MG/DL (ref 6–20)
BUN/CREAT SERPL: 7.7 (ref 7–25)
CALCIUM SPEC-SCNC: 7.3 MG/DL (ref 8.6–10.5)
CHLORIDE SERPL-SCNC: 105 MMOL/L (ref 98–107)
CO2 SERPL-SCNC: 17.9 MMOL/L (ref 22–29)
CREAT BLD-MCNC: 0.91 MG/DL (ref 0.57–1)
DEPRECATED RDW RBC AUTO: 42.2 FL (ref 37–54)
EOSINOPHIL # BLD AUTO: 0 10*3/MM3 (ref 0–0.4)
EOSINOPHIL NFR BLD AUTO: 0 % (ref 0.3–6.2)
ERYTHROCYTE [DISTWIDTH] IN BLOOD BY AUTOMATED COUNT: 12.2 % (ref 12.3–15.4)
GFR SERPL CREATININE-BSD FRML MDRD: 67 ML/MIN/1.73
GLOBULIN UR ELPH-MCNC: 2.9 GM/DL
GLUCOSE BLD-MCNC: 106 MG/DL (ref 65–99)
HCT VFR BLD AUTO: 41.3 % (ref 34–46.6)
HGB BLD-MCNC: 13.6 G/DL (ref 12–15.9)
IMM GRANULOCYTES # BLD AUTO: 0.06 10*3/MM3 (ref 0–0.05)
IMM GRANULOCYTES NFR BLD AUTO: 0.4 % (ref 0–0.5)
IRON 24H UR-MRATE: 56 MCG/DL (ref 37–145)
LYMPHOCYTES # BLD AUTO: 1.62 10*3/MM3 (ref 0.7–3.1)
LYMPHOCYTES NFR BLD AUTO: 11.8 % (ref 19.6–45.3)
MCH RBC QN AUTO: 30.9 PG (ref 26.6–33)
MCHC RBC AUTO-ENTMCNC: 32.9 G/DL (ref 31.5–35.7)
MCV RBC AUTO: 93.9 FL (ref 79–97)
MONOCYTES # BLD AUTO: 1.35 10*3/MM3 (ref 0.1–0.9)
MONOCYTES NFR BLD AUTO: 9.8 % (ref 5–12)
NEUTROPHILS # BLD AUTO: 10.67 10*3/MM3 (ref 1.7–7)
NEUTROPHILS NFR BLD AUTO: 77.9 % (ref 42.7–76)
NRBC BLD AUTO-RTO: 0 /100 WBC (ref 0–0.2)
PLATELET # BLD AUTO: 286 10*3/MM3 (ref 140–450)
PMV BLD AUTO: 10.9 FL (ref 6–12)
POTASSIUM BLD-SCNC: 3.5 MMOL/L (ref 3.5–5.2)
PROT SERPL-MCNC: 6.6 G/DL (ref 6–8.5)
RBC # BLD AUTO: 4.4 10*6/MM3 (ref 3.77–5.28)
SODIUM BLD-SCNC: 139 MMOL/L (ref 136–145)
WBC NRBC COR # BLD: 13.72 10*3/MM3 (ref 3.4–10.8)

## 2019-12-18 PROCEDURE — 63710000001 PROMETHAZINE PER 12.5 MG: Performed by: SURGERY

## 2019-12-18 PROCEDURE — 25010000002 HYDROMORPHONE 1 MG/ML SOLUTION: Performed by: SURGERY

## 2019-12-18 PROCEDURE — 83540 ASSAY OF IRON: CPT | Performed by: SURGERY

## 2019-12-18 PROCEDURE — 25010000002 THIAMINE PER 100 MG: Performed by: SURGERY

## 2019-12-18 PROCEDURE — 80053 COMPREHEN METABOLIC PANEL: CPT | Performed by: SURGERY

## 2019-12-18 PROCEDURE — 74241: CPT

## 2019-12-18 PROCEDURE — 25010000002 CYANOCOBALAMIN PER 1000 MCG: Performed by: SURGERY

## 2019-12-18 PROCEDURE — 25010000002 ENOXAPARIN PER 10 MG: Performed by: SURGERY

## 2019-12-18 PROCEDURE — 85025 COMPLETE CBC W/AUTO DIFF WBC: CPT | Performed by: SURGERY

## 2019-12-18 PROCEDURE — 99024 POSTOP FOLLOW-UP VISIT: CPT | Performed by: SURGERY

## 2019-12-18 PROCEDURE — 0 DIATRIZOATE MEGLUMINE & SODIUM PER 1 ML: Performed by: SURGERY

## 2019-12-18 PROCEDURE — 25010000002 ONDANSETRON PER 1 MG: Performed by: SURGERY

## 2019-12-18 RX ORDER — OMEPRAZOLE 40 MG/1
40 CAPSULE, DELAYED RELEASE ORAL DAILY
Qty: 60 CAPSULE | Refills: 0 | Status: SHIPPED | OUTPATIENT
Start: 2019-12-18 | End: 2020-04-01 | Stop reason: SDUPTHER

## 2019-12-18 RX ORDER — POTASSIUM CHLORIDE 7.45 MG/ML
10 INJECTION INTRAVENOUS
Status: DISCONTINUED | OUTPATIENT
Start: 2019-12-18 | End: 2019-12-18 | Stop reason: HOSPADM

## 2019-12-18 RX ORDER — METOCLOPRAMIDE 5 MG/1
5 TABLET ORAL
Qty: 30 TABLET | Refills: 0 | Status: SHIPPED | OUTPATIENT
Start: 2019-12-18 | End: 2020-01-22

## 2019-12-18 RX ORDER — POTASSIUM CHLORIDE 1.5 G/1.77G
40 POWDER, FOR SOLUTION ORAL AS NEEDED
Status: DISCONTINUED | OUTPATIENT
Start: 2019-12-18 | End: 2019-12-18 | Stop reason: HOSPADM

## 2019-12-18 RX ORDER — HYDROCODONE BITARTRATE AND ACETAMINOPHEN 7.5; 325 MG/1; MG/1
1 TABLET ORAL EVERY 6 HOURS PRN
Qty: 10 TABLET | Refills: 0 | Status: SHIPPED | OUTPATIENT
Start: 2019-12-18 | End: 2020-01-22

## 2019-12-18 RX ORDER — POTASSIUM CHLORIDE 750 MG/1
40 CAPSULE, EXTENDED RELEASE ORAL EVERY 4 HOURS
Status: DISCONTINUED | OUTPATIENT
Start: 2019-12-18 | End: 2019-12-18 | Stop reason: HOSPADM

## 2019-12-18 RX ORDER — POTASSIUM CHLORIDE 750 MG/1
40 CAPSULE, EXTENDED RELEASE ORAL AS NEEDED
Status: DISCONTINUED | OUTPATIENT
Start: 2019-12-18 | End: 2019-12-18 | Stop reason: HOSPADM

## 2019-12-18 RX ORDER — NARATRIPTAN 1 MG/1
1 TABLET ORAL ONCE AS NEEDED
Qty: 30 TABLET | Refills: 0 | Status: SHIPPED | OUTPATIENT
Start: 2019-12-18 | End: 2020-03-17 | Stop reason: SDUPTHER

## 2019-12-18 RX ORDER — POTASSIUM CHLORIDE 29.8 MG/ML
20 INJECTION INTRAVENOUS
Status: DISCONTINUED | OUTPATIENT
Start: 2019-12-18 | End: 2019-12-18 | Stop reason: ALTCHOICE

## 2019-12-18 RX ADMIN — PROPRANOLOL HYDROCHLORIDE 120 MG: 60 CAPSULE, EXTENDED RELEASE ORAL at 08:22

## 2019-12-18 RX ADMIN — ACETAMINOPHEN 1000 MG: 500 TABLET, FILM COATED ORAL at 08:21

## 2019-12-18 RX ADMIN — DULOXETINE HYDROCHLORIDE 60 MG: 30 CAPSULE, DELAYED RELEASE ORAL at 08:22

## 2019-12-18 RX ADMIN — GABAPENTIN 100 MG: 100 CAPSULE ORAL at 08:21

## 2019-12-18 RX ADMIN — ONDANSETRON HYDROCHLORIDE 4 MG: 4 TABLET, FILM COATED ORAL at 14:15

## 2019-12-18 RX ADMIN — HYDROMORPHONE HYDROCHLORIDE 0.5 MG: 1 INJECTION, SOLUTION INTRAMUSCULAR; INTRAVENOUS; SUBCUTANEOUS at 04:54

## 2019-12-18 RX ADMIN — POTASSIUM CHLORIDE AND SODIUM CHLORIDE 100 ML/HR: 450; 150 INJECTION, SOLUTION INTRAVENOUS at 07:25

## 2019-12-18 RX ADMIN — FOLIC ACID 250 ML/HR: 5 INJECTION, SOLUTION INTRAMUSCULAR; INTRAVENOUS; SUBCUTANEOUS at 03:19

## 2019-12-18 RX ADMIN — HYDROCODONE BITARTRATE AND ACETAMINOPHEN 1 TABLET: 5; 325 TABLET ORAL at 14:15

## 2019-12-18 RX ADMIN — CYANOCOBALAMIN 1000 MCG: 1000 INJECTION, SOLUTION INTRAMUSCULAR at 08:22

## 2019-12-18 RX ADMIN — ONDANSETRON 4 MG: 2 INJECTION INTRAMUSCULAR; INTRAVENOUS at 04:50

## 2019-12-18 RX ADMIN — POTASSIUM CHLORIDE 40 MEQ: 750 CAPSULE, EXTENDED RELEASE ORAL at 13:00

## 2019-12-18 RX ADMIN — CETIRIZINE HYDROCHLORIDE 10 MG: 10 TABLET, FILM COATED ORAL at 08:21

## 2019-12-18 RX ADMIN — GABAPENTIN 100 MG: 100 CAPSULE ORAL at 16:08

## 2019-12-18 RX ADMIN — ACYCLOVIR 200 MG: 200 CAPSULE ORAL at 08:22

## 2019-12-18 RX ADMIN — SIMETHICONE CHEW TAB 80 MG 80 MG: 80 TABLET ORAL at 08:21

## 2019-12-18 RX ADMIN — ENOXAPARIN SODIUM 40 MG: 40 INJECTION SUBCUTANEOUS at 08:22

## 2019-12-18 RX ADMIN — PROMETHAZINE HYDROCHLORIDE 12.5 MG: 12.5 TABLET ORAL at 17:32

## 2019-12-18 RX ADMIN — SIMETHICONE CHEW TAB 80 MG 80 MG: 80 TABLET ORAL at 02:51

## 2019-12-18 RX ADMIN — DIATRIZOATE MEGLUMINE AND DIATRIZOATE SODIUM 90 ML: 660; 100 LIQUID ORAL; RECTAL at 10:00

## 2019-12-18 NOTE — PLAN OF CARE
Problem: Patient Care Overview  Goal: Plan of Care Review  Flowsheets  Taken 12/18/2019 0800  Plan of Care Reviewed With: patient  Taken 12/18/2019 1513  Outcome Summary: VSS. Room air. Alternating time K pad is on right shoulder. Patient has been chewing gum, walking, taking in protein as ordered. Sips with meds. No straws. Using incentive spirometer. Will continue to monitor.

## 2019-12-18 NOTE — PROGRESS NOTES
Discharge Planning Assessment   Michel     Patient Name: Renae Bolton  MRN: [id number]  Today's Date: 12/18/2019    Admit Date: 12/17/2019    Discharge Needs Assessment     Row Name 12/18/19 1220       Living Environment    Name(s) of Who Lives With Patient   Bruno    Primary Care Provided by  self    Provides Primary Care For  no one    Able to Return to Prior Arrangements  yes    Living Arrangement Comments  -- Lives with  in one story house       Resource/Environmental Concerns    Transportation Concerns  car, none       Discharge Needs Assessment    Readmission Within the Last 30 Days  no previous admission in last 30 days    Concerns to be Addressed  no discharge needs identified    Equipment Currently Used at Home  bipap/cpap Cpap provided by [employer]    Anticipated Changes Related to Illness  none    Equipment Needed After Discharge  none        Discharge Plan     Row Name 12/18/19 1225       Plan    Plan  Spoke to pt and  in room.  Permission granted to speak in front of .  Has a POA and a Living Will, neither are on file.  Address and phone # verified.  Has Cpap provided by [employer] and no other medical equipment.  Plans to go home at discharge and  will transport.  Denies discharge needs.                  Expected Discharge Date and Time     Expected Discharge Date Expected Discharge Time    Dec 19, 2019         Demographic Summary     Row Name 12/18/19 1219       General Information    Admission Type  observation    Arrived From  PACU/recovery room    Expected Length of Stay (LOS)  1 to 2 days    Referral Source  admission list    Reason for Consult  discharge planning        Functional Status     Row Name 12/18/19 1220       Functional Status    Usual Activity Tolerance  good    Current Activity Tolerance  good    Functional Status Comments  Independent       Functional Status, IADL    Medications  independent    Meal Preparation  independent    Housekeeping   independent    Laundry  independent    Shopping  independent    IADL Comments  Self       Mental Status    General Appearance WDL  WDL       Mental Status Summary    Recent Changes in Mental Status/Cognitive Functioning  no changes    Mental Status Comments  alert and oriented                   Ling Kelley RN

## 2019-12-18 NOTE — PLAN OF CARE
Problem: Patient Care Overview  Goal: Plan of Care Review  Outcome: Ongoing (interventions implemented as appropriate)  Flowsheets  Taken 12/18/2019 0530  Progress: improving  Outcome Summary: Pt continues to receive iv fluids as ordered.  VSS.  Room air and maintain sats >90%.  Wore home cpap unit overnight.  Pain and nausea meds as ordered.  Pt ambulated length of hallway 2 times this shift.  Utilizing incentive spirometer well.  Taken 12/17/2019 2000  Plan of Care Reviewed With: patient;spouse

## 2019-12-18 NOTE — CONSULTS
Patient: Renae Bolton  Procedure(s) with comments:  GASTRIC SLEEVE LAPAROSCOPIC  ESOPHAGOGASTRODUODENOSCOPY  HIATAL HERNIA REPAIR LAPAROSCOPIC - [id number]  15MIN  Anesthesia type: general    Patient location: [facility] Surgical Floor  Last vitals:   Vitals:    12/18/19 1141   BP: 128/81   Pulse: 66   Resp: 18   Temp: 98.3 °F (36.8 °C)   SpO2: 97%     Level of consciousness: awake, alert and oriented    Post-anesthesia pain: adequate analgesia  Airway patency: patent  Respiratory: unassisted  Cardiovascular: stable and blood pressure at baseline  Hydration: euvolemic    Anesthetic complications: no

## 2019-12-18 NOTE — NURSING NOTE
IV infiltrated. Tried to stick the patient 3 times and all three IVs blew. Two other nurses tried to stick the patient and used the vein finder, and the veins blew as well. Called Dr. Onofre's office. Not able to get in touch with the doctor. Will try again. Jennifer Clemente RN

## 2019-12-18 NOTE — PROGRESS NOTES
"Bariatric Surgery Progress Note:      Chief Complaint:  POD #1    Subjective     Interval History:  Doing well.  No complaints.  Pain controlled.  Denies N/V.  No fevers.  Ambulating.  Voiding.  IS [id number].  Potassium replaced.  Mild metabolic acidosis--received 1L LR bolus.  IV running vitamin bag infiltrated in right upper arm--feeling softer, still slightly warm on right upper arm/shoulder/chest.  Feels better with warm compresses.    Objective     Vital Signs  Blood pressure 127/86, pulse 83, temperature 98.5 °F (36.9 °C), temperature source Oral, resp. rate 18, height 162.6 cm (64\"), weight 102 kg (224 lb 12.8 oz), SpO2 98 %, not currently breastfeeding.      Intake/Output Summary (Last 24 hours) at 12/18/2019 1054  Last data filed at 12/18/2019 0651  Gross per 24 hour   Intake 3254 ml   Output 900 ml   Net 2354 ml       Physical Exam:  General: Alert, NAD  Lungs: Clear  Heart: RRR  Abdomen: soft, appropriate, incisions okay  Extremities: warm, (+) SCDs.  RUE with pinkness on upper arm, shoulder, right chest. Compartment is soft.  Palpable radial pulse b/l, normal strength b/l.         Labs:  Lab Results (last 24 hours)     Procedure Component Value Units Date/Time    Comprehensive Metabolic Panel [[id number]]  (Abnormal) Collected:  12/18/19 0651    Specimen:  Blood Updated:  12/18/19 0739     Glucose 106 mg/dL      BUN 7 mg/dL      Creatinine 0.91 mg/dL      Sodium 139 mmol/L      Potassium 3.5 mmol/L      Chloride 105 mmol/L      CO2 17.9 mmol/L      Calcium 7.3 mg/dL      Total Protein 6.6 g/dL      Albumin 3.70 g/dL      ALT (SGPT) 131 U/L      AST (SGOT) 66 U/L      Alkaline Phosphatase 82 U/L      Total Bilirubin 0.3 mg/dL      eGFR Non African Amer 67 mL/min/1.73      Globulin 2.9 gm/dL      A/G Ratio 1.3 g/dL      BUN/Creatinine Ratio 7.7     Anion Gap 16.1 mmol/L     Narrative:       GFR Normal >60  Chronic Kidney Disease <60  Kidney Failure <15      Iron [[id number]]  (Normal) Collected:  12/18/19 " 0651    Specimen:  Blood Updated:  12/18/19 0731     Iron 56 mcg/dL     CBC & Differential [[id number]] Collected:  12/18/19 0651    Specimen:  Blood Updated:  12/18/19 0702    Narrative:       The following orders were created for panel order CBC & Differential.  Procedure                               Abnormality         Status                     ---------                               -----------         ------                     CBC Auto Differential[[id number]]        Abnormal            Final result                 Please view results for these tests on the individual orders.    CBC Auto Differential [[id number]]  (Abnormal) Collected:  12/18/19 0651    Specimen:  Blood Updated:  12/18/19 0702     WBC 13.72 10*3/mm3      RBC 4.40 10*6/mm3      Hemoglobin 13.6 g/dL      Hematocrit 41.3 %      MCV 93.9 fL      MCH 30.9 pg      MCHC 32.9 g/dL      RDW 12.2 %      RDW-SD 42.2 fl      MPV 10.9 fL      Platelets 286 10*3/mm3      Neutrophil % 77.9 %      Lymphocyte % 11.8 %      Monocyte % 9.8 %      Eosinophil % 0.0 %      Basophil % 0.1 %      Immature Grans % 0.4 %      Neutrophils, Absolute 10.67 10*3/mm3      Lymphocytes, Absolute 1.62 10*3/mm3      Monocytes, Absolute 1.35 10*3/mm3      Eosinophils, Absolute 0.00 10*3/mm3      Basophils, Absolute 0.02 10*3/mm3      Immature Grans, Absolute 0.06 10*3/mm3      nRBC 0.0 /100 WBC     Tissue Pathology Exam [[id number]] Collected:  12/17/19 0814    Specimen:  Tissue from Stomach Updated:  12/17/19 1341            Assessment/Plan     POD # 1 s/p LSG/HHR.    Doing well.  UGI normal post-sleeve, images and report reviewed. For IV infiltration: pt to monitor for hard compartment, or worsening redness.  Anticipate she will have a superficial thrombophlebitis.   Patient feels well and has met discharge criteria.  Will discharge home with follow up in 1 week with PA.  Rx given for Lortab, Reglan (risk of TD explained), PPI, and preferred triptan without NSAID.   Discharge  instructions reviewed with patient and all questions answered.              12/18/19  10:54 AM  Tracee Onofre MD

## 2019-12-18 NOTE — NURSING NOTE
House Supervisor and Unit Director from the ICU using an ultrasound to try to get an IV in the patient. Will continue to monitor. Jennifer Clemente RN

## 2019-12-19 DIAGNOSIS — Z01.419 ENCOUNTER FOR GYNECOLOGICAL EXAMINATION WITHOUT ABNORMAL FINDING: ICD-10-CM

## 2019-12-19 NOTE — PROGRESS NOTES
Continued Stay Note  MARILYN Pearson     Patient Name: Renae Bolton  MRN: [id number]  Today's Date: 12/19/2019    Admit Date: 12/17/2019    Discharge Plan     Row Name 12/19/19 0855       Plan    Final Discharge Disposition Code  01 - home or self-care    Final Note  home by car, cpap by [employer] (Prior to Admission)        Discharge Codes    No documentation.       Expected Discharge Date and Time     Expected Discharge Date Expected Discharge Time    Dec 18, 2019             Ling Kelley RN

## 2019-12-22 LAB
LAB AP CASE REPORT: NORMAL
PATH REPORT.FINAL DX SPEC: NORMAL

## 2019-12-26 ENCOUNTER — TELEPHONE (OUTPATIENT)
Dept: BARIATRICS/WEIGHT MGMT | Facility: CLINIC | Age: 44
End: 2019-12-26

## 2019-12-26 NOTE — TELEPHONE ENCOUNTER
Pt notified to come tomorrow at 8:15 AM for further eval and to [facility] ER if sx worsen in the night. Pt verbalized her understanding if sx worsen to go to [facility] ER and will be her tomorrow at 8:15 AM

## 2019-12-26 NOTE — TELEPHONE ENCOUNTER
Pt called in stating that she is having epigastric pain when she takes in deep breaths, Pt states that she does have some nausea when she drinks her shakes but she states its where they are so sweet. Pt denies fever/chills/ vomiting. Pt states she is getting 50 g of protein daily and is getting 64 oz of fluids. Pt does have an appointment tomorrow. Please advise thank you.

## 2019-12-27 ENCOUNTER — HOSPITAL ENCOUNTER (OUTPATIENT)
Dept: GENERAL RADIOLOGY | Facility: HOSPITAL | Age: 44
Discharge: HOME | End: 2019-12-27

## 2019-12-27 ENCOUNTER — LAB (OUTPATIENT)
Dept: LAB | Facility: HOSPITAL | Age: 44
End: 2019-12-27

## 2019-12-27 ENCOUNTER — OFFICE VISIT (OUTPATIENT)
Dept: BARIATRICS/WEIGHT MGMT | Facility: CLINIC | Age: 44
End: 2019-12-27

## 2019-12-27 ENCOUNTER — HOSPITAL ENCOUNTER (OUTPATIENT)
Dept: GENERAL RADIOLOGY | Facility: HOSPITAL | Age: 44
Discharge: HOME | End: 2019-12-27
Admitting: PHYSICIAN ASSISTANT

## 2019-12-27 ENCOUNTER — TELEPHONE (OUTPATIENT)
Dept: BARIATRICS/WEIGHT MGMT | Facility: CLINIC | Age: 44
End: 2019-12-27

## 2019-12-27 VITALS
HEIGHT: 64 IN | BODY MASS INDEX: 36.88 KG/M2 | OXYGEN SATURATION: 100 % | HEART RATE: 78 BPM | TEMPERATURE: 97 F | DIASTOLIC BLOOD PRESSURE: 74 MMHG | WEIGHT: 216 LBS | SYSTOLIC BLOOD PRESSURE: 126 MMHG | RESPIRATION RATE: 18 BRPM

## 2019-12-27 DIAGNOSIS — R06.00 DYSPNEA, UNSPECIFIED TYPE: Primary | ICD-10-CM

## 2019-12-27 DIAGNOSIS — R11.0 NAUSEA: ICD-10-CM

## 2019-12-27 DIAGNOSIS — R10.12 LUQ PAIN: ICD-10-CM

## 2019-12-27 DIAGNOSIS — R10.13 EPIGASTRIC PAIN: ICD-10-CM

## 2019-12-27 LAB
ALBUMIN SERPL-MCNC: 4 G/DL (ref 3.5–5.2)
ALBUMIN/GLOB SERPL: 1.1 G/DL
ALP SERPL-CCNC: 76 U/L (ref 39–117)
ALT SERPL W P-5'-P-CCNC: 38 U/L (ref 1–33)
AMYLASE SERPL-CCNC: 61 U/L (ref 28–100)
ANION GAP SERPL CALCULATED.3IONS-SCNC: 11.4 MMOL/L (ref 5–15)
AST SERPL-CCNC: 16 U/L (ref 1–32)
BASOPHILS # BLD AUTO: 0.06 10*3/MM3 (ref 0–0.2)
BASOPHILS NFR BLD AUTO: 0.6 % (ref 0–1.5)
BILIRUB SERPL-MCNC: 0.3 MG/DL (ref 0.2–1.2)
BUN BLD-MCNC: 15 MG/DL (ref 6–20)
BUN/CREAT SERPL: 15.2 (ref 7–25)
CALCIUM SPEC-SCNC: 9.5 MG/DL (ref 8.6–10.5)
CHLORIDE SERPL-SCNC: 100 MMOL/L (ref 98–107)
CO2 SERPL-SCNC: 21.6 MMOL/L (ref 22–29)
CREAT BLD-MCNC: 0.99 MG/DL (ref 0.57–1)
DEPRECATED RDW RBC AUTO: 41.9 FL (ref 37–54)
EOSINOPHIL # BLD AUTO: 0.52 10*3/MM3 (ref 0–0.4)
EOSINOPHIL NFR BLD AUTO: 5.2 % (ref 0.3–6.2)
ERYTHROCYTE [DISTWIDTH] IN BLOOD BY AUTOMATED COUNT: 12.4 % (ref 12.3–15.4)
GFR SERPL CREATININE-BSD FRML MDRD: 60 ML/MIN/1.73
GLOBULIN UR ELPH-MCNC: 3.6 GM/DL
GLUCOSE BLD-MCNC: 95 MG/DL (ref 65–99)
HCT VFR BLD AUTO: 40.6 % (ref 34–46.6)
HGB BLD-MCNC: 13.8 G/DL (ref 12–15.9)
IMM GRANULOCYTES # BLD AUTO: 0.04 10*3/MM3 (ref 0–0.05)
IMM GRANULOCYTES NFR BLD AUTO: 0.4 % (ref 0–0.5)
LIPASE SERPL-CCNC: 83 U/L (ref 13–60)
LYMPHOCYTES # BLD AUTO: 1.59 10*3/MM3 (ref 0.7–3.1)
LYMPHOCYTES NFR BLD AUTO: 16 % (ref 19.6–45.3)
MCH RBC QN AUTO: 31.6 PG (ref 26.6–33)
MCHC RBC AUTO-ENTMCNC: 34 G/DL (ref 31.5–35.7)
MCV RBC AUTO: 92.9 FL (ref 79–97)
MONOCYTES # BLD AUTO: 0.98 10*3/MM3 (ref 0.1–0.9)
MONOCYTES NFR BLD AUTO: 9.9 % (ref 5–12)
NEUTROPHILS # BLD AUTO: 6.75 10*3/MM3 (ref 1.7–7)
NEUTROPHILS NFR BLD AUTO: 67.9 % (ref 42.7–76)
NRBC BLD AUTO-RTO: 0 /100 WBC (ref 0–0.2)
PLATELET # BLD AUTO: 369 10*3/MM3 (ref 140–450)
PMV BLD AUTO: 11.7 FL (ref 6–12)
POTASSIUM BLD-SCNC: 4.1 MMOL/L (ref 3.5–5.2)
PREALB SERPL-MCNC: 15.1 MG/DL (ref 20–40)
PROT SERPL-MCNC: 7.6 G/DL (ref 6–8.5)
RBC # BLD AUTO: 4.37 10*6/MM3 (ref 3.77–5.28)
SODIUM BLD-SCNC: 133 MMOL/L (ref 136–145)
WBC NRBC COR # BLD: 9.94 10*3/MM3 (ref 3.4–10.8)

## 2019-12-27 PROCEDURE — 99024 POSTOP FOLLOW-UP VISIT: CPT | Performed by: PHYSICIAN ASSISTANT

## 2019-12-27 PROCEDURE — 84134 ASSAY OF PREALBUMIN: CPT

## 2019-12-27 PROCEDURE — 74241: CPT

## 2019-12-27 PROCEDURE — 0 DIATRIZOATE MEGLUMINE & SODIUM PER 1 ML: Performed by: PHYSICIAN ASSISTANT

## 2019-12-27 PROCEDURE — 85025 COMPLETE CBC W/AUTO DIFF WBC: CPT

## 2019-12-27 PROCEDURE — 36415 COLL VENOUS BLD VENIPUNCTURE: CPT

## 2019-12-27 PROCEDURE — 83690 ASSAY OF LIPASE: CPT

## 2019-12-27 PROCEDURE — 82150 ASSAY OF AMYLASE: CPT

## 2019-12-27 PROCEDURE — 71046 X-RAY EXAM CHEST 2 VIEWS: CPT

## 2019-12-27 PROCEDURE — 80053 COMPREHEN METABOLIC PANEL: CPT

## 2019-12-27 RX ADMIN — DIATRIZOATE MEGLUMINE AND DIATRIZOATE SODIUM 90 ML: 660; 100 LIQUID ORAL; RECTAL at 11:05

## 2019-12-27 NOTE — PROGRESS NOTES
"[facility] Bariatric Surgery  [address]  [address]  [facility] [id number] [phone]      Patient Name:  Renae Bolton.  :  1975      Date of Visit: 2019      Reason for Visit:  POD #10    HPI:  Renae Bolton is a 44 y.o. female s/p LSG/HHR / w/ Dr. Onofre    FINDINGS: Small hiatal hernia. Adhesions in the left upper quadrant.  Small splenic tear with minimal bleeding, controlled with FloSeal.  Path reviewed.    Discharged on POD#1.  Developed chest pain/epigastric pain on POD#7, worse w/ inspiration, unassociated w/ PO intake.  Says had already gone back to work.  Has only been using IS once daily, maybe, getting it to 1033-0618.  Denies productive cough.  No fevers/chills.  Chest soreness persists, although may be slightly better today.  Having episodic nausea, worse w/ the protein shakes, says very much dislikes them.  Getting only 40g prot/day.  Woke this AM w/ sharp constant LUQ pain - feels like it may be \"gas pain\" but is not sure.  Denies vomiting.  Bowels moving.  Voiding w/out issue.  On Omeprazole .  Avoiding ASA/NSAIDS/steroids/tobacco.  Ambulating frequently.     Presurgery weight: 224 pounds.  Today's weight is 98 kg (216 lb) pounds, today's  Body mass index is 37.08 kg/m²., and her weight loss since surgery is 8 pounds.       Past Medical History:   Diagnosis Date   • Anxiety    • Arrhythmia     SVT w/ PVCs, on propranolol, follows w/ Dr. Liang   • Aspirin long-term use     for heart health   • Depression    • Dyspepsia    • Dyspnea on exertion    • Fatigue    • Generalized edema     r/t weight gain   • Genital HSV     Famvir for prophylaxis   • GERD (gastroesophageal reflux disease)     daily Prilosec, denies prior eval   • Hard to intubate     states was told this after her hysterectomy.  states had a \"lidocaine lollipop\"   • Interstitial cystitis    • Migraine     Topamax w/ prn Treximet/Phenergan/Zofran   • Mitral valve prolapse  "   • Normal vaginal Papanicolaou smear in patient with history of hysterectomy 09/16/2016   • Obesity    • MAREN (obstructive sleep apnea)    • Peripheral edema    • PONV (postoperative nausea and vomiting)    • Post concussion syndrome     s/p fall w/ frontal lobe head injury 9/2017, on Elavil   • Sleep apnea     newly dx, CPAP compliant   • Stress incontinence     follows w/ Dr. Galvan, on Myrbetriq, s/p cystoscopy w/ bulking agent   • Wears contact lenses      Past Surgical History:   Procedure Laterality Date   • ANTERIOR AND POSTERIOR VAGINAL REPAIR  06/23/2010    [facility]   • BREAST LUMPECTOMY Left 2015    benign   • CYSTOSCOPY W/ BULKING AGENT INJECTION N/A 6/25/2019    Procedure: CYSTOSCOPY WITH URETHRAL  BULKING AGENT INJECTION;  Surgeon: Jose Galvan MD;  Location: [facility] OR;  Service: Urology   • CYSTOSCOPY, DIMETHYL SULFOXIDE COCKTAIL  03/14/2011    WITH HYDRODISTENTION (DR AVINASH MADDOX)   • ENDOSCOPY     • ENDOSCOPY N/A 12/17/2019    Procedure: ESOPHAGOGASTRODUODENOSCOPY;  Surgeon: Tracee Onofre MD;  Location:  [facility] OR;  Service: Bariatric   • GASTRIC SLEEVE LAPAROSCOPIC N/A 12/17/2019    Procedure: GASTRIC SLEEVE LAPAROSCOPIC;  Surgeon: Tracee Onofre MD;  Location: [facility] OR;  Service: Bariatric   • HIATAL HERNIA REPAIR N/A 12/17/2019    Procedure: HIATAL HERNIA REPAIR LAPAROSCOPIC;  Surgeon: Tracee Onofre MD;  Location: [facility] OR;  Service: Bariatric   • HYSTERECTOMY  06/23/2010    [facility] ([facility])   • LAPAROSCOPIC CHOLECYSTECTOMY  2013    for stones   • TRANSVAGINAL TAPING SUSPENSION WITH OBTURATOR  06/23/2010    [facility]   • WISDOM TOOTH EXTRACTION  1990     Outpatient Medications Marked as Taking for the 12/27/19 encounter (Office Visit) with Dina Ibarra PA   Medication Sig Dispense Refill   • acyclovir (ZOVIRAX) 5 % ointment Apply  topically to the appropriate area as directed Every 3 (Three) Hours. (Patient taking differently: Apply  topically  to the appropriate area as directed Take As Directed.) 30 g 6   • albuterol sulfate  (90 Base) MCG/ACT inhaler INHALE 1 TO 2 PUFFS BY MOUTH EVERY 4 TO 6 HOURS AS NEEDED. 8.5 g 3   • amitriptyline (ELAVIL) 25 MG tablet Take 1 tablet by mouth Every Night as directed 90 tablet 3   • cetirizine (zyrTEC) 10 MG tablet Take 1 tablet by mouth Daily. 90 tablet 3   • cholecalciferol (VITAMIN D3) 1000 UNITS tablet Take 1,000 Units by mouth Daily.     • clobetasol propionate (CLOBEX) 0.05 % shampoo apply TO HAIR AND SCALP as directed  0   • cyclobenzaprine (FLEXERIL) 5 MG tablet Take 1 tablet by mouth 3 (Three) Times a Day As Needed. 270 tablet 3   • DULoxetine (CYMBALTA) 60 MG capsule Take 1 capsule by mouth Daily. 90 capsule 1   • famciclovir (FAMVIR) 250 MG tablet Take 1 tablet by mouth 3 (Three) Times a Day. 270 tablet 4   • Mirabegron ER (MYRBETRIQ) 50 MG tablet sustained-release 24 hour 24 hr tablet Take 1 Tablet by mouth Daily. (Patient taking differently: Take 50 mg by mouth Daily.) 90 tablet 3   • naratriptan (AMERGE) 1 MG tablet Take 1 tablet by mouth 1 (One) Time As Needed for Migraine for up to 1 dose. 30 tablet 0   • NON FORMULARY Multivitamin patch and iron patch daily     • omeprazole (PrilOSEC) 40 MG capsule Take 1 capsule by mouth Daily for 60 days. 60 capsule 0   • ondansetron (ZOFRAN) 4 MG tablet Take 4 mg by mouth Every 8 (Eight) Hours As Needed for Nausea or Vomiting.     • ondansetron (ZOFRAN) 4 MG tablet Take 1 tablet by mouth Every 6 (Six) Hours As Needed. 120 tablet 3   • Probiotic Product (PROBIOTIC PO) Take 1 capsule by mouth Daily.     • propranolol LA (INDERAL LA) 120 MG 24 hr capsule Take 1 capsule by mouth Daily. 90 capsule 3   • PSYLLIUM HUSK PO Take 1 capsule by mouth Daily.     • Thiamine HCl (VITAMIN B-1 PO) Take  by mouth.     • TOPAMAX 100 MG tablet Take 1 tablet by mouth 2 (Two) Times a Day. 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  Never actually taken it internally.   • Latex Anaphylaxis and Rash   • Morphine And Related Rash     Rash (if gets on skin).  Never taken it internally   • Penicillins Shortness Of Breath and Palpitations     Keflex OK   • Oxycodone Other (See Comments)     Dizziness, confusion     • Topiramate Other (See Comments)     Pt states she can take NAME BRAND ONLY.  Generic brand \"doesn't work\"       Social History     Socioeconomic History   • Marital status:      Spouse name: Not on file   • Number of children: Not on file   • Years of education: Not on file   • Highest education level: Not on file   Occupational History   • Occupation: 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Employer: [employer]   Tobacco Use   • Smoking status: Never Smoker   • Smokeless tobacco: Never Used   Substance and Sexual Activity   • Alcohol use: No   • Drug use: No   • Sexual activity: Defer   Social History Narrative    Living in [place] w/  and 2 children.  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         /74 (BP Location: Left arm, Patient Position: Sitting, Cuff Size: Large Adult)   Pulse 78   Temp 97 °F (36.1 °C) (Temporal)   Resp 18   Ht 162.6 cm (64\")   Wt 98 kg (216 lb)   SpO2 100%   BMI 37.08 kg/m²   Physical Exam   Constitutional: She appears well-developed and well-nourished. She is cooperative.   obese   HENT:   Mouth/Throat: Oropharynx is clear and moist and mucous membranes are normal.   Eyes: Conjunctivae are normal. No scleral icterus.   Cardiovascular: Normal rate and regular rhythm.   Pulmonary/Chest: Effort normal and breath sounds normal. No respiratory distress.   mid chest pain reported w/ inspiration   Abdominal: Soft. Bowel sounds are normal. She exhibits no distension. There is no rebound and no guarding.   lap incisions healing well, LUQ TTP reported but " not appreciated   Musculoskeletal: Normal range of motion. She exhibits no edema.   Neurological: She is alert.   Skin: Skin is warm and dry. No rash noted.   Psychiatric: She has a normal mood and affect. Judgment normal.         Assessment:   POD #10 s/p LSG/HHR 12/17/19 w/ Dr. Onofre      Plan:  STAT CXR, Water Soluble UGI + labs ordered to further evaluate.  Discussed importance of pulmonary toilet.  Advised to increase protein to 100g/day.  Use antiemetics prn.  Continue PPI.  Continue to avoid ASA/NSAIDs/tobacco x 6 weeks postop, steroids x 8 weeks postop.  Call w/ ongoing problems/concerns.    The patient was instructed to follow up in 3 weeks, sooner if needed.

## 2019-12-27 NOTE — TELEPHONE ENCOUNTER
Notified pt that her CXR and UGI were okay, and her labs are still pending.  Told pt to call w/ any issues/concerns. Pt verbalized understanding.

## 2020-01-08 RX ORDER — TOPIRAMATE 100 MG
100 TABLET ORAL 2 TIMES DAILY
Qty: 180 TABLET | Refills: 0 | Status: SHIPPED | OUTPATIENT
Start: 2020-01-08 | End: 2020-03-17 | Stop reason: ALTCHOICE

## 2020-01-13 ENCOUNTER — OFFICE VISIT (OUTPATIENT)
Dept: UROLOGY | Facility: CLINIC | Age: 45
End: 2020-01-13

## 2020-01-13 VITALS
HEART RATE: 79 BPM | RESPIRATION RATE: 18 BRPM | WEIGHT: 216 LBS | SYSTOLIC BLOOD PRESSURE: 102 MMHG | DIASTOLIC BLOOD PRESSURE: 68 MMHG | HEIGHT: 64 IN | OXYGEN SATURATION: 99 % | BODY MASS INDEX: 36.88 KG/M2

## 2020-01-13 DIAGNOSIS — N39.46 MIXED STRESS AND URGE URINARY INCONTINENCE: Primary | ICD-10-CM

## 2020-01-13 DIAGNOSIS — N32.81 OVERACTIVE BLADDER: ICD-10-CM

## 2020-01-13 PROCEDURE — 51798 US URINE CAPACITY MEASURE: CPT | Performed by: UROLOGY

## 2020-01-13 PROCEDURE — 99213 OFFICE O/P EST LOW 20 MIN: CPT | Performed by: UROLOGY

## 2020-01-13 NOTE — PROGRESS NOTES
"Chief Complaint  KULWINDER    HPI  Ms. Bolton is a 44 y.o. female There is no height or weight on file to calculate BMI. presents with complaint of urinary incontinence for >5 years. Pt has primarily stress mixed urinary incontinence. She also says she has Interstitial Cystitis and has been in remission since a DMSO cocktail/hydrodistention in 2011.     She presents today for follow-up. She is overall very happy with her treatment after the bulking agent injections in terms her stress urinary incontinence.  She barely uses 1 pad per day, only a panty liner.  No longer urgency and frequency.  She is still taking the Myrbetriq.    Past History,  Severity: Pt uses 2-3 pads a day (5-6 if she has a cough) and had a TOT sling with Dr. Hedrick (2010) in the past.  Associated Sx: Pt has + h/o daytime frequency, urgency and nocturia x1.     No h/o poor stream, straining, hesitancy or incomplete voiding.     No h/o recurrent UTI, hematuria, nephrolithiasis    No vaginal discharge or bleeding.  No h/o something coming out of vagina   No fever/chills  No weight loss, appetite normal.  yes Constipation - she takes 2 fiber pills daily  2 History of vaginal deliveries    She has an arrythmia that is controlled with a BB    Past Medical History  Past Medical History:   Diagnosis Date   • Anxiety    • Arrhythmia     SVT w/ PVCs, on propranolol, follows w/ Dr. Liang   • Aspirin long-term use     for heart health   • Depression    • Dyspepsia    • Dyspnea on exertion    • Fatigue    • Generalized edema     r/t weight gain   • Genital HSV     Famvir for prophylaxis   • GERD (gastroesophageal reflux disease)     daily Prilosec, denies prior eval   • Hard to intubate     states was told this after her hysterectomy.  states had a \"lidocaine lollipop\"   • Interstitial cystitis    • Migraine     Topamax w/ prn Treximet/Phenergan/Zofran   • Mitral valve prolapse    • Normal vaginal Papanicolaou smear in patient with history of hysterectomy 09/16/2016 "   • Obesity    • MAREN (obstructive sleep apnea)    • Peripheral edema    • PONV (postoperative nausea and vomiting)    • Post concussion syndrome     s/p fall w/ frontal lobe head injury 9/2017, on Elavil   • Sleep apnea     newly dx, CPAP compliant   • Stress incontinence     follows w/ Dr. Galvan, on Myrbetriq, s/p cystoscopy w/ bulking agent   • Wears contact lenses        Past Surgical History  Past Surgical History:   Procedure Laterality Date   • ANTERIOR AND POSTERIOR VAGINAL REPAIR  06/23/2010    [facility]   • BREAST LUMPECTOMY Left 2015    benign   • CYSTOSCOPY W/ BULKING AGENT INJECTION N/A 6/25/2019    Procedure: CYSTOSCOPY WITH URETHRAL  BULKING AGENT INJECTION;  Surgeon: Jose Galvan MD;  Location: [facility] OR;  Service: Urology   • CYSTOSCOPY, DIMETHYL SULFOXIDE COCKTAIL  03/14/2011    WITH HYDRODISTENTION (DR AVINASH MADDOX)   • ENDOSCOPY     • ENDOSCOPY N/A 12/17/2019    Procedure: ESOPHAGOGASTRODUODENOSCOPY;  Surgeon: Trcaee Onofre MD;  Location:  [facility] OR;  Service: Bariatric   • GASTRIC SLEEVE LAPAROSCOPIC N/A 12/17/2019    Procedure: GASTRIC SLEEVE LAPAROSCOPIC;  Surgeon: Tracee Onofre MD;  Location:  [facility] OR;  Service: Bariatric   • HIATAL HERNIA REPAIR N/A 12/17/2019    Procedure: HIATAL HERNIA REPAIR LAPAROSCOPIC;  Surgeon: Tracee Onofre MD;  Location: [facility] OR;  Service: Bariatric   • HYSTERECTOMY  06/23/2010    [facility] ([facility])   • LAPAROSCOPIC CHOLECYSTECTOMY  2013    for stones   • TRANSVAGINAL TAPING SUSPENSION WITH OBTURATOR  06/23/2010    [facility]   • WISDOM TOOTH EXTRACTION  1990       Medications  Current Outpatient Medications   Medication Sig Dispense Refill   • acyclovir (ZOVIRAX) 5 % ointment Apply  topically to the appropriate area as directed Every 3 (Three) Hours. (Patient taking differently: Apply  topically to the appropriate area as directed Take As Directed.) 30 g 6   • albuterol sulfate  (90 Base) MCG/ACT inhaler INHALE 1  TO 2 PUFFS BY MOUTH EVERY 4 TO 6 HOURS AS NEEDED. 8.5 g 3   • amitriptyline (ELAVIL) 25 MG tablet Take 1 tablet by mouth Every Night as directed 90 tablet 3   • cetirizine (zyrTEC) 10 MG tablet Take 1 tablet by mouth Daily. 90 tablet 3   • cholecalciferol (VITAMIN D3) 1000 UNITS tablet Take 1,000 Units by mouth Daily.     • clobetasol propionate (CLOBEX) 0.05 % shampoo apply TO HAIR AND SCALP as directed  0   • cyclobenzaprine (FLEXERIL) 5 MG tablet Take 1 tablet by mouth 3 (Three) Times a Day As Needed. 270 tablet 3   • DULoxetine (CYMBALTA) 60 MG capsule Take 1 capsule by mouth Daily. 90 capsule 1   • famciclovir (FAMVIR) 250 MG tablet Take 1 tablet by mouth 3 (Three) Times a Day. 270 tablet 4   • HYDROcodone-acetaminophen (NORCO) 7.5-325 MG per tablet Take 1 tablet by mouth Every 6 (Six) Hours As Needed for Moderate Pain . 10 tablet 0   • Lysine 1000 MG tablet Take 1,000 mg by mouth.     • metoclopramide (REGLAN) 5 MG tablet Take 1 tablet by mouth 4 (Four) Times a Day Before Meals & at Bedtime. 30 tablet 0   • Mirabegron ER (MYRBETRIQ) 50 MG tablet sustained-release 24 hour 24 hr tablet Take 1 Tablet by mouth Daily. (Patient taking differently: Take 50 mg by mouth Daily.) 90 tablet 3   • naratriptan (AMERGE) 1 MG tablet Take 1 tablet by mouth 1 (One) Time As Needed for Migraine for up to 1 dose. 30 tablet 0   • NON FORMULARY Multivitamin patch and iron patch daily     • omeprazole (PrilOSEC) 40 MG capsule Take 1 capsule by mouth Daily for 60 days. 60 capsule 0   • ondansetron (ZOFRAN) 4 MG tablet Take 4 mg by mouth Every 8 (Eight) Hours As Needed for Nausea or Vomiting.     • ondansetron (ZOFRAN) 4 MG tablet Take 1 tablet by mouth Every 6 (Six) Hours As Needed. 120 tablet 3   • Prenatal Vit-Fe Fumarate-FA (SE-EMLVI 19) 29-1 MG chewable tablet Chew 1 tablet by mouth Daily. 30 tablet 2   • Probiotic Product (PROBIOTIC PO) Take 1 capsule by mouth Daily.     • promethazine (PHENERGAN) 25 MG tablet Take 1 tablet by  "mouth Every 6 (Six) Hours As Needed for nausea 30 tablet 4   • propranolol LA (INDERAL LA) 120 MG 24 hr capsule Take 1 capsule by mouth Daily. 90 capsule 3   • PSYLLIUM HUSK PO Take 1 capsule by mouth Daily.     • Thiamine HCl (VITAMIN B-1 PO) Take  by mouth.     • TOPAMAX 100 MG tablet Take 1 tablet by mouth 2 (Two) Times a Day. 180 tablet 0   • vitamin E 400 UNIT capsule Take 800 Units by mouth Daily.       No current facility-administered medications for this visit.        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  Never actually taken it internally.   • Latex Anaphylaxis and Rash   • Morphine And Related Rash     Rash (if gets on skin).  Never taken it internally   • Penicillins Shortness Of Breath and Palpitations     Keflex OK   • Oxycodone Other (See Comments)     Dizziness, confusion     • Topiramate Other (See Comments)     Pt states she can take NAME BRAND ONLY.  Generic brand \"doesn't work\"       Social History  Social History     Socioeconomic History   • Marital status:      Spouse name: Not on file   • Number of children: Not on file   • Years of education: Not on file   • Highest education level: Not on file   Occupational History   • Occupation: 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Employer: [employer]   Tobacco Use   • Smoking status: Never Smoker   • Smokeless tobacco: Never Used   Substance and Sexual Activity   • Alcohol use: No   • Drug use: No   • Sexual activity: Defer   Social History Narrative    Living in [place] w/  and 2 children.  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.         Family History  She has no family history of bladder or kidney cancer  She has no family history of kidney stones    Review of Systems  Constitutional: No fevers or chills  Skin: Negative for rash  Endocrine: No heat/cold " intolerance   Cardiovascular: Negative for chest pain or dyspnea on exertion  Respiratory: Negative for shortness of breath or wheezing  Gastrointestinal: No nausea or vomiting  Genitourinary: Negative for current gross hematuria.  Musculoskeletal: No flank pain  Neurological:  Negative for frequent headaches or dizziness  Lymph/Heme: Negative for leg swelling or calf pain.    Physical Exam  There were no vitals taken for this visit.  Constitutional: NAD, WDWN  HEENT: NCAT. Conjunctivae normal  MMM  Cardiovascular: Regular rate  Pulmonary/Chest: Respirations are even and non-labored bilaterally  Abdominal: Soft with no distension, tenderness, masses, guarding or CVA tenderness  Neurological: A + O x 3,  cranial nerves II-XII grossly intact  Extremities: RACHEL x 4, warm, no clubbing, no cyanosis  Skin: Pink, warm, dry with no rash  Psychiatric:  Normal mood and affect    Genitourinary:   No mesh extrusion into the vagina or any vaginal pain with palpation.  No major pelvic organ prolapse, only mild grade 1 cystocele.  Minimal urethral hypermobility, but positive large stress urinary incontinence with cough leak test with full bladder    Labs  Brief Urine Lab Results  (Last result in the past 365 days)      Color   Clarity   Blood   Leuk Est   Nitrite   Protein   CREAT   Urine HCG        06/10/19 1134 Yellow Cloudy Negative Negative Negative Negative               Lab Results   Component Value Date    GLUCOSE 95 12/27/2019    CALCIUM 9.5 12/27/2019     (L) 12/27/2019    K 4.1 12/27/2019    CO2 21.6 (L) 12/27/2019     12/27/2019    BUN 15 12/27/2019    CREATININE 0.99 12/27/2019    EGFRIFNONA 60 (L) 12/27/2019    BCR 15.2 12/27/2019    ANIONGAP 11.4 12/27/2019       Lab Results   Component Value Date    WBC 9.94 12/27/2019    HGB 13.8 12/27/2019    HCT 40.6 12/27/2019    MCV 92.9 12/27/2019     12/27/2019       PVR  Post-void residual performed by staff today is 20 mL      I have personally reviewed  her labs and post void residual imaging.     Assessment  Ms. Bolton is a 44 y.o. female with recurrent FELICIANO after TOT sling in 2010. She also has a history of IC that is well controlled with diet, and very mild OAB that is controlled with Myrbetriq.  She was using 2-3 pads per day and now is using a panty liner. She is s/p urethral bulking agent injection on 6/25/2019. FELICIANO well controlled.     Plan  1.  FU 1 yr  2.  Refill Myrbetriq       Jose Galvan MD

## 2020-01-22 ENCOUNTER — OFFICE VISIT (OUTPATIENT)
Dept: BARIATRICS/WEIGHT MGMT | Facility: CLINIC | Age: 45
End: 2020-01-22

## 2020-01-22 VITALS
HEART RATE: 69 BPM | OXYGEN SATURATION: 98 % | RESPIRATION RATE: 18 BRPM | WEIGHT: 208 LBS | TEMPERATURE: 97.6 F | DIASTOLIC BLOOD PRESSURE: 74 MMHG | SYSTOLIC BLOOD PRESSURE: 110 MMHG | HEIGHT: 64 IN | BODY MASS INDEX: 35.51 KG/M2

## 2020-01-22 DIAGNOSIS — Z90.3 POSTGASTRECTOMY MALABSORPTION: ICD-10-CM

## 2020-01-22 DIAGNOSIS — K91.2 POSTGASTRECTOMY MALABSORPTION: ICD-10-CM

## 2020-01-22 DIAGNOSIS — R11.0 NAUSEA: ICD-10-CM

## 2020-01-22 DIAGNOSIS — K21.9 GASTROESOPHAGEAL REFLUX DISEASE, ESOPHAGITIS PRESENCE NOT SPECIFIED: ICD-10-CM

## 2020-01-22 DIAGNOSIS — Z98.84 STATUS POST BARIATRIC SURGERY: Primary | ICD-10-CM

## 2020-01-22 DIAGNOSIS — R53.83 FATIGUE, UNSPECIFIED TYPE: ICD-10-CM

## 2020-01-22 DIAGNOSIS — E55.9 HYPOVITAMINOSIS D: ICD-10-CM

## 2020-01-22 DIAGNOSIS — E66.9 OBESITY, CLASS II, BMI 35-39.9: ICD-10-CM

## 2020-01-22 DIAGNOSIS — Z13.21 MALNUTRITION SCREEN: ICD-10-CM

## 2020-01-22 DIAGNOSIS — Z13.0 SCREENING, IRON DEFICIENCY ANEMIA: ICD-10-CM

## 2020-01-22 PROCEDURE — 99024 POSTOP FOLLOW-UP VISIT: CPT | Performed by: PHYSICIAN ASSISTANT

## 2020-01-22 RX ORDER — DOCUSATE SODIUM 100 MG/1
250 CAPSULE, LIQUID FILLED ORAL DAILY
COMMUNITY
End: 2021-03-17

## 2020-01-25 LAB
25(OH)D3+25(OH)D2 SERPL-MCNC: 35.1 NG/ML (ref 30–100)
ALBUMIN SERPL-MCNC: 4.3 G/DL (ref 3.8–4.8)
ALBUMIN/GLOB SERPL: 1.5 {RATIO} (ref 1.2–2.2)
ALP SERPL-CCNC: 80 IU/L (ref 39–117)
ALT SERPL-CCNC: 27 IU/L (ref 0–32)
AST SERPL-CCNC: 21 IU/L (ref 0–40)
BASOPHILS # BLD AUTO: 0 X10E3/UL (ref 0–0.2)
BASOPHILS NFR BLD AUTO: 0 %
BILIRUB SERPL-MCNC: 0.2 MG/DL (ref 0–1.2)
BUN SERPL-MCNC: 22 MG/DL (ref 6–24)
BUN/CREAT SERPL: 22 (ref 9–23)
CALCIUM SERPL-MCNC: 9.6 MG/DL (ref 8.7–10.2)
CHLORIDE SERPL-SCNC: 109 MMOL/L (ref 96–106)
CO2 SERPL-SCNC: 21 MMOL/L (ref 20–29)
CREAT SERPL-MCNC: 1.02 MG/DL (ref 0.57–1)
EOSINOPHIL # BLD AUTO: 0.3 X10E3/UL (ref 0–0.4)
EOSINOPHIL NFR BLD AUTO: 4 %
ERYTHROCYTE [DISTWIDTH] IN BLOOD BY AUTOMATED COUNT: 12.9 % (ref 11.7–15.4)
FERRITIN SERPL-MCNC: 112 NG/ML (ref 15–150)
FOLATE SERPL-MCNC: 8.1 NG/ML
GLOBULIN SER CALC-MCNC: 2.9 G/DL (ref 1.5–4.5)
GLUCOSE SERPL-MCNC: 85 MG/DL (ref 65–99)
HCT VFR BLD AUTO: 42.4 % (ref 34–46.6)
HGB BLD-MCNC: 14.5 G/DL (ref 11.1–15.9)
IMM GRANULOCYTES # BLD AUTO: 0 X10E3/UL (ref 0–0.1)
IMM GRANULOCYTES NFR BLD AUTO: 0 %
IRON SERPL-MCNC: 49 UG/DL (ref 27–159)
LYMPHOCYTES # BLD AUTO: 1.9 X10E3/UL (ref 0.7–3.1)
LYMPHOCYTES NFR BLD AUTO: 29 %
Lab: NORMAL
MCH RBC QN AUTO: 31.9 PG (ref 26.6–33)
MCHC RBC AUTO-ENTMCNC: 34.2 G/DL (ref 31.5–35.7)
MCV RBC AUTO: 93 FL (ref 79–97)
METHYLMALONATE SERPL-SCNC: 71 NMOL/L (ref 0–378)
MONOCYTES # BLD AUTO: 0.6 X10E3/UL (ref 0.1–0.9)
MONOCYTES NFR BLD AUTO: 9 %
NEUTROPHILS # BLD AUTO: 3.8 X10E3/UL (ref 1.4–7)
NEUTROPHILS NFR BLD AUTO: 58 %
PLATELET # BLD AUTO: 317 X10E3/UL (ref 150–450)
POTASSIUM SERPL-SCNC: 4.7 MMOL/L (ref 3.5–5.2)
PREALB SERPL-MCNC: 24 MG/DL (ref 12–34)
PROT SERPL-MCNC: 7.2 G/DL (ref 6–8.5)
RBC # BLD AUTO: 4.55 X10E6/UL (ref 3.77–5.28)
SODIUM SERPL-SCNC: 145 MMOL/L (ref 134–144)
VIT B1 BLD-SCNC: 202.4 NMOL/L (ref 66.5–200)
WBC # BLD AUTO: 6.6 X10E3/UL (ref 3.4–10.8)

## 2020-03-08 NOTE — PROGRESS NOTES
[facility] Bariatric Surgery  [address]  [address]  [facility] [id number] [phone]      Patient Name:  Renae Bolton.  :  1975      Reason for Visit:  1 month postop    HPI:  Renae Bolton is a 44 y.o. female  s/p LSG/HHR 19 w/ Dr. Onofre       FINDINGS: Small hiatal hernia. Adhesions in the left upper quadrant.  Small splenic tear with minimal bleeding, controlled with FloSeal.  Path reviewed.    POD#10- with c/o CP/ epigastric pain, worse with deep inspiration, poor IS, sharp LUQ pain. Stat CXT + UGI, okay. Lipase 83, CO2 21.6, Na 133, prealbumin 15, amylase 61.     Doing well. Has had lingering nausea a couple times a week, helped with zofran. Not associated with PO intake, sometimes when she wakes in morning.  Has heartburn occasionally, twice a week, despite omeprazole 40mg. Takes tums/ rolaids which resolves symptoms.   Having constipation, taking colace- using metamucil. Denies dysphagia, vomiting, abdominal pain, pulmonary issues and fevers.  Tolerating diet progression - on stage 6.  Getting 70-90g prot/day, protein shakes once a day, protein water. Chicken, lean meats.  Eating 3-4 Times a day.    Drinking 64+ fluid oz/day.  Taking B1, Vit D and Patches: MVI + iron (thinks D is 1000).  On Omeprazole .  Still holding ASA , NSAIDs , Tramadol, Hormones, Diuretics , Steroids and Immunologics.  Exercising- treadmill.     Presurgery weight:  224 pounds. Today's weight is 94.3 kg (208 lb) pounds, today's Body mass index is 35.7 kg/m²., and her weight loss since surgery is 16 pounds.       Past Medical History:   Diagnosis Date   • Anxiety    • Arrhythmia     SVT w/ PVCs, on propranolol, follows w/ Dr. Liang   • Aspirin long-term use     for heart health   • Depression    • Dyspepsia    • Dyspnea on exertion    • Fatigue    • Generalized edema     r/t weight gain   • Genital HSV     Famvir for prophylaxis   • GERD (gastroesophageal reflux disease)      "daily Prilosec, denies prior eval   • Hard to intubate     states was told this after her hysterectomy.  states had a \"lidocaine lollipop\"   • Interstitial cystitis    • Migraine     Topamax w/ prn Treximet/Phenergan/Zofran   • Mitral valve prolapse    • Normal vaginal Papanicolaou smear in patient with history of hysterectomy 09/16/2016   • Obesity    • MAREN (obstructive sleep apnea)    • Peripheral edema    • PONV (postoperative nausea and vomiting)    • Post concussion syndrome     s/p fall w/ frontal lobe head injury 9/2017, on Elavil   • Sleep apnea     newly dx, CPAP compliant   • Stress incontinence     follows w/ Dr. Galvan, on Myrbetriq, s/p cystoscopy w/ bulking agent   • Wears contact lenses      Past Surgical History:   Procedure Laterality Date   • ANTERIOR AND POSTERIOR VAGINAL REPAIR  06/23/2010    DR EDIN BYRD   • BREAST LUMPECTOMY Left 2015    benign   • CYSTOSCOPY W/ BULKING AGENT INJECTION N/A 6/25/2019    Procedure: CYSTOSCOPY WITH URETHRAL  BULKING AGENT INJECTION;  Surgeon: Jose Galvan MD;  Location: [facility] OR;  Service: Urology   • CYSTOSCOPY, DIMETHYL SULFOXIDE COCKTAIL  03/14/2011    WITH HYDRODISTENTION (DR AVINASH MADDOX)   • ENDOSCOPY     • ENDOSCOPY N/A 12/17/2019    Procedure: ESOPHAGOGASTRODUODENOSCOPY;  Surgeon: Tracee Onofre MD;  Location: [facility] OR;  Service: Bariatric   • GASTRIC SLEEVE LAPAROSCOPIC N/A 12/17/2019    Procedure: GASTRIC SLEEVE LAPAROSCOPIC;  Surgeon: Tracee Onofre MD;  Location: [facility] OR;  Service: Bariatric   • HIATAL HERNIA REPAIR N/A 12/17/2019    Procedure: HIATAL HERNIA REPAIR LAPAROSCOPIC;  Surgeon: Tracee Onofre MD;  Location: [facility] OR;  Service: Bariatric   • HYSTERECTOMY  06/23/2010    LAVH (DR EDIN BYRD)   • LAPAROSCOPIC CHOLECYSTECTOMY  2013    for stones   • TRANSVAGINAL TAPING SUSPENSION WITH OBTURATOR  06/23/2010    DR EDIN BYRD   • WISDOM TOOTH EXTRACTION  1990     Outpatient Medications Marked as Taking for the " 1/22/20 encounter (Office Visit) with Noemi Sanderson PA-C   Medication Sig Dispense Refill   • acyclovir (ZOVIRAX) 5 % ointment Apply  topically to the appropriate area as directed Every 3 (Three) Hours. (Patient taking differently: Apply  topically to the appropriate area as directed Take As Directed.) 30 g 6   • albuterol sulfate  (90 Base) MCG/ACT inhaler INHALE 1 TO 2 PUFFS BY MOUTH EVERY 4 TO 6 HOURS AS NEEDED. 8.5 g 3   • amitriptyline (ELAVIL) 25 MG tablet Take 1 tablet by mouth Every Night as directed 90 tablet 3   • cetirizine (zyrTEC) 10 MG tablet Take 1 tablet by mouth Daily. 90 tablet 3   • cholecalciferol (VITAMIN D3) 1000 UNITS tablet Take 1,000 Units by mouth Daily.     • clobetasol propionate (CLOBEX) 0.05 % shampoo apply TO HAIR AND SCALP as directed  0   • cyclobenzaprine (FLEXERIL) 5 MG tablet Take 1 tablet by mouth 3 (Three) Times a Day As Needed. 270 tablet 3   • docusate sodium (COLACE) 100 MG capsule Take 100 mg by mouth 2 (Two) Times a Day.     • DULoxetine (CYMBALTA) 60 MG capsule Take 1 capsule by mouth Daily. 90 capsule 1   • famciclovir (FAMVIR) 250 MG tablet Take 1 tablet by mouth 3 (Three) Times a Day. 270 tablet 4   • Mirabegron ER (MYRBETRIQ) 50 MG tablet sustained-release 24 hour 24 hr tablet Take 1 tablet by mouth Daily. 90 tablet 3   • naratriptan (AMERGE) 1 MG tablet Take 1 tablet by mouth 1 (One) Time As Needed for Migraine for up to 1 dose. 30 tablet 0   • NON FORMULARY Multivitamin patch and iron patch daily     • omeprazole (PrilOSEC) 40 MG capsule Take 1 capsule by mouth Daily for 60 days. 60 capsule 0   • ondansetron (ZOFRAN) 4 MG tablet Take 4 mg by mouth Every 8 (Eight) Hours As Needed for Nausea or Vomiting.     • ondansetron (ZOFRAN) 4 MG tablet Take 1 tablet by mouth Every 6 (Six) Hours As Needed. 120 tablet 3   • Probiotic Product (PROBIOTIC PO) Take 1 capsule by mouth Daily.     • promethazine (PHENERGAN) 25 MG tablet Take 1 tablet by mouth Every 6 (Six)  "Hours As Needed for nausea 30 tablet 4   • propranolol LA (INDERAL LA) 120 MG 24 hr capsule Take 1 capsule by mouth Daily. 90 capsule 3   • PSYLLIUM HUSK PO Take 1 capsule by mouth Daily.     • Thiamine HCl (VITAMIN B-1 PO) Take  by mouth.     • TOPAMAX 100 MG tablet Take 1 tablet by mouth 2 (Two) Times a Day. 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  Never actually taken it internally.   • Latex Anaphylaxis and Rash   • Morphine And Related Rash     Rash (if gets on skin).  Never taken it internally   • Penicillins Shortness Of Breath and Palpitations     Keflex OK   • Oxycodone Other (See Comments)     Dizziness, confusion     • Topiramate Other (See Comments)     Pt states she can take NAME BRAND ONLY.  Generic brand \"doesn't work\"       Social History     Socioeconomic History   • Marital status:      Spouse name: Not on file   • Number of children: Not on file   • Years of education: Not on file   • Highest education level: Not on file   Occupational History   • Occupation: 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Employer: [employer]   Tobacco Use   • Smoking status: Never Smoker   • Smokeless tobacco: Never Used   Substance and Sexual Activity   • Alcohol use: No   • Drug use: No   • Sexual activity: Defer   Social History Narrative    Living in [place] w/  and 2 children.  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         /74 (BP Location: Left arm, Patient Position: Sitting, Cuff Size: Adult)   Pulse 69   Temp 97.6 °F (36.4 °C) (Temporal)   Resp 18   Ht 162.6 cm (64\")   Wt 94.3 kg (208 lb)   SpO2 98%   BMI 35.70 kg/m²     Physical Exam   Constitutional: She is oriented to person, place, and time. She appears well-developed and well-nourished.   HENT:   Head: Normocephalic and atraumatic. "   Cardiovascular: Normal rate, regular rhythm and normal heart sounds.   Pulmonary/Chest: Effort normal and breath sounds normal. No respiratory distress. She has no wheezes.   Abdominal: Soft. Bowel sounds are normal. She exhibits no distension. There is no tenderness.   Incisions healing well   Neurological: She is alert and oriented to person, place, and time.   Skin: Skin is warm and dry.   Psychiatric: She has a normal mood and affect. Her behavior is normal. Judgment and thought content normal.         Assessment:  1 month s/p LSG/HHR 12/17/19 w/ Dr. Onofre    ICD-10-CM ICD-9-CM   1. Status post bariatric surgery Z98.84 V45.86   2. Hypovitaminosis D E55.9 268.9   3. Malnutrition screen Z13.21 V77.2   4. Screening, iron deficiency anemia Z13.0 V78.0   5. Postgastrectomy malabsorption K91.2 579.3    Z90.3    6. Fatigue, unspecified type R53.83 780.79   7. Obesity, Class II, BMI 35-39.9 E66.9 278.00   8. Nausea R11.0 787.02   9. Gastroesophageal reflux disease, esophagitis presence not specified K21.9 530.81         Plan:  Doing well. Advised miralax daily for constipation.  Continue to advance diet per manual.  Continue protein 70-100g/day.  Encouraged good food choices - high protein, low carb.  Continue fluids 64oz per day. Continue routine exercise, lifting restrictions lifted.  Continue PPI. Continue to avoid NSAIDS, ASA, tramadol, tobacco x 6 weeks postop, steroids x 8 weeks postop.  Routine bariatric labs ordered.  Continue vitamins w/ adjustments pending lab results.  Call w/ problems/concerns.    Patient's Body mass index is 35.7 kg/m². BMI is above normal parameters. Recommendations include: exercise counseling and nutrition counseling.      The patient was instructed to follow up in 2 months, sooner if needed.    decreased strength/impaired balance/impaired coordination

## 2020-03-17 ENCOUNTER — OFFICE VISIT (OUTPATIENT)
Dept: NEUROLOGY | Facility: CLINIC | Age: 45
End: 2020-03-17

## 2020-03-17 VITALS
HEART RATE: 68 BPM | WEIGHT: 201.2 LBS | OXYGEN SATURATION: 98 % | HEIGHT: 64 IN | DIASTOLIC BLOOD PRESSURE: 58 MMHG | TEMPERATURE: 98.1 F | BODY MASS INDEX: 34.35 KG/M2 | SYSTOLIC BLOOD PRESSURE: 84 MMHG

## 2020-03-17 DIAGNOSIS — G43.009 MIGRAINE WITHOUT AURA AND WITHOUT STATUS MIGRAINOSUS, NOT INTRACTABLE: ICD-10-CM

## 2020-03-17 DIAGNOSIS — G44.209 TENSION HEADACHE: Primary | ICD-10-CM

## 2020-03-17 PROCEDURE — 99214 OFFICE O/P EST MOD 30 MIN: CPT | Performed by: NURSE PRACTITIONER

## 2020-03-17 RX ORDER — TOPIRAMATE 100 MG/1
100 CAPSULE, EXTENDED RELEASE ORAL DAILY
Qty: 30 CAPSULE | Refills: 11 | Status: SHIPPED | OUTPATIENT
Start: 2020-03-17 | End: 2020-12-17 | Stop reason: SINTOL

## 2020-03-17 RX ORDER — TOPIRAMATE 100 MG/1
100 CAPSULE, EXTENDED RELEASE ORAL DAILY
Qty: 7 CAPSULE | Refills: 0 | COMMUNITY
Start: 2020-03-17 | End: 2020-03-17 | Stop reason: SDUPTHER

## 2020-03-17 RX ORDER — NARATRIPTAN 1 MG/1
1 TABLET ORAL ONCE AS NEEDED
Qty: 10 TABLET | Refills: 2 | Status: SHIPPED | OUTPATIENT
Start: 2020-03-17 | End: 2020-12-18

## 2020-03-17 RX ORDER — AMITRIPTYLINE HYDROCHLORIDE 10 MG/1
10 TABLET, FILM COATED ORAL NIGHTLY
Qty: 30 TABLET | Refills: 2 | Status: SHIPPED | OUTPATIENT
Start: 2020-03-17 | End: 2020-05-12 | Stop reason: DRUGHIGH

## 2020-03-17 NOTE — PROGRESS NOTES
Follow Up Neurology Office Visit      Patient Name: Renae Bolton    Referring Physician: No ref. provider found    Chief Complaint:    Chief Complaint   Patient presents with   • Headache     Patient is here for a follow up on headaches.  Patient states her headaches seem more like sinus instead of migraines.  She states she would like to start coming off of her medication.       History of Present Illness: Renae Bolton is a 44 y.o. female who is here to follow up with Neurology for  Headache management. She has had headaches for several years, and has noted some improvement on current regimen but is still having frequent headaches. She underwent gastric sleeve surgery in December, and feels the improvement is due to weight loss. She does feel that she's not losing as much weight as she had hoped, despite exercise and nutritional adjustments. She continues to have headaches several times per week, typically a 'sinus or stress' headache. She uses naratriptan with good effect for migraine attacks.        The following portions of the patient's history were reviewed and updated as appropriate: allergies, current medications, past family history, past medical history, past social history, past surgical history and problem list.    Subjective     Review of Systems:   Review of Systems   Constitutional: Negative for activity change and fatigue.   HENT: Negative for drooling and voice change.    Eyes: Negative for blurred vision, double vision, photophobia and visual disturbance.   Respiratory: Negative for shortness of breath.    Cardiovascular: Negative for chest pain and palpitations.   Gastrointestinal: Negative for nausea and vomiting.   Genitourinary: Negative for urinary incontinence.   Musculoskeletal: Negative for arthralgias, back pain, gait problem, myalgias and neck pain.   Allergic/Immunologic: Negative for immunocompromised state.   Neurological: Positive for headache. Negative for  dizziness, tremors, seizures, syncope, facial asymmetry, speech difficulty, weakness, light-headedness, numbness, memory problem and confusion.   Hematological: Does not bruise/bleed easily.   Psychiatric/Behavioral: Negative for decreased concentration, dysphoric mood, hallucinations, sleep disturbance, depressed mood and stress. The patient is not nervous/anxious.        Medications:     Current Outpatient Medications:   •  acyclovir (ZOVIRAX) 5 % ointment, Apply  topically to the appropriate area as directed Every 3 (Three) Hours. (Patient taking differently: Apply  topically to the appropriate area as directed Take As Directed.), Disp: 30 g, Rfl: 6  •  albuterol sulfate  (90 Base) MCG/ACT inhaler, INHALE 1 TO 2 PUFFS BY MOUTH EVERY 4 TO 6 HOURS AS NEEDED., Disp: 8.5 g, Rfl: 3  •  amitriptyline (ELAVIL) 10 MG tablet, Take 1 tablet by mouth Every Night., Disp: 30 tablet, Rfl: 2  •  cetirizine (zyrTEC) 10 MG tablet, Take 1 tablet by mouth Daily., Disp: 90 tablet, Rfl: 3  •  cholecalciferol (VITAMIN D3) 1000 UNITS tablet, Take 1,000 Units by mouth Daily., Disp: , Rfl:   •  clobetasol propionate (CLOBEX) 0.05 % shampoo, apply TO HAIR AND SCALP as directed, Disp: , Rfl: 0  •  cyclobenzaprine (FLEXERIL) 5 MG tablet, Take 1 tablet by mouth 3 (Three) Times a Day As Needed., Disp: 270 tablet, Rfl: 3  •  docusate sodium (COLACE) 100 MG capsule, Take 100 mg by mouth 2 (Two) Times a Day., Disp: , Rfl:   •  DULoxetine (CYMBALTA) 60 MG capsule, Take 1 capsule by mouth Daily., Disp: 90 capsule, Rfl: 1  •  famciclovir (FAMVIR) 250 MG tablet, Take 1 tablet by mouth 3 (Three) Times a Day., Disp: 270 tablet, Rfl: 4  •  Mirabegron ER (MYRBETRIQ) 50 MG tablet sustained-release 24 hour 24 hr tablet, Take 1 tablet by mouth Daily., Disp: 90 tablet, Rfl: 3  •  naratriptan (AMERGE) 1 MG tablet, Take 1 tablet by mouth 1 (One) Time As Needed for Migraine for up to 1 dose., Disp: 10 tablet, Rfl: 2  •  NON FORMULARY, Multivitamin patch  "and iron patch daily, Disp: , Rfl:   •  omeprazole (PrilOSEC) 40 MG capsule, Take 1 capsule by mouth Daily for 60 days., Disp: 60 capsule, Rfl: 0  •  ondansetron (ZOFRAN) 4 MG tablet, Take 4 mg by mouth Every 8 (Eight) Hours As Needed for Nausea or Vomiting., Disp: , Rfl:   •  Probiotic Product (PROBIOTIC PO), Take 1 capsule by mouth Daily., Disp: , Rfl:   •  promethazine (PHENERGAN) 25 MG tablet, Take 1 tablet by mouth Every 6 (Six) Hours As Needed for nausea, Disp: 30 tablet, Rfl: 4  •  propranolol LA (INDERAL LA) 120 MG 24 hr capsule, Take 1 capsule by mouth Daily., Disp: 90 capsule, Rfl: 3  •  PSYLLIUM HUSK PO, Take 1 capsule by mouth Daily., Disp: , Rfl:   •  Thiamine HCl (VITAMIN B-1 PO), Take  by mouth., Disp: , Rfl:   •  ondansetron (ZOFRAN) 4 MG tablet, Take 1 tablet by mouth Every 6 (Six) Hours As Needed., Disp: 120 tablet, Rfl: 3  •  Topiramate ER (Trokendi XR) 100 MG capsule sustained-release 24 hr, Take 1 capsule by mouth Daily., Disp: 30 capsule, Rfl: 11    Allergies:   Allergies   Allergen Reactions   • Amoxicillin Shortness Of Breath and Palpitations     Keflex OK   • Caffeine Arrhythmia   • Demerol [Meperidine] Rash     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  Never actually taken it internally.   • Latex Anaphylaxis and Rash   • Morphine And Related Rash     Rash (if gets on skin).  Never taken it internally   • Penicillins Shortness Of Breath and Palpitations     Keflex OK   • Oxycodone Other (See Comments)     Dizziness, confusion     • Topiramate Other (See Comments)     Pt states she can take NAME BRAND ONLY.  Generic brand \"doesn't work\"       Objective     Physical Exam:  Vital Signs:   Vitals:    03/17/20 0819   BP: (!) 84/58   BP Location: Left arm   Patient Position: Sitting   Pulse: 68   Temp: 98.1 °F (36.7 °C)   SpO2: 98%   Weight: 91.3 kg (201 lb 3.2 oz)   Height: 162.6 cm (64\")   PainSc: 0-No pain       Physical Exam   Constitutional: " She is oriented to person, place, and time. She appears well-developed.   Eyes: Pupils are equal, round, and reactive to light. EOM are normal.   Cardiovascular: Regular rhythm and normal heart sounds.   Pulmonary/Chest: Effort normal and breath sounds normal.   Neurological: She is oriented to person, place, and time. She has normal strength. She has a normal Romberg Test and a normal Tandem Gait Test. Gait normal.   Skin: Skin is warm.   Psychiatric: She has a normal mood and affect. Her speech is normal and behavior is normal. Judgment and thought content normal.   Nursing note and vitals reviewed.      Neurologic Exam     Mental Status   Oriented to person, place, and time.   Attention: normal. Concentration: normal.   Speech: speech is normal   Level of consciousness: alert    Cranial Nerves     CN II   Visual fields full to confrontation.   Visual acuity: normal    CN III, IV, VI   Pupils are equal, round, and reactive to light.  Extraocular motions are normal.   Right pupil: Shape: regular. Reactivity: brisk.   Left pupil: Shape: regular. Reactivity: brisk.   Diplopia: none  Ophthalmoparesis: none  Upgaze: normal  Downgaze: normal    CN V   Facial sensation intact.     CN VII   Facial expression full, symmetric.     CN VIII   CN VIII normal.     CN IX, X   CN IX normal.   CN X normal.     CN XI   CN XI normal.     CN XII   CN XII normal.     Motor Exam   Muscle bulk: normal  Overall muscle tone: normal  Right arm pronator drift: absent  Left arm pronator drift: absent    Strength   Strength 5/5 throughout.     Sensory Exam   Light touch normal.     Gait, Coordination, and Reflexes     Gait  Gait: normal    Coordination   Romberg: negative  Tandem walking coordination: normal    Tremor   Resting tremor: absent  Intention tremor: absent    Reflexes   Reflexes 2+ except as noted.         Results Review:   I reviewed the patient's new clinical results.  I have reviewed the patient's other medical records to  include, labs, radiology and referrals.   I reviewed the patient's other test results and agree with the interpretation    Assessment / Plan      Assessment/Plan:   Renae was seen today for headache.    Diagnoses and all orders for this visit:    Tension headache  -     amitriptyline (ELAVIL) 10 MG tablet; Take 1 tablet by mouth Every Night.    Migraine without aura and without status migrainosus, not intractable  -     naratriptan (AMERGE) 1 MG tablet; Take 1 tablet by mouth 1 (One) Time As Needed for Migraine for up to 1 dose.  -     Topiramate ER (Trokendi XR) 100 MG capsule sustained-release 24 hr; Take 1 capsule by mouth Daily.    The patient has switched to Trokendi for improved control of chronic headaches. She has both chronic tension-type headache and migraine headaches. She does not feel that her headache control improved with increased dose of amitriptyline and is concerned about weight gain, so dose has been decreased to 10mg qhs. She has requested refill of naratriptan, which has been provided. She was encouraged in her weight loss efforts, and in particular her exercise habits. We discussed that continuing her exercise will benefit not only weight loss, but improve her sleep and decrease stress and headaches as well.         Follow Up:   Return in about 3 months (around 6/17/2020).       LAURITA Sainz  [facility]       Please note that portions of this note may have been completed with a voice recognition program. Efforts were made to edit the dictations, but occasionally words are mistranscribed.

## 2020-03-20 ENCOUNTER — LAB (OUTPATIENT)
Dept: LAB | Facility: HOSPITAL | Age: 45
End: 2020-03-20

## 2020-03-20 DIAGNOSIS — E86.0 DEHYDRATION: ICD-10-CM

## 2020-03-20 DIAGNOSIS — Z13.21 MALNUTRITION SCREEN: ICD-10-CM

## 2020-03-20 DIAGNOSIS — E87.8 ELECTROLYTE IMBALANCE: ICD-10-CM

## 2020-03-20 DIAGNOSIS — R53.83 FATIGUE, UNSPECIFIED TYPE: ICD-10-CM

## 2020-03-20 DIAGNOSIS — E55.9 HYPOVITAMINOSIS D: ICD-10-CM

## 2020-03-20 DIAGNOSIS — L65.9 HAIR LOSS: ICD-10-CM

## 2020-03-20 LAB
25(OH)D3 SERPL-MCNC: 42 NG/ML (ref 30–100)
ALBUMIN SERPL-MCNC: 4.1 G/DL (ref 3.5–5.2)
ALBUMIN/GLOB SERPL: 1.6 G/DL
ALP SERPL-CCNC: 78 U/L (ref 39–117)
ALT SERPL W P-5'-P-CCNC: 19 U/L (ref 1–33)
ANION GAP SERPL CALCULATED.3IONS-SCNC: 10 MMOL/L (ref 5–15)
AST SERPL-CCNC: 12 U/L (ref 1–32)
BASOPHILS # BLD AUTO: 0.04 10*3/MM3 (ref 0–0.2)
BASOPHILS NFR BLD AUTO: 0.6 % (ref 0–1.5)
BILIRUB SERPL-MCNC: 0.2 MG/DL (ref 0.2–1.2)
BUN BLD-MCNC: 19 MG/DL (ref 6–20)
BUN/CREAT SERPL: 20.9 (ref 7–25)
CALCIUM SPEC-SCNC: 9.2 MG/DL (ref 8.6–10.5)
CHLORIDE SERPL-SCNC: 109 MMOL/L (ref 98–107)
CO2 SERPL-SCNC: 21 MMOL/L (ref 22–29)
CREAT BLD-MCNC: 0.91 MG/DL (ref 0.57–1)
DEPRECATED RDW RBC AUTO: 44.5 FL (ref 37–54)
EOSINOPHIL # BLD AUTO: 0.18 10*3/MM3 (ref 0–0.4)
EOSINOPHIL NFR BLD AUTO: 2.9 % (ref 0.3–6.2)
ERYTHROCYTE [DISTWIDTH] IN BLOOD BY AUTOMATED COUNT: 13.1 % (ref 12.3–15.4)
FERRITIN SERPL-MCNC: 98.6 NG/ML (ref 13–150)
FOLATE SERPL-MCNC: 5.29 NG/ML (ref 4.78–24.2)
GFR SERPL CREATININE-BSD FRML MDRD: 67 ML/MIN/1.73
GLOBULIN UR ELPH-MCNC: 2.5 GM/DL
GLUCOSE BLD-MCNC: 116 MG/DL (ref 65–99)
HCT VFR BLD AUTO: 39.1 % (ref 34–46.6)
HGB BLD-MCNC: 13.5 G/DL (ref 12–15.9)
IMM GRANULOCYTES # BLD AUTO: 0.02 10*3/MM3 (ref 0–0.05)
IMM GRANULOCYTES NFR BLD AUTO: 0.3 % (ref 0–0.5)
IRON 24H UR-MRATE: 43 MCG/DL (ref 37–145)
LYMPHOCYTES # BLD AUTO: 1.85 10*3/MM3 (ref 0.7–3.1)
LYMPHOCYTES NFR BLD AUTO: 29.4 % (ref 19.6–45.3)
MAGNESIUM SERPL-MCNC: 2.2 MG/DL (ref 1.6–2.6)
MCH RBC QN AUTO: 32.3 PG (ref 26.6–33)
MCHC RBC AUTO-ENTMCNC: 34.5 G/DL (ref 31.5–35.7)
MCV RBC AUTO: 93.5 FL (ref 79–97)
MONOCYTES # BLD AUTO: 0.53 10*3/MM3 (ref 0.1–0.9)
MONOCYTES NFR BLD AUTO: 8.4 % (ref 5–12)
NEUTROPHILS # BLD AUTO: 3.68 10*3/MM3 (ref 1.7–7)
NEUTROPHILS NFR BLD AUTO: 58.4 % (ref 42.7–76)
NRBC BLD AUTO-RTO: 0 /100 WBC (ref 0–0.2)
PLATELET # BLD AUTO: 275 10*3/MM3 (ref 140–450)
PMV BLD AUTO: 12.1 FL (ref 6–12)
POTASSIUM BLD-SCNC: 3.6 MMOL/L (ref 3.5–5.2)
PREALB SERPL-MCNC: 20.9 MG/DL (ref 20–40)
PROT SERPL-MCNC: 6.6 G/DL (ref 6–8.5)
RBC # BLD AUTO: 4.18 10*6/MM3 (ref 3.77–5.28)
SODIUM BLD-SCNC: 140 MMOL/L (ref 136–145)
TSH SERPL DL<=0.05 MIU/L-ACNC: 2.03 UIU/ML (ref 0.27–4.2)
WBC NRBC COR # BLD: 6.3 10*3/MM3 (ref 3.4–10.8)

## 2020-03-20 PROCEDURE — 80053 COMPREHEN METABOLIC PANEL: CPT

## 2020-03-20 PROCEDURE — 82728 ASSAY OF FERRITIN: CPT

## 2020-03-20 PROCEDURE — 82306 VITAMIN D 25 HYDROXY: CPT

## 2020-03-20 PROCEDURE — 84443 ASSAY THYROID STIM HORMONE: CPT

## 2020-03-20 PROCEDURE — 84134 ASSAY OF PREALBUMIN: CPT

## 2020-03-20 PROCEDURE — 83540 ASSAY OF IRON: CPT

## 2020-03-20 PROCEDURE — 85025 COMPLETE CBC W/AUTO DIFF WBC: CPT

## 2020-03-20 PROCEDURE — 36415 COLL VENOUS BLD VENIPUNCTURE: CPT

## 2020-03-20 PROCEDURE — 83921 ORGANIC ACID SINGLE QUANT: CPT

## 2020-03-20 PROCEDURE — 84425 ASSAY OF VITAMIN B-1: CPT

## 2020-03-20 PROCEDURE — 83735 ASSAY OF MAGNESIUM: CPT

## 2020-03-20 PROCEDURE — 82746 ASSAY OF FOLIC ACID SERUM: CPT

## 2020-03-23 LAB — VIT B1 BLD-SCNC: 196.7 NMOL/L (ref 66.5–200)

## 2020-03-24 LAB
Lab: NORMAL
METHYLMALONATE SERPL-SCNC: 130 NMOL/L (ref 0–378)

## 2020-04-01 ENCOUNTER — TELEMEDICINE (OUTPATIENT)
Dept: BARIATRICS/WEIGHT MGMT | Facility: CLINIC | Age: 45
End: 2020-04-01

## 2020-04-01 VITALS — HEIGHT: 64 IN | WEIGHT: 197 LBS | BODY MASS INDEX: 33.63 KG/M2

## 2020-04-01 DIAGNOSIS — E66.9 OBESITY, CLASS I, BMI 30-34.9: ICD-10-CM

## 2020-04-01 DIAGNOSIS — R53.83 FATIGUE, UNSPECIFIED TYPE: Primary | ICD-10-CM

## 2020-04-01 PROCEDURE — 99214 OFFICE O/P EST MOD 30 MIN: CPT | Performed by: PHYSICIAN ASSISTANT

## 2020-04-01 RX ORDER — OMEPRAZOLE 40 MG/1
40 CAPSULE, DELAYED RELEASE ORAL 2 TIMES DAILY
Qty: 180 CAPSULE | Refills: 0 | Status: SHIPPED | OUTPATIENT
Start: 2020-04-01 | End: 2020-06-08 | Stop reason: SDUPTHER

## 2020-04-01 NOTE — PROGRESS NOTES
[facility] Bariatric Surgery  [address]  [address]  [facility] [id number] [phone]      Patient Name:  Renae Bolton.  :  1975      Date of Visit: 2020      Reason for Visit:  routine postop      HPI:  Renae Bolton is a 44 y.o. female s/p LSG/HHR 19 w/ Dr. Onofre    Almost 4 months postop.  Virtual Visit today d/t COVID pandemic.  Overall she is feeling good, but is a little discouraged w/ her weight loss.  Says her  is losing faster.  Weighs weekly @ home - was 197 lbs this AM, down just 27 lbs since having surgery.      Tracking w/ [employer].  Getting at least 70g prot/day.  Averaging 800-1000 daniel/day.  Carbs always <100g/day.  Mostly gets carbs from fruits and the occ whole wheat bread.  Not snacking on sweets or chips.  Constantly drinking water, but says does still feel thirsty often.  Walking 1.5 miles/day for exercise.      Has noticed some hair loss, so made some adjustments to her vitamin regimen.  Was starting to feel tired, so added extra iron to her regimen.  Recently having issues w/ night reflux, but increased Omeprazole to BID dosing w/ prn TUMS which is helping.     Recent labs 3/20/20 - CO2 21, glucose 116, o/w okay.  Wearing MVI + iron patches w/ PO iron, B12, B1, Biotin and Vit E.  Adding collagen to her protein shakes each AM for her hair.       Presurgery weight: 224 pounds.  Today's weight is 89.4 kg (197 lb) pounds, today's  Body mass index is 33.81 kg/m²., and her weight loss since surgery is 27 pounds.       Past Medical History:   Diagnosis Date   • Anxiety    • Arrhythmia     SVT w/ PVCs, on propranolol, follows w/ Dr. Liang   • Aspirin long-term use     for heart health   • Depression    • Dyspepsia    • Dyspnea on exertion    • Fatigue    • Generalized edema     r/t weight gain   • Genital HSV     Famvir for prophylaxis   • GERD (gastroesophageal reflux disease)     daily Prilosec, denies prior eval   • Hard to  "intubate     states was told this after her hysterectomy.  states had a \"lidocaine lollipop\"   • Hypertension    • Interstitial cystitis    • Migraine     Topamax w/ prn Treximet/Phenergan/Zofran   • Mitral valve prolapse    • Normal vaginal Papanicolaou smear in patient with history of hysterectomy 09/16/2016   • Obesity    • MAREN (obstructive sleep apnea)    • Peripheral edema    • PONV (postoperative nausea and vomiting)    • Post concussion syndrome     s/p fall w/ frontal lobe head injury 9/2017, on Elavil   • Sleep apnea     newly dx, CPAP compliant   • Stress incontinence     follows w/ Dr. Galvan, on Myrbetriq, s/p cystoscopy w/ bulking agent   • Wears contact lenses      Past Surgical History:   Procedure Laterality Date   • ANTERIOR AND POSTERIOR VAGINAL REPAIR  06/23/2010    DR EDIN BYRD   • BREAST LUMPECTOMY Left 2015    benign   • CYSTOSCOPY W/ BULKING AGENT INJECTION N/A 6/25/2019    Procedure: CYSTOSCOPY WITH URETHRAL  BULKING AGENT INJECTION;  Surgeon: Jose Galvan MD;  Location: [facility] OR;  Service: Urology   • CYSTOSCOPY, DIMETHYL SULFOXIDE COCKTAIL  03/14/2011    WITH HYDRODISTENTION (DR AVINASH MADDOX)   • ENDOSCOPY     • ENDOSCOPY N/A 12/17/2019    Procedure: ESOPHAGOGASTRODUODENOSCOPY;  Surgeon: Tracee Onofre MD;  Location: [facility] OR;  Service: Bariatric   • GASTRIC SLEEVE LAPAROSCOPIC N/A 12/17/2019    Procedure: GASTRIC SLEEVE LAPAROSCOPIC;  Surgeon: Tracee Onofre MD;  Location: [facility] OR;  Service: Bariatric   • HIATAL HERNIA REPAIR N/A 12/17/2019    Procedure: HIATAL HERNIA REPAIR LAPAROSCOPIC;  Surgeon: Tracee Onofre MD;  Location: [facility] OR;  Service: Bariatric   • HYSTERECTOMY  06/23/2010    LAV (DR EDIN BYRD)   • LAPAROSCOPIC CHOLECYSTECTOMY  2013    for stones   • TRANSVAGINAL TAPING SUSPENSION WITH OBTURATOR  06/23/2010    DR EIDN BYRD   • WISDOM TOOTH EXTRACTION  1990     Outpatient Medications Marked as Taking for the 4/1/20 encounter " (Telemedicine) with Dina Ibarra PA   Medication Sig Dispense Refill   • acyclovir (ZOVIRAX) 5 % ointment Apply  topically to the appropriate area as directed Every 3 (Three) Hours. (Patient taking differently: Apply  topically to the appropriate area as directed Take As Directed.) 30 g 6   • albuterol sulfate  (90 Base) MCG/ACT inhaler INHALE 1 TO 2 PUFFS BY MOUTH EVERY 4 TO 6 HOURS AS NEEDED. 8.5 g 3   • amitriptyline (ELAVIL) 10 MG tablet Take 1 tablet by mouth Every Night. 30 tablet 2   • cetirizine (zyrTEC) 10 MG tablet Take 1 tablet by mouth Daily. 90 tablet 3   • cholecalciferol (VITAMIN D3) 1000 UNITS tablet Take 1,000 Units by mouth Daily.     • clobetasol propionate (CLOBEX) 0.05 % shampoo apply TO HAIR AND SCALP as directed  0   • cyclobenzaprine (FLEXERIL) 5 MG tablet Take 1 tablet by mouth 3 (Three) Times a Day As Needed. 270 tablet 3   • docusate sodium (COLACE) 100 MG capsule Take 100 mg by mouth 2 (Two) Times a Day.     • DULoxetine (CYMBALTA) 60 MG capsule Take 1 capsule by mouth Daily. 90 capsule 1   • famciclovir (FAMVIR) 250 MG tablet Take 1 tablet by mouth 3 (Three) Times a Day. 270 tablet 4   • Mirabegron ER (MYRBETRIQ) 50 MG tablet sustained-release 24 hour 24 hr tablet Take 1 tablet by mouth Daily. 90 tablet 3   • naratriptan (AMERGE) 1 MG tablet Take 1 tablet by mouth 1 (One) Time As Needed for Migraine for up to 1 dose. 10 tablet 2   • NON FORMULARY Multivitamin patch and iron patch daily     • omeprazole (PrilOSEC) 40 MG capsule Take 1 capsule by mouth Daily for 60 days. 60 capsule 0   • ondansetron (ZOFRAN) 4 MG tablet Take 4 mg by mouth Every 8 (Eight) Hours As Needed for Nausea or Vomiting.     • Probiotic Product (PROBIOTIC PO) Take 1 capsule by mouth Daily.     • promethazine (PHENERGAN) 25 MG tablet Take 1 tablet by mouth Every 6 (Six) Hours As Needed for nausea 30 tablet 4   • propranolol LA (INDERAL LA) 120 MG 24 hr capsule Take 1 capsule by mouth Daily. 90 capsule 3  "  • PSYLLIUM HUSK PO Take 1 capsule by mouth Daily.     • Thiamine HCl (VITAMIN B-1 PO) Take  by mouth.     • Topiramate ER (Trokendi XR) 100 MG capsule sustained-release 24 hr Take 1 capsule by mouth Daily. 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  Never actually taken it internally.   • Latex Anaphylaxis and Rash   • Morphine And Related Rash     Rash (if gets on skin).  Never taken it internally   • Penicillins Shortness Of Breath and Palpitations     Keflex OK   • Oxycodone Other (See Comments)     Dizziness, confusion     • Topiramate Other (See Comments)     Pt states she can take NAME BRAND ONLY.  Generic brand \"doesn't work\"       Social History     Socioeconomic History   • Marital status:      Spouse name: Not on file   • Number of children: Not on file   • Years of education: Not on file   • Highest education level: Not on file   Occupational History   • Occupation: 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Employer: [employer]   Tobacco Use   • Smoking status: Never Smoker   • Smokeless tobacco: Never Used   Substance and Sexual Activity   • Alcohol use: No   • Drug use: No   • Sexual activity: Defer   Social History Narrative    Living in [place] w/  and 2 children.  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 She appears well-developed and well-nourished. No distress.   Eyes: No scleral icterus.   Pulmonary/Chest: Effort normal.   Neurological: She is alert and oriented to person, place, and time.   Psychiatric: She has a normal mood and affect.         Assessment:   Almost 4 months s/p LSG/HHR 12/17/19 w/ Dr. Onofre    ICD-10-CM ICD-9-CM   1. Fatigue, unspecified " type R53.83 780.79   2. Obesity, Class I, BMI 30-34.9 E66.9 278.00         Plan:   Discussed IBW/EWL.  Reassured patient.   Reviewed necessary dietary/lifestyle modifications.  Encouraged to continue tracking intake.  Focus on high protein, low carb food choices.  Increase calories to 1200/day.  Increase daily fluid/water intake.  Add electrolyte alvarez.  Continue w/ routine physical activity, but add strength training.  Continue vitamins.  Continue BID PPI - refill escribed.  Call w/ issues/concerns.    The patient was instructed to follow up in 2 months, sooner if needed.     Note: This was an audio and video enabled telemedicine encounter to comply with social distancing guidelines during the COVID-19 pandemic.  Consent was obtained prior to the visit.    I spent 25 minutes face to face with patient and 20 minutes spent counseling the patient on nutrition, healthy habits and necessary dietary/lifestyle modifications.

## 2020-04-01 NOTE — PATIENT INSTRUCTIONS
Continue to focus on good food choices and healthy habits.  Keep up the good work!  Call us with any questions/concerns.

## 2020-05-12 ENCOUNTER — PATIENT MESSAGE (OUTPATIENT)
Dept: NEUROLOGY | Facility: CLINIC | Age: 45
End: 2020-05-12

## 2020-05-12 DIAGNOSIS — G43.009 MIGRAINE WITHOUT AURA AND WITHOUT STATUS MIGRAINOSUS, NOT INTRACTABLE: Primary | ICD-10-CM

## 2020-05-12 RX ORDER — AMITRIPTYLINE HYDROCHLORIDE 25 MG/1
25 TABLET, FILM COATED ORAL NIGHTLY
Qty: 30 TABLET | Refills: 2 | Status: SHIPPED | OUTPATIENT
Start: 2020-05-12 | End: 2020-06-17

## 2020-05-12 NOTE — TELEPHONE ENCOUNTER
Pérez Diehl 5/12/2020 8:48 AM EDT    PLEASE ADVISE  ----- Message -----  From: Renae Bolton  Sent: 5/12/2020 12:10 AM EDT  To: [facility]  Subject: Prescription Question     Layo Jack    I tried two months worth of the amitriptyline 10mg and find my sleep is very disrupted. I am not sleeping all night, wake up often, have difficulty going to sleep and am having vivid dreams. I didn’t have that with the 25mg dosage. It was my idea to wean down with the hopes of weaning off the medication totally, but I can’t forego this. Can we go back to 25mg? I’m exhausted. I use the [facility]. Thank you!  Renae Bolton

## 2020-06-17 ENCOUNTER — OFFICE VISIT (OUTPATIENT)
Dept: NEUROLOGY | Facility: CLINIC | Age: 45
End: 2020-06-17

## 2020-06-17 VITALS
HEART RATE: 66 BPM | BODY MASS INDEX: 31.58 KG/M2 | OXYGEN SATURATION: 99 % | WEIGHT: 185 LBS | TEMPERATURE: 97.8 F | SYSTOLIC BLOOD PRESSURE: 88 MMHG | HEIGHT: 64 IN | DIASTOLIC BLOOD PRESSURE: 64 MMHG

## 2020-06-17 DIAGNOSIS — G43.719 INTRACTABLE CHRONIC MIGRAINE WITHOUT AURA AND WITHOUT STATUS MIGRAINOSUS: Primary | ICD-10-CM

## 2020-06-17 PROCEDURE — 99213 OFFICE O/P EST LOW 20 MIN: CPT | Performed by: NURSE PRACTITIONER

## 2020-06-17 RX ORDER — BIOTIN 5 MG
1 CAPSULE ORAL DAILY
COMMUNITY
Start: 2020-03-02

## 2020-06-17 RX ORDER — PNV NO.95/FERROUS FUM/FOLIC AC 28MG-0.8MG
TABLET ORAL
COMMUNITY
Start: 2020-03-16 | End: 2020-10-20 | Stop reason: HOSPADM

## 2020-06-17 RX ORDER — CYANOCOBALAMIN (VITAMIN B-12) 500 MCG
400 LOZENGE ORAL DAILY
COMMUNITY
Start: 2020-03-09

## 2020-06-17 NOTE — PROGRESS NOTES
Follow Up Neurology Office Visit      Patient Name: Renae Bolton    Referring Physician: Renae Beard*    Chief Complaint:    Chief Complaint   Patient presents with   • Migraine     Patient is here for a follow up on migraines.  Patient states migraines are under control.         History of Present Illness: Renae Bolton is a 45 y.o. female who is here to follow up with Neurology for migraine headaches.  Ports that she has been doing well since previous visit she estimates 2-3 migraine episodes over the past 3 months.  She believes this may be due to decreased stress due to recent furlough during pandemic.  She was able to manage her migraine episodes with naratriptan and some Treximet samples.  She continues to lose weight steadily, additional 12 pound purposeful weight loss noted.  Patient had requested to decrease amitriptyline to 10 mg nightly at previous visit, but later contacted provider to increase to 25 mg.  She did briefly use 25 mg dose, but felt that this made her overly somnolent into the next day and has since ceased amitriptyline usage completely.  She states that her headaches did not increase, although this may be due to decreased stress during furlough.  She continues to take Trokendi daily. She is doing well overall and currently does not desire to resume additional daily medication.    The following portions of the patient's history were reviewed and updated as appropriate: allergies, current medications, past family history, past medical history, past social history, past surgical history and problem list.    Subjective     Review of Systems:   Review of Systems   Constitutional: Negative for activity change and fatigue.   HENT: Negative for drooling and voice change.    Eyes: Negative for blurred vision, double vision, photophobia and visual disturbance.   Respiratory: Negative for shortness of breath.    Cardiovascular: Negative for chest pain and palpitations.    Gastrointestinal: Negative for nausea and vomiting.   Genitourinary: Negative for urinary incontinence.   Musculoskeletal: Negative for arthralgias, back pain, gait problem, myalgias and neck pain.   Allergic/Immunologic: Negative for immunocompromised state.   Neurological: Positive for headache. Negative for dizziness, tremors, seizures, syncope, facial asymmetry, speech difficulty, weakness, light-headedness, numbness, memory problem and confusion.   Hematological: Does not bruise/bleed easily.   Psychiatric/Behavioral: Negative for decreased concentration, dysphoric mood, hallucinations, sleep disturbance, depressed mood and stress. The patient is not nervous/anxious.        Medications:     Current Outpatient Medications:   •  acyclovir (ZOVIRAX) 5 % ointment, Apply  topically to the appropriate area as directed Every 3 (Three) Hours. (Patient taking differently: Apply  topically to the appropriate area as directed Take As Directed.), Disp: 30 g, Rfl: 6  •  albuterol sulfate  (90 Base) MCG/ACT inhaler, INHALE 1 TO 2 PUFFS BY MOUTH EVERY 4 TO 6 HOURS AS NEEDED., Disp: 8.5 g, Rfl: 3  •  Ascorbic Acid (VITAMIN C) 500 MG chewable tablet, , Disp: , Rfl:   •  Biotin (Biotin 5000) 5 MG capsule, , Disp: , Rfl:   •  cetirizine (zyrTEC) 10 MG tablet, Take 1 tablet by mouth Daily., Disp: 90 tablet, Rfl: 3  •  cholecalciferol (VITAMIN D3) 1000 UNITS tablet, Take 1,000 Units by mouth Daily., Disp: , Rfl:   •  clobetasol propionate (CLOBEX) 0.05 % shampoo, apply TO HAIR AND SCALP as directed, Disp: , Rfl: 0  •  Cyanocobalamin (VITAMIN B12) 1000 MCG tablet controlled-release, , Disp: , Rfl:   •  cyclobenzaprine (FLEXERIL) 5 MG tablet, Take 1 tablet by mouth 3 (Three) Times a Day As Needed., Disp: 270 tablet, Rfl: 3  •  docusate sodium (COLACE) 100 MG capsule, Take 250 mg by mouth Daily., Disp: , Rfl:   •  DULoxetine (CYMBALTA) 60 MG capsule, Take 1 capsule by mouth Daily., Disp: 90 capsule, Rfl: 1  •  famciclovir  (FAMVIR) 250 MG tablet, Take 1 tablet by mouth 3 (Three) Times a Day., Disp: 270 tablet, Rfl: 4  •  ferrous sulfate 325 (65 Fe) MG tablet, , Disp: , Rfl:   •  Mirabegron ER (MYRBETRIQ) 50 MG tablet sustained-release 24 hour 24 hr tablet, Take 1 tablet by mouth Daily., Disp: 90 tablet, Rfl: 3  •  naratriptan (AMERGE) 1 MG tablet, Take 1 tablet by mouth 1 (One) Time As Needed for Migraine for up to 1 dose., Disp: 10 tablet, Rfl: 2  •  NON FORMULARY, Multivitamin patch and iron patch daily, Disp: , Rfl:   •  omeprazole (priLOSEC) 40 MG capsule, Take 1 capsule by mouth 2 (two) times a day 30 minutes before meals for stomach, Disp: 180 capsule, Rfl: 3  •  ondansetron (ZOFRAN) 4 MG tablet, Take 4 mg by mouth Every 8 (Eight) Hours As Needed for Nausea or Vomiting., Disp: , Rfl:   •  Probiotic Product (PROBIOTIC PO), Take 1 capsule by mouth Daily., Disp: , Rfl:   •  promethazine (PHENERGAN) 25 MG tablet, Take 1 tablet by mouth Every 6 (Six) Hours As Needed for nausea, Disp: 30 tablet, Rfl: 4  •  propranolol LA (INDERAL LA) 120 MG 24 hr capsule, Take 1 capsule by mouth Daily., Disp: 90 capsule, Rfl: 3  •  PSYLLIUM HUSK PO, Take 1 capsule by mouth Daily., Disp: , Rfl:   •  Thiamine HCl (VITAMIN B-1 PO), Take  by mouth., Disp: , Rfl:   •  Topiramate ER (Trokendi XR) 100 MG capsule sustained-release 24 hr, Take 1 capsule by mouth Daily., Disp: 30 capsule, Rfl: 11  •  Vitamin E 400 units tablet, , Disp: , Rfl:   •  Ubrogepant (ubrogepant) 50 MG tablet, Take 1 tablet by mouth 2 (Two) Times a Day As Needed (migraine) for up to 2 doses. 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  Never actually taken it internally.   • Latex Anaphylaxis and Rash   • Morphine And  "Related Rash     Rash (if gets on skin).  Never taken it internally   • Penicillins Shortness Of Breath and Palpitations     Keflex OK   • Oxycodone Other (See Comments)     Dizziness, confusion     • Topiramate Other (See Comments)     Pt states she can take NAME BRAND ONLY.  Generic brand \"doesn't work\"       Objective     Physical Exam:  Vital Signs:   Vitals:    06/17/20 1052   BP: (!) 88/64   BP Location: Left arm   Patient Position: Sitting   Cuff Size: Adult   Pulse: 66   Temp: 97.8 °F (36.6 °C)   SpO2: 99%   Weight: 83.9 kg (185 lb)   Height: 162.6 cm (64\")   PainSc: 0-No pain       Physical Exam   Constitutional: She appears well-developed.   HENT:   Head: Normocephalic.   Pulmonary/Chest: Effort normal.   Neurological: She is alert. She has normal strength. No cranial nerve deficit or sensory deficit. GCS eye subscore is 4. GCS verbal subscore is 5. GCS motor subscore is 6.   CHANCE to command, gait fluid and coordinated   Skin: Skin is warm.   Psychiatric: She has a normal mood and affect. Her behavior is normal. Judgment and thought content normal.   Nursing note and vitals reviewed.      Neurologic Exam     Motor Exam     Strength   Strength 5/5 throughout.         Results Review:   I reviewed the patient's new clinical results.  I have reviewed the patient's other medical records to include, labs, radiology and referrals.     Assessment / Plan      Assessment/Plan:   Renae was seen today for migraine.    Diagnoses and all orders for this visit:    Intractable migraine without aura and with status migrainosus    Migraine without aura and without status migrainosus, not intractable    Intractable chronic migraine without aura and without status migrainosus  -     Ubrogepant (ubrogepant) 50 MG tablet; Take 1 tablet by mouth 2 (Two) Times a Day As Needed (migraine) for up to 2 doses. Wait 2 hours before 2nd dose.     Patient continues to have some breakthrough migraine episodes on daily topiramate, she has " modest relief with naratriptan and has exhausted her supply of Treximet samples.  We discussed new -gepant medications, patient agreed to try Ubrelvy for acute treatment of migraine episodes.  We reviewed pharmacology, dosing, and instructions for use.  Patient was encouraged to contact provider if symptoms change or worsen.    Follow Up:   Return in about 6 months (around 12/17/2020).       LAURITA Sainz  [facility]       Please note that portions of this note may have been completed with a voice recognition program. Efforts were made to edit the dictations, but occasionally words are mistranscribed.

## 2020-06-24 ENCOUNTER — OFFICE VISIT (OUTPATIENT)
Dept: BARIATRICS/WEIGHT MGMT | Facility: CLINIC | Age: 45
End: 2020-06-24

## 2020-06-24 VITALS
TEMPERATURE: 97.7 F | RESPIRATION RATE: 18 BRPM | OXYGEN SATURATION: 99 % | WEIGHT: 185.5 LBS | HEIGHT: 64 IN | BODY MASS INDEX: 31.67 KG/M2 | SYSTOLIC BLOOD PRESSURE: 108 MMHG | DIASTOLIC BLOOD PRESSURE: 64 MMHG | HEART RATE: 71 BPM

## 2020-06-24 DIAGNOSIS — K21.9 GASTROESOPHAGEAL REFLUX DISEASE, ESOPHAGITIS PRESENCE NOT SPECIFIED: ICD-10-CM

## 2020-06-24 DIAGNOSIS — K91.2 POSTGASTRECTOMY MALABSORPTION: ICD-10-CM

## 2020-06-24 DIAGNOSIS — R53.83 FATIGUE, UNSPECIFIED TYPE: Primary | ICD-10-CM

## 2020-06-24 DIAGNOSIS — Z13.21 MALNUTRITION SCREEN: ICD-10-CM

## 2020-06-24 DIAGNOSIS — Z98.84 STATUS POST BARIATRIC SURGERY: ICD-10-CM

## 2020-06-24 DIAGNOSIS — E55.9 HYPOVITAMINOSIS D: ICD-10-CM

## 2020-06-24 DIAGNOSIS — Z13.0 SCREENING, IRON DEFICIENCY ANEMIA: ICD-10-CM

## 2020-06-24 DIAGNOSIS — Z90.3 POSTGASTRECTOMY MALABSORPTION: ICD-10-CM

## 2020-06-24 PROCEDURE — 99214 OFFICE O/P EST MOD 30 MIN: CPT | Performed by: SURGERY

## 2020-06-24 NOTE — PROGRESS NOTES
"ADDENDUM:    Reviewed UGI:  IMPRESSION:  Gastroesophageal reflux into the distal esophagus.    Will proceed with diagnostic EGD.          [facility]  [address]  [address]  [facility] [address] [phone]        Patient Name:  Renae Bolton.  :  1975      Date of Visit: 2020      Reason for Visit:   6 months postop     HPI: Renae Bolton is a 45 y.o. female s/p LSG/HHR 19 w/ Dr. Onofre     C/o severe reflux since LSG.  On omeprazole 40 mg bid.  Her PCP told her to add Pepcid.  Reflux is incompletely controlled by this regimen.  Sometimes wakes up aspirating.    Getting 70-80 g prot/day.  She estimates getting 1000 daniel/day.    Drinking 64+ fluid oz/day.  Taking Patches: MVI, iron, collagen, iron/B12/biotin/B1 my mouth..  On Omeprazole .  Exercise walks 2 miles or exercise bike.  She also does weight training at home gym.      Just got back to work from being furloughed.     Presurgery weight: 224 pounds.  Today's weight is 84.1 kg (185 lb 8 oz) pounds, today's  Body mass index is 31.84 kg/m²., and weight loss since surgery is 39 pounds.  Weight loss has stalled.    Past Medical History:   Diagnosis Date   • Anxiety    • Arrhythmia     SVT w/ PVCs, on propranolol, follows w/ Dr. Liang   • Aspirin long-term use     for heart health   • Depression    • Dyspepsia    • Dyspnea on exertion    • Fatigue    • Generalized edema     r/t weight gain   • Genital HSV     Famvir for prophylaxis   • GERD (gastroesophageal reflux disease)     daily Prilosec, denies prior eval   • Hard to intubate     states was told this after her hysterectomy.  states had a \"lidocaine lollipop\"   • Hypertension    • Interstitial cystitis    • Migraine     Topamax w/ prn Treximet/Phenergan/Zofran   • Mitral valve prolapse    • Normal vaginal Papanicolaou smear in patient with history of hysterectomy 2016   • Obesity    • MAREN (obstructive sleep apnea)    • " Peripheral edema    • PONV (postoperative nausea and vomiting)    • Post concussion syndrome     s/p fall w/ frontal lobe head injury 9/2017, on Elavil   • Sleep apnea     newly dx, CPAP compliant   • Stress incontinence     follows w/ Dr. Galvan, on Myrbetriq, s/p cystoscopy w/ bulking agent   • Wears contact lenses      Past Surgical History:   Procedure Laterality Date   • ANTERIOR AND POSTERIOR VAGINAL REPAIR  06/23/2010    DR EDIN BYRD   • BREAST LUMPECTOMY Left 2015    benign   • CYSTOSCOPY W/ BULKING AGENT INJECTION N/A 6/25/2019    Procedure: CYSTOSCOPY WITH URETHRAL  BULKING AGENT INJECTION;  Surgeon: Jose Galvan MD;  Location: [facility] OR;  Service: Urology   • CYSTOSCOPY, DIMETHYL SULFOXIDE COCKTAIL  03/14/2011    WITH HYDRODISTENTION (DR AVINASH MADDOX)   • ENDOSCOPY     • ENDOSCOPY N/A 12/17/2019    Procedure: ESOPHAGOGASTRODUODENOSCOPY;  Surgeon: Tracee Onofre MD;  Location: [facility] OR;  Service: Bariatric   • GASTRIC SLEEVE LAPAROSCOPIC N/A 12/17/2019    Procedure: GASTRIC SLEEVE LAPAROSCOPIC;  Surgeon: Tracee Onofre MD;  Location: [facility] OR;  Service: Bariatric   • HIATAL HERNIA REPAIR N/A 12/17/2019    Procedure: HIATAL HERNIA REPAIR LAPAROSCOPIC;  Surgeon: Tracee Onofre MD;  Location: [facility] OR;  Service: Bariatric   • HYSTERECTOMY  06/23/2010    [facility] (DR EDIN [facility])   • LAPAROSCOPIC CHOLECYSTECTOMY  2013    for stones   • TRANSVAGINAL TAPING SUSPENSION WITH OBTURATOR  06/23/2010    DR EDIN BYRD   • WISDOM TOOTH EXTRACTION  1990     Outpatient Medications Marked as Taking for the 6/24/20 encounter (Office Visit) with Tracee Onofre MD   Medication Sig Dispense Refill   • acyclovir (ZOVIRAX) 5 % ointment Apply  topically to the appropriate area as directed Every 3 (Three) Hours. (Patient taking differently: Apply  topically to the appropriate area as directed Take As Directed.) 30 g 6   • albuterol sulfate  (90 Base) MCG/ACT inhaler INHALE 1 TO 2 PUFFS  BY MOUTH EVERY 4 TO 6 HOURS AS NEEDED. 8.5 g 3   • Biotin (Biotin 5000) 5 MG capsule      • cetirizine (zyrTEC) 10 MG tablet Take 1 tablet by mouth Daily. 90 tablet 3   • cholecalciferol (VITAMIN D3) 1000 UNITS tablet Take 1,000 Units by mouth Daily.     • clobetasol propionate (CLOBEX) 0.05 % shampoo apply TO HAIR AND SCALP as directed  0   • Cyanocobalamin (VITAMIN B12) 1000 MCG tablet controlled-release      • cyclobenzaprine (FLEXERIL) 5 MG tablet Take 1 tablet by mouth 3 (Three) Times a Day As Needed. 270 tablet 3   • docusate sodium (COLACE) 100 MG capsule Take 250 mg by mouth Daily.     • DULoxetine (CYMBALTA) 60 MG capsule Take 1 capsule by mouth Daily. 90 capsule 1   • famciclovir (FAMVIR) 250 MG tablet Take 1 tablet by mouth 3 (Three) Times a Day. 270 tablet 4   • ferrous sulfate 325 (65 Fe) MG tablet      • Mirabegron ER (MYRBETRIQ) 50 MG tablet sustained-release 24 hour 24 hr tablet Take 1 tablet by mouth Daily. 90 tablet 3   • naratriptan (AMERGE) 1 MG tablet Take 1 tablet by mouth 1 (One) Time As Needed for Migraine for up to 1 dose. 10 tablet 2   • NON FORMULARY Multivitamin patch and iron patch daily   Collagen patch daily     • omeprazole (priLOSEC) 40 MG capsule Take 1 capsule by mouth 2 (two) times a day 30 minutes before meals for stomach 180 capsule 3   • ondansetron (ZOFRAN) 4 MG tablet Take 4 mg by mouth Every 8 (Eight) Hours As Needed for Nausea or Vomiting.     • Probiotic Product (PROBIOTIC PO) Take 1 capsule by mouth Daily.     • promethazine (PHENERGAN) 25 MG tablet Take 1 tablet by mouth Every 6 (Six) Hours As Needed for nausea 30 tablet 4   • propranolol LA (INDERAL LA) 120 MG 24 hr capsule Take 1 capsule by mouth Daily. 90 capsule 3   • PSYLLIUM HUSK PO Take 1 capsule by mouth Daily.     • Thiamine HCl (VITAMIN B-1 PO) Take  by mouth.     • Topiramate ER (Trokendi XR) 100 MG capsule sustained-release 24 hr Take 1 capsule by mouth Daily. 30 capsule 11   • Ubrogepant (ubrogepant) 50 MG  "tablet Take 1 tablet by mouth 2 (Two) Times a Day As Needed for migraine for up to 2 doses. Wait 2 hours before 2nd dose. 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  Never actually taken it internally.   • Latex Anaphylaxis and Rash   • Morphine And Related Rash     Rash (if gets on skin).  Never taken it internally   • Penicillins Shortness Of Breath and Palpitations     Keflex OK   • Oxycodone Other (See Comments)     Dizziness, confusion     • Topiramate Other (See Comments)     Pt states she can take NAME BRAND ONLY.  Generic brand \"doesn't work\"       Social History     Socioeconomic History   • Marital status:      Spouse name: Not on file   • Number of children: Not on file   • Years of education: Not on file   • Highest education level: Not on file   Occupational History   • Occupation: 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Employer: [employer]   Tobacco Use   • Smoking status: Never Smoker   • Smokeless tobacco: Never Used   Substance and Sexual Activity   • Alcohol use: No   • Drug use: No   • Sexual activity: Defer   Social History Narrative    Living in [place] w/  and 2 children.  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         /64 (BP Location: Left arm, Patient Position: Sitting, Cuff Size: Adult)   Pulse 71   Temp 97.7 °F (36.5 °C) (Temporal)   Resp 18   Ht 162.6 cm (64\")   Wt 84.1 kg (185 lb 8 oz)   SpO2 99%   BMI 31.84 kg/m²     Physical Exam   Constitutional: She is oriented to person, place, and time. She appears well-developed and well-nourished. No distress.   HENT:   Head: Normocephalic and atraumatic.   Mouth/Throat: No oropharyngeal exudate.   Eyes: Pupils are equal, round, and reactive to light. Conjunctivae and EOM are normal.   Pulmonary/Chest: " Effort normal. No respiratory distress.   Abdominal: Soft. She exhibits no distension.   Neurological: She is alert and oriented to person, place, and time. No cranial nerve deficit.   Skin: Skin is warm and dry. She is not diaphoretic. No pallor.   Psychiatric: She has a normal mood and affect. Her behavior is normal. Thought content normal.         Assessment:  6 months s/p LSG/HHR 12/17/19 w/ Dr. Onofre    ICD-10-CM ICD-9-CM   1. Fatigue, unspecified type R53.83 780.79   2. Malnutrition screen Z13.21 V77.2   3. Hypovitaminosis D E55.9 268.9   4. Status post bariatric surgery Z98.84 V45.86   5. Screening, iron deficiency anemia Z13.0 V78.0   6. Postgastrectomy malabsorption K91.2 579.3    Z90.3          Plan:  Doing well. Continue w/ good food choices and healthy habits.  Continue protein >70g/day.  Continue routine exercise.  Routine bariatric labs ordered.  Continue vitamins w/ adjustments pending lab results.  Call w/ problems/concerns.     Try to increase protein to 100 g/day, increase daniel to 5377-5606/day.    Re: reflux: workup with UGI then EGD.    The patient was instructed to follow up in 3 months, sooner if needed.    note: approx 15 of the 25 minute visit was spent counseling on nutrition and necessary dietary/lifestyle modifications face to face.    Tracee Onofre MD

## 2020-07-02 ENCOUNTER — LAB (OUTPATIENT)
Dept: LAB | Facility: HOSPITAL | Age: 45
End: 2020-07-02

## 2020-07-02 DIAGNOSIS — R53.83 FATIGUE, UNSPECIFIED TYPE: ICD-10-CM

## 2020-07-02 DIAGNOSIS — K91.2 POSTGASTRECTOMY MALABSORPTION: ICD-10-CM

## 2020-07-02 DIAGNOSIS — Z13.0 SCREENING, IRON DEFICIENCY ANEMIA: ICD-10-CM

## 2020-07-02 DIAGNOSIS — Z98.84 STATUS POST BARIATRIC SURGERY: ICD-10-CM

## 2020-07-02 DIAGNOSIS — Z13.21 MALNUTRITION SCREEN: ICD-10-CM

## 2020-07-02 DIAGNOSIS — Z90.3 POSTGASTRECTOMY MALABSORPTION: ICD-10-CM

## 2020-07-02 DIAGNOSIS — E55.9 HYPOVITAMINOSIS D: ICD-10-CM

## 2020-07-02 LAB
25(OH)D3 SERPL-MCNC: 41.3 NG/ML (ref 30–100)
ALBUMIN SERPL-MCNC: 4.4 G/DL (ref 3.5–5.2)
ALBUMIN/GLOB SERPL: 1.8 G/DL
ALP SERPL-CCNC: 67 U/L (ref 39–117)
ALT SERPL W P-5'-P-CCNC: 16 U/L (ref 1–33)
ANION GAP SERPL CALCULATED.3IONS-SCNC: 7.9 MMOL/L (ref 5–15)
AST SERPL-CCNC: 11 U/L (ref 1–32)
BASOPHILS # BLD AUTO: 0.07 10*3/MM3 (ref 0–0.2)
BASOPHILS NFR BLD AUTO: 0.8 % (ref 0–1.5)
BILIRUB SERPL-MCNC: 0.3 MG/DL (ref 0.2–1.2)
BUN SERPL-MCNC: 23 MG/DL (ref 6–20)
BUN/CREAT SERPL: 24.2 (ref 7–25)
CALCIUM SPEC-SCNC: 9.5 MG/DL (ref 8.6–10.5)
CHLORIDE SERPL-SCNC: 110 MMOL/L (ref 98–107)
CO2 SERPL-SCNC: 23.1 MMOL/L (ref 22–29)
CREAT SERPL-MCNC: 0.95 MG/DL (ref 0.57–1)
DEPRECATED RDW RBC AUTO: 45.1 FL (ref 37–54)
EOSINOPHIL # BLD AUTO: 0.2 10*3/MM3 (ref 0–0.4)
EOSINOPHIL NFR BLD AUTO: 2.3 % (ref 0.3–6.2)
ERYTHROCYTE [DISTWIDTH] IN BLOOD BY AUTOMATED COUNT: 12.9 % (ref 12.3–15.4)
FERRITIN SERPL-MCNC: 139 NG/ML (ref 13–150)
FOLATE SERPL-MCNC: 3.77 NG/ML (ref 4.78–24.2)
GFR SERPL CREATININE-BSD FRML MDRD: 63 ML/MIN/1.73
GLOBULIN UR ELPH-MCNC: 2.4 GM/DL
GLUCOSE SERPL-MCNC: 83 MG/DL (ref 65–99)
HCT VFR BLD AUTO: 43.6 % (ref 34–46.6)
HGB BLD-MCNC: 14.6 G/DL (ref 12–15.9)
IMM GRANULOCYTES # BLD AUTO: 0.02 10*3/MM3 (ref 0–0.05)
IMM GRANULOCYTES NFR BLD AUTO: 0.2 % (ref 0–0.5)
IRON 24H UR-MRATE: 109 MCG/DL (ref 37–145)
LYMPHOCYTES # BLD AUTO: 2.11 10*3/MM3 (ref 0.7–3.1)
LYMPHOCYTES NFR BLD AUTO: 24.3 % (ref 19.6–45.3)
MCH RBC QN AUTO: 31.9 PG (ref 26.6–33)
MCHC RBC AUTO-ENTMCNC: 33.5 G/DL (ref 31.5–35.7)
MCV RBC AUTO: 95.2 FL (ref 79–97)
MONOCYTES # BLD AUTO: 0.65 10*3/MM3 (ref 0.1–0.9)
MONOCYTES NFR BLD AUTO: 7.5 % (ref 5–12)
NEUTROPHILS NFR BLD AUTO: 5.63 10*3/MM3 (ref 1.7–7)
NEUTROPHILS NFR BLD AUTO: 64.9 % (ref 42.7–76)
NRBC BLD AUTO-RTO: 0 /100 WBC (ref 0–0.2)
PLATELET # BLD AUTO: 316 10*3/MM3 (ref 140–450)
PMV BLD AUTO: 11.7 FL (ref 6–12)
POTASSIUM SERPL-SCNC: 4.3 MMOL/L (ref 3.5–5.2)
PROT SERPL-MCNC: 6.8 G/DL (ref 6–8.5)
RBC # BLD AUTO: 4.58 10*6/MM3 (ref 3.77–5.28)
SODIUM SERPL-SCNC: 141 MMOL/L (ref 136–145)
WBC # BLD AUTO: 8.68 10*3/MM3 (ref 3.4–10.8)

## 2020-07-02 PROCEDURE — 83921 ORGANIC ACID SINGLE QUANT: CPT

## 2020-07-02 PROCEDURE — 83540 ASSAY OF IRON: CPT

## 2020-07-02 PROCEDURE — 85025 COMPLETE CBC W/AUTO DIFF WBC: CPT

## 2020-07-02 PROCEDURE — 84425 ASSAY OF VITAMIN B-1: CPT

## 2020-07-02 PROCEDURE — 82746 ASSAY OF FOLIC ACID SERUM: CPT

## 2020-07-02 PROCEDURE — 82728 ASSAY OF FERRITIN: CPT

## 2020-07-02 PROCEDURE — 82306 VITAMIN D 25 HYDROXY: CPT

## 2020-07-02 PROCEDURE — 84134 ASSAY OF PREALBUMIN: CPT

## 2020-07-02 PROCEDURE — 80053 COMPREHEN METABOLIC PANEL: CPT

## 2020-07-02 PROCEDURE — 36415 COLL VENOUS BLD VENIPUNCTURE: CPT

## 2020-07-03 LAB — PREALB SERPL-MCNC: 26 MG/DL (ref 20–40)

## 2020-07-05 LAB
Lab: NORMAL
METHYLMALONATE SERPL-SCNC: 112 NMOL/L (ref 0–378)
VIT B1 BLD-SCNC: 219.3 NMOL/L (ref 66.5–200)

## 2020-07-07 ENCOUNTER — TRANSCRIBE ORDERS (OUTPATIENT)
Dept: LAB | Facility: HOSPITAL | Age: 45
End: 2020-07-07

## 2020-07-07 ENCOUNTER — LAB (OUTPATIENT)
Dept: LAB | Facility: HOSPITAL | Age: 45
End: 2020-07-07

## 2020-07-07 ENCOUNTER — OFFICE VISIT (OUTPATIENT)
Dept: PULMONOLOGY | Facility: CLINIC | Age: 45
End: 2020-07-07

## 2020-07-07 ENCOUNTER — APPOINTMENT (OUTPATIENT)
Dept: PREADMISSION TESTING | Facility: HOSPITAL | Age: 45
End: 2020-07-07

## 2020-07-07 VITALS
HEART RATE: 67 BPM | SYSTOLIC BLOOD PRESSURE: 118 MMHG | WEIGHT: 189 LBS | RESPIRATION RATE: 16 BRPM | DIASTOLIC BLOOD PRESSURE: 72 MMHG | OXYGEN SATURATION: 99 % | BODY MASS INDEX: 32.27 KG/M2 | TEMPERATURE: 97.1 F | HEIGHT: 64 IN

## 2020-07-07 DIAGNOSIS — G47.19 EXCESSIVE DAYTIME SLEEPINESS: ICD-10-CM

## 2020-07-07 DIAGNOSIS — G47.33 OSA (OBSTRUCTIVE SLEEP APNEA): Primary | ICD-10-CM

## 2020-07-07 DIAGNOSIS — E66.9 OBESITY (BMI 30-39.9): ICD-10-CM

## 2020-07-07 DIAGNOSIS — Z01.818 PRE-OP TESTING: Primary | ICD-10-CM

## 2020-07-07 DIAGNOSIS — Z01.818 PRE-OP TESTING: ICD-10-CM

## 2020-07-07 PROCEDURE — U0004 COV-19 TEST NON-CDC HGH THRU: HCPCS

## 2020-07-07 PROCEDURE — C9803 HOPD COVID-19 SPEC COLLECT: HCPCS

## 2020-07-07 PROCEDURE — 99214 OFFICE O/P EST MOD 30 MIN: CPT | Performed by: INTERNAL MEDICINE

## 2020-07-07 PROCEDURE — U0002 COVID-19 LAB TEST NON-CDC: HCPCS

## 2020-07-07 NOTE — PROGRESS NOTES
"Chief Complaint   Patient presents with   • Follow-up   • Sleeping Problem       Subjective   Renae Bolton is a 45 y.o. female.     History of Present Illness   Patient was evaluated today for follow up of Sleep apnea. Patient does report some initial improvement but now feels that the PAP device is not relieving some of the symptoms.     She actually has lost 50 lbs or so, after weight loss surgery, and feels that the CPAP is \"too strong\".     Patient reports slight worsening in daytime sleepiness when compared to before. Patient is not aware of snoring through the device.     Patient denies any issues with her mask.       The following portions of the patient's history were reviewed and updated as appropriate: allergies, current medications, past family history, past medical history, past social history and past surgical history.    Review of Systems   HENT: Negative for sinus pressure, sneezing and sore throat.    Respiratory: Negative for cough, shortness of breath and wheezing.        Objective   Visit Vitals  /72   Pulse 67   Temp 97.1 °F (36.2 °C)   Resp 16   Ht 162.6 cm (64.02\")   Wt 85.7 kg (189 lb)   SpO2 99%   BMI 32.43 kg/m²       Physical Exam   Constitutional: She is oriented to person, place, and time. She appears well-developed and well-nourished.   HENT:   Head: Atraumatic.   Crowded oropharynx.    Musculoskeletal:   Gait was normal.   Neurological: She is alert and oriented to person, place, and time.   Psychiatric: She has a normal mood and affect.   Vitals reviewed.      Assessment/Plan   Renae was seen today for follow-up and sleeping problem.    Diagnoses and all orders for this visit:    MAREN (obstructive sleep apnea)  -     BIPAP / CPAP Adjustment    Excessive daytime sleepiness    Obesity (BMI 30-39.9)         Return in about 4 months (around 11/7/2020) for Dinah/Marcie, ....Also 10 mths w/ Dr. Castro.    DISCUSSION (if any):  I reviewed the results of last sleep study in " detail. I informed her that the apnea hypopnea index was 51 / hr. This was a home study. This was done in 2019.    I told the patient that her symptoms are consistent with poorly controlled sleep apnea.    I have decided to empirically change her to AutoPAP at a pressure of 5/12.    If the symptoms do not improve, even after undertaking the above measures, the patient may have to undergo a titration study.    Patient was advised to continue using PAP for at least 4 hours every night.    Patient was advised to call this office with any issues.    I spent a total of 26 minutes face to face with this patient. her  pertinent current and previous data, as applicable, were reviewed. Patient's diagnostic studies were also reviewed. More than 16 minutes of the time spent, was spent in counseling about patient's underlying disease process, reviewing any available sleep studies, need to use devices (as applicable) on a regular basis and lifestyle modifications that may positively impact patient's health.       Dictated utilizing Dragon dictation.    This document was electronically signed by Sarah Castro MD on 07/07/20 at 10:56

## 2020-07-09 LAB
REF LAB TEST METHOD: NORMAL
SARS-COV-2 RNA RESP QL NAA+PROBE: NOT DETECTED

## 2020-07-10 ENCOUNTER — HOSPITAL ENCOUNTER (OUTPATIENT)
Dept: GENERAL RADIOLOGY | Facility: HOSPITAL | Age: 45
Discharge: HOME | End: 2020-07-10
Admitting: SURGERY

## 2020-07-10 DIAGNOSIS — K21.9 GASTROESOPHAGEAL REFLUX DISEASE, ESOPHAGITIS PRESENCE NOT SPECIFIED: ICD-10-CM

## 2020-07-10 PROCEDURE — 74240 X-RAY XM UPR GI TRC 1CNTRST: CPT

## 2020-07-16 RX ORDER — SODIUM CHLORIDE 9 MG/ML
150 INJECTION, SOLUTION INTRAVENOUS CONTINUOUS
Status: CANCELLED | OUTPATIENT
Start: 2020-07-16

## 2020-07-21 DIAGNOSIS — K21.9 GASTROESOPHAGEAL REFLUX DISEASE, ESOPHAGITIS PRESENCE NOT SPECIFIED: Primary | ICD-10-CM

## 2020-07-21 RX ORDER — SODIUM CHLORIDE 9 MG/ML
150 INJECTION, SOLUTION INTRAVENOUS CONTINUOUS
Status: CANCELLED | OUTPATIENT
Start: 2020-07-21

## 2020-08-21 ENCOUNTER — TELEPHONE (OUTPATIENT)
Dept: UROLOGY | Facility: CLINIC | Age: 45
End: 2020-08-21

## 2020-08-21 DIAGNOSIS — N30.10 INTERSTITIAL CYSTITIS: Primary | ICD-10-CM

## 2020-08-21 DIAGNOSIS — N39.0 URINARY TRACT INFECTION WITHOUT HEMATURIA, SITE UNSPECIFIED: ICD-10-CM

## 2020-08-21 LAB
BILIRUB BLD-MCNC: ABNORMAL MG/DL
CLARITY, POC: ABNORMAL
COLOR UR: YELLOW
GLUCOSE UR STRIP-MCNC: NEGATIVE MG/DL
KETONES UR QL: NEGATIVE
LEUKOCYTE EST, POC: NEGATIVE
NITRITE UR-MCNC: NEGATIVE MG/ML
PH UR: 8.5 [PH] (ref 5–8)
PROT UR STRIP-MCNC: ABNORMAL MG/DL
RBC # UR STRIP: NEGATIVE /UL
SP GR UR: 1.01 (ref 1–1.03)
UROBILINOGEN UR QL: NORMAL

## 2020-08-21 PROCEDURE — 81003 URINALYSIS AUTO W/O SCOPE: CPT | Performed by: UROLOGY

## 2020-08-21 RX ORDER — NITROFURANTOIN 25; 75 MG/1; MG/1
100 CAPSULE ORAL 2 TIMES DAILY
Qty: 14 CAPSULE | Refills: 0 | Status: SHIPPED | OUTPATIENT
Start: 2020-08-21 | End: 2020-09-17

## 2020-08-21 NOTE — PROGRESS NOTES
Why was her urine checked?  Does she think she has a urinary tract infection?  When is she coming to see me?  We need to make an appointment if she is having symptoms.

## 2020-08-31 ENCOUNTER — TELEMEDICINE (OUTPATIENT)
Dept: UROLOGY | Facility: CLINIC | Age: 45
End: 2020-08-31

## 2020-08-31 DIAGNOSIS — N39.0 URINARY TRACT INFECTION WITHOUT HEMATURIA, SITE UNSPECIFIED: Primary | ICD-10-CM

## 2020-08-31 PROCEDURE — 99213 OFFICE O/P EST LOW 20 MIN: CPT | Performed by: UROLOGY

## 2020-08-31 NOTE — PROGRESS NOTES
"Chief Complaint  KULWINDER    HPI  Ms. Bolton is a 45 y.o. female There is no height or weight on file to calculate BMI. presents with complaint of urinary incontinence for >5 years. Pt has primarily stress mixed urinary incontinence. She also says she has Interstitial Cystitis and has been in remission since a DMSO cocktail/hydrodistention in 2011.     She presents today for follow-up.  She recently had a UTI with dysuria, urgency, frequency, and foul odor.  She is status post 1 week of Macrobid.  Symptoms have resolved.  She also has been taking d-mannose and cranberry powder.    Past History,  Severity: Pt uses 2-3 pads a day (5-6 if she has a cough) and had a TOT sling with Dr. Hedrick (2010) in the past.  Associated Sx: Pt has + h/o daytime frequency, urgency and nocturia x1.     No h/o poor stream, straining, hesitancy or incomplete voiding.     No h/o recurrent UTI, hematuria, nephrolithiasis    No vaginal discharge or bleeding.  No h/o something coming out of vagina   No fever/chills  No weight loss, appetite normal.  yes Constipation - she takes 2 fiber pills daily  2 History of vaginal deliveries    She has an arrythmia that is controlled with a BB    Past Medical History  Past Medical History:   Diagnosis Date   • Anxiety    • Arrhythmia     SVT w/ PVCs, on propranolol, follows w/ Dr. Liang   • Aspirin long-term use     for heart health   • Depression    • Dyspepsia    • Dyspnea on exertion    • Fatigue    • Generalized edema     r/t weight gain   • Genital HSV     Famvir for prophylaxis   • GERD (gastroesophageal reflux disease)     daily Prilosec, denies prior eval   • Hard to intubate     states was told this after her hysterectomy.  states had a \"lidocaine lollipop\"   • Hypertension    • Interstitial cystitis    • Migraine     Topamax w/ prn Treximet/Phenergan/Zofran   • Mitral valve prolapse    • Normal vaginal Papanicolaou smear in patient with history of hysterectomy 09/16/2016   • Obesity    • MAREN " (obstructive sleep apnea)    • Peripheral edema    • PONV (postoperative nausea and vomiting)    • Post concussion syndrome     s/p fall w/ frontal lobe head injury 9/2017, on Elavil   • Sleep apnea     newly dx, CPAP compliant   • Stress incontinence     follows w/ Dr. Galvan, on Myrbetriq, s/p cystoscopy w/ bulking agent   • Wears contact lenses        Past Surgical History  Past Surgical History:   Procedure Laterality Date   • ANTERIOR AND POSTERIOR VAGINAL REPAIR  06/23/2010    DR [facility]IN [facility]   • BREAST LUMPECTOMY Left 2015    benign   • CYSTOSCOPY W/ BULKING AGENT INJECTION N/A 6/25/2019    Procedure: CYSTOSCOPY WITH URETHRAL  BULKING AGENT INJECTION;  Surgeon: Jose Galvan MD;  Location: [facility] OR;  Service: Urology   • CYSTOSCOPY, DIMETHYL SULFOXIDE COCKTAIL  03/14/2011    WITH HYDRODISTENTION (DR AVINASH MADDOX)   • ENDOSCOPY     • ENDOSCOPY N/A 12/17/2019    Procedure: ESOPHAGOGASTRODUODENOSCOPY;  Surgeon: Tracee Onofre MD;  Location:  [facility] OR;  Service: Bariatric   • GASTRIC SLEEVE LAPAROSCOPIC N/A 12/17/2019    Procedure: GASTRIC SLEEVE LAPAROSCOPIC;  Surgeon: Tracee Onofre MD;  Location:  [facility] OR;  Service: Bariatric   • HIATAL HERNIA REPAIR N/A 12/17/2019    Procedure: HIATAL HERNIA REPAIR LAPAROSCOPIC;  Surgeon: Tracee Onofre MD;  Location: [facility] OR;  Service: Bariatric   • HYSTERECTOMY  06/23/2010    [facility] (DR EDIN [facility])   • LAPAROSCOPIC CHOLECYSTECTOMY  2013    for stones   • TRANSVAGINAL TAPING SUSPENSION WITH OBTURATOR  06/23/2010    DR EDIN BYRD   • WISDOM TOOTH EXTRACTION  1990       Medications  Current Outpatient Medications   Medication Sig Dispense Refill   • acyclovir (ZOVIRAX) 5 % ointment Apply  topically to the appropriate area as directed Every 3 (Three) Hours. (Patient taking differently: Apply  topically to the appropriate area as directed Take As Directed.) 30 g 6   • albuterol sulfate  (90 Base) MCG/ACT inhaler INHALE 1 TO 2 PUFFS BY MOUTH  EVERY 4 TO 6 HOURS AS NEEDED. 8.5 g 3   • Ascorbic Acid (VITAMIN C) 500 MG chewable tablet      • Biotin (Biotin 5000) 5 MG capsule      • cetirizine (zyrTEC) 10 MG tablet Take 1 tablet by mouth Daily. 90 tablet 3   • cholecalciferol (VITAMIN D3) 1000 UNITS tablet Take 1,000 Units by mouth Daily.     • clobetasol propionate (CLOBEX) 0.05 % shampoo apply TO HAIR AND SCALP as directed  0   • Cyanocobalamin (VITAMIN B12) 1000 MCG tablet controlled-release      • cyclobenzaprine (FLEXERIL) 5 MG tablet Take 1 tablet by mouth 3 (Three) Times a Day As Needed. 270 tablet 3   • docusate sodium (COLACE) 100 MG capsule Take 250 mg by mouth Daily.     • DULoxetine (CYMBALTA) 60 MG capsule Take 1 capsule by mouth Daily. 90 capsule 1   • famciclovir (FAMVIR) 250 MG tablet Take 1 tablet by mouth 3 (Three) Times a Day. 270 tablet 4   • ferrous sulfate 325 (65 Fe) MG tablet      • Mirabegron ER (MYRBETRIQ) 50 MG tablet sustained-release 24 hour 24 hr tablet Take 1 tablet by mouth Daily. 90 tablet 3   • naratriptan (AMERGE) 1 MG tablet Take 1 tablet by mouth 1 (One) Time As Needed for Migraine for up to 1 dose. 10 tablet 2   • nitrofurantoin, macrocrystal-monohydrate, (Macrobid) 100 MG capsule Take 1 capsule by mouth 2 (Two) Times a Day. 14 capsule 0   • NON FORMULARY Multivitamin patch and iron patch daily   Collagen patch daily     • omeprazole (priLOSEC) 40 MG capsule Take 1 capsule by mouth 2 (two) times a day 30 minutes before meals for stomach 180 capsule 3   • ondansetron (ZOFRAN) 4 MG tablet Take 4 mg by mouth Every 8 (Eight) Hours As Needed for Nausea or Vomiting.     • Probiotic Product (PROBIOTIC PO) Take 1 capsule by mouth Daily.     • promethazine (PHENERGAN) 25 MG tablet Take 1 tablet by mouth Every 6 (Six) Hours As Needed for nausea 30 tablet 4   • propranolol LA (INDERAL LA) 120 MG 24 hr capsule Take 1 capsule by mouth Daily. 90 capsule 3   • PSYLLIUM HUSK PO Take 1 capsule by mouth Daily.     • Thiamine HCl (VITAMIN  "B-1 PO) Take  by mouth.     • Topiramate ER (Trokendi XR) 100 MG capsule sustained-release 24 hr Take 1 capsule by mouth Daily. 30 capsule 11   • Ubrogepant (ubrogepant) 50 MG tablet Take 1 tablet by mouth 2 (Two) Times a Day As Needed for migraine for up to 2 doses. Wait 2 hours before 2nd dose. 10 tablet 2   • Vitamin E 400 units tablet        No current facility-administered medications for this visit.        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  Never actually taken it internally.   • Latex Anaphylaxis and Rash   • Morphine And Related Rash     Rash (if gets on skin).  Never taken it internally   • Penicillins Shortness Of Breath and Palpitations     Keflex OK   • Oxycodone Other (See Comments)     Dizziness, confusion     • Topiramate Other (See Comments)     Pt states she can take NAME BRAND ONLY.  Generic brand \"doesn't work\"       Social History  Social History     Socioeconomic History   • Marital status:      Spouse name: Not on file   • Number of children: Not on file   • Years of education: Not on file   • Highest education level: Not on file   Occupational History   • Occupation: 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Employer: [employer]   Tobacco Use   • Smoking status: Never Smoker   • Smokeless tobacco: Never Used   Substance and Sexual Activity   • Alcohol use: No   • Drug use: No   • Sexual activity: Defer   Social History Narrative    Living in [place] w/  and 2 children.  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         Family History  She has no family history of bladder or kidney cancer  She has no family history of kidney stones    Review of Systems  Constitutional: No fevers or chills  Skin: Negative for rash  Endocrine: No heat/cold intolerance   Cardiovascular: Negative for chest pain or dyspnea " on exertion  Respiratory: Negative for shortness of breath or wheezing  Gastrointestinal: No nausea or vomiting  Genitourinary: Negative for current gross hematuria.  Musculoskeletal: No flank pain  Neurological:  Negative for frequent headaches or dizziness  Lymph/Heme: Negative for leg swelling or calf pain.    Physical Exam  There were no vitals taken for this visit.  Constitutional: NAD, WDWN  HEENT: NCAT. Conjunctivae normal  MMM  Cardiovascular: No visual evidence of palpitations  Pulmonary/Chest: Respirations are even and non-labored bilaterally  Abdominal: No clear visual distention  Neurological: A + O x 3,  cranial nerves II-XII grossly intact  Extremities: RACHEL x 4, warm, no clubbing, no cyanosis  Skin: Pink, warm, dry with no rash  Psychiatric:  Normal mood and affect      Labs  Brief Urine Lab Results  (Last result in the past 365 days)      Color   Clarity   Blood   Leuk Est   Nitrite   Protein   CREAT   Urine HCG        08/21/20 1056 Yellow Cloudy Negative Negative Negative Trace               Lab Results   Component Value Date    GLUCOSE 83 07/02/2020    CALCIUM 9.5 07/02/2020     07/02/2020    K 4.3 07/02/2020    CO2 23.1 07/02/2020     (H) 07/02/2020    BUN 23 (H) 07/02/2020    CREATININE 0.95 07/02/2020    EGFRIFAFRI 76 01/22/2020    EGFRIFNONA 63 07/02/2020    BCR 24.2 07/02/2020    ANIONGAP 7.9 07/02/2020       Lab Results   Component Value Date    WBC 8.68 07/02/2020    HGB 14.6 07/02/2020    HCT 43.6 07/02/2020    MCV 95.2 07/02/2020     07/02/2020           I have personally reviewed her labs and post void residual imaging.     Assessment  Ms. Bolton is a 45 y.o. female with recurrent FELICIANO after TOT sling in 2010. She also has a history of IC that is well controlled with diet, and very mild OAB that is controlled with Myrbetriq.  She was using 2-3 pads per day and now is using a panty liner. She is s/p urethral bulking agent injection on 6/25/2019. FELICIANO well controlled.     She  recently had a UTI and symptoms have now resolved after a week of Macrobid.    Plan  1.  FU as planned in January.    This visit was conducted via telehealth with audio and visual components.  Informed consent was obtained ahead of time.    Jose Galvan MD

## 2020-09-01 PROBLEM — K21.9 GASTROESOPHAGEAL REFLUX DISEASE: Status: ACTIVE | Noted: 2020-09-01

## 2020-09-17 ENCOUNTER — OFFICE VISIT (OUTPATIENT)
Dept: BARIATRICS/WEIGHT MGMT | Facility: CLINIC | Age: 45
End: 2020-09-17

## 2020-09-17 VITALS
BODY MASS INDEX: 31.84 KG/M2 | RESPIRATION RATE: 18 BRPM | TEMPERATURE: 97.7 F | HEART RATE: 74 BPM | WEIGHT: 186.5 LBS | DIASTOLIC BLOOD PRESSURE: 66 MMHG | HEIGHT: 64 IN | SYSTOLIC BLOOD PRESSURE: 112 MMHG | OXYGEN SATURATION: 99 %

## 2020-09-17 DIAGNOSIS — R10.13 DYSPEPSIA: ICD-10-CM

## 2020-09-17 DIAGNOSIS — Z90.3 POSTGASTRECTOMY MALABSORPTION: ICD-10-CM

## 2020-09-17 DIAGNOSIS — Z13.0 SCREENING, IRON DEFICIENCY ANEMIA: ICD-10-CM

## 2020-09-17 DIAGNOSIS — K21.9 GASTROESOPHAGEAL REFLUX DISEASE, ESOPHAGITIS PRESENCE NOT SPECIFIED: ICD-10-CM

## 2020-09-17 DIAGNOSIS — R53.83 FATIGUE, UNSPECIFIED TYPE: Primary | ICD-10-CM

## 2020-09-17 DIAGNOSIS — K91.2 POSTGASTRECTOMY MALABSORPTION: ICD-10-CM

## 2020-09-17 DIAGNOSIS — E55.9 HYPOVITAMINOSIS D: ICD-10-CM

## 2020-09-17 DIAGNOSIS — Z13.21 MALNUTRITION SCREEN: ICD-10-CM

## 2020-09-17 PROCEDURE — 99214 OFFICE O/P EST MOD 30 MIN: CPT | Performed by: SURGERY

## 2020-09-17 RX ORDER — OMEPRAZOLE 40 MG/1
40 CAPSULE, DELAYED RELEASE ORAL 2 TIMES DAILY
Qty: 180 CAPSULE | Refills: 3 | Status: SHIPPED | OUTPATIENT
Start: 2020-09-17 | End: 2020-10-20 | Stop reason: HOSPADM

## 2020-09-17 RX ORDER — SODIUM CHLORIDE 9 MG/ML
150 INJECTION, SOLUTION INTRAVENOUS CONTINUOUS
Status: CANCELLED | OUTPATIENT
Start: 2020-09-17

## 2020-09-17 NOTE — PROGRESS NOTES
[facility] Bariatric Surgery  [address]  [address]  [facility] [id number] [phone]        Patient Name:  Renae Bolton.  :  1975      Date of Visit: 2020      Reason for Visit:   9 months postop     HPI: Renae Bolton is a 45 y.o. female s/p LSG/HHR 19 w/ Dr. Onofre    LOV:   C/o severe reflux since LSG.  On omeprazole 40 mg bid.  Her PCP told her to add Pepcid.  Reflux is incompletely controlled by this regimen.  Sometimes wakes up aspirating.    C/o dysphagia and also lactose intolerance.  Has nausea/cramping with all dairy.  Also reflux.  Thinks she might be gluten intolerant.  Eats gluten-free--seems to help.  Aso taking apple ider vinegar, ox bile and pancreatin supplements from a wellness clinic which has helped.      Omeprazole 40 mg daily doesn't control reflux.  Pepcid helped some.      UGI 20:  IMPRESSION:  Gastroesophageal reflux into the distal esophagus.    I had ordered an EGD, but she was in quarantine due to her 's COVID diagnosis.  She was tested and was negative.     Getting ??g prot/day.  Does not log because many combo foods like chicken salad.      Drinking <64 fluid oz/day.  Last labs revealed low folic acid. Taking Biotin and vit E, folate, MVI patch, iron patch, collagen/biotin patch.  On Omeprazole .  Exercise: nothing.     Presurgery weight: 224 pounds.  Today's weight is 84.6 kg (186 lb 8 oz) pounds, today's  Body mass index is 32.01 kg/m²., and@ weight loss since surgery is 38 pounds.  Weight loss has stalled.      Past Medical History:   Diagnosis Date   • Anxiety    • Arrhythmia     SVT w/ PVCs, on propranolol, follows w/ Dr. Liang   • Aspirin long-term use     for heart health   • Depression    • Dyspepsia    • Dyspnea on exertion    • Fatigue    • Generalized edema     r/t weight gain   • Genital HSV     Famvir for prophylaxis   • GERD (gastroesophageal reflux disease)     daily Prilosec, denies prior eval  "  • Hard to intubate     states was told this after her hysterectomy.  states had a \"lidocaine lollipop\"   • Hypertension    • Interstitial cystitis    • Migraine     Topamax w/ prn Treximet/Phenergan/Zofran   • Mitral valve prolapse    • Normal vaginal Papanicolaou smear in patient with history of hysterectomy 09/16/2016   • Obesity    • MAREN (obstructive sleep apnea)    • Peripheral edema    • PONV (postoperative nausea and vomiting)    • Post concussion syndrome     s/p fall w/ frontal lobe head injury 9/2017, on Elavil   • Sleep apnea     newly dx, CPAP compliant   • Stress incontinence     follows w/ Dr. Galvan, on Myrbetriq, s/p cystoscopy w/ bulking agent   • Wears contact lenses      Past Surgical History:   Procedure Laterality Date   • ANTERIOR AND POSTERIOR VAGINAL REPAIR  06/23/2010    DR EDIN BYRD   • BREAST LUMPECTOMY Left 2015    benign   • CYSTOSCOPY W/ BULKING AGENT INJECTION N/A 6/25/2019    Procedure: CYSTOSCOPY WITH URETHRAL  BULKING AGENT INJECTION;  Surgeon: Jose Galvan MD;  Location: [facility] OR;  Service: Urology   • CYSTOSCOPY, DIMETHYL SULFOXIDE COCKTAIL  03/14/2011    WITH HYDRODISTENTION (DR AVINASH MADDOX)   • ENDOSCOPY     • ENDOSCOPY N/A 12/17/2019    Procedure: ESOPHAGOGASTRODUODENOSCOPY;  Surgeon: Tracee Onofre MD;  Location: [facility] OR;  Service: Bariatric   • GASTRIC SLEEVE LAPAROSCOPIC N/A 12/17/2019    Procedure: GASTRIC SLEEVE LAPAROSCOPIC;  Surgeon: Tracee Onofre MD;  Location: [facility] OR;  Service: Bariatric   • HIATAL HERNIA REPAIR N/A 12/17/2019    Procedure: HIATAL HERNIA REPAIR LAPAROSCOPIC;  Surgeon: Tracee Onofre MD;  Location: [facility] OR;  Service: Bariatric   • HYSTERECTOMY  06/23/2010    LAV (DR EDIN BYRD)   • LAPAROSCOPIC CHOLECYSTECTOMY  2013    for stones   • TRANSVAGINAL TAPING SUSPENSION WITH OBTURATOR  06/23/2010    DR EDIN BYRD   • WISDOM TOOTH EXTRACTION  1990     Outpatient Medications Marked as Taking for the 9/17/20 encounter " (Office Visit) with Tracee Onofre MD   Medication Sig Dispense Refill   • acyclovir (ZOVIRAX) 5 % ointment Apply  topically to the appropriate area as directed Every 3 (Three) Hours. (Patient taking differently: Apply  topically to the appropriate area as directed Take As Directed.) 30 g 6   • albuterol sulfate  (90 Base) MCG/ACT inhaler INHALE 1 TO 2 PUFFS BY MOUTH EVERY 4 TO 6 HOURS AS NEEDED. 8.5 g 3   • Biotin (Biotin 5000) 5 MG capsule      • cetirizine (zyrTEC) 10 MG tablet Take 1 tablet by mouth Daily. 90 tablet 3   • cholecalciferol (VITAMIN D3) 1000 UNITS tablet Take 1,000 Units by mouth Daily.     • clobetasol propionate (CLOBEX) 0.05 % shampoo apply TO HAIR AND SCALP as directed  0   • Cyanocobalamin (VITAMIN B12) 1000 MCG tablet controlled-release      • cyclobenzaprine (FLEXERIL) 5 MG tablet Take 1 tablet by mouth 3 (Three) Times a Day As Needed. 270 tablet 3   • docusate sodium (COLACE) 100 MG capsule Take 250 mg by mouth Daily.     • DULoxetine (CYMBALTA) 60 MG capsule Take 1 capsule by mouth Daily. 90 capsule 1   • famciclovir (FAMVIR) 250 MG tablet Take 1 tablet by mouth 3 (Three) Times a Day. 270 tablet 4   • ferrous sulfate 325 (65 Fe) MG tablet      • MAGNESIUM CITRATE PO Take 135 mg by mouth Daily.     • Mirabegron ER (MYRBETRIQ) 50 MG tablet sustained-release 24 hour 24 hr tablet Take 1 tablet by mouth Daily. 90 tablet 3   • naratriptan (AMERGE) 1 MG tablet Take 1 tablet by mouth 1 (One) Time As Needed for Migraine for up to 1 dose. 10 tablet 2   • NON FORMULARY Multivitamin patch and iron patch daily   Collagen patch daily     • Probiotic Product (PROBIOTIC PO) Take 1 capsule by mouth Daily.     • propranolol LA (INDERAL LA) 120 MG 24 hr capsule Take 1 capsule by mouth Daily. 90 capsule 3   • PSYLLIUM HUSK PO Take 1 capsule by mouth Daily.     • Thiamine HCl (VITAMIN B-1 PO) Take  by mouth.     • Ubrogepant (ubrogepant) 50 MG tablet Take 1 tablet by mouth 2 (Two) Times a Day As  "Needed for migraine for up to 2 doses. Wait 2 hours before 2nd dose. 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  Never actually taken it internally.   • Latex Anaphylaxis and Rash   • Morphine And Related Rash     Rash (if gets on skin).  Never taken it internally   • Penicillins Shortness Of Breath and Palpitations     Keflex OK   • Oxycodone Other (See Comments)     Dizziness, confusion     • Topiramate Other (See Comments)     Pt states she can take NAME BRAND ONLY.  Generic brand \"doesn't work\"       Social History     Socioeconomic History   • Marital status:      Spouse name: Not on file   • Number of children: Not on file   • Years of education: Not on file   • Highest education level: Not on file   Occupational History   • Occupation: 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Employer: [employer]   Tobacco Use   • Smoking status: Never Smoker   • Smokeless tobacco: Never Used   Substance and Sexual Activity   • Alcohol use: No   • Drug use: No   • Sexual activity: Defer   Social History Narrative    Living in [place] w/  and 2 children.  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         /66 (BP Location: Left arm, Patient Position: Sitting, Cuff Size: Large Adult)   Pulse 74   Temp 97.7 °F (36.5 °C) (Temporal)   Resp 18   Ht 162.6 cm (64\")   Wt 84.6 kg (186 lb 8 oz)   SpO2 99%   BMI 32.01 kg/m²     Physical Exam  Constitutional:       General: She is not in acute distress.     Appearance: She is well-developed. She is not diaphoretic.   HENT:      Head: Normocephalic and atraumatic.      Mouth/Throat:      Pharynx: No " oropharyngeal exudate.   Eyes:      Conjunctiva/sclera: Conjunctivae normal.      Pupils: Pupils are equal, round, and reactive to light.   Pulmonary:      Effort: Pulmonary effort is normal. No respiratory distress.   Abdominal:      General: There is no distension.      Palpations: Abdomen is soft.   Skin:     General: Skin is warm and dry.      Coloration: Skin is not pale.   Neurological:      Mental Status: She is alert and oriented to person, place, and time.      Cranial Nerves: No cranial nerve deficit.   Psychiatric:         Behavior: Behavior normal.         Thought Content: Thought content normal.           Assessment:  9 months s/p LSG/HHR 12/17/19 w/ Dr. Onofre      ICD-10-CM ICD-9-CM   1. Fatigue, unspecified type  R53.83 780.79   2. Hypovitaminosis D  E55.9 268.9   3. Malnutrition screen  Z13.21 V77.2   4. Postgastrectomy malabsorption  K91.2 579.3    Z90.3    5. Screening, iron deficiency anemia  Z13.0 V78.0   6. Dyspepsia  R10.13 536.8   7. Gastroesophageal reflux disease, esophagitis presence not specified  K21.9 530.81         Plan:  Doing well. Continue w/ good food choices and healthy habits.  Continue protein >70g/day.  Continue routine exercise.  Routine bariatric labs ordered.  Continue vitamins w/ adjustments pending lab results.  Call w/ problems/concerns.     The patient was instructed to follow up in 3 months, sooner if needed.    Rx omeprazole 40 mg bid.    EGD to eval reflux, dyspepsia.  Take lactaid for lactose intolerance.  Refer to our dietician to discuss protein options with her diet limitations.  Start walking 30 minute 5 days per week.  Track food intake daily.  If workup negative, recommend referral to GI in [place].  May have IBS or celiac disease, but has no diagnosis.      note: approx 15 of the 25 minute visit was spent counseling on nutrition and necessary dietary/lifestyle modifications face to face.    Tracee Onofre MD

## 2020-09-22 LAB
25(OH)D3+25(OH)D2 SERPL-MCNC: 50.2 NG/ML (ref 30–100)
ALBUMIN SERPL-MCNC: 4.3 G/DL (ref 3.5–5.2)
ALBUMIN/GLOB SERPL: 2 G/DL
ALP SERPL-CCNC: 78 U/L (ref 39–117)
ALT SERPL-CCNC: 21 U/L (ref 1–33)
AST SERPL-CCNC: 16 U/L (ref 1–32)
BASOPHILS # BLD AUTO: 0.06 10*3/MM3 (ref 0–0.2)
BASOPHILS NFR BLD AUTO: 0.9 % (ref 0–1.5)
BILIRUB SERPL-MCNC: 0.3 MG/DL (ref 0–1.2)
BUN SERPL-MCNC: 17 MG/DL (ref 6–20)
BUN/CREAT SERPL: 18.5 (ref 7–25)
CALCIUM SERPL-MCNC: 9.2 MG/DL (ref 8.6–10.5)
CHLORIDE SERPL-SCNC: 99 MMOL/L (ref 98–107)
CO2 SERPL-SCNC: 27.2 MMOL/L (ref 22–29)
CREAT SERPL-MCNC: 0.92 MG/DL (ref 0.57–1)
EOSINOPHIL # BLD AUTO: 0.44 10*3/MM3 (ref 0–0.4)
EOSINOPHIL NFR BLD AUTO: 6.6 % (ref 0.3–6.2)
ERYTHROCYTE [DISTWIDTH] IN BLOOD BY AUTOMATED COUNT: 11.9 % (ref 12.3–15.4)
FERRITIN SERPL-MCNC: 147 NG/ML (ref 13–150)
FOLATE SERPL-MCNC: >20 NG/ML (ref 4.78–24.2)
GLOBULIN SER CALC-MCNC: 2.1 GM/DL
GLUCOSE SERPL-MCNC: 81 MG/DL (ref 65–99)
HCT VFR BLD AUTO: 42.6 % (ref 34–46.6)
HGB BLD-MCNC: 14.5 G/DL (ref 12–15.9)
IMM GRANULOCYTES # BLD AUTO: 0.02 10*3/MM3 (ref 0–0.05)
IMM GRANULOCYTES NFR BLD AUTO: 0.3 % (ref 0–0.5)
IRON SERPL-MCNC: 111 MCG/DL (ref 37–145)
LYMPHOCYTES # BLD AUTO: 1.77 10*3/MM3 (ref 0.7–3.1)
LYMPHOCYTES NFR BLD AUTO: 26.7 % (ref 19.6–45.3)
Lab: NORMAL
MCH RBC QN AUTO: 31.9 PG (ref 26.6–33)
MCHC RBC AUTO-ENTMCNC: 34 G/DL (ref 31.5–35.7)
MCV RBC AUTO: 93.6 FL (ref 79–97)
METHYLMALONATE SERPL-SCNC: 101 NMOL/L (ref 0–378)
MONOCYTES # BLD AUTO: 0.54 10*3/MM3 (ref 0.1–0.9)
MONOCYTES NFR BLD AUTO: 8.1 % (ref 5–12)
NEUTROPHILS # BLD AUTO: 3.8 10*3/MM3 (ref 1.7–7)
NEUTROPHILS NFR BLD AUTO: 57.4 % (ref 42.7–76)
NRBC BLD AUTO-RTO: 0 /100 WBC (ref 0–0.2)
PLATELET # BLD AUTO: 295 10*3/MM3 (ref 140–450)
POTASSIUM SERPL-SCNC: 4.5 MMOL/L (ref 3.5–5.2)
PREALB SERPL-MCNC: 21 MG/DL (ref 12–34)
PROT SERPL-MCNC: 6.4 G/DL (ref 6–8.5)
RBC # BLD AUTO: 4.55 10*6/MM3 (ref 3.77–5.28)
SODIUM SERPL-SCNC: 143 MMOL/L (ref 136–145)
VIT B1 BLD-SCNC: 100.4 NMOL/L (ref 66.5–200)
WBC # BLD AUTO: 6.63 10*3/MM3 (ref 3.4–10.8)

## 2020-10-05 ENCOUNTER — DOCUMENTATION (OUTPATIENT)
Dept: BARIATRICS/WEIGHT MGMT | Facility: CLINIC | Age: 45
End: 2020-10-05

## 2020-10-15 ENCOUNTER — TELEMEDICINE (OUTPATIENT)
Dept: BARIATRICS/WEIGHT MGMT | Facility: CLINIC | Age: 45
End: 2020-10-15

## 2020-10-15 DIAGNOSIS — K21.9 GASTROESOPHAGEAL REFLUX DISEASE, UNSPECIFIED WHETHER ESOPHAGITIS PRESENT: Primary | ICD-10-CM

## 2020-10-15 PROCEDURE — 99214 OFFICE O/P EST MOD 30 MIN: CPT | Performed by: SURGERY

## 2020-10-15 NOTE — PROGRESS NOTES
"[facility] Bariatric Surgery  [address]  [address]  [facility] [address] [phone]        Patient Name: Renae Bolton.  YOB: 1975      Date of Visit: 10/15/2020      Reason for Visit:  reflux    HPI:  Renae Bolton is a 45 y.o. female s/p LSG/HHR 12/17/19 w/ Dr. Onofre    \"LOV:   C/o severe reflux since LSG.  On omeprazole 40 mg bid.  Her PCP told her to add Pepcid.  Reflux is incompletely controlled by this regimen.  Sometimes wakes up aspirating.    C/o dysphagia and also lactose intolerance.  Has nausea/cramping with all dairy.  Also reflux.  Thinks she might be gluten intolerant.  Eats gluten-free--seems to help.  Aso taking apple cider vinegar, ox bile and pancreatin supplements from a wellness clinic which has helped.      Omeprazole 40 mg daily doesn't control reflux.  Pepcid helped some.      UGI 6/24/20:  IMPRESSION:  Gastroesophageal reflux into the distal esophagus.    I had ordered an EGD, but she was in quarantine due to her 's COVID diagnosis.  She was tested and was negative.\"    Today's update:    Still gaining weight.  Up 7 pounds since LOV.  Not getting enough protein and no longer exercising.      Has gotten the [employer] so she can get some nutrition without lactose due to lactose intolerance.  UGI showed reflux.  Has dysphagia and lactose intolerance. Reflux.            Past Medical History:   Diagnosis Date   • Anxiety    • Arrhythmia     SVT w/ PVCs, on propranolol, follows w/ Dr. Liang   • Aspirin long-term use     for heart health   • Depression    • Dyspepsia    • Dyspnea on exertion    • Fatigue    • Generalized edema     r/t weight gain   • Genital HSV     Famvir for prophylaxis   • GERD (gastroesophageal reflux disease)     daily Prilosec, denies prior eval   • Hard to intubate     states was told this after her hysterectomy.  states had a \"lidocaine lollipop\"   • Hypertension    • Interstitial " cystitis    • Migraine     Topamax w/ prn Treximet/Phenergan/Zofran   • Mitral valve prolapse    • Normal vaginal Papanicolaou smear in patient with history of hysterectomy 09/16/2016   • Obesity    • MAREN (obstructive sleep apnea)    • Peripheral edema    • PONV (postoperative nausea and vomiting)    • Post concussion syndrome     s/p fall w/ frontal lobe head injury 9/2017, on Elavil   • Sleep apnea     newly dx, CPAP compliant   • Stress incontinence     follows w/ Dr. Galvan, on Myrbetriq, s/p cystoscopy w/ bulking agent   • Wears contact lenses      Past Surgical History:   Procedure Laterality Date   • ANTERIOR AND POSTERIOR VAGINAL REPAIR  06/23/2010    DR EDIN BYRD   • BREAST LUMPECTOMY Left 2015    benign   • CYSTOSCOPY W/ BULKING AGENT INJECTION N/A 6/25/2019    Procedure: CYSTOSCOPY WITH URETHRAL  BULKING AGENT INJECTION;  Surgeon: Jose Galvan MD;  Location: [facility] OR;  Service: Urology   • CYSTOSCOPY, DIMETHYL SULFOXIDE COCKTAIL  03/14/2011    WITH HYDRODISTENTION (DR AVINASH MADDOX)   • ENDOSCOPY     • ENDOSCOPY N/A 12/17/2019    Procedure: ESOPHAGOGASTRODUODENOSCOPY;  Surgeon: Tracee Onofre MD;  Location: [facility] OR;  Service: Bariatric   • GASTRIC SLEEVE LAPAROSCOPIC N/A 12/17/2019    Procedure: GASTRIC SLEEVE LAPAROSCOPIC;  Surgeon: Tracee Onofre MD;  Location: [facility] OR;  Service: Bariatric   • HIATAL HERNIA REPAIR N/A 12/17/2019    Procedure: HIATAL HERNIA REPAIR LAPAROSCOPIC;  Surgeon: Tracee Onofre MD;  Location: [facility] OR;  Service: Bariatric   • HYSTERECTOMY  06/23/2010    LAV (DR EDIN BYRD)   • LAPAROSCOPIC CHOLECYSTECTOMY  2013    for stones   • TRANSVAGINAL TAPING SUSPENSION WITH OBTURATOR  06/23/2010    DR EDIN BYRD   • WISDOM TOOTH EXTRACTION  1990     No outpatient medications have been marked as taking for the 10/15/20 encounter (Telemedicine) with Tracee Onofre MD.     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  Never actually taken it internally.   • Latex Anaphylaxis and Rash   • Morphine And Related Rash     Rash (if gets on skin).  Never taken it internally   • Penicillins Shortness Of Breath and Palpitations     Keflex OK   • Oxycodone Other (See Comments)     Dizziness, confusion     • Topiramate Other (See Comments)     Pt states she can take NAME BRAND ONLY.  Generic brand \"doesn't work\"       Social History     Socioeconomic History   • Marital status:      Spouse name: Not on file   • Number of children: Not on file   • Years of education: Not on file   • Highest education level: Not on file   Occupational History   • Occupation: 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Employer: [employer]   Tobacco Use   • Smoking status: Never Smoker   • Smokeless tobacco: Never Used   Substance and Sexual Activity   • Alcohol use: No   • Drug use: No   • Sexual activity: Defer   Social History Narrative    Living in [place] w/  and 2 children.  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         There were no vitals filed for this visit.  Weight    There is no height or weight on file to calculate BMI.    Physical Exam  Constitutional:       General: She is not in acute distress.     Appearance: Normal appearance. She is not ill-appearing.   HENT:      Head: Normocephalic and atraumatic.      Nose: Nose normal.   Eyes:      General: No scleral icterus.     Extraocular Movements: Extraocular movements intact.      Conjunctiva/sclera: Conjunctivae normal.      Pupils: Pupils are equal, round, and reactive to light.   Pulmonary:      Effort: Pulmonary effort is normal. No respiratory distress.   Skin:     Coloration: Skin is not pale.   Neurological:      Mental Status: She is alert and oriented to person, place, and time.   Psychiatric:         Mood and Affect: Mood normal.         " Behavior: Behavior normal.           Assessment:      ICD-10-CM ICD-9-CM   1. Gastroesophageal reflux disease, unspecified whether esophagitis present  K21.9 530.81       Plan:      EGD with biopsy.  Pt instructed to adhere to NPO after midnight, full liquids for 24 hours before procedure.      The risks and benefits of the upper endoscopy were discussed with the patient in detail and all questions were answered.  Possibility of perforation, bleeding, aspiration, and anesthesia reaction were reviewed.  Patient agrees to proceed.    This visit was conducted as a video visit, in an effort to limit spread of the novel coronavirus during the 2020 pandemic.      Will need COVID test.

## 2020-10-16 ENCOUNTER — LAB (OUTPATIENT)
Dept: LAB | Facility: HOSPITAL | Age: 45
End: 2020-10-16

## 2020-10-16 ENCOUNTER — TRANSCRIBE ORDERS (OUTPATIENT)
Dept: LAB | Facility: HOSPITAL | Age: 45
End: 2020-10-16

## 2020-10-16 DIAGNOSIS — Z01.818 PRE-OP TESTING: Primary | ICD-10-CM

## 2020-10-16 DIAGNOSIS — Z01.818 PRE-OP TESTING: ICD-10-CM

## 2020-10-16 LAB — SARS-COV-2 RNA RESP QL NAA+PROBE: NOT DETECTED

## 2020-10-16 PROCEDURE — C9803 HOPD COVID-19 SPEC COLLECT: HCPCS

## 2020-10-16 PROCEDURE — U0004 COV-19 TEST NON-CDC HGH THRU: HCPCS

## 2020-10-20 ENCOUNTER — ANESTHESIA EVENT (OUTPATIENT)
Dept: GASTROENTEROLOGY | Facility: HOSPITAL | Age: 45
End: 2020-10-20

## 2020-10-20 ENCOUNTER — HOSPITAL ENCOUNTER (OUTPATIENT)
Facility: HOSPITAL | Age: 45
Setting detail: HOSPITAL OUTPATIENT SURGERY
Discharge: HOME | End: 2020-10-20
Attending: SURGERY | Admitting: SURGERY

## 2020-10-20 ENCOUNTER — ANESTHESIA (OUTPATIENT)
Dept: GASTROENTEROLOGY | Facility: HOSPITAL | Age: 45
End: 2020-10-20

## 2020-10-20 VITALS
HEIGHT: 64 IN | SYSTOLIC BLOOD PRESSURE: 106 MMHG | TEMPERATURE: 97.3 F | OXYGEN SATURATION: 100 % | WEIGHT: 186 LBS | DIASTOLIC BLOOD PRESSURE: 65 MMHG | RESPIRATION RATE: 16 BRPM | BODY MASS INDEX: 31.76 KG/M2 | HEART RATE: 65 BPM

## 2020-10-20 DIAGNOSIS — K21.9 GASTROESOPHAGEAL REFLUX DISEASE: ICD-10-CM

## 2020-10-20 DIAGNOSIS — R10.13 DYSPEPSIA: ICD-10-CM

## 2020-10-20 DIAGNOSIS — K21.9 GASTROESOPHAGEAL REFLUX DISEASE, ESOPHAGITIS PRESENCE NOT SPECIFIED: ICD-10-CM

## 2020-10-20 PROCEDURE — S0260 H&P FOR SURGERY: HCPCS | Performed by: SURGERY

## 2020-10-20 PROCEDURE — 25010000002 PROPOFOL 1000 MG/100ML EMULSION: Performed by: NURSE ANESTHETIST, CERTIFIED REGISTERED

## 2020-10-20 PROCEDURE — 25010000002 FENTANYL CITRATE (PF) 100 MCG/2ML SOLUTION: Performed by: NURSE ANESTHETIST, CERTIFIED REGISTERED

## 2020-10-20 PROCEDURE — 25010000002 ONDANSETRON PER 1 MG: Performed by: NURSE ANESTHETIST, CERTIFIED REGISTERED

## 2020-10-20 PROCEDURE — 43239 EGD BIOPSY SINGLE/MULTIPLE: CPT | Performed by: SURGERY

## 2020-10-20 RX ORDER — LIDOCAINE HYDROCHLORIDE 20 MG/ML
INJECTION, SOLUTION INTRAVENOUS AS NEEDED
Status: DISCONTINUED | OUTPATIENT
Start: 2020-10-20 | End: 2020-10-20 | Stop reason: SURG

## 2020-10-20 RX ORDER — DEXLANSOPRAZOLE 60 MG/1
60 CAPSULE, DELAYED RELEASE ORAL DAILY
Qty: 30 CAPSULE | Refills: 11 | Status: SHIPPED | OUTPATIENT
Start: 2020-10-20 | End: 2021-09-22 | Stop reason: ALTCHOICE

## 2020-10-20 RX ORDER — FENTANYL CITRATE 50 UG/ML
INJECTION, SOLUTION INTRAMUSCULAR; INTRAVENOUS AS NEEDED
Status: DISCONTINUED | OUTPATIENT
Start: 2020-10-20 | End: 2020-10-20 | Stop reason: SURG

## 2020-10-20 RX ORDER — SODIUM CHLORIDE 9 MG/ML
150 INJECTION, SOLUTION INTRAVENOUS CONTINUOUS
Status: DISCONTINUED | OUTPATIENT
Start: 2020-10-20 | End: 2020-10-20 | Stop reason: HOSPADM

## 2020-10-20 RX ORDER — ONDANSETRON 2 MG/ML
INJECTION INTRAMUSCULAR; INTRAVENOUS AS NEEDED
Status: DISCONTINUED | OUTPATIENT
Start: 2020-10-20 | End: 2020-10-20 | Stop reason: SURG

## 2020-10-20 RX ORDER — PROPOFOL 10 MG/ML
INJECTION, EMULSION INTRAVENOUS AS NEEDED
Status: DISCONTINUED | OUTPATIENT
Start: 2020-10-20 | End: 2020-10-20 | Stop reason: SURG

## 2020-10-20 RX ORDER — ONDANSETRON 2 MG/ML
4 INJECTION INTRAMUSCULAR; INTRAVENOUS ONCE AS NEEDED
Status: DISCONTINUED | OUTPATIENT
Start: 2020-10-20 | End: 2020-10-20 | Stop reason: HOSPADM

## 2020-10-20 RX ADMIN — ONDANSETRON 4 MG: 2 INJECTION INTRAMUSCULAR; INTRAVENOUS at 08:11

## 2020-10-20 RX ADMIN — FENTANYL CITRATE 100 MCG: 50 INJECTION, SOLUTION INTRAMUSCULAR; INTRAVENOUS at 08:09

## 2020-10-20 RX ADMIN — PROPOFOL 50 MG: 10 INJECTION, EMULSION INTRAVENOUS at 08:09

## 2020-10-20 RX ADMIN — LIDOCAINE HYDROCHLORIDE 100 MG: 20 INJECTION, SOLUTION INTRAVENOUS at 08:09

## 2020-10-20 RX ADMIN — SODIUM CHLORIDE 500 ML: 9 INJECTION, SOLUTION INTRAVENOUS at 07:20

## 2020-10-20 RX ADMIN — SODIUM CHLORIDE 150 ML/HR: 9 INJECTION, SOLUTION INTRAVENOUS at 07:49

## 2020-10-20 RX ADMIN — SODIUM CHLORIDE: 9 INJECTION, SOLUTION INTRAVENOUS at 08:09

## 2020-10-20 NOTE — ANESTHESIA PREPROCEDURE EVALUATION
Anesthesia Evaluation     Patient summary reviewed and Nursing notes reviewed   history of anesthetic complications: PONV difficult airway  NPO Solid Status: > 8 hours  NPO Liquid Status: > 8 hours           Airway   Mallampati: II  TM distance: >3 FB  Neck ROM: full  Difficult intubation highly probable  Dental - normal exam     Pulmonary - normal exam   (+) asthma,shortness of breath, sleep apnea,   Cardiovascular - normal exam    (+) valvular problems/murmurs, dysrhythmias Tachycardia,   (-) hypertension      Neuro/Psych  (+) headaches, psychiatric history Depression,     GI/Hepatic/Renal/Endo    (+) obesity,  GERD,      Musculoskeletal (-) negative ROS    Abdominal  - normal exam    Bowel sounds: normal.   Substance History - negative use     OB/GYN negative ob/gyn ROS         Other                          Anesthesia Plan    ASA 3     MAC   (Severe PONV requiring overnight hospitalization after Lap Brittny)  intravenous induction     Anesthetic plan, all risks, benefits, and alternatives have been provided, discussed and informed consent has been obtained with: patient.    Plan discussed with CRNA.

## 2020-10-20 NOTE — OP NOTE
PROCEDURE NOTE.    Date: 10/20/20    Patient: Renae Bolton     Surgeon: Tracee Onofre MD      Preoperative Diagnosis: GERD     Postoperative Diagnosis: GERD, antritis     Procedure Performed: Esophagogastroduodenoscopy with biopsy (CPT 50314)    Findings: GE junction at 38 cm.  No recurrent hiatal hernia.  The sleeved stomach is a narrow uniform tube without twist or stricture.  Mild antritis.     Specimens: Distal esophageal (37 cm) and antral biopsies     Indications: Renae Bolton is a 45 y.o. year old female who is status post laparoscopic sleeve gastrectomy and hiatal hernia repair by me in December 2019.  She has struggled with reflux since her sleeve, poorly controlled on both proton pump inhibitor and H2 blocker.  She has dysphagia, nighttime aspiration, and also cramping with dairy foods.  Upper GI on 6/24/20 showed reflux, but did not demonstrate gastric stricture or recurrent hiatal hernia.  She presents today for diagnostic endoscopy.       Procedure:      After informed consent was taken, the patient was brought to the operating room and placed in the supine position. MAC was given by anesthesia staff. A bite block was placed and time out performed. A flexible endoscope was passed transorally down to the second portion of the duodenum without difficulty. The scope was slowly withdrawn and the anatomy examined with photodocumentation throughout. The duodenum was normal. The pylorus was without spasm. The antrum of the stomach was notable for gastritis without ulceration.  A biopsy was taken to rule out H. Pylori.  I did not note bile reflux.  The stomach was a narrow, uniform tube without twist or stricture.  There was no narrowing at the incisura.  The scope was withdrawn back into the esophagus after decompressing the stomach. The GE junction was at 38 cm and the distal esophagus showed no gross evidence of reflux esophagitis. Biopsy was taken. There was no recurrent hiatal hernia  and the hiatus was not abnormally narrowed. The scope was further withdrawn. There was no other esophageal abnormality. The vocal cords were normal. The scope was withdrawn completely and the procedure concluded. The patient was awakened and taken to recovery in good condition.      Recommendations: We will discuss biopsy results at next office appointment.

## 2020-10-20 NOTE — ANESTHESIA POSTPROCEDURE EVALUATION
Patient: Renae Bolton    Procedure Summary     Date: 10/20/20 Room / Location: [facility] ENDOSCOPY 3 / [facility] ENDOSCOPY    Anesthesia Start: 0808 Anesthesia Stop: 0822    Procedure: ESOPHAGOGASTRODUODENOSCOPY WITH BIOPSY (N/A ) Diagnosis:       Dyspepsia      Gastroesophageal reflux disease, esophagitis presence not specified      (Dyspepsia [R10.13])      (Gastroesophageal reflux disease, esophagitis presence not specified [K21.9])    Surgeon: Tracee Onofre MD Provider: Edin Best CRNA    Anesthesia Type: MAC ASA Status: 3          Anesthesia Type: MAC    Vitals  Vitals Value Taken Time   /65 10/20/20 0855   Temp     Pulse 65 10/20/20 0855   Resp 16 10/20/20 0855   SpO2 100 % 10/20/20 0855           Post Anesthesia Care and Evaluation    Patient location during evaluation: bedside  Patient participation: complete - patient participated  Level of consciousness: awake  Pain score: 0  Pain management: adequate  Airway patency: patent  Anesthetic complications: No anesthetic complications  PONV Status: controlled  Cardiovascular status: acceptable and stable  Respiratory status: acceptable and room air  Hydration status: acceptable

## 2020-10-20 NOTE — H&P
"[facility]ariat[facility]   [address]   [address]   [facility] [id number] [phone]   Patient Name: Renae Bolton.   YOB: 1975   Date of Visit: 10/15/2020   Reason for Visit: reflux   HPI: Renae Bolton is a 45 y.o. female s/p LSG/HHR 12/17/19 w/ Dr. Onofre   \"LOV:   C/o severe reflux since LSG. On omeprazole 40 mg bid. Her PCP told her to add Pepcid. Reflux is incompletely controlled by this regimen. Sometimes wakes up aspirating.   C/o dysphagia and also lactose intolerance. Has nausea/cramping with all dairy. Also reflux. Thinks she might be gluten intolerant. Eats gluten-free--seems to help. Aso taking apple cider vinegar, ox bile and pancreatin supplements from a wellness clinic which has helped.   Omeprazole 40 mg daily doesn't control reflux. Pepcid helped some.   UGI 6/24/20:   IMPRESSION:   Gastroesophageal reflux into the distal esophagus.   I had ordered an EGD, but she was in quarantine due to her 's COVID diagnosis. She was tested and was negative.\"   Today's update:   Still gaining weight. Up 7 pounds since LOV. Not getting enough protein and no longer exercising.   Has gotten the [employer] so she can get some nutrition without lactose due to lactose intolerance.   UGI showed reflux. Has dysphagia and lactose intolerance. Reflux.   Medical History        Past Medical History:   Diagnosis Date   • Anxiety    • Arrhythmia     SVT w/ PVCs, on propranolol, follows w/ Dr. Liang   • Aspirin long-term use     for heart health   • Depression    • Dyspepsia    • Dyspnea on exertion    • Fatigue    • Generalized edema     r/t weight gain   • Genital HSV     Famvir for prophylaxis   • GERD (gastroesophageal reflux disease)     daily Prilosec, denies prior eval   • Hard to intubate     states was told this after her hysterectomy. states had a \"lidocaine lollipop\"   • Hypertension    • Interstitial cystitis    • Migraine     " Topamax w/ prn Treximet/Phenergan/Zofran   • Mitral valve prolapse    • Normal vaginal Papanicolaou smear in patient with history of hysterectomy 09/16/2016   • Obesity    • MAREN (obstructive sleep apnea)    • Peripheral edema    • PONV (postoperative nausea and vomiting)    • Post concussion syndrome     s/p fall w/ frontal lobe head injury 9/2017, on Elavil   • Sleep apnea     newly dx, CPAP compliant   • Stress incontinence     follows w/ Dr. Galvan, on Myrbetriq, s/p cystoscopy w/ bulking agent   • Wears contact lenses      Surgical History         Past Surgical History:   Procedure Laterality Date   • ANTERIOR AND POSTERIOR VAGINAL REPAIR  06/23/2010    DR EDIN BYRD   • BREAST LUMPECTOMY Left 2015    benign   • CYSTOSCOPY W/ BULKING AGENT INJECTION N/A 6/25/2019    Procedure: CYSTOSCOPY WITH URETHRAL BULKING AGENT INJECTION; Surgeon: Jose Galvan MD; Location: [facility] OR; Service: Urology   • CYSTOSCOPY, DIMETHYL SULFOXIDE COCKTAIL  03/14/2011    WITH HYDRODISTENTION (DR AVINASH MADDOX)   • ENDOSCOPY     • ENDOSCOPY N/A 12/17/2019    Procedure: ESOPHAGOGASTRODUODENOSCOPY; Surgeon: Tracee Onofre MD; Location: [facility] OR; Service: Bariatric   • GASTRIC SLEEVE LAPAROSCOPIC N/A 12/17/2019    Procedure: GASTRIC SLEEVE LAPAROSCOPIC; Surgeon: Tracee Onofre MD; Location: [facility] OR; Service: Bariatric   • HIATAL HERNIA REPAIR N/A 12/17/2019    Procedure: HIATAL HERNIA REPAIR LAPAROSCOPIC; Surgeon: Tracee Onofre MD; Location: [facility] OR; Service: Bariatric   • HYSTERECTOMY  06/23/2010    [facility] (DR EDIN BYRD)   • LAPAROSCOPIC CHOLECYSTECTOMY  2013    for stones   • TRANSVAGINAL TAPING SUSPENSION WITH OBTURATOR  06/23/2010    DR EDIN BYRD   • WISDOM TOOTH EXTRACTION  1990     Medications Taking   No outpatient medications have been marked as taking for the 10/15/20 encounter (Telemedicine) with Tracee Onofre MD.           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 Never actually taken it internally.   • Latex Anaphylaxis and Rash   • Morphine And Related Rash     Rash (if gets on skin). Never taken it internally   • Penicillins Shortness Of Breath and Palpitations     Keflex OK   • Oxycodone Other (See Comments)     Dizziness, confusion    • Topiramate Other (See Comments)     Pt states she can take NAME BRAND ONLY. Generic brand \"doesn't work\"     Social History   Social History         Socioeconomic History   • Marital status:      Spouse name: Not on file   • Number of children: Not on file   • Years of education: Not on file   • Highest education level: Not on file   Occupational History   • Occupation: 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Employer: [employer]   Tobacco Use   • Smoking status: Never Smoker   • Smokeless tobacco: Never Used   Substance and Sexual Activity   • Alcohol use: No   • Drug use: No   • Sexual activity: Defer   Social History Narrative    Living in [place] w/  and 2 children. 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.    There were no vitals filed for this visit.   Weight   There is no height or weight on file to calculate BMI.   Physical Exam   Constitutional:   General: She is not in acute distress.  Appearance: Normal appearance. She is not ill-appearing.   HENT:   Head: Normocephalic and atraumatic.   Nose: Nose normal.   Eyes:   General: No scleral icterus.  Extraocular Movements: Extraocular movements intact.   Conjunctiva/sclera: Conjunctivae normal.   Pupils: Pupils are equal, round, and reactive to light.   Pulmonary:   Effort: Pulmonary effort is normal. No respiratory distress.   Skin:   Coloration: Skin is not pale.   Neurological:   Mental Status: She is alert and oriented to person, place, and time.   Psychiatric:   Mood and Affect: Mood normal.   Behavior: Behavior " normal.     Assessment:     ICD-10-CM ICD-9-CM   1. Gastroesophageal reflux disease, unspecified whether esophagitis present  K21.9 530.81   Plan:   EGD with biopsy. Pt instructed to adhere to NPO after midnight, full liquids for 24 hours before procedure.   The risks and benefits of the upper endoscopy were discussed with the patient in detail and all questions were answered. Possibility of perforation, bleeding, aspiration, and anesthesia reaction were reviewed. Patient agrees to proceed.   This visit was conducted as a video visit, in an effort to limit spread of the novel coronavirus during the 2020 pandemic.       10/20/20    The patient has been seen and examined by me today. There are no changes to the H&P.      Normal heart sounds, no murmur  Lungs are clear to auscultation b/l

## 2020-10-22 LAB
LAB AP CASE REPORT: NORMAL
PATH REPORT.FINAL DX SPEC: NORMAL

## 2020-11-11 ENCOUNTER — OFFICE VISIT (OUTPATIENT)
Dept: PULMONOLOGY | Facility: CLINIC | Age: 45
End: 2020-11-11

## 2020-11-11 VITALS
OXYGEN SATURATION: 98 % | WEIGHT: 192 LBS | DIASTOLIC BLOOD PRESSURE: 80 MMHG | TEMPERATURE: 97.1 F | HEIGHT: 64 IN | SYSTOLIC BLOOD PRESSURE: 120 MMHG | HEART RATE: 68 BPM | BODY MASS INDEX: 32.78 KG/M2 | RESPIRATION RATE: 18 BRPM

## 2020-11-11 DIAGNOSIS — E66.9 OBESITY (BMI 30-39.9): ICD-10-CM

## 2020-11-11 DIAGNOSIS — G47.33 OSA (OBSTRUCTIVE SLEEP APNEA): Primary | ICD-10-CM

## 2020-11-11 DIAGNOSIS — G47.19 EXCESSIVE DAYTIME SLEEPINESS: ICD-10-CM

## 2020-11-11 PROCEDURE — 99213 OFFICE O/P EST LOW 20 MIN: CPT | Performed by: NURSE PRACTITIONER

## 2020-11-11 NOTE — PROGRESS NOTES
"Chief Complaint   Patient presents with   • Follow-up   • Sleeping Problem         Subjective   Renae Bolton is a 45 y.o. female.     History of Present Illness   Patient comes back today for follow up of Obstructive Sleep apnea. she doesn't report any issues with the device or mask.     Patient says that she is compliant with her device and using it regularly.    Patient's symptoms of sleep disturbance and daytime sleepiness have been helped greatly with the use of PAP device, as prescribed.  Overall she feels rested most days upon awakening.    She has lost about 40 pounds after having the gastric sleeve procedure.  She is hoping to continue weight loss.      The following portions of the patient's history were reviewed and updated as appropriate: allergies, current medications, past family history, past medical history, past social history and past surgical history.    Review of Systems   Constitutional: Negative for chills and fever.   HENT: Negative for rhinorrhea, sinus pressure, sneezing and sore throat.    Respiratory: Negative for cough, chest tightness, shortness of breath and wheezing.    Psychiatric/Behavioral: Negative for sleep disturbance.       Objective   Visit Vitals  /80   Pulse 68   Temp 97.1 °F (36.2 °C)   Resp 18   Ht 162.6 cm (64\")   Wt 87.1 kg (192 lb)   SpO2 98%   BMI 32.96 kg/m²         Physical Exam  Vitals signs reviewed.   HENT:      Head: Atraumatic.      Mouth/Throat:      Mouth: Mucous membranes are moist.      Pharynx: Oropharynx is clear.      Comments: Crowded oropharynx.   Neck:      Musculoskeletal: Neck supple.   Cardiovascular:      Rate and Rhythm: Normal rate and regular rhythm.   Pulmonary:      Effort: Pulmonary effort is normal. No respiratory distress.   Neurological:      Mental Status: She is alert and oriented to person, place, and time.         Assessment/Plan   Diagnoses and all orders for this visit:    1. MAREN (obstructive sleep apnea) (Primary)  -     " BIPAP / CPAP Adjustment    2. Obesity (BMI 30-39.9)    3. Excessive daytime sleepiness           Return for keep appt in May.    DISCUSSION (if any):  Continue treatment with AutoPAP at a pressure of 5/15, with a nasal pillows.    Patient's compliance data was reviewed and the compliance is greater than 70%.    Humidification setup, hose and mask care discussed.    I have congratulated her on her weight loss and encouraged her to continue.    Use every night for at least 4 hours stressed.      Dictated utilizing Dragon dictation.    This document was electronically signed by LAURITA Heredia November 11, 2020  10:06 EST

## 2020-11-19 ENCOUNTER — TELEMEDICINE (OUTPATIENT)
Dept: BARIATRICS/WEIGHT MGMT | Facility: CLINIC | Age: 45
End: 2020-11-19

## 2020-11-19 DIAGNOSIS — K21.9 GASTROESOPHAGEAL REFLUX DISEASE, UNSPECIFIED WHETHER ESOPHAGITIS PRESENT: Primary | ICD-10-CM

## 2020-11-19 PROCEDURE — 99423 OL DIG E/M SVC 21+ MIN: CPT | Performed by: SURGERY

## 2020-11-19 NOTE — PROGRESS NOTES
"[facility]  [address]  [address]  [facility] [address] [phone]        Patient Name: Renae Bolton.  YOB: 1975      Date of Visit: 11/19/2020      Reason for Visit:  F/u EGD results    HPI:  Renae Bolton is a 45 y.o. female s/p LSG/HHR 12/17/19 w/ Dr. Onofre      10/20/20/ EGD:  GE junction at 38 cm.  No recurrent hiatal hernia.  The sleeved stomach is a narrow uniform tube without twist or stricture.  Mild antritis.    Path: mild chronic inactive gastritis, normal esophageal biopsy    UGI 6/24/20:  IMPRESSION:  Gastroesophageal reflux into the distal esophagus.     Has had complaint of nighttime aspiration, reflux, and lactose intolerance.  On Dexilant daily.      It works better than omeprazole, still some breakthrough reflux at night.  Not taking any Pepcid.     Her weight loss is stalled still.  Has more hunger before meal time and has more cravings.  She has increased protein to 100 g/day.  She is also walking and using exercise back, also strength exercises.  She has had some change in her weight, and seems to carry more weight in abdomen and hips.      Past Medical History:   Diagnosis Date   • Anxiety    • Arrhythmia     SVT w/ PVCs, on propranolol, follows w/ Dr. Liang   • Aspirin long-term use     for heart health   • Depression    • Dyspepsia    • Dyspnea on exertion    • Fatigue    • Generalized edema     r/t weight gain   • Genital HSV     Famvir for prophylaxis   • GERD (gastroesophageal reflux disease)     daily Prilosec, denies prior eval   • Hard to intubate     states was told this after her hysterectomy.  states had a \"lidocaine lollipop\"   • Hypertension    • Interstitial cystitis    • Kidney stones    • Migraine     Topamax w/ prn Treximet/Phenergan/Zofran   • Mitral valve prolapse    • Normal vaginal Papanicolaou smear in patient with history of hysterectomy 09/16/2016   • Obesity    • MAREN (obstructive sleep " apnea)    • Peripheral edema    • PONV (postoperative nausea and vomiting)    • Post concussion syndrome     s/p fall w/ frontal lobe head injury 9/2017, on Elavil   • Sleep apnea     newly dx, CPAP compliant   • Stress incontinence     follows w/ Dr. Galvan, on Myrbetriq, s/p cystoscopy w/ bulking agent   • Wears contact lenses      Past Surgical History:   Procedure Laterality Date   • ANTERIOR AND POSTERIOR VAGINAL REPAIR  06/23/2010    DR EDIN BYRD   • BREAST LUMPECTOMY Left 2015    benign   • CYSTOSCOPY W/ BULKING AGENT INJECTION N/A 6/25/2019    Procedure: CYSTOSCOPY WITH URETHRAL  BULKING AGENT INJECTION;  Surgeon: Jose Galvan MD;  Location: [facility] OR;  Service: Urology   • CYSTOSCOPY, DIMETHYL SULFOXIDE COCKTAIL  03/14/2011    WITH HYDRODISTENTION (DR AVINASH MADDOX)   • ENDOSCOPY     • ENDOSCOPY N/A 12/17/2019    Procedure: ESOPHAGOGASTRODUODENOSCOPY;  Surgeon: Tracee Onofre MD;  Location: [facility] OR;  Service: Bariatric   • ENDOSCOPY N/A 10/20/2020    Procedure: ESOPHAGOGASTRODUODENOSCOPY WITH BIOPSY;  Surgeon: Tracee Onofre MD;  Location: [facility] ENDOSCOPY;  Service: General;  Laterality: N/A;   • GASTRIC SLEEVE LAPAROSCOPIC N/A 12/17/2019    Procedure: GASTRIC SLEEVE LAPAROSCOPIC;  Surgeon: Tracee Onofre MD;  Location: [facility] OR;  Service: Bariatric   • HIATAL HERNIA REPAIR N/A 12/17/2019    Procedure: HIATAL HERNIA REPAIR LAPAROSCOPIC;  Surgeon: Tracee Onofre MD;  Location: [facility] OR;  Service: Bariatric   • HYSTERECTOMY  06/23/2010    [facility] (DR EDIN BY[facility])   • LAPAROSCOPIC CHOLECYSTECTOMY  2013    for stones   • TRANSVAGINAL TAPING SUSPENSION WITH OBTURATOR  06/23/2010    DR EDIN BYRD   • WISDOM TOOTH EXTRACTION  1990     Outpatient Medications Marked as Taking for the 11/19/20 encounter (Telemedicine) with Tracee Onofre MD   Medication Sig Dispense Refill   • acyclovir (ZOVIRAX) 5 % ointment Apply  topically to the appropriate area as directed Every 3  (Three) Hours. (Patient taking differently: Apply 1 application topically to the appropriate area as directed Take As Directed.) 30 g 6   • albuterol sulfate  (90 Base) MCG/ACT inhaler INHALE 1 TO 2 PUFFS BY MOUTH EVERY 4 TO 6 HOURS AS NEEDED. 8.5 g 3   • Biotin (Biotin 5000) 5 MG capsule Take 1 capsule by mouth Daily.     • cetirizine (zyrTEC) 10 MG tablet Take 1 tablet by mouth Daily. 90 tablet 3   • Cholecalciferol (Vitamin D3) 250 MCG (32964 UT) tablet Take 1,000 Units by mouth Daily.     • clobetasol propionate (CLOBEX) 0.05 % shampoo apply TO HAIR AND SCALP as directed  0   • Cyanocobalamin (VITAMIN B12) 1000 MCG tablet controlled-release      • cyclobenzaprine (FLEXERIL) 5 MG tablet Take 1 tablet by mouth 3 (Three) Times a Day As Needed. 270 tablet 3   • dexlansoprazole (Dexilant) 60 MG capsule Take 1 capsule by mouth Daily 30 capsule 11   • docusate sodium (COLACE) 100 MG capsule Take 250 mg by mouth Daily.     • DULoxetine (CYMBALTA) 60 MG capsule Take 1 capsule by mouth Daily. 90 capsule 1   • famciclovir (FAMVIR) 250 MG tablet Take 1 tablet by mouth 3 (Three) Times a Day. (Patient taking differently: Take 250 mg by mouth Daily.) 270 tablet 4   • MAGNESIUM CITRATE PO Take 270 mg by mouth Daily.     • Mirabegron ER (MYRBETRIQ) 50 MG tablet sustained-release 24 hour 24 hr tablet Take 1 tablet by mouth Daily. 90 tablet 3   • naratriptan (AMERGE) 1 MG tablet Take 1 tablet by mouth 1 (One) Time As Needed for Migraine for up to 1 dose. 10 tablet 2   • NON FORMULARY Multivitamin patch and iron patch daily   Collagen patch daily     • ondansetron (ZOFRAN) 4 MG tablet Take 4 mg by mouth Every 8 (Eight) Hours As Needed for Nausea or Vomiting.     • Probiotic Product (PROBIOTIC PO) Take 1 capsule by mouth Daily.     • promethazine (PHENERGAN) 25 MG tablet Take 1 tablet by mouth Every 6 (Six) Hours As Needed for nausea 30 tablet 4   • propranolol LA (INDERAL LA) 120 MG 24 hr capsule Take 1 capsule by mouth  "Daily. 90 capsule 3   • Topiramate ER (Trokendi XR) 100 MG capsule sustained-release 24 hr Take 1 capsule by mouth Daily. 30 capsule 11   • Ubrogepant (ubrogepant) 50 MG tablet Take 1 tablet by mouth 2 (Two) Times a Day As Needed for migraine for up to 2 doses. Wait 2 hours before 2nd dose. 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  Never actually taken it internally.   • Latex Anaphylaxis and Rash   • Morphine And Related Rash     Rash (if gets on skin).  Never taken it internally   • Penicillins Shortness Of Breath and Palpitations     Keflex OK   • Oxycodone Other (See Comments)     Dizziness, confusion     • Topiramate Other (See Comments)     Pt states she can take NAME BRAND ONLY.  Generic brand \"doesn't work\"       Social History     Socioeconomic History   • Marital status:      Spouse name: Not on file   • Number of children: Not on file   • Years of education: Not on file   • Highest education level: Not on file   Occupational History   • Occupation: 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Employer: [employer]   Tobacco Use   • Smoking status: Never Smoker   • Smokeless tobacco: Never Used   Substance and Sexual Activity   • Alcohol use: No   • Drug use: No   • Sexual activity: Defer   Social History Narrative    Living in [place] w/  and 2 children.  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         There were no vitals filed for this visit.  Weight    There is no height or weight on file to calculate BMI.    Physical Exam  Constitutional:       General: She is not in acute distress.     Appearance: Normal appearance. She is not ill-appearing.   HENT:      Head: Normocephalic and atraumatic.      Nose: Nose normal.   Eyes:      General: No scleral icterus.     Extraocular Movements: Extraocular movements intact. "      Conjunctiva/sclera: Conjunctivae normal.      Pupils: Pupils are equal, round, and reactive to light.   Pulmonary:      Effort: Pulmonary effort is normal. No respiratory distress.   Skin:     Coloration: Skin is not pale.   Neurological:      Mental Status: She is alert and oriented to person, place, and time.   Psychiatric:         Mood and Affect: Mood normal.         Behavior: Behavior normal.           Assessment:      ICD-10-CM ICD-9-CM   1. Gastroesophageal reflux disease, unspecified whether esophagitis present  K21.9 530.81       Plan:   Dexilant is covering reflux symptoms better.  Add Pepcid at night for breakthrough.    No technical issue with sleeve.  Will continue with max medical treatment of reflux.      MAREN: still uses CPAP.      We discussed gastric bypass as a surgical fix for reflux, but since improving on Dexilant she is comfortable with medical therapy.  Continue to avoid dietary triggers.      This visit was conducted as a video visit, in an effort to limit spread of the novel coronavirus during the 2020 pandemic.      F/u in 1 month for annual appointment.

## 2020-12-16 ENCOUNTER — TELEMEDICINE (OUTPATIENT)
Dept: BARIATRICS/WEIGHT MGMT | Facility: CLINIC | Age: 45
End: 2020-12-16

## 2020-12-16 VITALS — HEIGHT: 64 IN | BODY MASS INDEX: 32.61 KG/M2 | WEIGHT: 191 LBS

## 2020-12-16 DIAGNOSIS — Z13.0 SCREENING, IRON DEFICIENCY ANEMIA: ICD-10-CM

## 2020-12-16 DIAGNOSIS — E55.9 HYPOVITAMINOSIS D: ICD-10-CM

## 2020-12-16 DIAGNOSIS — Z90.3 POSTGASTRECTOMY MALABSORPTION: ICD-10-CM

## 2020-12-16 DIAGNOSIS — R53.83 FATIGUE, UNSPECIFIED TYPE: Primary | ICD-10-CM

## 2020-12-16 DIAGNOSIS — K21.9 GASTROESOPHAGEAL REFLUX DISEASE, UNSPECIFIED WHETHER ESOPHAGITIS PRESENT: ICD-10-CM

## 2020-12-16 DIAGNOSIS — Z13.21 MALNUTRITION SCREEN: ICD-10-CM

## 2020-12-16 DIAGNOSIS — K91.2 POSTGASTRECTOMY MALABSORPTION: ICD-10-CM

## 2020-12-16 PROCEDURE — 99213 OFFICE O/P EST LOW 20 MIN: CPT | Performed by: SURGERY

## 2020-12-16 NOTE — PROGRESS NOTES
"[facility]ariat[facility] Surgery  [address]  [address]  [facility] [id number] [phone]        Patient Name:  Renae Bolton.  :  1975      Date of Visit: 2020      Reason for Visit:   1 year postop      HPI: Renae Bolton is a 45 y.o. female s/p  s/p LSG/HHR 19 w/ Dr. Onofre      \"10/20/20/ EGD:  GE junction at 38 cm.  No recurrent hiatal hernia.  The sleeved stomach is a narrow uniform tube without twist or stricture.  Mild antritis.    Path: mild chronic inactive gastritis, normal esophageal biopsy    UGI 20:  IMPRESSION:  Gastroesophageal reflux into the distal esophagus.     Has had complaint of nighttime aspiration, reflux, and lactose intolerance.  On Dexilant daily.      It works better than omeprazole, still some breakthrough reflux at night.  Not taking any Pepcid.\"    She is taking Dexilant at night.  She also takes Pepcid in AM.  Her reflux is reasonably well-controlled.  She did not have a hiatal hernia, and upon our discussion she did not desire conversion to gastric bypass.         Doing well otherwise.     Getting 80-90 g prot/day.  Drinking <64 fluid oz/day. Last labs revealed  no vitamin deficiencies. Taking Patches: MVI from [employer] (just finishing up old [employer] vitamins).  Exercise: rides exercise bike, some stretches and situps afterwards.  Has a treadmill also.  Does cardio 30-45 minutes 3-4x per week, has been doing for about 1 month.       Presurgery weight: 224 pounds.  Today's weight is 86.6 kg (191 lb) pounds, today's  Body mass index is 32.79 kg/m²., and@ weight loss since surgery is 35 pounds.      Admits diet is not very consist with regards to daily protein intake.   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    Lots of opportunities for excess carbs.  Frustrated with logging her food intake.She packs her lunch the night before, and is trying to plan better.      Past Medical History:   Diagnosis " "Date   • Anxiety    • Arrhythmia     SVT w/ PVCs, on propranolol, follows w/ Dr. Liang   • Aspirin long-term use     for heart health   • Depression    • Dyspepsia    • Dyspnea on exertion    • Fatigue    • Generalized edema     r/t weight gain   • Genital HSV     Famvir for prophylaxis   • GERD (gastroesophageal reflux disease)     daily Prilosec, denies prior eval   • Hard to intubate     states was told this after her hysterectomy.  states had a \"lidocaine lollipop\"   • Hypertension    • Interstitial cystitis    • Kidney stones    • Migraine     Topamax w/ prn Treximet/Phenergan/Zofran   • Mitral valve prolapse    • Normal vaginal Papanicolaou smear in patient with history of hysterectomy 09/16/2016   • Obesity    • MAREN (obstructive sleep apnea)    • Peripheral edema    • PONV (postoperative nausea and vomiting)    • Post concussion syndrome     s/p fall w/ frontal lobe head injury 9/2017, on Elavil   • Sleep apnea     newly dx, CPAP compliant   • Stress incontinence     follows w/ Dr. Galvan, on Myrbetriq, s/p cystoscopy w/ bulking agent   • Wears contact lenses      Past Surgical History:   Procedure Laterality Date   • ANTERIOR AND POSTERIOR VAGINAL REPAIR  06/23/2010    DR EDIN BYRD   • BREAST LUMPECTOMY Left 2015    benign   • CYSTOSCOPY W/ BULKING AGENT INJECTION N/A 6/25/2019    Procedure: CYSTOSCOPY WITH URETHRAL  BULKING AGENT INJECTION;  Surgeon: Jose Galvan MD;  Location: [facility] OR;  Service: Urology   • CYSTOSCOPY, DIMETHYL SULFOXIDE COCKTAIL  03/14/2011    WITH HYDRODISTENTION (DR AVINASH MADDOX)   • ENDOSCOPY     • ENDOSCOPY N/A 12/17/2019    Procedure: ESOPHAGOGASTRODUODENOSCOPY;  Surgeon: Tracee Onofre MD;  Location: [facility] OR;  Service: Bariatric   • ENDOSCOPY N/A 10/20/2020    Procedure: ESOPHAGOGASTRODUODENOSCOPY WITH BIOPSY;  Surgeon: Tracee Onofre MD;  Location: [facility] ENDOSCOPY;  Service: General;  Laterality: N/A;   • GASTRIC SLEEVE LAPAROSCOPIC N/A 12/17/2019    " "Procedure: GASTRIC SLEEVE LAPAROSCOPIC;  Surgeon: Tracee Onofre MD;  Location: [facility] OR;  Service: Bariatric   • HIATAL HERNIA REPAIR N/A 12/17/2019    Procedure: HIATAL HERNIA REPAIR LAPAROSCOPIC;  Surgeon: Tracee Onofre MD;  Location: [facility] OR;  Service: Bariatric   • HYSTERECTOMY  06/23/2010    [facility] (DR EDIN BYRD)   • LAPAROSCOPIC CHOLECYSTECTOMY  2013    for stones   • TRANSVAGINAL TAPING SUSPENSION WITH OBTURATOR  06/23/2010    DR EDIN BYRD   • WISDOM TOOTH EXTRACTION  1990     No outpatient medications have been marked as taking for the 12/16/20 encounter (Telemedicine) with Tracee Onofre MD.       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  Never actually taken it internally.   • Latex Anaphylaxis and Rash   • Morphine And Related Rash     Rash (if gets on skin).  Never taken it internally   • Penicillins Shortness Of Breath and Palpitations     Keflex OK   • Oxycodone Other (See Comments)     Dizziness, confusion     • Topiramate Other (See Comments)     Pt states she can take NAME BRAND ONLY.  Generic brand \"doesn't work\"       Social History     Socioeconomic History   • Marital status:      Spouse name: Not on file   • Number of children: Not on file   • Years of education: Not on file   • Highest education level: Not on file   Occupational History   • Occupation: 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Employer: [employer]   Tobacco Use   • Smoking status: Never Smoker   • Smokeless tobacco: Never Used   Substance and Sexual Activity   • Alcohol use: No   • Drug use: No   • Sexual activity: Defer   Social History Narrative    Living in [place] w/  and 2 children.  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     Appearance: Normal appearance. She is not ill-appearing.   HENT:      Head: Normocephalic and atraumatic.      Nose: Nose normal.   Eyes:      General: No scleral icterus.     Extraocular Movements: Extraocular movements intact.      Conjunctiva/sclera: Conjunctivae normal.      Pupils: Pupils are equal, round, and reactive to light.   Pulmonary:      Effort: Pulmonary effort is normal. No respiratory distress.   Skin:     Coloration: Skin is not pale.   Neurological:      Mental Status: She is alert and oriented to person, place, and time.   Psychiatric:         Mood and Affect: Mood normal.         Behavior: Behavior normal.           Assessment:  1 years s/p  s/p LSG/HHR 12/17/19 w/ Dr. Onofre      ICD-10-CM ICD-9-CM   1. Fatigue, unspecified type  R53.83 780.79   2. Hypovitaminosis D  E55.9 268.9   3. Malnutrition screen  Z13.21 V77.2   4. Postgastrectomy malabsorption  K91.2 579.3    Z90.3    5. Screening, iron deficiency anemia  Z13.0 V78.0   6. Gastroesophageal reflux disease, unspecified whether esophagitis present  K21.9 530.81         Plan:  Doing well. Continue w/ good food choices and healthy habits.  Continue protein >70g/day.  Continue routine exercise.  Routine bariatric labs ordered.  Continue vitamins w/ adjustments pending lab results.  Call w/ problems/concerns.     The patient was instructed to follow up in 3 months, sooner if needed.    Goals: until [employer], no sweets.  Only eat if at work if brought from home.  On January 1, she plans to start the liver shrinking diet.    Keep exercise.    This visit was conducted as a video visit, in an effort to limit spread of the novel coronavirus during the 6056-0025 pandemic.  The patient gave consent.      Tracee Onofre MD

## 2020-12-17 ENCOUNTER — TELEMEDICINE (OUTPATIENT)
Dept: NEUROLOGY | Facility: CLINIC | Age: 45
End: 2020-12-17

## 2020-12-17 DIAGNOSIS — G43.009 MIGRAINE WITHOUT AURA AND WITHOUT STATUS MIGRAINOSUS, NOT INTRACTABLE: Primary | ICD-10-CM

## 2020-12-17 PROCEDURE — 99422 OL DIG E/M SVC 11-20 MIN: CPT | Performed by: NURSE PRACTITIONER

## 2020-12-17 RX ORDER — SUMATRIPTAN SUCC/NAPROXEN SOD 85MG-500MG
TABLET ORAL
Qty: 9 TABLET | Refills: 5 | Status: SHIPPED | OUTPATIENT
Start: 2020-12-17 | End: 2020-12-18 | Stop reason: SDUPTHER

## 2020-12-17 NOTE — PROGRESS NOTES
Follow Up Neurology Office Visit      Patient Name: Renae Bolton    Referring Physician: No ref. provider found    Chief Complaint:  headaches      History of Present Illness: Renae Bolton is a 45 y.o. female who is here to follow up with Neurology for Headaches  Doing well  3 doses Treximet, has not tried Ubrelvy because Treximet typically works well  No longer taking Trokendi, has not noticed difference in headaches  Taking daily magnesium, thinks this has been helpful    The following portions of the patient's history were reviewed and updated as appropriate: allergies, current medications, past family history, past medical history, past social history, past surgical history and problem list.    Subjective     Review of Systems:   Review of Systems   Constitutional: Positive for unexpected weight gain.   Eyes: Negative.    Respiratory: Negative.    Cardiovascular: Negative.    Endocrine: Negative.    Genitourinary: Negative.    Allergic/Immunologic: Negative.    Neurological: Positive for headache.   Hematological: Negative.    Psychiatric/Behavioral: Positive for stress.     Medications:     Current Outpatient Medications:   •  acyclovir (ZOVIRAX) 5 % ointment, Apply  topically to the appropriate area as directed Every 3 (Three) Hours. (Patient taking differently: Apply 1 application topically to the appropriate area as directed Take As Directed.), Disp: 30 g, Rfl: 6  •  albuterol sulfate  (90 Base) MCG/ACT inhaler, INHALE 1 TO 2 PUFFS BY MOUTH EVERY 4 TO 6 HOURS AS NEEDED., Disp: 8.5 g, Rfl: 3  •  Biotin (Biotin 5000) 5 MG capsule, Take 1 capsule by mouth Daily., Disp: , Rfl:   •  cetirizine (zyrTEC) 10 MG tablet, Take 1 tablet by mouth Daily., Disp: 90 tablet, Rfl: 3  •  Cholecalciferol (Vitamin D3) 250 MCG (49487 UT) tablet, Take 1,000 Units by mouth Daily., Disp: , Rfl:   •  clobetasol propionate (CLOBEX) 0.05 % shampoo, apply TO HAIR AND SCALP as directed, Disp: , Rfl: 0  •   Cyanocobalamin (VITAMIN B12) 1000 MCG tablet controlled-release, , Disp: , Rfl:   •  cyclobenzaprine (FLEXERIL) 5 MG tablet, Take 1 tablet by mouth 3 (Three) Times a Day As Needed., Disp: 270 tablet, Rfl: 3  •  dexlansoprazole (Dexilant) 60 MG capsule, Take 1 capsule by mouth Daily, Disp: 30 capsule, Rfl: 11  •  dexlansoprazole (Dexilant) 60 MG capsule, Take 1 capsule by mouth Daily With Breakfast., Disp: 30 capsule, Rfl: 3  •  docusate sodium (COLACE) 100 MG capsule, Take 250 mg by mouth Daily., Disp: , Rfl:   •  DULoxetine (CYMBALTA) 60 MG capsule, Take 1 capsule by mouth Daily., Disp: 90 capsule, Rfl: 1  •  famciclovir (FAMVIR) 250 MG tablet, Take 1 tablet by mouth 3 (Three) Times a Day. (Patient taking differently: Take 250 mg by mouth Daily.), Disp: 270 tablet, Rfl: 4  •  MAGNESIUM CITRATE PO, Take 270 mg by mouth Daily., Disp: , Rfl:   •  Mirabegron ER (MYRBETRIQ) 50 MG tablet sustained-release 24 hour 24 hr tablet, Take 1 tablet by mouth Daily., Disp: 90 tablet, Rfl: 3  •  naratriptan (AMERGE) 1 MG tablet, Take 1 tablet by mouth 1 (One) Time As Needed for Migraine for up to 1 dose., Disp: 10 tablet, Rfl: 2  •  NON FORMULARY, Multivitamin patch and iron patch daily  Collagen patch daily, Disp: , Rfl:   •  ondansetron (ZOFRAN) 4 MG tablet, Take 4 mg by mouth Every 8 (Eight) Hours As Needed for Nausea or Vomiting., Disp: , Rfl:   •  ondansetron (ZOFRAN) 4 MG tablet, Take 1 tablet by mouth Every 6 (Six) Hours As Needed., Disp: 120 tablet, Rfl: 3  •  Probiotic Product (PROBIOTIC PO), Take 1 capsule by mouth Daily., Disp: , Rfl:   •  promethazine (PHENERGAN) 25 MG tablet, Take 1 tablet by mouth Every 6 (Six) Hours As Needed for nausea, Disp: 30 tablet, Rfl: 4  •  promethazine (PHENERGAN) 25 MG tablet, Take 1 tablet by mouth Every 6 (Six) Hours As Needed., Disp: 30 tablet, Rfl: 4  •  propranolol LA (INDERAL LA) 120 MG 24 hr capsule, Take 1 capsule by mouth Daily., Disp: 90 capsule, Rfl: 3  •  PSYLLIUM HUSK PO, Take  "2 capsules by mouth Daily., Disp: , Rfl:   •  Thiamine HCl (VITAMIN B-1 PO), Take  by mouth., Disp: , Rfl:   •  Treximet  MG per tablet, Take one tablet at onset of headache. May repeat dose one time in 2 hours if headache not relieved., Disp: 9 tablet, Rfl: 5  •  Ubrogepant (ubrogepant) 50 MG tablet, Take 1 tablet by mouth 2 (Two) Times a Day As Needed for migraine for up to 2 doses. 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  Never actually taken it internally.   • Latex Anaphylaxis and Rash   • Morphine And Related Rash     Rash (if gets on skin).  Never taken it internally   • Penicillins Shortness Of Breath and Palpitations     Keflex OK   • Oxycodone Other (See Comments)     Dizziness, confusion     • Topiramate Other (See Comments)     Pt states she can take NAME BRAND ONLY.  Generic brand \"doesn't work\"       Objective     Physical Exam:  Vital Signs: There were no vitals filed for this visit.    Physical Exam  Neurological:      Mental Status: She is oriented to person, place, and time.   Psychiatric:         Speech: Speech normal.       Neurologic Exam     Mental Status   Oriented to person, place, and time.   Attention: normal. Concentration: normal.   Speech: speech is normal   Knowledge: good.   Normal comprehension.     Cranial Nerves     CN VII   Facial expression full, symmetric.     CN VIII   Hearing: intact    Results Review:   I have reviewed the patient's other medical records to include, labs, radiology and referrals.     Assessment / Plan      Assessment/Plan:   Diagnoses and all orders for this visit:    1. Migraine without aura and without " status migrainosus, not intractable (Primary)  -     Treximet  MG per tablet; Take one tablet at onset of headache. May repeat dose one time in 2 hours if headache not relieved.  Dispense: 9 tablet; Refill: 5    Patient headache pattern has improved significantly, she estimates only 3 migraine headache episodes since previous visit.  She has not tried Ubrelvy because Treximet works very well for her, she has requested a refill of this to mail order pharmacy and this was provided today.  We discussed medication options, patient does not feel need for prescription daily preventative at this time.  She was encouraged to continue with daily magnesium, and consider adding B complex vitamin as well.  She was instructed to contact provider with any change in headache pattern.  Follow Up:   Return in about 3 months (around 3/17/2021).    Marcie Crum APREDITH  [facility] Neurology[facility]iatric     This was an audio and video enabled telemedicine encounter lasting 11 minutes    Please note that portions of this note may have been completed with a voice recognition program. Efforts were made to edit the dictations, but occasionally words are mistranscribed.

## 2020-12-18 ENCOUNTER — TELEPHONE (OUTPATIENT)
Dept: NEUROLOGY | Facility: CLINIC | Age: 45
End: 2020-12-18

## 2020-12-18 DIAGNOSIS — G43.719 INTRACTABLE CHRONIC MIGRAINE WITHOUT AURA AND WITHOUT STATUS MIGRAINOSUS: Primary | ICD-10-CM

## 2020-12-18 RX ORDER — SUMATRIPTAN SUCC/NAPROXEN SOD 85MG-500MG
TABLET ORAL
Qty: 9 TABLET | Refills: 5 | Status: SHIPPED | OUTPATIENT
Start: 2020-12-18 | End: 2021-06-14 | Stop reason: SDUPTHER

## 2020-12-18 NOTE — TELEPHONE ENCOUNTER
LEFT MSG FOR PT TO RETURN CALL.    MEDICATION WAS CALLED IN YESTERDAY TO [facility] - NEED TO VERIFY WITH PT IS WAS THE CORRECT KNIPPER.

## 2020-12-18 NOTE — TELEPHONE ENCOUNTER
Provider: LAURITA MAHAN   Caller: JUWAN MCPHERSON  Relationship to Patient: SELF      Reason for Call: PT CALLING STATING THAT THE PHARMACY HAD CHANGED, THAT IS IS NO LONGER [facility] AND IT IS NOW [facility]. PLEASE SEND PRESCRIPTION OF Treximet  MG per tablet TO [facility]      BEST CALL BACK NUMBER [phone]

## 2020-12-31 DIAGNOSIS — N32.81 OVERACTIVE BLADDER: ICD-10-CM

## 2020-12-31 RX ORDER — MIRABEGRON 50 MG/1
TABLET, FILM COATED, EXTENDED RELEASE ORAL
Qty: 90 TABLET | Refills: 3 | Status: SHIPPED | OUTPATIENT
Start: 2020-12-31 | End: 2021-01-14 | Stop reason: SDUPTHER

## 2021-01-14 ENCOUNTER — OFFICE VISIT (OUTPATIENT)
Dept: UROLOGY | Facility: CLINIC | Age: 46
End: 2021-01-14

## 2021-01-14 ENCOUNTER — LAB (OUTPATIENT)
Dept: LAB | Facility: HOSPITAL | Age: 46
End: 2021-01-14

## 2021-01-14 ENCOUNTER — OFFICE VISIT (OUTPATIENT)
Dept: OBSTETRICS AND GYNECOLOGY | Facility: CLINIC | Age: 46
End: 2021-01-14

## 2021-01-14 VITALS
HEART RATE: 71 BPM | DIASTOLIC BLOOD PRESSURE: 60 MMHG | SYSTOLIC BLOOD PRESSURE: 110 MMHG | WEIGHT: 188 LBS | TEMPERATURE: 96.7 F | BODY MASS INDEX: 32.1 KG/M2 | HEIGHT: 64 IN | OXYGEN SATURATION: 99 %

## 2021-01-14 VITALS
HEIGHT: 66 IN | WEIGHT: 192 LBS | SYSTOLIC BLOOD PRESSURE: 116 MMHG | BODY MASS INDEX: 30.86 KG/M2 | DIASTOLIC BLOOD PRESSURE: 60 MMHG

## 2021-01-14 DIAGNOSIS — Z01.419 ENCOUNTER FOR GYNECOLOGICAL EXAMINATION (GENERAL) (ROUTINE) WITHOUT ABNORMAL FINDINGS: Primary | ICD-10-CM

## 2021-01-14 DIAGNOSIS — E55.9 HYPOVITAMINOSIS D: ICD-10-CM

## 2021-01-14 DIAGNOSIS — N95.2 POSTMENOPAUSAL ATROPHIC VAGINITIS: ICD-10-CM

## 2021-01-14 DIAGNOSIS — N39.3 STRESS INCONTINENCE: Primary | ICD-10-CM

## 2021-01-14 DIAGNOSIS — Z90.3 POSTGASTRECTOMY MALABSORPTION: ICD-10-CM

## 2021-01-14 DIAGNOSIS — Z13.21 MALNUTRITION SCREEN: ICD-10-CM

## 2021-01-14 DIAGNOSIS — K91.2 POSTGASTRECTOMY MALABSORPTION: ICD-10-CM

## 2021-01-14 DIAGNOSIS — N32.81 OVERACTIVE BLADDER: ICD-10-CM

## 2021-01-14 DIAGNOSIS — Z13.0 SCREENING, IRON DEFICIENCY ANEMIA: ICD-10-CM

## 2021-01-14 DIAGNOSIS — R53.83 FATIGUE, UNSPECIFIED TYPE: ICD-10-CM

## 2021-01-14 DIAGNOSIS — Z12.31 ENCOUNTER FOR SCREENING MAMMOGRAM FOR BREAST CANCER: ICD-10-CM

## 2021-01-14 DIAGNOSIS — B00.9 RECURRENT HSV (HERPES SIMPLEX VIRUS): ICD-10-CM

## 2021-01-14 LAB
25(OH)D3 SERPL-MCNC: 61.7 NG/ML (ref 30–100)
ALBUMIN SERPL-MCNC: 4.6 G/DL (ref 3.5–5.2)
ALBUMIN/GLOB SERPL: 1.8 G/DL
ALP SERPL-CCNC: 66 U/L (ref 39–117)
ALT SERPL W P-5'-P-CCNC: 16 U/L (ref 1–33)
ANION GAP SERPL CALCULATED.3IONS-SCNC: 10.7 MMOL/L (ref 5–15)
AST SERPL-CCNC: 14 U/L (ref 1–32)
BASOPHILS # BLD AUTO: 0.08 10*3/MM3 (ref 0–0.2)
BASOPHILS NFR BLD AUTO: 1.1 % (ref 0–1.5)
BILIRUB SERPL-MCNC: 0.4 MG/DL (ref 0–1.2)
BUN SERPL-MCNC: 21 MG/DL (ref 6–20)
BUN/CREAT SERPL: 25.6 (ref 7–25)
CALCIUM SPEC-SCNC: 9.2 MG/DL (ref 8.6–10.5)
CHLORIDE SERPL-SCNC: 107 MMOL/L (ref 98–107)
CO2 SERPL-SCNC: 23.3 MMOL/L (ref 22–29)
CREAT SERPL-MCNC: 0.82 MG/DL (ref 0.57–1)
DEPRECATED RDW RBC AUTO: 44.4 FL (ref 37–54)
EOSINOPHIL # BLD AUTO: 0.37 10*3/MM3 (ref 0–0.4)
EOSINOPHIL NFR BLD AUTO: 4.9 % (ref 0.3–6.2)
ERYTHROCYTE [DISTWIDTH] IN BLOOD BY AUTOMATED COUNT: 12.4 % (ref 12.3–15.4)
FERRITIN SERPL-MCNC: 133 NG/ML (ref 13–150)
FOLATE SERPL-MCNC: >20 NG/ML (ref 4.78–24.2)
GFR SERPL CREATININE-BSD FRML MDRD: 75 ML/MIN/1.73
GLOBULIN UR ELPH-MCNC: 2.5 GM/DL
GLUCOSE SERPL-MCNC: 66 MG/DL (ref 65–99)
HCT VFR BLD AUTO: 47 % (ref 34–46.6)
HGB BLD-MCNC: 15.7 G/DL (ref 12–15.9)
IMM GRANULOCYTES # BLD AUTO: 0.02 10*3/MM3 (ref 0–0.05)
IMM GRANULOCYTES NFR BLD AUTO: 0.3 % (ref 0–0.5)
IRON 24H UR-MRATE: 127 MCG/DL (ref 37–145)
LYMPHOCYTES # BLD AUTO: 1.59 10*3/MM3 (ref 0.7–3.1)
LYMPHOCYTES NFR BLD AUTO: 21.2 % (ref 19.6–45.3)
MCH RBC QN AUTO: 32.2 PG (ref 26.6–33)
MCHC RBC AUTO-ENTMCNC: 33.4 G/DL (ref 31.5–35.7)
MCV RBC AUTO: 96.5 FL (ref 79–97)
MONOCYTES # BLD AUTO: 0.54 10*3/MM3 (ref 0.1–0.9)
MONOCYTES NFR BLD AUTO: 7.2 % (ref 5–12)
NEUTROPHILS NFR BLD AUTO: 4.91 10*3/MM3 (ref 1.7–7)
NEUTROPHILS NFR BLD AUTO: 65.3 % (ref 42.7–76)
NRBC BLD AUTO-RTO: 0 /100 WBC (ref 0–0.2)
PLATELET # BLD AUTO: 265 10*3/MM3 (ref 140–450)
PMV BLD AUTO: 12 FL (ref 6–12)
POTASSIUM SERPL-SCNC: 3.9 MMOL/L (ref 3.5–5.2)
PREALB SERPL-MCNC: 22.5 MG/DL (ref 20–40)
PROT SERPL-MCNC: 7.1 G/DL (ref 6–8.5)
RBC # BLD AUTO: 4.87 10*6/MM3 (ref 3.77–5.28)
SODIUM SERPL-SCNC: 141 MMOL/L (ref 136–145)
WBC # BLD AUTO: 7.51 10*3/MM3 (ref 3.4–10.8)

## 2021-01-14 PROCEDURE — 83540 ASSAY OF IRON: CPT

## 2021-01-14 PROCEDURE — 82306 VITAMIN D 25 HYDROXY: CPT

## 2021-01-14 PROCEDURE — 36415 COLL VENOUS BLD VENIPUNCTURE: CPT

## 2021-01-14 PROCEDURE — 99396 PREV VISIT EST AGE 40-64: CPT | Performed by: OBSTETRICS & GYNECOLOGY

## 2021-01-14 PROCEDURE — 82746 ASSAY OF FOLIC ACID SERUM: CPT

## 2021-01-14 PROCEDURE — 84425 ASSAY OF VITAMIN B-1: CPT

## 2021-01-14 PROCEDURE — 83921 ORGANIC ACID SINGLE QUANT: CPT

## 2021-01-14 PROCEDURE — 84134 ASSAY OF PREALBUMIN: CPT

## 2021-01-14 PROCEDURE — 80053 COMPREHEN METABOLIC PANEL: CPT

## 2021-01-14 PROCEDURE — 85025 COMPLETE CBC W/AUTO DIFF WBC: CPT

## 2021-01-14 PROCEDURE — 82728 ASSAY OF FERRITIN: CPT

## 2021-01-14 PROCEDURE — 99214 OFFICE O/P EST MOD 30 MIN: CPT | Performed by: UROLOGY

## 2021-01-14 RX ORDER — ESTRADIOL 0.1 MG/G
CREAM VAGINAL
Qty: 42.5 G | Refills: 4 | Status: SHIPPED | OUTPATIENT
Start: 2021-01-14 | End: 2021-09-15

## 2021-01-14 RX ORDER — FAMCICLOVIR 250 MG/1
250 TABLET ORAL 3 TIMES DAILY
Qty: 270 TABLET | Refills: 4 | Status: SHIPPED | OUTPATIENT
Start: 2021-01-14 | End: 2022-01-25 | Stop reason: SDUPTHER

## 2021-01-14 NOTE — PROGRESS NOTES
"Chief Complaint  Gynecologic Exam     History of Present Illness:  Patient is 45 y.o.  who presents to [facility] for her annual examination.  Patient did have her mammogram in September of last year at [facility].  Patient had her gastric sleeve in 2019.  Patient had a recent EGD as well.  Patient does have complaints of vaginal dryness and irritation.  She is not on any topical estrogen cream.  Patient does continue to use Famvir as needed.  Patient is requesting prescription today.  Patient sees Dr. Beard for primary care.    Physical Examination:  Vital Signs: /60   Ht 167.6 cm (66\")   Wt 87.1 kg (192 lb)   BMI 30.99 kg/m²     General Appearance: alert, appears stated age, and cooperative  Breasts: Examined in supine position  Symmetric without masses or skin dimpling  Nipples normal without inversion, lesions or discharge  There are no palpable axillary nodes  Abdomen: no masses, no hepatomegaly, no splenomegaly, soft non-tender, no guarding and no rebound tenderness  Pelvic: Clinical staff was present for exam  External genitalia:  normal appearance of the external genitalia including Bartholin's and [place]'s glands.  :  urethral meatus normal;  Vaginal:  atrophic mucosal changes are present;  Cervix:  absent.  Uterus:  absent.  Adnexa:  non palpable bilaterally.  Pap smear done and specimen sent using Thin-Prep technique    Data Review:  The following data was reviewed by: Shabana Hedrick MD on 2021:     Labs:    Imaging:  SCANNED - MAMMO (2020)    Medical Records:  None    Assessment and Plan   Problem List Items Addressed This Visit     None      Visit Diagnoses     Encounter for gynecological examination (general) (routine) without abnormal findings    -  Primary  Pap was done today.  If she does not receive the results of the Pap within 2 weeks  time, she was instructed to call to find out the results.  I explained to Renae " that the recommendations for Pap smear interval in a low risk patient has lengthened to 3 years time if cytology alone normal or  5 years time if both cytology and HPV testing were normal.  I encouraged her to be seen yearly for a full physical exam including breast and pelvic exam even during the off years when PAP's will not be performed.    Relevant Orders    Liquid-based Pap Smear, Screening    Encounter for screening mammogram for breast cancer      It is recommended per ACOG, for women at average risk to start annual mammogram screening at the age of 40 until the age of 75 and an individualized decision be made for women after age 75.  She was encouraged to continue getting yearly mammograms.  She should report any palpable breast lump(s) or skin changes regardless of mammographic findings.  I explained to Renae that notification regarding her mammogram results will come from the center performing the study.  Our office will not be routinely calling with mammogram results.  It is her responsibility to make sure that the results from the mammogram are communicated to her by the breast center.  If she has any questions about the results, she is welcome to call our office anytime.  The patient reports she has done her mammogram and results are noted.    Renae was counseled regarding having clinical breast exams and breast self-awareness.  Women aged 29-39 years of age should have clinical breast exams every 1-3 years and yearly aged 40 and older.  The patient was counseled regarding breast self-awareness focusing on having a sense of what is normal for her breasts so that she can tell if there are changes.  Even small changes should be reported to provider.    Recurrent HSV (herpes simplex virus)        Relevant Medications    famciclovir (FAMVIR) 250 MG tablet    Postmenopausal atrophic vaginitis      Discussed various options for relief of atrophic vaginal symptoms related to menopause. Discussed local  therapy for treatment of vaginal symptoms only.  Discussed the different formulation options including cream, ring, and tablets.  Discussed the low risk of systemic absorption in postmenopausal women with atrophy using 25 mcgs of estradiol on a daily basis.  Recommend low dose use 2-3x/wk for maintenance of treatment.  Other treatment options were discussed including the use of water-based and silicone-based vaginal lubricants and moisturizers.  Also discussed was the FDA approved treatment option of ospemifene for moderate to severe dyspareunia.    Relevant Medications    estradiol (ESTRACE VAGINAL) 0.1 MG/GM vaginal cream          Follow Up/Instructions:    Patient was given instructions and counseling regarding her condition or for health maintenance advice. Please see specific information pulled into the AVS if appropriate.     Note: Speech recognition transcription software may have been used to dictate portions of this document.  An attempt at proofreading has been made though minor errors in transcription may still be present.    This note was electronically signed.  Shabana Hedrick M.D.

## 2021-01-14 NOTE — PROGRESS NOTES
"Chief Complaint  KULWINDER    HPI  Ms. Bolton is a 45 y.o. female There is no height or weight on file to calculate BMI. presents with complaint of urinary incontinence for >5 years. Pt has primarily stress mixed urinary incontinence. She also says she has Interstitial Cystitis and has been in remission since a DMSO cocktail/hydrodistention in 2011.     She presents today for follow-up.  She states she is still only using a panty liner as a safety.  Otherwise she is completely dry for the most part.  She is overall happy with her regimen and desires to continue taking the Myrbetriq.    Past History,  Severity: Pt uses 2-3 pads a day (5-6 if she has a cough) and had a TOT sling with Dr. Hedrick (2010) in the past.  Associated Sx: Pt has + h/o daytime frequency, urgency and nocturia x1.     No h/o poor stream, straining, hesitancy or incomplete voiding.     No h/o recurrent UTI, hematuria, nephrolithiasis    No vaginal discharge or bleeding.  No h/o something coming out of vagina   No fever/chills  No weight loss, appetite normal.  yes Constipation - she takes 2 fiber pills daily  2 History of vaginal deliveries    She has an arrythmia that is controlled with a BB    Past Medical History  Past Medical History:   Diagnosis Date   • Anxiety    • Arrhythmia     SVT w/ PVCs, on propranolol, follows w/ Dr. Liang   • Aspirin long-term use     for heart health   • Depression    • Dyspepsia    • Dyspnea on exertion    • Fatigue    • Generalized edema     r/t weight gain   • Genital HSV     Famvir for prophylaxis   • GERD (gastroesophageal reflux disease)     daily Prilosec, denies prior eval   • Hard to intubate     states was told this after her hysterectomy.  states had a \"lidocaine lollipop\"   • Hypertension    • Interstitial cystitis    • Kidney stones    • Migraine     Topamax w/ prn Treximet/Phenergan/Zofran   • Mitral valve prolapse    • Normal vaginal Papanicolaou smear in patient with history of hysterectomy 09/16/2016   • " Obesity    • MAREN (obstructive sleep apnea)    • Peripheral edema    • PONV (postoperative nausea and vomiting)    • Post concussion syndrome     s/p fall w/ frontal lobe head injury 9/2017, on Elavil   • Sleep apnea     newly dx, CPAP compliant   • Stress incontinence     follows w/ Dr. Galvan, on Myrbetriq, s/p cystoscopy w/ bulking agent   • Wears contact lenses        Past Surgical History  Past Surgical History:   Procedure Laterality Date   • ANTERIOR AND POSTERIOR VAGINAL REPAIR  06/23/2010    DR EDIN BYRD   • BREAST LUMPECTOMY Left 2015    benign   • CYSTOSCOPY W/ BULKING AGENT INJECTION N/A 6/25/2019    Procedure: CYSTOSCOPY WITH URETHRAL  BULKING AGENT INJECTION;  Surgeon: Jose Galvan MD;  Location: [facility] OR;  Service: Urology   • CYSTOSCOPY, DIMETHYL SULFOXIDE COCKTAIL  03/14/2011    WITH HYDRODISTENTION (DR AVINASH MADDOX)   • ENDOSCOPY     • ENDOSCOPY N/A 12/17/2019    Procedure: ESOPHAGOGASTRODUODENOSCOPY;  Surgeon: Tracee Onofre MD;  Location: [facility] OR;  Service: Bariatric   • ENDOSCOPY N/A 10/20/2020    Procedure: ESOPHAGOGASTRODUODENOSCOPY WITH BIOPSY;  Surgeon: Tracee Onofre MD;  Location: [facility] ENDOSCOPY;  Service: General;  Laterality: N/A;   • GASTRIC SLEEVE LAPAROSCOPIC N/A 12/17/2019    Procedure: GASTRIC SLEEVE LAPAROSCOPIC;  Surgeon: Tracee Onofre MD;  Location: [facility] OR;  Service: Bariatric   • HIATAL HERNIA REPAIR N/A 12/17/2019    Procedure: HIATAL HERNIA REPAIR LAPAROSCOPIC;  Surgeon: Tracee Onofre MD;  Location: [facility] OR;  Service: Bariatric   • HYSTERECTOMY  06/23/2010    [facility] (DR EDIN BYRD)   • LAPAROSCOPIC CHOLECYSTECTOMY  2013    for stones   • TRANSVAGINAL TAPING SUSPENSION WITH OBTURATOR  06/23/2010    DR EDIN BYRD   • WISDOM TOOTH EXTRACTION  1990       Medications  Current Outpatient Medications   Medication Sig Dispense Refill   • acyclovir (ZOVIRAX) 5 % ointment Apply  topically to the appropriate area as directed Every 3  (Three) Hours. (Patient taking differently: Apply 1 application topically to the appropriate area as directed Take As Directed.) 30 g 6   • albuterol sulfate  (90 Base) MCG/ACT inhaler INHALE 1 TO 2 PUFFS BY MOUTH EVERY 4 TO 6 HOURS AS NEEDED. 8.5 g 3   • Biotin (Biotin 5000) 5 MG capsule Take 1 capsule by mouth Daily.     • cetirizine (zyrTEC) 10 MG tablet Take 1 tablet by mouth Daily. 90 tablet 3   • Cholecalciferol (Vitamin D3) 250 MCG (62076 UT) tablet Take 1,000 Units by mouth Daily.     • clobetasol propionate (CLOBEX) 0.05 % shampoo apply TO HAIR AND SCALP as directed  0   • Cyanocobalamin (VITAMIN B12) 1000 MCG tablet controlled-release      • cyclobenzaprine (FLEXERIL) 5 MG tablet Take 1 tablet by mouth 3 (Three) Times a Day As Needed. 270 tablet 3   • dexlansoprazole (Dexilant) 60 MG capsule Take 1 capsule by mouth Daily 30 capsule 11   • dexlansoprazole (Dexilant) 60 MG capsule Take 1 capsule by mouth Daily With Breakfast. 30 capsule 3   • docusate sodium (COLACE) 100 MG capsule Take 250 mg by mouth Daily.     • DULoxetine (CYMBALTA) 60 MG capsule Take 1 capsule by mouth Daily. 90 capsule 1   • famciclovir (FAMVIR) 250 MG tablet Take 1 tablet by mouth 3 (Three) Times a Day. (Patient taking differently: Take 250 mg by mouth Daily.) 270 tablet 4   • MAGNESIUM CITRATE PO Take 270 mg by mouth Daily.     • Mirabegron ER (Myrbetriq) 50 MG tablet sustained-release 24 hour 24 hr tablet Take 1 tablet by mouth Daily. 90 tablet 3   • NON FORMULARY Multivitamin patch and iron patch daily   Collagen patch daily     • ondansetron (ZOFRAN) 4 MG tablet Take 4 mg by mouth Every 8 (Eight) Hours As Needed for Nausea or Vomiting.     • ondansetron (ZOFRAN) 4 MG tablet Take 1 tablet by mouth Every 6 (Six) Hours As Needed. 120 tablet 3   • Probiotic Product (PROBIOTIC PO) Take 1 capsule by mouth Daily.     • promethazine (PHENERGAN) 25 MG tablet Take 1 tablet by mouth Every 6 (Six) Hours As Needed for nausea 30 tablet 4  "  • promethazine (PHENERGAN) 25 MG tablet Take 1 tablet by mouth Every 6 (Six) Hours As Needed. 30 tablet 4   • propranolol LA (INDERAL LA) 120 MG 24 hr capsule Take 1 capsule by mouth Daily. 90 capsule 3   • PSYLLIUM HUSK PO Take 2 capsules by mouth Daily.     • Thiamine HCl (VITAMIN B-1 PO) Take  by mouth.     • Treximet  MG per tablet Take one tablet at onset of headache. May repeat dose one time in 2 hours if headache not relieved. 9 tablet 5   • Vitamin E 400 units tablet Take 400 Units by mouth Daily.       No current facility-administered medications for this visit.        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  Never actually taken it internally.   • Latex Anaphylaxis and Rash   • Morphine And Related Rash     Rash (if gets on skin).  Never taken it internally   • Penicillins Shortness Of Breath and Palpitations     Keflex OK   • Oxycodone Other (See Comments)     Dizziness, confusion     • Topiramate Other (See Comments)     Pt states she can take NAME BRAND ONLY.  Generic brand \"doesn't work\"       Social History  Social History     Socioeconomic History   • Marital status:      Spouse name: Not on file   • Number of children: Not on file   • Years of education: Not on file   • Highest education level: Not on file   Occupational History   • Occupation: 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Employer: [employer]   Tobacco Use   • Smoking status: Never Smoker   • Smokeless tobacco: Never Used   Substance and Sexual Activity   • Alcohol use: No   • Drug use: No   • Sexual activity: Defer   Social History Narrative    Living in [place] w/  and 2 children.  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         Family History  She has no family history of bladder or kidney cancer  She has no family history of kidney " stones    Review of Systems  Constitutional: No fevers or chills  Skin: Negative for rash  Endocrine: No heat/cold intolerance   Cardiovascular: Negative for chest pain or dyspnea on exertion  Respiratory: Negative for shortness of breath or wheezing  Gastrointestinal: No nausea or vomiting  Genitourinary: Negative for current gross hematuria.  Musculoskeletal: No flank pain  Neurological:  Negative for frequent headaches or dizziness  Lymph/Heme: Negative for leg swelling or calf pain.    Physical Exam  There were no vitals taken for this visit.  Constitutional: NAD, WDWN  HEENT: NCAT. Conjunctivae normal  MMM  Cardiovascular: No visual evidence of palpitations  Pulmonary/Chest: Respirations are even and non-labored bilaterally  Abdominal: No clear visual distention  Neurological: A + O x 3,  cranial nerves II-XII grossly intact  Extremities: RACHEL x 4, warm, no clubbing, no cyanosis  Skin: Pink, warm, dry with no rash  Psychiatric:  Normal mood and affect      Labs  Brief Urine Lab Results  (Last result in the past 365 days)      Color   Clarity   Blood   Leuk Est   Nitrite   Protein   CREAT   Urine HCG        08/21/20 1056 Yellow Cloudy Negative Negative Negative Trace               Lab Results   Component Value Date    GLUCOSE 83 07/02/2020    CALCIUM 9.2 09/17/2020     09/17/2020    K 4.5 09/17/2020    CO2 27.2 09/17/2020    CL 99 09/17/2020    BUN 17 09/17/2020    CREATININE 0.92 09/17/2020    EGFRIFAFRI 79 09/17/2020    EGFRIFNONA 65 09/17/2020    BCR 18.5 09/17/2020    ANIONGAP 7.9 07/02/2020       Lab Results   Component Value Date    WBC 6.63 09/17/2020    HGB 14.5 09/17/2020    HCT 42.6 09/17/2020    MCV 93.6 09/17/2020     09/17/2020           I have personally reviewed her labs and post void residual imaging.     Assessment  Ms. Bolton is a 45 y.o. female with recurrent FELICIANO after TOT sling in 2010. She also has a history of IC that is well controlled with diet, and very mild OAB that is  controlled with Myrbetriq.  She was using 2-3 pads per day and now is using a panty liner. She is s/p urethral bulking agent injection on 6/25/2019. FELICIANO well controlled.       Plan  1.  FU in 1 year  2.  Refill Myrbetriq    Jose Galvan MD

## 2021-01-20 LAB — VIT B1 BLD-SCNC: 93.5 NMOL/L (ref 66.5–200)

## 2021-01-21 DIAGNOSIS — Z01.419 ENCOUNTER FOR GYNECOLOGICAL EXAMINATION (GENERAL) (ROUTINE) WITHOUT ABNORMAL FINDINGS: ICD-10-CM

## 2021-01-23 LAB
Lab: NORMAL
METHYLMALONATE SERPL-SCNC: 146 NMOL/L (ref 0–378)

## 2021-03-17 ENCOUNTER — OFFICE VISIT (OUTPATIENT)
Dept: NEUROLOGY | Facility: CLINIC | Age: 46
End: 2021-03-17

## 2021-03-17 VITALS
OXYGEN SATURATION: 99 % | TEMPERATURE: 97.7 F | HEART RATE: 74 BPM | WEIGHT: 198.2 LBS | BODY MASS INDEX: 33.84 KG/M2 | SYSTOLIC BLOOD PRESSURE: 102 MMHG | DIASTOLIC BLOOD PRESSURE: 58 MMHG | HEIGHT: 64 IN

## 2021-03-17 DIAGNOSIS — G44.209 TENSION HEADACHE: Primary | ICD-10-CM

## 2021-03-17 PROCEDURE — 99213 OFFICE O/P EST LOW 20 MIN: CPT | Performed by: NURSE PRACTITIONER

## 2021-03-17 RX ORDER — VITAMIN B COMPLEX
1 CAPSULE ORAL DAILY
COMMUNITY

## 2021-03-17 RX ORDER — NABUMETONE 500 MG/1
500 TABLET, FILM COATED ORAL 2 TIMES DAILY PRN
Qty: 30 TABLET | Refills: 2 | Status: SHIPPED | OUTPATIENT
Start: 2021-03-17 | End: 2022-10-12 | Stop reason: ALTCHOICE

## 2021-03-17 NOTE — PATIENT INSTRUCTIONS
Migraine Headache  A migraine headache is an intense, throbbing pain on one side or both sides of the head. Migraine headaches may also cause other symptoms, such as nausea, vomiting, and sensitivity to light and noise. A migraine headache can last from 4 hours to 3 days. Talk with your doctor about what things may bring on (trigger) your migraine headaches.  What are the causes?  The exact cause of this condition is not known. However, a migraine may be caused when nerves in the brain become irritated and release chemicals that cause inflammation of blood vessels. This inflammation causes pain. This condition may be triggered or caused by:  · Drinking alcohol.  · Smoking.  · Taking medicines, such as:  ? Medicine used to treat chest pain (nitroglycerin).  ? Birth control pills.  ? Estrogen.  ? Certain blood pressure medicines.  · Eating or drinking products that contain nitrates, glutamate, aspartame, or tyramine. Aged cheeses, chocolate, or caffeine may also be triggers.  · Doing physical activity.  Other things that may trigger a migraine headache include:  · Menstruation.  · Pregnancy.  · Hunger.  · Stress.  · Lack of sleep or too much sleep.  · Weather changes.  · Fatigue.  What increases the risk?  The following factors may make you more likely to experience migraine headaches:  · Being a certain age. This condition is more common in people who are 25-55 years old.  · Being female.  · Having a family history of migraine headaches.  · Being .  · Having a mental health condition, such as depression or anxiety.  · Being obese.  What are the signs or symptoms?  The main symptom of this condition is pulsating or throbbing pain. This pain may:  · Happen in any area of the head, such as on one side or both sides.  · Interfere with daily activities.  · Get worse with physical activity.  · Get worse with exposure to bright lights or loud noises.  Other symptoms may  include:  · Nausea.  · Vomiting.  · Dizziness.  · General sensitivity to bright lights, loud noises, or smells.  Before you get a migraine headache, you may get warning signs (an aura). An aura may include:  · Seeing flashing lights or having blind spots.  · Seeing bright spots, halos, or zigzag lines.  · Having tunnel vision or blurred vision.  · Having numbness or a tingling feeling.  · Having trouble talking.  · Having muscle weakness.  Some people have symptoms after a migraine headache (postdromal phase), such as:  · Feeling tired.  · Difficulty concentrating.  How is this diagnosed?  A migraine headache can be diagnosed based on:  · Your symptoms.  · A physical exam.  · Tests, such as:  ? CT scan or an MRI of the head. These imaging tests can help rule out other causes of headaches.  ? Taking fluid from the spine (lumbar puncture) and analyzing it (cerebrospinal fluid analysis, or CSF analysis).  How is this treated?  This condition may be treated with medicines that:  · Relieve pain.  · Relieve nausea.  · Prevent migraine headaches.  Treatment for this condition may also include:  · Acupuncture.  · Lifestyle changes like avoiding foods that trigger migraine headaches.  · Biofeedback.  · Cognitive behavioral therapy.  Follow these instructions at home:  Medicines  · Take over-the-counter and prescription medicines only as told by your health care provider.  · Ask your health care provider if the medicine prescribed to you:  ? Requires you to avoid driving or using heavy machinery.  ? Can cause constipation. You may need to take these actions to prevent or treat constipation:  § Drink enough fluid to keep your urine pale yellow.  § Take over-the-counter or prescription medicines.  § Eat foods that are high in fiber, such as beans, whole grains, and fresh fruits and vegetables.  § Limit foods that are high in fat and processed sugars, such as fried or sweet foods.  Lifestyle  · Do not drink alcohol.  · Do not  use any products that contain nicotine or tobacco, such as cigarettes, e-cigarettes, and chewing tobacco. If you need help quitting, ask your health care provider.  · Get at least 8 hours of sleep every night.  · Find ways to manage stress, such as meditation, deep breathing, or yoga.  General instructions         · Keep a journal to find out what may trigger your migraine headaches. For example, write down:  ? What you eat and drink.  ? How much sleep you get.  ? Any change to your diet or medicines.  · If you have a migraine headache:  ? Avoid things that make your symptoms worse, such as bright lights.  ? It may help to lie down in a dark, quiet room.  ? Do not drive or use heavy machinery.  ? Ask your health care provider what activities are safe for you while you are experiencing symptoms.  · Keep all follow-up visits as told by your health care provider. This is important.  Contact a health care provider if:  · You develop symptoms that are different or more severe than your usual migraine headache symptoms.  · You have more than 15 headache days in one month.  Get help right away if:  · Your migraine headache becomes severe.  · Your migraine headache lasts longer than 72 hours.  · You have a fever.  · You have a stiff neck.  · You have vision loss.  · Your muscles feel weak or like you cannot control them.  · You start to lose your balance often.  · You have trouble walking.  · You faint.  · You have a seizure.  Summary  · A migraine headache is an intense, throbbing pain on one side or both sides of the head. Migraines may also cause other symptoms, such as nausea, vomiting, and sensitivity to light and noise.  · This condition may be treated with medicines and lifestyle changes. You may also need to avoid certain things that trigger a migraine headache.  · Keep a journal to find out what may trigger your migraine headaches.  · Contact your health care provider if you have more than 15 headache days in a  month or you develop symptoms that are different or more severe than your usual migraine headache symptoms.  This information is not intended to replace advice given to you by your health care provider. Make sure you discuss any questions you have with your health care provider.  Document Revised: 04/10/2020 Document Reviewed: 01/30/2020  [employer]vier Patient Education © 2021 [employer]

## 2021-03-17 NOTE — PROGRESS NOTES
Follow Up Neurology Office Visit      Patient Name: Renae Bolton    Referring Physician: No ref. provider found    Chief Complaint:    Chief Complaint   Patient presents with   • Migraine     Patient in the office for migraine follow up.  Patient states migraines are the same since last office visit.        History of Present Illness: Renae Bolton is a 45 y.o. female who is here to follow up with Neurology for headaches.  She continues to report adequate relief with daily B vitamin and magnesium supplementation, and as needed use of Treximet.  She estimates about 3 migraine headache episodes over the previous 2 months.  She denies bothersome daily headache.     She has been using [facility] for Treximet, reports that pricing is significantly better than with use of local pharmacies.    The following portions of the patient's history were reviewed and updated as appropriate: allergies, current medications, past family history, past medical history, past social history, past surgical history and problem list.    Subjective     Review of Systems:   Review of Systems   Constitutional: Negative for activity change and fatigue.   HENT: Negative for drooling and voice change.    Eyes: Negative for blurred vision, double vision, photophobia and visual disturbance.   Respiratory: Negative for shortness of breath.    Cardiovascular: Negative for chest pain and palpitations.   Gastrointestinal: Positive for nausea. Negative for vomiting.   Genitourinary: Negative for urinary incontinence.   Musculoskeletal: Positive for neck pain. Negative for arthralgias, back pain, gait problem and myalgias.   Allergic/Immunologic: Negative for immunocompromised state.   Neurological: Positive for headache. Negative for dizziness, tremors, seizures, syncope, facial asymmetry, speech difficulty, weakness, light-headedness, numbness, memory problem and confusion.   Hematological: Does not bruise/bleed easily.    Psychiatric/Behavioral: Positive for sleep disturbance. Negative for decreased concentration, dysphoric mood, hallucinations, depressed mood and stress. The patient is not nervous/anxious.        Medications:     Current Outpatient Medications:   •  acyclovir (ZOVIRAX) 5 % ointment, Apply  topically to the appropriate area as directed Every 3 (Three) Hours. (Patient taking differently: Apply 1 application topically to the appropriate area as directed Take As Directed.), Disp: 30 g, Rfl: 6  •  albuterol sulfate  (90 Base) MCG/ACT inhaler, INHALE 1 TO 2 PUFFS BY MOUTH EVERY 4 TO 6 HOURS AS NEEDED., Disp: 8.5 g, Rfl: 3  •  B Complex Vitamins (vitamin b complex) capsule capsule, Take 1 capsule by mouth Daily., Disp: , Rfl:   •  Biotin (Biotin 5000) 5 MG capsule, Take 1 capsule by mouth Daily., Disp: , Rfl:   •  cetirizine (zyrTEC) 10 MG tablet, Take 1 tablet by mouth Daily., Disp: 90 tablet, Rfl: 3  •  Cholecalciferol (Vitamin D3) 250 MCG (29192 UT) tablet, Take 20,000 Units by mouth Daily., Disp: , Rfl:   •  clobetasol propionate (CLOBEX) 0.05 % shampoo, apply TO HAIR AND SCALP as directed, Disp: , Rfl: 0  •  cyclobenzaprine (FLEXERIL) 5 MG tablet, Take 1 tablet by mouth 3 (Three) Times a Day As Needed., Disp: 270 tablet, Rfl: 3  •  dexlansoprazole (Dexilant) 60 MG capsule, Take 1 capsule by mouth Daily With Breakfast., Disp: 30 capsule, Rfl: 3  •  DULoxetine (CYMBALTA) 60 MG capsule, Take 1 capsule by mouth Daily., Disp: 90 capsule, Rfl: 1  •  estradiol (ESTRACE VAGINAL) 0.1 MG/GM vaginal cream, Use 0.5 grams intravaginally twice weekly to control symptoms., Disp: 42.5 g, Rfl: 4  •  famciclovir (FAMVIR) 250 MG tablet, Take 1 tablet by mouth 3 (Three) Times a Day., Disp: 270 tablet, Rfl: 4  •  MAGNESIUM CITRATE PO, Take 270 mg by mouth Daily., Disp: , Rfl:   •  Mirabegron ER (Myrbetriq) 50 MG tablet sustained-release 24 hour 24 hr tablet, Take 1 tablet by mouth Daily., Disp: 90 tablet, Rfl: 3  •  NON  "FORMULARY, Multivitamin patch and iron patch daily  Collagen patch daily, Disp: , Rfl:   •  ondansetron (ZOFRAN) 4 MG tablet, Take 1 tablet by mouth Every 6 (Six) Hours As Needed., Disp: 120 tablet, Rfl: 3  •  Probiotic Product (PROBIOTIC PO), Take 1 capsule by mouth Daily., Disp: , Rfl:   •  promethazine (PHENERGAN) 25 MG tablet, Take 1 tablet by mouth Every 6 (Six) Hours As Needed for nausea, Disp: 30 tablet, Rfl: 4  •  propranolol LA (INDERAL LA) 120 MG 24 hr capsule, Take 1 capsule by mouth Daily., Disp: 90 capsule, Rfl: 3  •  PSYLLIUM HUSK PO, Take 1 capsule by mouth Daily., Disp: , Rfl:   •  Treximet  MG per tablet, Take one tablet at onset of headache. 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  Never actually taken it internally.   • Latex Anaphylaxis and Rash   • Morphine And Related Rash     Rash (if gets on skin).  Never taken it internally   • Penicillins Shortness Of Breath and Palpitations     Keflex OK   • Oxycodone Other (See Comments)     Dizziness, confusion     • Topiramate Other (See Comments)     Pt states she can take NAME BRAND ONLY.  Generic brand \"doesn't work\"     Objective     Physical Exam:  Vital Signs:   Vitals:    03/17/21 0820   BP: 102/58   BP Location: Left arm   Patient Position: Sitting   Cuff Size: Adult   Pulse: 74   Temp: 97.7 °F (36.5 °C) " "  SpO2: 99%   Weight: 89.9 kg (198 lb 3.2 oz)   Height: 162.6 cm (64\")   PainSc: 0-No pain     Physical Exam  Vitals and nursing note reviewed.   Cardiovascular:      Rate and Rhythm: Regular rhythm.      Heart sounds: Normal heart sounds.   Pulmonary:      Effort: Pulmonary effort is normal.   Skin:     General: Skin is warm.   Neurological:      General: No focal deficit present.      Mental Status: She is oriented to person, place, and time. Mental status is at baseline.      Gait: Gait is intact.   Psychiatric:         Speech: Speech normal.       Neurologic Exam     Mental Status   Oriented to person, place, and time.   Attention: normal. Concentration: normal.   Speech: speech is normal   Level of consciousness: alert  Knowledge: good.   Normal comprehension.     Cranial Nerves   Cranial nerves II through XII intact.     Motor Exam   Muscle bulk: normal  Overall muscle tone: normal    Sensory Exam   Light touch normal.     Gait, Coordination, and Reflexes     Gait  Gait: normal    Tremor   Resting tremor: absent    Results Review:   I have reviewed the patient's other medical records to include, labs, radiology and referrals.     Assessment / Plan      Assessment/Plan:   Diagnoses and all orders for this visit:    1. Tension headache (Primary)  -     nabumetone (RELAFEN) 500 MG tablet; Take 1 tablet by mouth 2 (Two) Times a Day As Needed for Mild Pain .  Dispense: 30 tablet; Refill: 2    Patient headaches are well controlled with current therapy, she does continue to have occasional moderately painful nonmigraine headaches.  She confirmed that she is able to take NSAIDs, we discussed use of nabumetone or diclofenac for bothersome nonmigraine type headaches.  She elected to try nabumetone and this is been prescribed today.  I have encouraged her to contact provider with any new or worsening symptoms or changes in headache pattern.    Follow Up:   Return in about 6 months (around 9/17/2021) for Next scheduled " follow up.     LAURITA Sainz  [facility]   AS THE PROVIDER, I PERSONALLY WORE PPE DURING ENTIRE FACE TO FACE ENCOUNTER IN CLINIC WITH THE PATIENT. PATIENT ALSO WORE PPE DURING ENTIRE FACE TO FACE ENCOUNTER EXCEPT FOR A MAX OF 30 SECONDS DURING NEUROLOGICAL EVALUATION OF CRANIAL NERVES AND THEN MASK WAS PLACED BACK OVER PATIENT FACE FOR REMAINDER OF VISIT. I WASHED MY HANDS BEFORE AND AFTER VISIT.      Please note that portions of this note may have been completed with a voice recognition program. Efforts were made to edit the dictations, but occasionally words are mistranscribed.

## 2021-03-18 ENCOUNTER — TELEMEDICINE (OUTPATIENT)
Dept: BARIATRICS/WEIGHT MGMT | Facility: CLINIC | Age: 46
End: 2021-03-18

## 2021-03-18 VITALS — BODY MASS INDEX: 33.12 KG/M2 | WEIGHT: 194 LBS | HEIGHT: 64 IN

## 2021-03-18 DIAGNOSIS — E66.9 OBESITY, CLASS I, BMI 30-34.9: Primary | ICD-10-CM

## 2021-03-18 PROCEDURE — 99212 OFFICE O/P EST SF 10 MIN: CPT | Performed by: SURGERY

## 2021-03-18 NOTE — PROGRESS NOTES
"[facility]  [address]  [address]  [facility] [id number] [phone]        Patient Name: Renae Bolton.  YOB: 1975      Date of Visit: 03/18/2021      Reason for Visit:  Weight check    HPI:  Renae Bolton is a 45 y.o. female s/p LSG/HHR 12/17/19 w/ Dr. Onofre    From LOV:  \"10/20/20/ EGD:  GE junction at 38 cm.  No recurrent hiatal hernia.  The sleeved stomach is a narrow uniform tube without twist or stricture.  Mild antritis.    Path: mild chronic inactive gastritis, normal esophageal biopsy    UGI 6/24/20:  IMPRESSION:  Gastroesophageal reflux into the distal esophagus.\"    03/18/21 Update:  Struggling with being too busy to plan meals.  Every time she starts to exercise, she feels like she gains weight.  She is walking 40 minutes at the park 3x per week.  Also doing strength exercises.  Exercise regimen x 1 past month.         Today's weight is 88 kg (194 lb) pounds.       Breakfast: premier  Maybe egg or pretzels for snack before lunch  Lunch: healthy per pt  Supper: healthy per pt   After dinner snacks: cookies        Past Medical History:   Diagnosis Date   • Anxiety    • Arrhythmia     SVT w/ PVCs, on propranolol, follows w/ Dr. Liang   • Aspirin long-term use     for heart health   • Depression    • Dyspepsia    • Dyspnea on exertion    • Fatigue    • Generalized edema     r/t weight gain   • Genital HSV     Famvir for prophylaxis   • GERD (gastroesophageal reflux disease)     daily Prilosec, denies prior eval   • Hard to intubate     states was told this after her hysterectomy.  states had a \"lidocaine lollipop\"   • Hypertension    • Interstitial cystitis    • Kidney stones    • Migraine     Topamax w/ prn Treximet/Phenergan/Zofran   • Mitral valve prolapse    • Normal vaginal Papanicolaou smear in patient with history of hysterectomy 09/16/2016   • Obesity    • MAREN (obstructive sleep apnea)    • Peripheral edema    • PONV " (postoperative nausea and vomiting)    • Post concussion syndrome     s/p fall w/ frontal lobe head injury 9/2017, on Elavil   • Sleep apnea     newly dx, CPAP compliant   • Stress incontinence     follows w/ Dr. Galvan, on Myrbetriq, s/p cystoscopy w/ bulking agent   • Wears contact lenses      Past Surgical History:   Procedure Laterality Date   • ANTERIOR AND POSTERIOR VAGINAL REPAIR  06/23/2010    DR EDIN BYRD   • BREAST LUMPECTOMY Left 2015    benign   • CYSTOSCOPY W/ BULKING AGENT INJECTION N/A 6/25/2019    Procedure: CYSTOSCOPY WITH URETHRAL  BULKING AGENT INJECTION;  Surgeon: Jose Galvan MD;  Location: [facility] OR;  Service: Urology   • CYSTOSCOPY, DIMETHYL SULFOXIDE COCKTAIL  03/14/2011    WITH HYDRODISTENTION (DR AVINASH MADDOX)   • ENDOSCOPY     • ENDOSCOPY N/A 12/17/2019    Procedure: ESOPHAGOGASTRODUODENOSCOPY;  Surgeon: Tracee Onofre MD;  Location: [facility] OR;  Service: Bariatric   • ENDOSCOPY N/A 10/20/2020    Procedure: ESOPHAGOGASTRODUODENOSCOPY WITH BIOPSY;  Surgeon: Tracee Onofre MD;  Location: [facility] ENDOSCOPY;  Service: General;  Laterality: N/A;   • GASTRIC SLEEVE LAPAROSCOPIC N/A 12/17/2019    Procedure: GASTRIC SLEEVE LAPAROSCOPIC;  Surgeon: Tracee Onofre MD;  Location: [facility] OR;  Service: Bariatric   • HIATAL HERNIA REPAIR N/A 12/17/2019    Procedure: HIATAL HERNIA REPAIR LAPAROSCOPIC;  Surgeon: Tracee Onofre MD;  Location: [facility] OR;  Service: Bariatric   • HYSTERECTOMY  06/23/2010    [facility] (DR EDIN BYRD)   • LAPAROSCOPIC CHOLECYSTECTOMY  2013    for stones   • TRANSVAGINAL TAPING SUSPENSION WITH OBTURATOR  06/23/2010    DR EDIN BYRD   • WISDOM TOOTH EXTRACTION  1990     No outpatient medications have been marked as taking for the 3/18/21 encounter (Telemedicine) with Tracee Onofre MD.     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  Never actually taken it internally.   • Latex Anaphylaxis and Rash   • Morphine And Related Rash     Rash (if gets on skin).  Never taken it internally   • Penicillins Shortness Of Breath and Palpitations     Keflex OK   • Oxycodone Other (See Comments)     Dizziness, confusion     • Topiramate Other (See Comments)     Pt states she can take NAME BRAND ONLY.  Generic brand \"doesn't work\"       Social History     Socioeconomic History   • Marital status:      Spouse name: Not on file   • Number of children: Not on file   • Years of education: Not on file   • Highest education level: Not on file   Tobacco Use   • Smoking status: Never Smoker   • Smokeless tobacco: Never Used   Vaping Use   • Vaping Use: Never used   Substance and Sexual Activity   • Alcohol use: No   • Drug use: No   • Sexual activity: Defer       There were no vitals filed for this visit.  Weight 88 kg (194 lb)  Body mass index is 33.3 kg/m².    Physical Exam  Constitutional:       General: She is not in acute distress.     Appearance: Normal appearance. She is not ill-appearing.   HENT:      Head: Normocephalic and atraumatic.      Nose: Nose normal.   Eyes:      General: No scleral icterus.     Extraocular Movements: Extraocular movements intact.      Conjunctiva/sclera: Conjunctivae normal.      Pupils: Pupils are equal, round, and reactive to light.   Pulmonary:      Effort: Pulmonary effort is normal. No respiratory distress.   Skin:     Coloration: Skin is not pale.   Neurological:      Mental Status: She is alert and oriented to person, place, and time.   Psychiatric:         Mood and Affect: Mood normal.         Behavior: Behavior normal.           Assessment:      ICD-10-CM ICD-9-CM   1. Obesity, Class I, BMI 30-34.9  E66.9 278.00       Plan:      Refer to [facility].    F/u with me in 6 months for yearly visit.    This visit was conducted as a " video visit, in an effort to limit spread of the novel coronavirus during the 3676-2265 pandemic.  The patient gave consent.

## 2021-03-31 ENCOUNTER — OFFICE VISIT (OUTPATIENT)
Dept: BARIATRICS/WEIGHT MGMT | Facility: CLINIC | Age: 46
End: 2021-03-31

## 2021-03-31 VITALS
BODY MASS INDEX: 32.95 KG/M2 | TEMPERATURE: 97.4 F | SYSTOLIC BLOOD PRESSURE: 114 MMHG | OXYGEN SATURATION: 99 % | RESPIRATION RATE: 18 BRPM | HEIGHT: 64 IN | WEIGHT: 193 LBS | DIASTOLIC BLOOD PRESSURE: 68 MMHG | HEART RATE: 74 BPM

## 2021-03-31 DIAGNOSIS — E88.81 METABOLIC SYNDROME: ICD-10-CM

## 2021-03-31 DIAGNOSIS — R60.1 GENERALIZED EDEMA: ICD-10-CM

## 2021-03-31 DIAGNOSIS — F41.9 ANXIETY: ICD-10-CM

## 2021-03-31 DIAGNOSIS — E66.9 OBESITY, CLASS I, BMI 30-34.9: ICD-10-CM

## 2021-03-31 DIAGNOSIS — G43.909 MIGRAINE WITHOUT STATUS MIGRAINOSUS, NOT INTRACTABLE, UNSPECIFIED MIGRAINE TYPE: ICD-10-CM

## 2021-03-31 DIAGNOSIS — I47.1 SUPRAVENTRICULAR TACHYCARDIA (HCC): ICD-10-CM

## 2021-03-31 DIAGNOSIS — G47.30 SLEEP APNEA, UNSPECIFIED TYPE: ICD-10-CM

## 2021-03-31 DIAGNOSIS — F32.A DEPRESSION, UNSPECIFIED DEPRESSION TYPE: ICD-10-CM

## 2021-03-31 DIAGNOSIS — Z98.84 STATUS POST BARIATRIC SURGERY: Primary | ICD-10-CM

## 2021-03-31 DIAGNOSIS — K21.9 GASTROESOPHAGEAL REFLUX DISEASE, UNSPECIFIED WHETHER ESOPHAGITIS PRESENT: ICD-10-CM

## 2021-03-31 PROBLEM — J30.2 SEASONAL ALLERGIES: Status: ACTIVE | Noted: 2021-03-31

## 2021-03-31 PROBLEM — M54.9 BACK PAIN: Status: ACTIVE | Noted: 2021-03-31

## 2021-03-31 PROBLEM — E66.812 OBESITY, CLASS II, BMI 35-39.9: Status: RESOLVED | Noted: 2019-12-02 | Resolved: 2021-03-31

## 2021-03-31 PROBLEM — E88.810 METABOLIC SYNDROME: Status: ACTIVE | Noted: 2021-03-31

## 2021-03-31 PROCEDURE — 99214 OFFICE O/P EST MOD 30 MIN: CPT | Performed by: NURSE PRACTITIONER

## 2021-03-31 RX ORDER — BLOOD SUGAR DIAGNOSTIC
1 STRIP MISCELLANEOUS DAILY
Qty: 100 EACH | Refills: 1 | Status: SHIPPED | OUTPATIENT
Start: 2021-03-31 | End: 2021-09-22

## 2021-03-31 RX ORDER — LIRAGLUTIDE 6 MG/ML
0.6 INJECTION, SOLUTION SUBCUTANEOUS DAILY
Qty: 15 ML | Refills: 0 | Status: SHIPPED | OUTPATIENT
Start: 2021-03-31 | End: 2021-04-14

## 2021-03-31 RX ORDER — LIRAGLUTIDE 6 MG/ML
0.6 INJECTION, SOLUTION SUBCUTANEOUS DAILY
Qty: 1 PEN | Refills: 0 | COMMUNITY
Start: 2021-03-31 | End: 2021-05-19

## 2021-03-31 RX ORDER — DULOXETIN HYDROCHLORIDE 30 MG/1
30 CAPSULE, DELAYED RELEASE ORAL DAILY
Qty: 30 CAPSULE | Refills: 0 | Status: SHIPPED | OUTPATIENT
Start: 2021-03-31 | End: 2021-04-21 | Stop reason: SDUPTHER

## 2021-03-31 RX ORDER — CHLORAL HYDRATE 500 MG
1000 CAPSULE ORAL 2 TIMES DAILY WITH MEALS
Start: 2021-03-31

## 2021-04-01 PROBLEM — E66.09 CLASS 1 OBESITY DUE TO EXCESS CALORIES WITH SERIOUS COMORBIDITY AND BODY MASS INDEX (BMI) OF 33.0 TO 33.9 IN ADULT: Status: ACTIVE | Noted: 2019-12-17

## 2021-04-01 PROBLEM — E66.811 CLASS 1 OBESITY DUE TO EXCESS CALORIES WITH SERIOUS COMORBIDITY AND BODY MASS INDEX (BMI) OF 33.0 TO 33.9 IN ADULT: Status: ACTIVE | Noted: 2019-12-17

## 2021-04-01 NOTE — ASSESSMENT & PLAN NOTE
Patient's (Body mass index is 33.13 kg/m².) indicates that they are obese (BMI >30) with obesity-related health conditions that include obstructive sleep apnea, GERD and prediabetes . Obesity is worsening. BMI is is above average; BMI management plan is completed. We discussed low calorie, low carb based diet program, portion control, increasing exercise and pharmacologic options including saxenda.

## 2021-04-09 ENCOUNTER — LAB (OUTPATIENT)
Dept: LAB | Facility: HOSPITAL | Age: 46
End: 2021-04-09

## 2021-04-09 DIAGNOSIS — E66.9 OBESITY, CLASS I, BMI 30-34.9: ICD-10-CM

## 2021-04-09 LAB
CHOLEST SERPL-MCNC: 194 MG/DL (ref 0–200)
HBA1C MFR BLD: 4.92 % (ref 4.8–5.6)
HDLC SERPL-MCNC: 42 MG/DL (ref 40–60)
LDLC SERPL CALC-MCNC: 135 MG/DL (ref 0–100)
LDLC/HDLC SERPL: 3.18 {RATIO}
TRIGL SERPL-MCNC: 92 MG/DL (ref 0–150)
TSH SERPL DL<=0.05 MIU/L-ACNC: 2.03 UIU/ML (ref 0.27–4.2)
VIT B12 BLD-MCNC: 1824 PG/ML (ref 211–946)
VLDLC SERPL-MCNC: 17 MG/DL (ref 5–40)

## 2021-04-09 PROCEDURE — 36415 COLL VENOUS BLD VENIPUNCTURE: CPT

## 2021-04-09 PROCEDURE — 84443 ASSAY THYROID STIM HORMONE: CPT

## 2021-04-09 PROCEDURE — 80061 LIPID PANEL: CPT

## 2021-04-09 PROCEDURE — 83036 HEMOGLOBIN GLYCOSYLATED A1C: CPT

## 2021-04-09 PROCEDURE — 82607 VITAMIN B-12: CPT

## 2021-04-09 PROCEDURE — 83525 ASSAY OF INSULIN: CPT

## 2021-04-10 LAB — INSULIN SERPL-ACNC: 19.2 UIU/ML (ref 2.6–24.9)

## 2021-04-14 ENCOUNTER — OFFICE VISIT (OUTPATIENT)
Dept: BARIATRICS/WEIGHT MGMT | Facility: CLINIC | Age: 46
End: 2021-04-14

## 2021-04-14 VITALS
WEIGHT: 188 LBS | HEIGHT: 64 IN | SYSTOLIC BLOOD PRESSURE: 118 MMHG | RESPIRATION RATE: 18 BRPM | BODY MASS INDEX: 32.1 KG/M2 | DIASTOLIC BLOOD PRESSURE: 64 MMHG | OXYGEN SATURATION: 99 % | HEART RATE: 80 BPM | TEMPERATURE: 97.3 F

## 2021-04-14 DIAGNOSIS — E88.81 METABOLIC SYNDROME: ICD-10-CM

## 2021-04-14 DIAGNOSIS — F51.01 PRIMARY INSOMNIA: ICD-10-CM

## 2021-04-14 DIAGNOSIS — G47.30 SLEEP APNEA, UNSPECIFIED TYPE: Primary | ICD-10-CM

## 2021-04-14 DIAGNOSIS — E66.09 CLASS 1 OBESITY DUE TO EXCESS CALORIES WITH SERIOUS COMORBIDITY AND BODY MASS INDEX (BMI) OF 33.0 TO 33.9 IN ADULT: ICD-10-CM

## 2021-04-14 PROCEDURE — 99214 OFFICE O/P EST MOD 30 MIN: CPT | Performed by: NURSE PRACTITIONER

## 2021-04-14 NOTE — ASSESSMENT & PLAN NOTE
I have instructed the patient to continue with pursuit of medical weight loss as a part of this program. Patient does meet criteria for use of anorectics at this time as BMI >27. Continue nutritional focus and work towards new exercise FITT goal of: 1-2-4-4. Plans to find a new  if hers continues to cancel.     The current plan for this month includes: continue to work on lifestyle behavioral changes. Continue Saxenda, don't titrate up until nausea subsides. Improve sleep.

## 2021-04-14 NOTE — PROGRESS NOTES
"Chief Complaint  No chief complaint on file.    Subjective          Renae Bolton presents to [facility] for obesity management. Started saxenda and did experience nausea. Also still fatigued but doesn't necessarily think it is related to the saxenda. Thinks it is more related to lack of higher intensity exercise. 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 Did try CPAP again and is wearing it 1/2-3/4 of the night before getting too uncomfortable and removing.   Patient is satisfied with weight loss progress. Hunger control is well controlled. Using baritastic ap. Reviewed food journal.   B: protein shake  S: 1/2 turkey/spinach wrap  D: sliced roast beef  Having to make herself eat because she gets full so quickly on saxenda. Calories 900 yesterday, net carbs 50, protein 86g. Sugar cravings much better. Eliminated pretzels.    The patient is exercising with a FITT score of:1-2-3-3. Wants to restart with  but her  keeps cancelling on her.     Patient's Body mass index is 32.27 kg/m². BMI is above normal parameters. Recommendations include: exercise counseling, nutrition counseling and pharmacological intervention.    Change in weight since last visit: -5lb    Recent Weight History:   Wt Readings from Last 3 Encounters:   04/14/21 85.3 kg (188 lb)   03/31/21 87.5 kg (193 lb)   03/18/21 88 kg (194 lb)     Start Weight: 193lb  Total Loss/%Loss of BBW: -5lb    VS   Vitals:    04/14/21 0944   BP: 118/64   BP Location: Left arm   Patient Position: Sitting   Cuff Size: Adult   Pulse: 80   Resp: 18   Temp: 97.3 °F (36.3 °C)   TempSrc: Temporal   SpO2: 99%   Weight: 85.3 kg (188 lb)   Height: 162.6 cm (64\")       Measurements (in inches)  Waist: 35.75\"    Objective   Vital Signs:   /64 (BP Location: Left arm, Patient Position: Sitting, Cuff Size: Adult)   Pulse 80   Temp 97.3 °F (36.3 °C) (Temporal)   Resp 18   Ht 162.6 cm " "(64\")   Wt 85.3 kg (188 lb)   SpO2 99%   BMI 32.27 kg/m²     Physical Exam   General appears stated age and normal appearance   HEENT PERRLA, EOM intact and conjunctivae normal   Chest/lungs Normal rate, Regular rhythm, Breathing is unlabored and Clear to auscultation bilaterally   Abdomen deferred   Extremities pitting edema 1+ bilat ext   Neuro Normal DTRs, Good historian and No focal deficit   Skin Warm, dry, intact   Psych normal behavior, normal thought content and normal concentration     Result Review :   The following data was reviewed by: LAURITA Ibanez on 04/14/2021:     A1c (04/09/2021 08:59)  Vitamin B12 (04/09/2021 08:59)  TSH (04/09/2021 08:59)  Lipid Panel (04/09/2021 08:59)  Insulin, Total (04/09/2021 08:59)         Assessment and Plan    Diagnoses and all orders for this visit:    1. Sleep apnea, unspecified type (Primary)    2. Metabolic syndrome    3. Class 1 obesity due to excess calories with serious comorbidity and body mass index (BMI) of 33.0 to 33.9 in adult  Assessment & Plan:  I have instructed the patient to continue with pursuit of medical weight loss as a part of this program. Patient does meet criteria for use of anorectics at this time as BMI >27. Continue nutritional focus and work towards new exercise FITT goal of: 1-2-4-4. Plans to find a new  if hers continues to cancel.     The current plan for this month includes: continue to work on lifestyle behavioral changes. Continue Saxenda, don't titrate up until nausea subsides. Improve sleep.        4. Primary insomnia  Assessment & Plan:  Add melatonin 5mg 30 minutes prior to bedtime.     Orders:  -     Melatonin 5 MG capsule; Take 1 tablet by mouth Every Night.  Dispense: 30 each; Refill: 0      I spent 30 minutes caring for Renae on this date of service. This time includes time spent by me in the following activities:counseling and educating the patient/family/caregiver, ordering medications, tests, or " procedures, documenting information in the medical record and independently interpreting results and communicating that information with the patient/family/caregiver  Follow Up   Return in about 2 weeks (around 4/28/2021) for Next scheduled follow up.  Patient was given instructions and counseling regarding her condition or for health maintenance advice. Please see specific information pulled into the AVS if appropriate.     LAURITA Michel

## 2021-04-21 DIAGNOSIS — F32.A DEPRESSION, UNSPECIFIED DEPRESSION TYPE: ICD-10-CM

## 2021-04-21 RX ORDER — DULOXETIN HYDROCHLORIDE 30 MG/1
30 CAPSULE, DELAYED RELEASE ORAL DAILY
Qty: 30 CAPSULE | Refills: 0 | Status: SHIPPED | OUTPATIENT
Start: 2021-04-21 | End: 2021-06-16 | Stop reason: DRUGHIGH

## 2021-04-28 ENCOUNTER — TELEMEDICINE (OUTPATIENT)
Dept: BARIATRICS/WEIGHT MGMT | Facility: CLINIC | Age: 46
End: 2021-04-28

## 2021-04-28 VITALS
HEIGHT: 64 IN | DIASTOLIC BLOOD PRESSURE: 67 MMHG | WEIGHT: 187 LBS | BODY MASS INDEX: 31.92 KG/M2 | SYSTOLIC BLOOD PRESSURE: 111 MMHG | HEART RATE: 81 BPM

## 2021-04-28 DIAGNOSIS — E66.09 CLASS 1 OBESITY DUE TO EXCESS CALORIES WITH SERIOUS COMORBIDITY AND BODY MASS INDEX (BMI) OF 33.0 TO 33.9 IN ADULT: Primary | ICD-10-CM

## 2021-04-28 PROCEDURE — 99212 OFFICE O/P EST SF 10 MIN: CPT | Performed by: NURSE PRACTITIONER

## 2021-04-28 NOTE — ASSESSMENT & PLAN NOTE
Patient's (Body mass index is 32.1 kg/m².) indicates that they are obese (BMI >30) with obesity-related health conditions that include obstructive sleep apnea . Obesity is improving with treatment. BMI is is above average; BMI management plan is completed. We discussed low calorie, low carb based diet program, increasing exercise and pharmacologic options including saxenda.     I have instructed the patient to continue with pursuit of medical weight loss as a part of this program. Patient does meet criteria for use of anorectics at this time as BMI >27.     Continue nutritional focus and work towards new exercise FITT goal of: 3-3-4-2.     The current plan for this month includes: continue current exercise efforts, increase water to recommended daily amount, weight loss goal 4-6lbs this month and continue to work on lifestyle behavioral changes. Continue to titrate up on saxenda slowly. Nausea should improve with continued use.

## 2021-04-28 NOTE — PROGRESS NOTES
"Chief Complaint  Follow-up, Obesity, Weight Regain, and Nutrition Counseling    Subjective          Virtual Visit today due to patient preference. Consent was given for a telemedicine encounter.     Renae Bolton presents to [facility] for obesity management.     Start Weight: 193lb  Total Loss/%Loss of BBW: -6lb/3%  Change in weight since last visit: -1lb  Patient's Body mass index is 32.1 kg/m². BMI is above normal parameters. Recommendations include: exercise counseling, nutrition counseling and pharmacological intervention.    Patient is unsure with weight loss progress. Appetite is well controlled, has to make herself eat, feels similar to after surgery. Did titrate up slowly on Saxenda. Felt very nauseated for several days so went back to 1.2mg, yesterday went up to 1.8mg. Is having to use Zofran twice a day to ease the nausea. Reports no other side effects of prescribed medications today. Knows water intake could use some improvement, getting about 48oz a day, feels bloated and even nauseated when she gets more. Keeping food journal some days, knows she is staying within carbs. Has been unable to tolerate shakes lately so had to take a break from those- feels so nauseated. Has tried mixing up the time, tried plant based and that was a little better. Also putting vital protein in her drinks. Having some constipation.     The patient is exercising with a FITT score of: 3-3-4-3. Seeing  three times a week. Walking on other days. Doing measurements with .     Vitals obtained by patient at home during visit.  VS   Vitals:    04/28/21 0734   BP: 111/67   Pulse: 81   Weight: 84.8 kg (187 lb)   Height: 162.6 cm (64\")       Measurements (in inches)  Waist: 38\" (home measurement)    Objective   Vital Signs:   /67   Pulse 81   Ht 162.6 cm (64\")   Wt 84.8 kg (187 lb)   BMI 32.10 kg/m²     Physical Exam     General appears stated age and " normal appearance   HEENT PERRLA, EOM intact and conjunctivae normal   Chest/lungs Normal rate, Regular rhythm and Breathing is unlabored   Neuro Good historian and No focal deficit   Psych normal behavior, normal thought content and normal concentration     Result Review :                 Assessment and Plan    Diagnoses and all orders for this visit:    1. Class 1 obesity due to excess calories with serious comorbidity and body mass index (BMI) of 33.0 to 33.9 in adult (Primary)  Assessment & Plan:  Patient's (Body mass index is 32.1 kg/m².) indicates that they are obese (BMI >30) with obesity-related health conditions that include obstructive sleep apnea . Obesity is improving with treatment. BMI is is above average; BMI management plan is completed. We discussed low calorie, low carb based diet program, increasing exercise and pharmacologic options including saxenda.     I have instructed the patient to continue with pursuit of medical weight loss as a part of this program. Patient does meet criteria for use of anorectics at this time as BMI >27.     Continue nutritional focus and work towards new exercise FITT goal of: 3-3-4-2.     The current plan for this month includes: continue current exercise efforts, increase water to recommended daily amount, weight loss goal 4-6lbs this month and continue to work on lifestyle behavioral changes. Continue to titrate up on saxenda slowly. Nausea should improve with continued use.         I spent 30 minutes caring for Renae on this date of service. This time includes time spent by me in the following activities:counseling and educating the patient/family/caregiver and documenting information in the medical record  Follow Up   Return in about 3 weeks (around 5/19/2021) for Next scheduled follow up.  Patient was given instructions and counseling regarding her condition or for health maintenance advice. Please see specific information pulled into the AVS if appropriate.      Bindu Lechuga, APRN

## 2021-05-19 ENCOUNTER — OFFICE VISIT (OUTPATIENT)
Dept: BARIATRICS/WEIGHT MGMT | Facility: CLINIC | Age: 46
End: 2021-05-19

## 2021-05-19 VITALS
HEART RATE: 80 BPM | BODY MASS INDEX: 31.33 KG/M2 | WEIGHT: 183.5 LBS | OXYGEN SATURATION: 98 % | TEMPERATURE: 98.2 F | DIASTOLIC BLOOD PRESSURE: 62 MMHG | SYSTOLIC BLOOD PRESSURE: 110 MMHG | HEIGHT: 64 IN | RESPIRATION RATE: 18 BRPM

## 2021-05-19 DIAGNOSIS — F32.A ANXIETY AND DEPRESSION: Primary | ICD-10-CM

## 2021-05-19 DIAGNOSIS — F41.9 ANXIETY AND DEPRESSION: Primary | ICD-10-CM

## 2021-05-19 DIAGNOSIS — E66.09 CLASS 1 OBESITY DUE TO EXCESS CALORIES WITH SERIOUS COMORBIDITY AND BODY MASS INDEX (BMI) OF 33.0 TO 33.9 IN ADULT: ICD-10-CM

## 2021-05-19 PROCEDURE — 99213 OFFICE O/P EST LOW 20 MIN: CPT | Performed by: NURSE PRACTITIONER

## 2021-05-19 RX ORDER — LIRAGLUTIDE 6 MG/ML
2.4 INJECTION, SOLUTION SUBCUTANEOUS DAILY
Qty: 1 PEN | Refills: 0
Start: 2021-05-19 | End: 2021-05-25 | Stop reason: SDUPTHER

## 2021-05-19 NOTE — ASSESSMENT & PLAN NOTE
I have instructed the patient to continue with pursuit of medical weight loss as a part of this program. Patient does meet criteria for use of anorectics at this time as BMI >27 and is not at treatment goal.     Continue nutritional focus and work towards new exercise FITT goal of: 2-2-4-2. Continue with .   The current plan for this month includes:   Continue to increase protein. Try moving saxenda to evening to prevent morning nausea/skipping breakfast. Continue to titrate up to 3.0mg slowly as tolerated. DC cymbalta when complete. Decreasing propanolol with cardiologist. Almost to lowest weight post surgery.

## 2021-05-19 NOTE — PROGRESS NOTES
"Chief Complaint  Follow-up, Obesity, Nutrition Counseling, and Weight Regain    Subjective          Renae Bolton presents to [facility] for obesity management. s/p LSG/HHR 19 w/ Dr. Onofre. Comorbidities of depression and anxiety were addressed today.    Pre-surgery weight: 232.5 pounds.  Today's weight is 83.2 kg (183 lb 8 oz) pounds and weight loss since surgery is -49 pounds.  Patient's Body mass index is 31.5 kg/m². indicating that she is obese (BMI >30).   Patient is satisfied with weight loss progress. Appetite is well controlled on saxenda 2.4mg. Reports no side effects of prescribed medications today- nausea and bloating much better since she has switched injeciton sites. Keeping constipation controlled with senna. Feels like she could do better with protein. Sometimes Doing a food journal off and on. Has been very busy at work, is exhausted.  Mood is great, almost finished with cymbalta 30mg.  Saw cardiologist Monday and they are going to decrease propranolol to 80mg daily.     The patient is exercisin-2-4-3. Working with , will be starting home workouts that  plans for her. Daughter is doing it with her. Doing weights 3 days a week. Is one pound from lowest weight after surgery.    Body composition analysis completed and showed:   %body fat: 44.3%  Total fat mass (in lbs): 81.5    VS   Vitals:    21 0817   BP: 110/62   BP Location: Left arm   Patient Position: Sitting   Cuff Size: Adult   Pulse: 80   Resp: 18   Temp: 98.2 °F (36.8 °C)   TempSrc: Temporal   SpO2: 98%   Weight: 83.2 kg (183 lb 8 oz)   Height: 162.6 cm (64\")     Objective   Vital Signs:   /62 (BP Location: Left arm, Patient Position: Sitting, Cuff Size: Adult)   Pulse 80   Temp 98.2 °F (36.8 °C) (Temporal)   Resp 18   Ht 162.6 cm (64\")   Wt 83.2 kg (183 lb 8 oz)   SpO2 98%   BMI 31.50 kg/m²     Physical Exam   General appears stated age and normal " appearance   HEENT PERRLA, EOM intact and conjunctivae normal   Chest/lungs Normal rate, Regular rhythm and Breathing is unlabored   Extremities without edema   Neuro Good historian and No focal deficit   Skin Warm, dry, intact   Psych normal behavior, normal thought content and normal concentration     Result Review :                 Assessment and Plan    Diagnoses and all orders for this visit:    1. Anxiety and depression (Primary)    2. Class 1 obesity due to excess calories with serious comorbidity and body mass index (BMI) of 33.0 to 33.9 in adult  Assessment & Plan:  I have instructed the patient to continue with pursuit of medical weight loss as a part of this program. Patient does meet criteria for use of anorectics at this time as BMI >27 and is not at treatment goal.     Continue nutritional focus and work towards new exercise FITT goal of: 2-2-4-2. Continue with .   The current plan for this month includes:   Continue to increase protein. Try moving saxenda to evening to prevent morning nausea/skipping breakfast. Continue to titrate up to 3.0mg slowly as tolerated. DC cymbalta when complete. Decreasing propanolol with cardiologist. Almost to lowest weight post surgery.     Orders:  -     Liraglutide (Saxenda) 18 MG/3ML injection pen; Inject 2.4 mg under the skin into the appropriate area as directed Daily.  Dispense: 1 pen; Refill: 0    I spent 25 minutes caring for Renae on this date of service. This time includes time spent by me in the following activities:preparing for the visit, performing a medically appropriate examination and/or evaluation , counseling and educating the patient/family/caregiver and documenting information in the medical record  Follow Up   Return in about 4 weeks (around 6/16/2021) for Next scheduled follow up.  Patient was given instructions and counseling regarding her condition or for health maintenance advice. Please see specific information pulled into the AVS if  appropriate.     LAURITA Michel

## 2021-05-23 ENCOUNTER — PATIENT MESSAGE (OUTPATIENT)
Dept: BARIATRICS/WEIGHT MGMT | Facility: CLINIC | Age: 46
End: 2021-05-23

## 2021-05-23 DIAGNOSIS — E66.09 CLASS 1 OBESITY DUE TO EXCESS CALORIES WITH SERIOUS COMORBIDITY AND BODY MASS INDEX (BMI) OF 33.0 TO 33.9 IN ADULT: ICD-10-CM

## 2021-05-23 RX ORDER — LIRAGLUTIDE 6 MG/ML
2.4 INJECTION, SOLUTION SUBCUTANEOUS DAILY
Qty: 1 PEN | Refills: 0 | Status: CANCELLED
Start: 2021-05-23

## 2021-05-24 DIAGNOSIS — E66.09 CLASS 1 OBESITY DUE TO EXCESS CALORIES WITH SERIOUS COMORBIDITY AND BODY MASS INDEX (BMI) OF 33.0 TO 33.9 IN ADULT: ICD-10-CM

## 2021-05-24 RX ORDER — LIRAGLUTIDE 6 MG/ML
2.4 INJECTION, SOLUTION SUBCUTANEOUS DAILY
Qty: 1 PEN | Refills: 0 | Status: CANCELLED
Start: 2021-05-24

## 2021-05-25 DIAGNOSIS — E66.09 CLASS 1 OBESITY DUE TO EXCESS CALORIES WITH SERIOUS COMORBIDITY AND BODY MASS INDEX (BMI) OF 33.0 TO 33.9 IN ADULT: ICD-10-CM

## 2021-05-25 RX ORDER — LIRAGLUTIDE 6 MG/ML
2.4 INJECTION, SOLUTION SUBCUTANEOUS DAILY
Qty: 1 PEN | Refills: 0
Start: 2021-05-25 | End: 2021-05-25

## 2021-05-25 RX ORDER — LIRAGLUTIDE 6 MG/ML
INJECTION, SOLUTION SUBCUTANEOUS
Qty: 15 ML | Refills: 0 | Status: SHIPPED | OUTPATIENT
Start: 2021-05-25 | End: 2021-06-16 | Stop reason: SDUPTHER

## 2021-05-25 NOTE — TELEPHONE ENCOUNTER
From: Renae Bolton  To: LAURITA Ibanez  Sent: 5/23/2021 5:56 PM EDT  Subject: Prescription Question    Bindu  Apparently the pharmacy does not have a refill for the saxenda. Would you mind to send me a refill please? Thank you!

## 2021-06-14 DIAGNOSIS — G43.719 INTRACTABLE CHRONIC MIGRAINE WITHOUT AURA AND WITHOUT STATUS MIGRAINOSUS: ICD-10-CM

## 2021-06-14 NOTE — TELEPHONE ENCOUNTER
Caller: ALEX    Relationship: PHARMACY    Best call back number:[phone]    Medication needed:   Requested Prescriptions     Pending Prescriptions Disp Refills   • Treximet  MG per tablet 9 tablet 5     Sig: Take one tablet at onset of headache. May repeat dose one time in 2 hours if headache not relieved.       When do you need the refill by: ASAP    What additional details did the patient provide when requesting the medication: PATIENT IS RUNNING OUT    Does the patient have less than a 3 day supply:  [x] Yes  [] No    What is the patient's preferred pharmacy:     [facility] AS LISTED

## 2021-06-15 RX ORDER — SUMATRIPTAN SUCC/NAPROXEN SOD 85MG-500MG
TABLET ORAL
Qty: 9 TABLET | Refills: 5 | Status: SHIPPED | OUTPATIENT
Start: 2021-06-15 | End: 2022-01-27

## 2021-06-16 ENCOUNTER — OFFICE VISIT (OUTPATIENT)
Dept: BARIATRICS/WEIGHT MGMT | Facility: CLINIC | Age: 46
End: 2021-06-16

## 2021-06-16 VITALS
HEIGHT: 64 IN | WEIGHT: 180 LBS | OXYGEN SATURATION: 99 % | SYSTOLIC BLOOD PRESSURE: 118 MMHG | HEART RATE: 85 BPM | TEMPERATURE: 97.8 F | DIASTOLIC BLOOD PRESSURE: 64 MMHG | BODY MASS INDEX: 30.73 KG/M2 | RESPIRATION RATE: 18 BRPM

## 2021-06-16 DIAGNOSIS — E66.09 CLASS 1 OBESITY DUE TO EXCESS CALORIES WITH SERIOUS COMORBIDITY AND BODY MASS INDEX (BMI) OF 33.0 TO 33.9 IN ADULT: ICD-10-CM

## 2021-06-16 DIAGNOSIS — F41.9 ANXIETY AND DEPRESSION: ICD-10-CM

## 2021-06-16 DIAGNOSIS — F32.A DEPRESSION, UNSPECIFIED DEPRESSION TYPE: Primary | ICD-10-CM

## 2021-06-16 DIAGNOSIS — Z51.81 THERAPEUTIC DRUG MONITORING: ICD-10-CM

## 2021-06-16 DIAGNOSIS — F32.A ANXIETY AND DEPRESSION: ICD-10-CM

## 2021-06-16 PROCEDURE — 99213 OFFICE O/P EST LOW 20 MIN: CPT | Performed by: NURSE PRACTITIONER

## 2021-06-16 RX ORDER — DULOXETIN HYDROCHLORIDE 20 MG/1
20 CAPSULE, DELAYED RELEASE ORAL DAILY
Qty: 30 CAPSULE | Refills: 2 | Status: SHIPPED | OUTPATIENT
Start: 2021-06-16 | End: 2021-09-04 | Stop reason: SDUPTHER

## 2021-06-16 RX ORDER — PHENTERMINE HYDROCHLORIDE 15 MG/1
CAPSULE ORAL
Qty: 30 CAPSULE | Refills: 0 | Status: SHIPPED | OUTPATIENT
Start: 2021-06-16 | End: 2021-07-15 | Stop reason: SDUPTHER

## 2021-06-16 RX ORDER — LIRAGLUTIDE 6 MG/ML
2.4 INJECTION, SOLUTION SUBCUTANEOUS DAILY
Qty: 15 ML | Refills: 0 | Status: SHIPPED | OUTPATIENT
Start: 2021-06-16 | End: 2021-07-15 | Stop reason: SDUPTHER

## 2021-06-16 NOTE — PROGRESS NOTES
"Chief Complaint  Follow-up, Obesity, and Weight Regain    Subjective          Renae Bolton presents to [facility] for obesity management. s/p LSG/HHR 19 w/ Dr. Onofre.    Pre-surgery weight: 232.5 pounds.  Today's weight is 81.6 kg (180 lb) pounds and weight loss since surgery is 52.5  pounds.  Patient's Body mass index is 30.9 kg/m².  Change in weight since last visit: -3.5 lb    Patient is satisfied with weight loss progress. Appetite is well controlled. Trying to get her protein in, not hungry in the mornings and often nauseated from saxenda but getting better. She has been crying more for the last 3 days and is more short tempered. Thinks she needs to go back on the cymbalta. Did decrease propranolol with cardiologist. Denies palpitations. Sleep is pretty good- about 7 hours a night.     The patient is exercisin-3-4-4. Still working with .     Body composition analysis completed and showed:   %body fat:43.1%  Total fat mass (in lbs):77.5 lb    VS   Vitals:    21 0918   BP: 118/64   BP Location: Left arm   Patient Position: Sitting   Cuff Size: Adult   Pulse: 85   Resp: 18   Temp: 97.8 °F (36.6 °C)   TempSrc: Temporal   SpO2: 99%   Weight: 81.6 kg (180 lb)   Height: 162.6 cm (64\")       Measurements (in inches)  Waist: 35.5    Objective   Vital Signs:   /64 (BP Location: Left arm, Patient Position: Sitting, Cuff Size: Adult)   Pulse 85   Temp 97.8 °F (36.6 °C) (Temporal)   Resp 18   Ht 162.6 cm (64\")   Wt 81.6 kg (180 lb)   SpO2 99%   BMI 30.90 kg/m²     Physical Exam   General appears stated age and normal appearance   HEENT PERRLA, EOM intact and conjunctivae normal   Chest/lungs Normal rate, Regular rhythm and Breathing is unlabored   Extremities without edema   Neuro Good historian and No focal deficit   Skin Warm, dry, intact   Psych normal behavior, normal thought content and normal concentration     Result Review : "                 Assessment and Plan    Diagnoses and all orders for this visit:    1. Depression, unspecified depression type (Primary)  -     DULoxetine (CYMBALTA) 20 MG capsule; Take 1 capsule by mouth Daily.  Dispense: 30 capsule; Refill: 2    2. Class 1 obesity due to excess calories with serious comorbidity and body mass index (BMI) of 33.0 to 33.9 in adult  Assessment & Plan:  Patient's (Body mass index is 30.9 kg/m².) indicates that they are obese (BMI >30) with obesity-related health conditions that include obstructive sleep apnea . Obesity is improving with treatment. BMI is is above average; BMI management plan is completed. We discussed low calorie, low carb based diet program, increasing exercise and pharmacologic options including saxenda and phentermine.     I have instructed the patient to continue with pursuit of medical weight loss as a part of this program. Patient does meet criteria for use of anorectics at this time as BMI >27.     Continue nutritional focus and continue exercise FITT goal of: 2-3-4-4.  The current plan for this month includes: Keep up the great focus. Restart cymbalta, start phentermine, move saxenda to evening to see if nausea improves. Titrate up as tolerated, currently at 2.4mg.     After review of Ms. Renae Bolton lab work, EKG, urine drug screen, PMH, and medical risk factors, it has been decided that it is medically appropriate to use an anorectic medication for assistance of weight loss. It is felt that the risks of staying at current body fat percentage and potential adverse co-morbid conditions outweighs the risk of medication use. Medication risks and benefits were again reviewed with patient. she has signed the medication agreement form & has decided to try the use of an anorectic medication for the assistance of weight loss.         Orders:  -     phentermine 15 MG capsule; Take 1 capsule by mouth once daily at 11am.  Dispense: 30 capsule; Refill: 0  -      Liraglutide (Saxenda) 18 MG/3ML injection pen; Inject 2.4 mg under the skin into the appropriate area as directed Daily.  Dispense: 15 mL; Refill: 0    3. Therapeutic drug monitoring  -     Urine Drug Screen - Urine, Clean Catch    4. Anxiety and depression  Assessment & Plan:  Restart cymbalta at lower dose due to weeping.         I spent 25 minutes caring for Renae on this date of service. This time includes time spent by me in the following activities:preparing for the visit, performing a medically appropriate examination and/or evaluation , counseling and educating the patient/family/caregiver, ordering medications, tests, or procedures and documenting information in the medical record  Follow Up   Return in about 4 weeks (around 7/14/2021) for Next scheduled follow up.  Patient was given instructions and counseling regarding her condition or for health maintenance advice. Please see specific information pulled into the AVS if appropriate.     Bindu Lechuga APRN

## 2021-06-16 NOTE — ASSESSMENT & PLAN NOTE
Patient's (Body mass index is 30.9 kg/m².) indicates that they are obese (BMI >30) with obesity-related health conditions that include obstructive sleep apnea . Obesity is improving with treatment. BMI is is above average; BMI management plan is completed. We discussed low calorie, low carb based diet program, increasing exercise and pharmacologic options including saxenda and phentermine.     I have instructed the patient to continue with pursuit of medical weight loss as a part of this program. Patient does meet criteria for use of anorectics at this time as BMI >27.     Continue nutritional focus and continue exercise FITT goal of: 2-3-4-4.  The current plan for this month includes: Keep up the great focus. Restart cymbalta, start phentermine, move saxenda to evening to see if nausea improves. Titrate up as tolerated, currently at 2.4mg.     After review of Ms. Renae Bolton lab work, EKG, urine drug screen, PMH, and medical risk factors, it has been decided that it is medically appropriate to use an anorectic medication for assistance of weight loss. It is felt that the risks of staying at current body fat percentage and potential adverse co-morbid conditions outweighs the risk of medication use. Medication risks and benefits were again reviewed with patient. she has signed the medication agreement form & has decided to try the use of an anorectic medication for the assistance of weight loss.

## 2021-06-18 LAB
AMPHETAMINES UR QL SCN: NEGATIVE NG/ML
BARBITURATES UR QL SCN: NEGATIVE NG/ML
BENZODIAZ UR QL SCN: NEGATIVE NG/ML
BZE UR QL SCN: NEGATIVE NG/ML
CANNABINOIDS UR QL SCN: NEGATIVE NG/ML
CREAT UR-MCNC: 146.4 MG/DL (ref 20–300)
LABORATORY COMMENT REPORT: NORMAL
METHADONE UR QL SCN: NEGATIVE NG/ML
OPIATES UR QL SCN: NEGATIVE NG/ML
OXYCODONE+OXYMORPHONE UR QL SCN: NEGATIVE NG/ML
PCP UR QL: NEGATIVE NG/ML
PH UR: 7.3 [PH] (ref 4.5–8.9)
PROPOXYPH UR QL SCN: NEGATIVE NG/ML

## 2021-06-23 ENCOUNTER — TELEPHONE (OUTPATIENT)
Dept: NEUROLOGY | Facility: CLINIC | Age: 46
End: 2021-06-23

## 2021-06-24 NOTE — TELEPHONE ENCOUNTER
I have spoke with pt, she is getting medication through a pharmacy that will cover if insurance had denied.

## 2021-07-15 ENCOUNTER — OFFICE VISIT (OUTPATIENT)
Dept: BARIATRICS/WEIGHT MGMT | Facility: CLINIC | Age: 46
End: 2021-07-15

## 2021-07-15 VITALS
HEIGHT: 64 IN | HEART RATE: 76 BPM | TEMPERATURE: 97.8 F | WEIGHT: 175 LBS | OXYGEN SATURATION: 99 % | RESPIRATION RATE: 18 BRPM | DIASTOLIC BLOOD PRESSURE: 62 MMHG | SYSTOLIC BLOOD PRESSURE: 108 MMHG | BODY MASS INDEX: 29.88 KG/M2

## 2021-07-15 DIAGNOSIS — E66.09 CLASS 1 OBESITY DUE TO EXCESS CALORIES WITH SERIOUS COMORBIDITY AND BODY MASS INDEX (BMI) OF 33.0 TO 33.9 IN ADULT: ICD-10-CM

## 2021-07-15 DIAGNOSIS — F41.9 ANXIETY AND DEPRESSION: Primary | ICD-10-CM

## 2021-07-15 DIAGNOSIS — F32.A ANXIETY AND DEPRESSION: Primary | ICD-10-CM

## 2021-07-15 PROCEDURE — 99212 OFFICE O/P EST SF 10 MIN: CPT | Performed by: NURSE PRACTITIONER

## 2021-07-15 RX ORDER — LIRAGLUTIDE 6 MG/ML
3 INJECTION, SOLUTION SUBCUTANEOUS DAILY
Qty: 15 ML | Refills: 0 | Status: SHIPPED | OUTPATIENT
Start: 2021-07-15 | End: 2021-09-04 | Stop reason: SDUPTHER

## 2021-07-15 RX ORDER — PHENTERMINE HYDROCHLORIDE 15 MG/1
CAPSULE ORAL
Qty: 30 CAPSULE | Refills: 0 | Status: SHIPPED | OUTPATIENT
Start: 2021-07-15 | End: 2021-08-19 | Stop reason: SDUPTHER

## 2021-07-15 NOTE — PROGRESS NOTES
"Chief Complaint  Nutrition Counseling, Weight Loss, and Follow-up    Subjective          Renae Bolton presents to [facility] for obesity management and weight regain following MBS.  Patient is satisfied with weight loss progress. Appetite is well controlled.  Started 3.0mg dose of Saxenda a few days ago. Nausea is much better, not lasting all day. No side effects with phentermine. Sometimes forgetting. Still focusing on protein and water intake. Has been eating more fresh fruits and vegetables. Denies palpitations, chest pain, SOA. Mood is stable, had some personal stress and used healthy coping strategies like prayer, being in nature, animals, exercising with friends. Still on Cymbalta 20mg.  The patient is exercisin-4-4-3. Still working with .     Pre-surgery weight:232.5  pounds.  Today's weight is 79.4 kg (175 lb)175 pounds and weight loss since surgery is -57.5 pounds/24%.  Patient's Body mass index is 30.04 kg/m².   Total weight loss with medical weight management: -12lb . Change in weight since last visit: -5lb    Body composition analysis completed and showed:   %body fat:43.8%  Total fat mass (in lbs):76.5 lb    VS   Vitals:    07/15/21 0808   BP: 108/62   BP Location: Left arm   Patient Position: Sitting   Cuff Size: Adult   Pulse: 76   Resp: 18   Temp: 97.8 °F (36.6 °C)   TempSrc: Temporal   SpO2: 99%   Weight: 79.4 kg (175 lb)   Height: 162.6 cm (64\")       Measurements (in inches)  Waist: 35.5\"    Objective   Vital Signs:   /62 (BP Location: Left arm, Patient Position: Sitting, Cuff Size: Adult)   Pulse 76   Temp 97.8 °F (36.6 °C) (Temporal)   Resp 18   Ht 162.6 cm (64\")   Wt 79.4 kg (175 lb)   SpO2 99%   BMI 30.04 kg/m²     Physical Exam   General appears stated age and normal appearance   HEENT PERRLA, EOM intact and conjunctivae normal   Chest/lungs Normal rate, Regular rhythm and Breathing is unlabored   Extremities without edema "   Neuro Good historian and No focal deficit   Skin Warm, dry, intact   Psych normal behavior, normal thought content and normal concentration     Result Review :                 Assessment and Plan    Diagnoses and all orders for this visit:    1. Anxiety and depression (Primary)  Assessment & Plan:  Patient's depression is recurrent and is moderate without psychosis. Their depression is currently active and the condition is improving with treatment. This will be reassessed in 3 months. F/U as described:patient will continue current medication therapy.      2. Class 1 obesity due to excess calories with serious comorbidity and body mass index (BMI) of 33.0 to 33.9 in adult  Assessment & Plan:  Patient's (Body mass index is 30.04 kg/m².) indicates that they are obese (BMI >30) with obesity-related health conditions that include obstructive sleep apnea and back pain . Obesity is improving with treatment. BMI is is above average; BMI management plan is completed. We discussed low calorie, low carb based diet program, portion control, increasing exercise and pharmacologic options including saxenda and phentermine.     I have instructed the patient to continue with pursuit of medical weight loss as a part of this program. Patient does meet criteria for use of anorectics at this time as BMI >27 and is not at treatment goal.     Continue nutritional focus and work towards new exercise FITT goal of: 2-3-4-3.  The current plan for this month includes: Keep up the great focus. Move phentermine to first thing in the morning if needed to remember to take. Continue current medications. Goal updated to 150lb.       Orders:  -     phentermine 15 MG capsule; Take 1 capsule by mouth once daily at 11am.  Dispense: 30 capsule; Refill: 0  -     Liraglutide (Saxenda) 18 MG/3ML injection pen; Inject 3 mg under the skin into the appropriate area as directed Daily.  Dispense: 15 mL; Refill: 0      I spent 16 minutes caring for Renae on  this date of service. This time includes time spent by me in the following activities:preparing for the visit, performing a medically appropriate examination and/or evaluation , counseling and educating the patient/family/caregiver, ordering medications, tests, or procedures and documenting information in the medical record  Follow Up   Return in about 4 weeks (around 8/12/2021) for Next scheduled follow up.  Patient was given instructions and counseling regarding her condition or for health maintenance advice. Please see specific information pulled into the AVS if appropriate.     Bindu Lechuga APRN

## 2021-07-15 NOTE — ASSESSMENT & PLAN NOTE
Patient's depression is recurrent and is moderate without psychosis. Their depression is currently active and the condition is improving with treatment. This will be reassessed in 3 months. F/U as described:patient will continue current medication therapy.

## 2021-07-15 NOTE — ASSESSMENT & PLAN NOTE
Patient's (Body mass index is 30.04 kg/m².) indicates that they are obese (BMI >30) with obesity-related health conditions that include obstructive sleep apnea and back pain . Obesity is improving with treatment. BMI is is above average; BMI management plan is completed. We discussed low calorie, low carb based diet program, portion control, increasing exercise and pharmacologic options including saxenda and phentermine.     I have instructed the patient to continue with pursuit of medical weight loss as a part of this program. Patient does meet criteria for use of anorectics at this time as BMI >27 and is not at treatment goal.     Continue nutritional focus and work towards new exercise FITT goal of: 2-3-4-3.  The current plan for this month includes: Keep up the great focus. Move phentermine to first thing in the morning if needed to remember to take. Continue current medications. Goal updated to 150lb.

## 2021-08-19 ENCOUNTER — OFFICE VISIT (OUTPATIENT)
Dept: BARIATRICS/WEIGHT MGMT | Facility: CLINIC | Age: 46
End: 2021-08-19

## 2021-08-19 VITALS
SYSTOLIC BLOOD PRESSURE: 108 MMHG | TEMPERATURE: 97.8 F | HEIGHT: 64 IN | DIASTOLIC BLOOD PRESSURE: 64 MMHG | RESPIRATION RATE: 18 BRPM | BODY MASS INDEX: 29.02 KG/M2 | OXYGEN SATURATION: 97 % | WEIGHT: 170 LBS | HEART RATE: 86 BPM

## 2021-08-19 DIAGNOSIS — E66.3 OVERWEIGHT: ICD-10-CM

## 2021-08-19 PROCEDURE — 99213 OFFICE O/P EST LOW 20 MIN: CPT | Performed by: NURSE PRACTITIONER

## 2021-08-19 RX ORDER — PHENTERMINE HYDROCHLORIDE 15 MG/1
CAPSULE ORAL
Qty: 30 CAPSULE | Refills: 0 | Status: SHIPPED | OUTPATIENT
Start: 2021-08-19 | End: 2021-09-21 | Stop reason: SDUPTHER

## 2021-08-19 NOTE — PROGRESS NOTES
"Chief Complaint  Obesity and Follow-up    Subjective          Renae Bolton presents to [facility] for obesity management. Co-existing arrhythmia. Patient is satisfied with weight loss progress. Appetite is well controlled. Reports no side effects of prescribed medications today. Denies palpitations, insomnia, SOA. She is not using a food journal at this time. Stress at work has increased, she is very busy and not making time to track food. States she knows she needs to work on decreasing carbs. When asked for examples, states she has had a cookie occasionally and she will have 4 pretzels at work. Still focused on adequate protein. The patient is exercisin-3-4-3. Still working with .     MWM start weight: 193 pounds.  Today's weight is 77.1 kg (170 lb)170  pounds and weight loss since initiation of medical weight loss is -23 pounds.    Change in weight since last visit: -5.0 lb    Objective   Body composition analysis completed and showed:   %body fat:41.0%  Total fat mass (in lbs):69.5 lb    Measurements (in inches)  Waist: 34.5    Vital Signs:    /64 (BP Location: Left arm, Patient Position: Sitting, Cuff Size: Adult)   Pulse 86   Temp 97.8 °F (36.6 °C) (Temporal)   Resp 18   Ht 162.6 cm (64\")   Wt 77.1 kg (170 lb)   SpO2 97%   BMI 29.18 kg/m²       Physical Exam   General appears stated age and normal appearance   HEENT PERRLA, EOM intact and conjunctivae normal   Chest/lungs Normal rate, Regular rhythm and Breathing is unlabored   Extremities without edema   Neuro Good historian and No focal deficit   Skin Warm, dry, intact   Psych normal behavior, normal thought content and normal concentration     Result Review :                 Assessment and Plan    Diagnoses and all orders for this visit:    1. Overweight  Assessment & Plan:  Patient's (Body mass index is 29.18 kg/m².) indicates that they are overweight with health conditions that " include GERD and arrhythmia . Weight is improving with treatment. BMI is is above average; BMI management plan is completed. We discussed low calorie, low carb based diet program, portion control, increasing exercise, management of depression/anxiety/stress to control compensatory eating, pharmacologic options including phentermine and saxenda and an radha-based approach such as gripNote Pal or Lose It.     I have instructed the patient to continue with pursuit of medical weight loss as a part of this program. Patient does meet criteria for use of anorectics at this time as BMI >27 and is not at treatment goal.     Continue nutritional focus and  exercise FITT goal of: 2-3-4-3.  The current plan for this month includes: Keep up the excellent focus. Write down foods if concerned about carb intake.       Orders:  -     phentermine 15 MG capsule; Take 1 capsule by mouth once daily at 11am.  Dispense: 30 capsule; Refill: 0      I spent 20 minutes caring for Renae on this date of service. This time includes time spent by me in the following activities:obtaining and/or reviewing a separately obtained history, performing a medically appropriate examination and/or evaluation , counseling and educating the patient/family/caregiver, ordering medications, tests, or procedures and documenting information in the medical record  Follow Up   Return in about 4 weeks (around 9/16/2021) for Next scheduled follow up.  Patient was given instructions and counseling regarding her condition or for health maintenance advice. Please see specific information pulled into the AVS if appropriate.     LAURITA Michel

## 2021-08-19 NOTE — ASSESSMENT & PLAN NOTE
Patient's (Body mass index is 29.18 kg/m².) indicates that they are overweight with health conditions that include GERD and arrhythmia . Weight is improving with treatment. BMI is is above average; BMI management plan is completed. We discussed low calorie, low carb based diet program, portion control, increasing exercise, management of depression/anxiety/stress to control compensatory eating, pharmacologic options including phentermine and saxenda and an radha-based approach such as watAgame Pal or Lose It.     I have instructed the patient to continue with pursuit of medical weight loss as a part of this program. Patient does meet criteria for use of anorectics at this time as BMI >27 and is not at treatment goal.     Continue nutritional focus and  exercise FITT goal of: 2-3-4-3.  The current plan for this month includes: Keep up the excellent focus. Write down foods if concerned about carb intake.

## 2021-09-04 DIAGNOSIS — E66.09 CLASS 1 OBESITY DUE TO EXCESS CALORIES WITH SERIOUS COMORBIDITY AND BODY MASS INDEX (BMI) OF 33.0 TO 33.9 IN ADULT: ICD-10-CM

## 2021-09-04 DIAGNOSIS — F32.A DEPRESSION, UNSPECIFIED DEPRESSION TYPE: ICD-10-CM

## 2021-09-04 DIAGNOSIS — E66.3 OVERWEIGHT: ICD-10-CM

## 2021-09-08 RX ORDER — LIRAGLUTIDE 6 MG/ML
3 INJECTION, SOLUTION SUBCUTANEOUS DAILY
Qty: 15 ML | Refills: 0 | Status: SHIPPED | OUTPATIENT
Start: 2021-09-08 | End: 2021-09-15

## 2021-09-08 RX ORDER — DULOXETIN HYDROCHLORIDE 20 MG/1
20 CAPSULE, DELAYED RELEASE ORAL DAILY
Qty: 30 CAPSULE | Refills: 2 | Status: SHIPPED | OUTPATIENT
Start: 2021-09-08 | End: 2021-10-21

## 2021-09-08 RX ORDER — PHENTERMINE HYDROCHLORIDE 15 MG/1
CAPSULE ORAL
Qty: 30 CAPSULE | Refills: 0 | OUTPATIENT
Start: 2021-09-08

## 2021-09-09 DIAGNOSIS — E66.09 CLASS 1 OBESITY DUE TO EXCESS CALORIES WITH SERIOUS COMORBIDITY AND BODY MASS INDEX (BMI) OF 33.0 TO 33.9 IN ADULT: Primary | ICD-10-CM

## 2021-09-09 RX ORDER — LIRAGLUTIDE 6 MG/ML
3 INJECTION, SOLUTION SUBCUTANEOUS DAILY
Qty: 1 PEN | Refills: 0 | COMMUNITY
Start: 2021-09-09 | End: 2021-09-15 | Stop reason: SDUPTHER

## 2021-09-15 ENCOUNTER — OFFICE VISIT (OUTPATIENT)
Dept: NEUROLOGY | Facility: CLINIC | Age: 46
End: 2021-09-15

## 2021-09-15 VITALS
HEIGHT: 64 IN | TEMPERATURE: 97.3 F | OXYGEN SATURATION: 98 % | DIASTOLIC BLOOD PRESSURE: 60 MMHG | WEIGHT: 170.6 LBS | SYSTOLIC BLOOD PRESSURE: 100 MMHG | BODY MASS INDEX: 29.12 KG/M2 | HEART RATE: 87 BPM

## 2021-09-15 DIAGNOSIS — G44.209 TENSION HEADACHE: Primary | ICD-10-CM

## 2021-09-15 DIAGNOSIS — E66.09 CLASS 1 OBESITY DUE TO EXCESS CALORIES WITH SERIOUS COMORBIDITY AND BODY MASS INDEX (BMI) OF 33.0 TO 33.9 IN ADULT: ICD-10-CM

## 2021-09-15 DIAGNOSIS — F45.8 BRUXISM: ICD-10-CM

## 2021-09-15 PROCEDURE — 99213 OFFICE O/P EST LOW 20 MIN: CPT | Performed by: NURSE PRACTITIONER

## 2021-09-15 RX ORDER — AMITRIPTYLINE HYDROCHLORIDE 10 MG/1
10 TABLET, FILM COATED ORAL EVERY EVENING
Qty: 90 TABLET | Refills: 2 | Status: SHIPPED | OUTPATIENT
Start: 2021-09-15 | End: 2021-10-21

## 2021-09-15 RX ORDER — LIRAGLUTIDE 6 MG/ML
3 INJECTION, SOLUTION SUBCUTANEOUS DAILY
Qty: 15 ML | Refills: 0 | Status: SHIPPED | OUTPATIENT
Start: 2021-09-15 | End: 2021-10-21 | Stop reason: SDUPTHER

## 2021-09-15 NOTE — PROGRESS NOTES
Follow Up Neurology Office Visit      Patient Name: Renae Bolton    Referring Physician: No ref. provider found    Chief Complaint:    Chief Complaint   Patient presents with   • Follow-up     patient in office to follow up on headaches.        History of Present Illness: Renae Bolton is a pleasant 46 y.o. female who is here to follow up with Neurology for  Headaches. 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 She reports that she is aware of frequently clenching teeth and jaw, and feels this may be worsening headache.   The following portions of the patient's history were reviewed and updated as appropriate: allergies, current medications, past family history, past medical history, past social history, past surgical history and problem list.    Subjective     Review of Systems:   Review of Systems   Constitutional: Negative for activity change and fatigue.   HENT: Negative for drooling and voice change.    Eyes: Negative for blurred vision, double vision, photophobia and visual disturbance.   Respiratory: Negative for shortness of breath.    Cardiovascular: Negative for chest pain and palpitations.   Gastrointestinal: Negative for nausea and vomiting.   Genitourinary: Negative for urinary incontinence.   Musculoskeletal: Negative for arthralgias, back pain, gait problem, myalgias and neck pain.   Allergic/Immunologic: Negative for immunocompromised state.   Neurological: Positive for headache. Negative for dizziness, tremors, seizures, syncope, facial asymmetry, speech difficulty, weakness, light-headedness, numbness, memory problem and confusion.   Hematological: Does not bruise/bleed easily.   Psychiatric/Behavioral: Negative for decreased concentration, dysphoric mood, hallucinations, sleep disturbance, depressed mood and stress. The patient is not nervous/anxious.      Medications:     Current  Outpatient Medications:   •  acyclovir (ZOVIRAX) 5 % ointment, Apply  topically to the appropriate area as directed Every 3 (Three) Hours. (Patient taking differently: Apply 1 application topically to the appropriate area as directed Take As Directed.), Disp: 30 g, Rfl: 6  •  B Complex Vitamins (vitamin b complex) capsule capsule, Take 1 capsule by mouth Daily., Disp: , Rfl:   •  Biotin (Biotin 5000) 5 MG capsule, Take 1 capsule by mouth Daily., Disp: , Rfl:   •  cetirizine (zyrTEC) 10 MG tablet, Take 1 tablet by mouth Daily., Disp: 90 tablet, Rfl: 3  •  Cholecalciferol (Vitamin D3) 250 MCG (07956 UT) tablet, Take 20,000 Units by mouth Daily., Disp: , Rfl:   •  clobetasol propionate (CLOBEX) 0.05 % shampoo, apply TO HAIR AND SCALP as directed, Disp: , Rfl: 0  •  cyclobenzaprine (FLEXERIL) 5 MG tablet, Take 1 tablet by mouth 3 (Three) Times a Day As Needed., Disp: 270 tablet, Rfl: 0  •  dexlansoprazole (Dexilant) 60 MG capsule, Take 1 capsule by mouth Daily, Disp: 30 capsule, Rfl: 11  •  DULoxetine (CYMBALTA) 20 MG capsule, Take 1 capsule by mouth Daily., Disp: 30 capsule, Rfl: 2  •  famciclovir (FAMVIR) 250 MG tablet, Take 1 tablet by mouth 3 (Three) Times a Day., Disp: 270 tablet, Rfl: 4  •  Insulin Pen Needle (Pen Needles) 32G X 6 MM misc, Use pen needles daily, Disp: 100 each, Rfl: 1  •  MAGNESIUM CITRATE PO, Take 270 mg by mouth Daily. *magnesium citrimate+B6 for migraines, Disp: , Rfl:   •  Mirabegron ER (Myrbetriq) 50 MG tablet sustained-release 24 hour 24 hr tablet, Take 1 tablet by mouth Daily., Disp: 90 tablet, Rfl: 3  •  nabumetone (RELAFEN) 500 MG tablet, Take 1 tablet by mouth 2 (Two) Times a Day As Needed for Mild Pain ., Disp: 30 tablet, Rfl: 2  •  NON FORMULARY, Multivitamin patch daily  Collagen patch daily, Disp: , Rfl:   •  Omega-3 Fatty Acids (fish oil) 1000 MG capsule capsule, Take 1 capsule by mouth 2 (Two) Times a Day With Meals., Disp: , Rfl:   •  ondansetron (ZOFRAN) 4 MG tablet, Take 1  "tablet by mouth Every 6 (Six) Hours As Needed., Disp: 120 tablet, Rfl: 3  •  phentermine 15 MG capsule, Take 1 capsule by mouth once daily at 11am., Disp: 30 capsule, Rfl: 0  •  Probiotic Product (PROBIOTIC PO), Take 1 capsule by mouth Daily., Disp: , Rfl:   •  promethazine (PHENERGAN) 25 MG tablet, Take 1 tablet by mouth Every 6 (Six) Hours As Needed for nausea, Disp: 30 tablet, Rfl: 4  •  propranolol LA (INDERAL LA) 80 MG 24 hr capsule, Take 1 capsule by mouth Daily., Disp: 90 capsule, Rfl: 3  •  PSYLLIUM HUSK PO, Take 1 capsule by mouth Daily., Disp: , Rfl:   •  Treximet  MG per tablet, Take one tablet at onset of headache. May repeat dose one time in 2 hours if headache not relieved., Disp: 9 tablet, Rfl: 5  •  Vitamin E 400 units tablet, Take 400 Units by mouth Daily., Disp: , Rfl:   •  amitriptyline (ELAVIL) 10 MG tablet, Take 1 tablet by mouth Every Evening., Disp: 90 tablet, Rfl: 2  •  Liraglutide (Saxenda) 18 MG/3ML injection pen, Inject 3 mg under the skin into the appropriate area as directed Daily., Disp: 15 mL, Rfl: 0  •  propranolol LA (INDERAL LA) 120 MG 24 hr capsule, Take 1 capsule by mouth Daily., Disp: 90 capsule, Rfl: 3    Allergies:   Allergies   Allergen Reactions   • Amoxicillin Shortness Of Breath and Palpitations     Keflex OK   • Caffeine Arrhythmia   • Demerol [Meperidine] Rash     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  Never actually taken it internally.   • Latex Anaphylaxis and Rash   • Morphine And Related Rash     Rash (if gets on skin).  Never taken it internally   • Penicillins Shortness Of Breath and Palpitations     Keflex OK   • Oxycodone Other (See Comments)     Dizziness, confusion     • Topiramate Other (See Comments)     Pt states she can take NAME BRAND ONLY.  Generic brand \"doesn't work\"       Objective     Physical Exam:  Vital Signs:   Vitals:    09/15/21 0805   BP: 100/60   BP Location: Left arm   Patient " "Position: Sitting   Cuff Size: Adult   Pulse: 87   Temp: 97.3 °F (36.3 °C)   SpO2: 98%   Weight: 77.4 kg (170 lb 9.6 oz)   Height: 162.6 cm (64\")   PainSc:   2   PainLoc: Head  Comment: headache       Physical Exam  Vitals and nursing note reviewed.   Neck:      Vascular: No carotid bruit.   Cardiovascular:      Rate and Rhythm: Regular rhythm.      Heart sounds: Normal heart sounds.   Pulmonary:      Effort: Pulmonary effort is normal.      Breath sounds: Normal breath sounds.   Skin:     General: Skin is warm.   Neurological:      General: No focal deficit present.      Mental Status: She is oriented to person, place, and time.      Gait: Gait is intact.      Deep Tendon Reflexes: Strength normal.   Psychiatric:         Mood and Affect: Mood normal.         Speech: Speech normal.         Behavior: Behavior normal.         Thought Content: Thought content normal.         Judgment: Judgment normal.         Neurologic Exam     Mental Status   Oriented to person, place, and time.   Attention: normal. Concentration: normal.   Speech: speech is normal   Level of consciousness: alert  Knowledge: good.   Normal comprehension.     Cranial Nerves   Cranial nerves II through XII intact.     Motor Exam   Muscle bulk: normal  Overall muscle tone: normal  Right arm pronator drift: absent  Left arm pronator drift: absent    Strength   Strength 5/5 throughout.     Sensory Exam   Light touch normal.     Gait, Coordination, and Reflexes     Gait  Gait: normal    Tremor   Resting tremor: absent    Results Review:   I have reviewed the patient's other medical records to include, labs, radiology and referrals.     Assessment / Plan      Assessment/Plan:   Diagnoses and all orders for this visit:    1. Tension headache (Primary)  -     amitriptyline (ELAVIL) 10 MG tablet; Take 1 tablet by mouth Every Evening.  Dispense: 90 tablet; Refill: 2    2. Bruxism  -     amitriptyline (ELAVIL) 10 MG tablet; Take 1 tablet by mouth Every Evening.  " Dispense: 90 tablet; Refill: 2       Follow Up:   Return in about 4 weeks (around 10/13/2021).     LAURITA Sainz  [facility]   AS THE PROVIDER, I PERSONALLY WORE PPE DURING ENTIRE FACE TO FACE ENCOUNTER IN CLINIC WITH THE PATIENT. PATIENT ALSO WORE PPE DURING ENTIRE FACE TO FACE ENCOUNTER EXCEPT FOR A MAX OF 30 SECONDS DURING NEUROLOGICAL EVALUATION OF CRANIAL NERVES AND THEN MASK WAS PLACED BACK OVER PATIENT FACE FOR REMAINDER OF VISIT. I WASHED MY HANDS BEFORE AND AFTER VISIT.      Please note that portions of this note may have been completed with a voice recognition program. Efforts were made to edit the dictations, but occasionally words are mistranscribed.

## 2021-09-15 NOTE — PATIENT INSTRUCTIONS
Tension Headache, Adult  A tension headache is a feeling of pain, pressure, or aching in the head that is often felt over the front and sides of the head. The pain can be dull, or it can feel tight (constricting). There are two types of tension headache:  · Episodic tension headache. This is when the headaches happen fewer than 15 days a month.  · Chronic tension headache. This is when the headaches happen more than 15 days a month during a 3-month period.  A tension headache can last from 30 minutes to several days. It is the most common kind of headache. Tension headaches are not normally associated with nausea or vomiting, and they do not get worse with physical activity.  What are the causes?  The exact cause of this condition is not known. Tension headaches are often triggered by stress, anxiety, or depression. Other triggers include:  · Alcohol.  · Too much caffeine or caffeine withdrawal.  · Respiratory infections, such as colds, flu, or sinus infections.  · Dental problems or teeth clenching.  · Tiredness (fatigue).  · Holding your head and neck in the same position for a long period of time, such as while using a computer.  · Smoking.  · Arthritis of the neck.  What are the signs or symptoms?  Symptoms of this condition include:  · A feeling of pressure or tightness around the head.  · Dull, aching head pain.  · Pain over the front and sides of the head.  · Tenderness in the muscles of the head, neck, and shoulders.  How is this diagnosed?  This condition may be diagnosed based on your symptoms, your medical history, and a physical exam. If your symptoms are severe or unusual, you may have imaging tests, such as a CT scan or an MRI of your head. Your vision may also be checked.  How is this treated?  This condition may be treated with lifestyle changes and with medicines that help relieve symptoms.  Follow these instructions at home:  Managing pain  · Take over-the-counter and prescription medicines only as  told by your health care provider.  · When you have a headache, lie down in a dark, quiet room.  · If directed, apply ice to the head and neck:  ? Put ice in a plastic bag.  ? Place a towel between your skin and the bag.  ? Leave the ice on for 20 minutes, 2-3 times a day.  · If directed, apply heat to the back of your neck as often as told by your health care provider. Use the heat source that your health care provider recommends, such as a moist heat pack or a heating pad.  ? Place a towel between your skin and the heat source.  ? Leave the heat on for 20-30 minutes.  ? Remove the heat if your skin turns bright red. This is especially important if you are unable to feel pain, heat, or cold. You may have a greater risk of getting burned.  Eating and drinking  · Eat meals on a regular schedule.  · Limit alcohol intake to no more than 1 drink a day for nonpregnant women and 2 drinks a day for men. One drink equals 12 oz of beer, 5 oz of wine, or 1½ oz of hard liquor.  · Drink enough fluid to keep your urine pale yellow.  · Decrease your caffeine intake, or stop using caffeine.  Lifestyle  · Get 7-9 hours of sleep each night, or get the amount of sleep recommended by your health care provider.  · At bedtime, remove all electronic devices from your room. Electronic devices include computers, phones, and tablets.  · Find ways to manage your stress. Some things that can help relieve stress include:  ? Exercise.  ? Deep breathing exercises.  ? Yoga.  ? Listening to music.  ? Positive mental imagery.  · Try to sit up straight and avoid tensing your muscles.  · Do not use any products that contain nicotine or tobacco, such as cigarettes and e-cigarettes. If you need help quitting, ask your health care provider.  General instructions    · Keep all follow-up visits as told by your health care provider. This is important.  · Avoid any headache triggers. Keep a headache journal to help find out what may trigger your headaches.  For example, write down:  ? What you eat and drink.  ? How much sleep you get.  ? Any change to your diet or medicines.    Contact a health care provider if:  · Your headache does not get better.  · Your headache comes back.  · You are sensitive to sounds, light, or smells because of a headache.  · You have nausea or you vomit.  · Your stomach hurts.  Get help right away if:  · You suddenly develop a very severe headache along with any of the following:  ? A stiff neck.  ? Nausea and vomiting.  ? Confusion.  ? Weakness.  ? Double vision or loss of vision.  ? Shortness of breath.  ? Rash.  ? Unusual sleepiness.  ? Fever.  ? Trouble speaking.  ? Pain in your eyes or ears.  ? Trouble walking or balancing.  ? Feeling faint or passing out.  Summary  · A tension headache is a feeling of pain, pressure, or aching in the head that is often felt over the front and sides of the head.  · A tension headache can last from 30 minutes to several days. It is the most common kind of headache.  · This condition may be diagnosed based on your symptoms, your medical history, and a physical exam.  · This condition may be treated with lifestyle changes and with medicines that help relieve symptoms.  This information is not intended to replace advice given to you by your health care provider. Make sure you discuss any questions you have with your health care provider.  Document Revised: 10/14/2020 Document Reviewed: 03/30/2018  [employer] Patient Education © 2021 [employer]

## 2021-09-21 ENCOUNTER — OFFICE VISIT (OUTPATIENT)
Dept: BARIATRICS/WEIGHT MGMT | Facility: CLINIC | Age: 46
End: 2021-09-21

## 2021-09-21 VITALS
HEART RATE: 65 BPM | WEIGHT: 164.5 LBS | DIASTOLIC BLOOD PRESSURE: 66 MMHG | BODY MASS INDEX: 28.09 KG/M2 | TEMPERATURE: 96.6 F | RESPIRATION RATE: 18 BRPM | SYSTOLIC BLOOD PRESSURE: 108 MMHG | OXYGEN SATURATION: 98 % | HEIGHT: 64 IN

## 2021-09-21 DIAGNOSIS — E66.9 OBESITY, CLASS I, BMI 30-34.9: ICD-10-CM

## 2021-09-21 DIAGNOSIS — E66.3 OVERWEIGHT: Primary | ICD-10-CM

## 2021-09-21 DIAGNOSIS — G43.909 MIGRAINE WITHOUT STATUS MIGRAINOSUS, NOT INTRACTABLE, UNSPECIFIED MIGRAINE TYPE: ICD-10-CM

## 2021-09-21 DIAGNOSIS — E88.81 METABOLIC SYNDROME: ICD-10-CM

## 2021-09-21 DIAGNOSIS — E78.5 HYPERLIPIDEMIA, UNSPECIFIED HYPERLIPIDEMIA TYPE: ICD-10-CM

## 2021-09-21 PROCEDURE — 99215 OFFICE O/P EST HI 40 MIN: CPT | Performed by: NURSE PRACTITIONER

## 2021-09-21 RX ORDER — BLOOD SUGAR DIAGNOSTIC
1 STRIP MISCELLANEOUS DAILY
Qty: 100 EACH | Refills: 1 | Status: CANCELLED | OUTPATIENT
Start: 2021-09-21

## 2021-09-21 RX ORDER — PHENTERMINE HYDROCHLORIDE 15 MG/1
CAPSULE ORAL
Qty: 30 CAPSULE | Refills: 0 | Status: SHIPPED | OUTPATIENT
Start: 2021-09-21 | End: 2021-10-21

## 2021-09-21 NOTE — ASSESSMENT & PLAN NOTE
Continue low carb diet, add back regular exercise. Continue liraglutide. Weight reduction of 15% should help.

## 2021-09-21 NOTE — PROGRESS NOTES
Office Note      Date: 2021  Patient Name: Renae Bolton  MRN: [id number]  : 1975     Chief Complaint  Obesity and Follow-up  Subjective  Subjective           eRnae Bolton presents to [facility] for obesity management. Co-existing hyperlipidemia, metabolic syndrome. Status post LSG/HHR 19 w/ Dr. Onofre.   Patient is satisfied with weight loss progress. She has had a very busy and very stressful month, she is surprised that she lost weight. Has not been able to meet with her  for 6 weeks, her  has cancelled due to various personal problems. The patient is working long hours, working on the weekends and also is helping her  clean his [employer]. Her daughter just got  a few weeks ago and now she is preparing for her son's surprise wedding in January. Appetite is well controlled. She has not been keeping a food journal or focusing on the right food choices. Reports no side effects of prescribed medications today. The patient is taking multivitamin and is taking fish oil. Body mass index is 28.24 kg/m².   The patient is exercising with a FITT score of:    Frequency Intensity Time Strength Training   []   0, none []   0 []   0 []   0   []   1 (1-2x/week) []   1 (light) []   1 (<10 min) []   1 (1x/week)   [x]   2 (3-5x/week) []   2 (moderate) []   2 (10-20 min) []   2 (2x/week)   []   3 (daily) [x]   3 (moderately hard)  []   4 (very hard) []   3 (20-30 min)  [x]   4 (>30 min) [x]   3 (3-4x/week)       Start weight: 190 pounds.    Total Loss/%Loss of BBW: -14.77%/ -28.5  Change in weight since last visit: -5.5     Review of Systems   Constitutional: Negative for fatigue.   Eyes: Negative for visual disturbance.   Cardiovascular: Negative for chest pain and palpitations.   Gastrointestinal: Negative for abdominal pain, constipation, diarrhea, nausea and GERD.   Neurological: Negative for memory problem.         "Parasthesias negative   Psychiatric/Behavioral: Negative for sleep disturbance and depressed mood. The patient is not nervous/anxious.        Objective     Body composition analysis completed and showed:   %body fat: 42.1%  Measurements (in inches)  Waist: 34.4  Vital Signs:   /66 (BP Location: Left arm, Patient Position: Sitting, Cuff Size: Adult)   Pulse 65   Temp 96.6 °F (35.9 °C) (Temporal)   Resp 18   Ht 162.6 cm (64\")   Wt 74.6 kg (164 lb 8 oz)   SpO2 98%   BMI 28.24 kg/m²     Physical Exam   General appears stated age and normal appearance   HEENT PERRLA and EOM intact   Chest/lungs Normal rate, Regular rhythm and Breathing is unlabored   Extremities without edema   Neuro Good historian and No focal deficit   Skin Warm, dry, intact   Psych normal behavior, normal thought content and normal concentration     Result Review :                Assessment / Plan        Diagnoses and all orders for this visit:    1. Overweight (Primary)  Assessment & Plan:  Patient's (Body mass index is 28.24 kg/m².) indicates that they are overweight with health conditions that include obstructive sleep apnea and metabolic syndrome . Weight is improving with treatment. BMI is is above average; BMI management plan is completed. We discussed low calorie, low carb based diet program, portion control, increasing exercise, joining a fitness center or start home based exercise program, management of depression/anxiety/stress to control compensatory eating and pharmacologic options including saxenda and phentermine.    I have instructed the patient to continue with pursuit of medical weight loss as a part of this program. Patient does meet criteria for use of anorectics at this time as BMI >27 and is not at treatment goal.     Continue nutritional focus and work towards new exercise FITT goal of: 2-2-4-2. Try [employer]+, plans on starting program with her . Trainer has been unavailable for 6 weeks.    The current " "plan for this month includes: Make plan for exercise, discussed that she may need to adjust her expectations at this time due to her increased work load. Bring back consistent meal planning, continue to limit sweets. Continue current medications. We discussed a taper plan for when she hits goal, is 14lb away from 1st goal.     Continue sympathomimetic (medication refilled).  This patient 1) has no evidence of serious cardiovascular disease; 2) does not have serious psychiatric disease or a history of substance abuse; 3) has been informed about weight loss medications that are FDA approved for long-term use and told that these have been all documented to be safe and effective whereas phentermine has not; 4) does not demonstrate a clinically significant increase in pulse or blood pressure when taking phentermine; and 5) demonstrates significant weight loss while using the medication.  Patient understands that all antiobesity medications are contraindicated in pregnancy.  Patient denies a history of glaucoma.  Patient understands that long-term use of phentermine is considered off label use of this medication, however, that the endocrine Society and recent research supports that long-term use of phentermine does not appear to have detrimental health effects when used on the appropriate patient.  In addition, a 2019 study published in an obesity journal on 13,972 patient's concluded that \"recommendations to limit phentermine to less than 3 months do not align with current concept of pharmacologic treatment of obesity\", and that \"long-term phentermine users experience greater weight loss without apparent increases in cardiovascular risk\".    We reviewed potential side effects including insomnia, dry mouth, increased heart rate and blood pressure, increased anxiety.  We reviewed reducing caffeine consumption while taking phentermine.  especially if the patient is experience side effects.  The potential risk and benefits " of phentermine have been reviewed with the patient, and alternative treatment options were discussed.  All questions were answered, and the patient wishes to move forward with this medication.         Orders:  -     phentermine 15 MG capsule; Take 1 capsule by mouth once daily at 11am.  Dispense: 30 capsule; Refill: 0  -     Comprehensive Metabolic Panel; Future    2. Hyperlipidemia, unspecified hyperlipidemia type  -     Lipid Panel; Future  -     Comprehensive Metabolic Panel; Future    3. Metabolic syndrome  Assessment & Plan:  Continue low carb diet, add back regular exercise. Continue liraglutide. Weight reduction of 15% should help.     Orders:  -     Insulin, Total; Future  -     Comprehensive Metabolic Panel; Future    4. Migraine without status migrainosus, not intractable, unspecified migraine type  Assessment & Plan:  Headaches are worsening.  Managed by neuro. Started elavil 10mg nightly.             I spent 40 minutes caring for Renae on this date of service. This time includes time spent by me in the following activities:obtaining and/or reviewing a separately obtained history, performing a medically appropriate examination and/or evaluation , counseling and educating the patient/family/caregiver, ordering medications, tests, or procedures and documenting information in the medical record  Follow Up   Return in about 4 weeks (around 10/19/2021) for Next scheduled follow up, Labs next visit.  Patient was given instructions and counseling regarding her condition or for health maintenance advice. Please see specific information pulled into the AVS if appropriate.     Bindu Lechuga, LAURITA  09/21/2021

## 2021-09-21 NOTE — ASSESSMENT & PLAN NOTE
Patient's (Body mass index is 28.24 kg/m².) indicates that they are overweight with health conditions that include obstructive sleep apnea and metabolic syndrome . Weight is improving with treatment. BMI is is above average; BMI management plan is completed. We discussed low calorie, low carb based diet program, portion control, increasing exercise, joining a fitness center or start home based exercise program, management of depression/anxiety/stress to control compensatory eating and pharmacologic options including saxenda and phentermine.    I have instructed the patient to continue with pursuit of medical weight loss as a part of this program. Patient does meet criteria for use of anorectics at this time as BMI >27 and is not at treatment goal.     Continue nutritional focus and work towards new exercise FITT goal of: 2-2-4-2. Try [employer]+, plans on starting program with her . Trainer has been unavailable for 6 weeks.    The current plan for this month includes: Make plan for exercise, discussed that she may need to adjust her expectations at this time due to her increased work load. Bring back consistent meal planning, continue to limit sweets. Continue current medications. We discussed a taper plan for when she hits goal, is 14lb away from 1st goal.     Continue sympathomimetic (medication refilled).  This patient 1) has no evidence of serious cardiovascular disease; 2) does not have serious psychiatric disease or a history of substance abuse; 3) has been informed about weight loss medications that are FDA approved for long-term use and told that these have been all documented to be safe and effective whereas phentermine has not; 4) does not demonstrate a clinically significant increase in pulse or blood pressure when taking phentermine; and 5) demonstrates significant weight loss while using the medication.  Patient understands that all antiobesity medications are contraindicated in pregnancy.   "Patient denies a history of glaucoma.  Patient understands that long-term use of phentermine is considered off label use of this medication, however, that the endocrine Society and recent research supports that long-term use of phentermine does not appear to have detrimental health effects when used on the appropriate patient.  In addition, a 2019 study published in an obesity journal on [id number] patient's concluded that \"recommendations to limit phentermine to less than 3 months do not align with current concept of pharmacologic treatment of obesity\", and that \"long-term phentermine users experience greater weight loss without apparent increases in cardiovascular risk\".    We reviewed potential side effects including insomnia, dry mouth, increased heart rate and blood pressure, increased anxiety.  We reviewed reducing caffeine consumption while taking phentermine.  especially if the patient is experience side effects.  The potential risk and benefits of phentermine have been reviewed with the patient, and alternative treatment options were discussed.  All questions were answered, and the patient wishes to move forward with this medication.       "

## 2021-09-22 ENCOUNTER — TELEMEDICINE (OUTPATIENT)
Dept: BARIATRICS/WEIGHT MGMT | Facility: CLINIC | Age: 46
End: 2021-09-22

## 2021-09-22 VITALS — BODY MASS INDEX: 28.17 KG/M2 | HEIGHT: 64 IN | WEIGHT: 165 LBS

## 2021-09-22 DIAGNOSIS — K91.2 POSTGASTRECTOMY MALABSORPTION: ICD-10-CM

## 2021-09-22 DIAGNOSIS — R53.83 FATIGUE, UNSPECIFIED TYPE: Primary | ICD-10-CM

## 2021-09-22 DIAGNOSIS — Z13.0 SCREENING, IRON DEFICIENCY ANEMIA: ICD-10-CM

## 2021-09-22 DIAGNOSIS — Z90.3 POSTGASTRECTOMY MALABSORPTION: ICD-10-CM

## 2021-09-22 DIAGNOSIS — E55.9 HYPOVITAMINOSIS D: ICD-10-CM

## 2021-09-22 DIAGNOSIS — Z13.21 MALNUTRITION SCREEN: ICD-10-CM

## 2021-09-22 PROCEDURE — 99423 OL DIG E/M SVC 21+ MIN: CPT | Performed by: SURGERY

## 2021-09-22 RX ORDER — PANTOPRAZOLE SODIUM 40 MG/1
40 TABLET, DELAYED RELEASE ORAL DAILY
Qty: 90 TABLET | Refills: 3 | Status: SHIPPED | OUTPATIENT
Start: 2021-09-22 | End: 2022-03-23 | Stop reason: ALTCHOICE

## 2021-09-22 NOTE — PROGRESS NOTES
"[facility]  [address]  [address]  [facility] [id number] [phone]        Patient Name: Renae Bolton.  YOB: 1975      Date of Visit: 09/22/2021      Reason for Visit:  21 months post op.    HPI:  Renae Bolton is a 46 y.o. female s/p LSG/HHR 12/17/19 w/ Dr. Onofre    Has been working with Bindu Lechuga and takes phentermine + Saxenda.  She has lost weight and is pleased with progress.    She had been working with a  but not for the past 6 weeks due to some delays on the part of the .    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      Denies current Gi complaints.  She had a lot of nausea with Saxenda initially, and it's better now.  Getting  grams of protein per day.  Drinks 64 oz liquid per day.  She takes MVI patch, Vit D, vit E, biotin.  Last bloodwork showed high iron.    Presurgery weight: 224 pounds. Today's weight is 74.8 kg (165 lb) pounds, today's  Body mass index is 28.32 kg/m²., and her weight loss since surgery is 59 pounds.         Past Medical History:   Diagnosis Date   • Allergic rhinitis approx 10 years ago   • Anxiety    • Arrhythmia     SVT w/ PVCs, on propranolol, follows w/ Dr. Liang   • Aspirin long-term use     for heart health   • Depression    • Dyspepsia    • Dyspnea on exertion    • Fatigue    • Generalized edema     r/t weight gain   • Genital HSV     Famvir for prophylaxis   • GERD (gastroesophageal reflux disease)     daily Prilosec, denies prior eval   • Hard to intubate     states was told this after her hysterectomy.  states had a \"lidocaine lollipop\"   • Hyperlipidemia 9/21/2021   • Hypertension    • Interstitial cystitis    • Kidney stones    • Migraine     Topamax w/ prn Treximet/Phenergan/Zofran   • Mitral valve prolapse    • Normal vaginal Papanicolaou smear in patient with history of hysterectomy 09/16/2016   • Obesity    • MAREN " (obstructive sleep apnea)    • Peripheral edema    • PONV (postoperative nausea and vomiting)    • Post concussion syndrome     s/p fall w/ frontal lobe head injury 9/2017, on Elavil   • Sleep apnea     newly dx, CPAP compliant   • Stress incontinence     follows w/ Dr. Galvan, on Myrbetriq, s/p cystoscopy w/ bulking agent   • Wears contact lenses      Past Surgical History:   Procedure Laterality Date   • ANTERIOR AND POSTERIOR VAGINAL REPAIR  06/23/2010    DR [facility]   • BREAST BIOPSY  [id number]    Left   • BREAST LUMPECTOMY Left 2015    benign   • CYSTOSCOPY W/ BULKING AGENT INJECTION N/A 6/25/2019    Procedure: CYSTOSCOPY WITH URETHRAL  BULKING AGENT INJECTION;  Surgeon: Jose Galvan MD;  Location: [facility] OR;  Service: Urology   • CYSTOSCOPY, DIMETHYL SULFOXIDE COCKTAIL  03/14/2011    WITH HYDRODISTENTION (DR AVINASH MADDOX)   • ENDOSCOPY     • ENDOSCOPY N/A 12/17/2019    Procedure: ESOPHAGOGASTRODUODENOSCOPY;  Surgeon: Tracee Onofre MD;  Location: [facility] OR;  Service: Bariatric   • ENDOSCOPY N/A 10/20/2020    Procedure: ESOPHAGOGASTRODUODENOSCOPY WITH BIOPSY;  Surgeon: Tracee Onofre MD;  Location: [facility] ENDOSCOPY;  Service: General;  Laterality: N/A;   • GASTRIC SLEEVE LAPAROSCOPIC N/A 12/17/2019    Procedure: GASTRIC SLEEVE LAPAROSCOPIC;  Surgeon: Tracee Onofre MD;  Location: [facility] OR;  Service: Bariatric   • HIATAL HERNIA REPAIR N/A 12/17/2019    Procedure: HIATAL HERNIA REPAIR LAPAROSCOPIC;  Surgeon: Tracee Onofre MD;  Location: [facility] OR;  Service: Bariatric   • HYSTERECTOMY  06/23/2010    [facility] ([facility])   • LAPAROSCOPIC CHOLECYSTECTOMY  2013    for stones   • TRANSVAGINAL TAPING SUSPENSION WITH OBTURATOR  06/23/2010    DR EDIN [facility]   • WISDOM TOOTH EXTRACTION  1990     No outpatient medications have been marked as taking for the 9/22/21 encounter (Telemedicine) with Tracee Onofre MD.     Allergies   Allergen Reactions   • Amoxicillin Shortness  "Of Breath and Palpitations     Keflex OK   • Caffeine Arrhythmia   • Demerol [Meperidine] Rash     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  Never actually taken it internally.   • Latex Anaphylaxis and Rash   • Morphine And Related Rash     Rash (if gets on skin).  Never taken it internally   • Penicillins Shortness Of Breath and Palpitations     Keflex OK   • Oxycodone Other (See Comments)     Dizziness, confusion     • Topiramate Other (See Comments)     Pt states she can take NAME BRAND ONLY.  Generic brand \"doesn't work\"       Social History     Socioeconomic History   • Marital status:      Spouse name: Not on file   • Number of children: Not on file   • Years of education: Not on file   • Highest education level: Not on file   Tobacco Use   • Smoking status: Never Smoker   • Smokeless tobacco: Never Used   Vaping Use   • Vaping Use: Never used   Substance and Sexual Activity   • Alcohol use: No   • Drug use: No   • Sexual activity: Yes     Partners: Male     Birth control/protection: Surgical       There were no vitals filed for this visit.  Weight 74.8 kg (165 lb)  Body mass index is 28.32 kg/m².    Physical Exam  Constitutional:       General: She is not in acute distress.     Appearance: Normal appearance. She is not ill-appearing.   HENT:      Head: Normocephalic and atraumatic.      Nose: Nose normal.   Eyes:      General: No scleral icterus.     Extraocular Movements: Extraocular movements intact.      Conjunctiva/sclera: Conjunctivae normal.      Pupils: Pupils are equal, round, and reactive to light.   Pulmonary:      Effort: Pulmonary effort is normal. No respiratory distress.   Skin:     Coloration: Skin is not pale.   Neurological:      Mental Status: She is alert and oriented to person, place, and time.   Psychiatric:         Mood and Affect: Mood normal.         Behavior: Behavior normal.           Assessment:    No diagnosis found.    Plan: "  Continue w/ good food choices and healthy habits.  Encouraged to focus on high protein, low carb.  Continue routine exercise.  Routine labs ordered.  Continue current vitamin regimen w/ adjustments pending lab results.  Call w/ issues/concerns.  RTC sooner w/ problems.     Will try Protonix daily for reflux.  Previously refractory and had to take Dexilant, but Dexilant is rather expensive so will try Protonix again.      The patient was instructed to follow up in 3-6 months .   Check 9 month bloodwork--orders sent to MARILYN Pearson.    This visit was conducted as a video visit, in an effort to limit spread of the novel coronavirus during the [id number] pandemic.  The patient gave consent.

## 2021-10-08 ENCOUNTER — LAB (OUTPATIENT)
Dept: LAB | Facility: HOSPITAL | Age: 46
End: 2021-10-08

## 2021-10-08 DIAGNOSIS — R53.83 FATIGUE, UNSPECIFIED TYPE: ICD-10-CM

## 2021-10-08 DIAGNOSIS — E55.9 HYPOVITAMINOSIS D: ICD-10-CM

## 2021-10-08 DIAGNOSIS — Z13.0 SCREENING, IRON DEFICIENCY ANEMIA: ICD-10-CM

## 2021-10-08 DIAGNOSIS — Z90.3 POSTGASTRECTOMY MALABSORPTION: ICD-10-CM

## 2021-10-08 DIAGNOSIS — E78.5 HYPERLIPIDEMIA, UNSPECIFIED HYPERLIPIDEMIA TYPE: ICD-10-CM

## 2021-10-08 DIAGNOSIS — Z13.21 MALNUTRITION SCREEN: ICD-10-CM

## 2021-10-08 DIAGNOSIS — K91.2 POSTGASTRECTOMY MALABSORPTION: ICD-10-CM

## 2021-10-08 DIAGNOSIS — E88.81 METABOLIC SYNDROME: ICD-10-CM

## 2021-10-08 DIAGNOSIS — E66.3 OVERWEIGHT: ICD-10-CM

## 2021-10-08 LAB
ALBUMIN SERPL-MCNC: 4.2 G/DL (ref 3.5–5.2)
ALBUMIN/GLOB SERPL: 1.6 G/DL
ALP SERPL-CCNC: 50 U/L (ref 39–117)
ALT SERPL W P-5'-P-CCNC: 18 U/L (ref 1–33)
ANION GAP SERPL CALCULATED.3IONS-SCNC: 7.7 MMOL/L (ref 5–15)
AST SERPL-CCNC: 15 U/L (ref 1–32)
BASOPHILS # BLD AUTO: 0.03 10*3/MM3 (ref 0–0.2)
BASOPHILS NFR BLD AUTO: 0.5 % (ref 0–1.5)
BILIRUB SERPL-MCNC: 0.5 MG/DL (ref 0–1.2)
BUN SERPL-MCNC: 17 MG/DL (ref 6–20)
BUN/CREAT SERPL: 19.1 (ref 7–25)
CALCIUM SPEC-SCNC: 9.1 MG/DL (ref 8.6–10.5)
CHLORIDE SERPL-SCNC: 105 MMOL/L (ref 98–107)
CHOLEST SERPL-MCNC: 186 MG/DL (ref 0–200)
CO2 SERPL-SCNC: 27.3 MMOL/L (ref 22–29)
CREAT SERPL-MCNC: 0.89 MG/DL (ref 0.57–1)
DEPRECATED RDW RBC AUTO: 40.4 FL (ref 37–54)
EOSINOPHIL # BLD AUTO: 0.08 10*3/MM3 (ref 0–0.4)
EOSINOPHIL NFR BLD AUTO: 1.4 % (ref 0.3–6.2)
ERYTHROCYTE [DISTWIDTH] IN BLOOD BY AUTOMATED COUNT: 11.8 % (ref 12.3–15.4)
FERRITIN SERPL-MCNC: 137 NG/ML (ref 13–150)
FOLATE SERPL-MCNC: >20 NG/ML (ref 4.78–24.2)
GFR SERPL CREATININE-BSD FRML MDRD: 68 ML/MIN/1.73
GLOBULIN UR ELPH-MCNC: 2.7 GM/DL
GLUCOSE SERPL-MCNC: 83 MG/DL (ref 65–99)
HCT VFR BLD AUTO: 42.5 % (ref 34–46.6)
HDLC SERPL-MCNC: 42 MG/DL (ref 40–60)
HGB BLD-MCNC: 14.3 G/DL (ref 12–15.9)
IMM GRANULOCYTES # BLD AUTO: 0.01 10*3/MM3 (ref 0–0.05)
IMM GRANULOCYTES NFR BLD AUTO: 0.2 % (ref 0–0.5)
IRON 24H UR-MRATE: 134 MCG/DL (ref 37–145)
LDLC SERPL CALC-MCNC: 131 MG/DL (ref 0–100)
LDLC/HDLC SERPL: 3.1 {RATIO}
LYMPHOCYTES # BLD AUTO: 1.6 10*3/MM3 (ref 0.7–3.1)
LYMPHOCYTES NFR BLD AUTO: 27.5 % (ref 19.6–45.3)
MCH RBC QN AUTO: 31.6 PG (ref 26.6–33)
MCHC RBC AUTO-ENTMCNC: 33.6 G/DL (ref 31.5–35.7)
MCV RBC AUTO: 94 FL (ref 79–97)
MONOCYTES # BLD AUTO: 0.44 10*3/MM3 (ref 0.1–0.9)
MONOCYTES NFR BLD AUTO: 7.6 % (ref 5–12)
NEUTROPHILS NFR BLD AUTO: 3.66 10*3/MM3 (ref 1.7–7)
NEUTROPHILS NFR BLD AUTO: 62.8 % (ref 42.7–76)
NRBC BLD AUTO-RTO: 0 /100 WBC (ref 0–0.2)
PLATELET # BLD AUTO: 275 10*3/MM3 (ref 140–450)
PMV BLD AUTO: 11.5 FL (ref 6–12)
POTASSIUM SERPL-SCNC: 4.3 MMOL/L (ref 3.5–5.2)
PROT SERPL-MCNC: 6.9 G/DL (ref 6–8.5)
RBC # BLD AUTO: 4.52 10*6/MM3 (ref 3.77–5.28)
SODIUM SERPL-SCNC: 140 MMOL/L (ref 136–145)
TRIGL SERPL-MCNC: 69 MG/DL (ref 0–150)
VLDLC SERPL-MCNC: 13 MG/DL (ref 5–40)
WBC # BLD AUTO: 5.82 10*3/MM3 (ref 3.4–10.8)

## 2021-10-08 PROCEDURE — 83540 ASSAY OF IRON: CPT

## 2021-10-08 PROCEDURE — 80061 LIPID PANEL: CPT

## 2021-10-08 PROCEDURE — 82746 ASSAY OF FOLIC ACID SERUM: CPT

## 2021-10-08 PROCEDURE — 84425 ASSAY OF VITAMIN B-1: CPT

## 2021-10-08 PROCEDURE — 85025 COMPLETE CBC W/AUTO DIFF WBC: CPT

## 2021-10-08 PROCEDURE — 80053 COMPREHEN METABOLIC PANEL: CPT

## 2021-10-08 PROCEDURE — 82728 ASSAY OF FERRITIN: CPT

## 2021-10-08 PROCEDURE — 83525 ASSAY OF INSULIN: CPT

## 2021-10-08 PROCEDURE — 36415 COLL VENOUS BLD VENIPUNCTURE: CPT

## 2021-10-08 PROCEDURE — 83921 ORGANIC ACID SINGLE QUANT: CPT

## 2021-10-09 ENCOUNTER — FLU SHOT (OUTPATIENT)
Dept: INTERNAL MEDICINE | Facility: CLINIC | Age: 46
End: 2021-10-09

## 2021-10-09 DIAGNOSIS — Z23 NEED FOR INFLUENZA VACCINATION: Primary | ICD-10-CM

## 2021-10-09 LAB — INSULIN SERPL-ACNC: 25.4 UIU/ML (ref 2.6–24.9)

## 2021-10-09 PROCEDURE — 90686 IIV4 VACC NO PRSV 0.5 ML IM: CPT | Performed by: PHYSICIAN ASSISTANT

## 2021-10-09 PROCEDURE — 90471 IMMUNIZATION ADMIN: CPT | Performed by: PHYSICIAN ASSISTANT

## 2021-10-12 LAB — VIT B1 BLD-SCNC: 225.3 NMOL/L (ref 66.5–200)

## 2021-10-13 LAB
Lab: NORMAL
METHYLMALONATE SERPL-SCNC: 92 NMOL/L (ref 0–378)

## 2021-10-21 ENCOUNTER — OFFICE VISIT (OUTPATIENT)
Dept: BARIATRICS/WEIGHT MGMT | Facility: CLINIC | Age: 46
End: 2021-10-21

## 2021-10-21 VITALS
WEIGHT: 162 LBS | OXYGEN SATURATION: 98 % | BODY MASS INDEX: 27.66 KG/M2 | RESPIRATION RATE: 18 BRPM | TEMPERATURE: 97.7 F | DIASTOLIC BLOOD PRESSURE: 66 MMHG | SYSTOLIC BLOOD PRESSURE: 112 MMHG | HEIGHT: 64 IN | HEART RATE: 82 BPM

## 2021-10-21 DIAGNOSIS — E66.3 OVERWEIGHT: Primary | ICD-10-CM

## 2021-10-21 DIAGNOSIS — F32.A DEPRESSION, UNSPECIFIED DEPRESSION TYPE: ICD-10-CM

## 2021-10-21 DIAGNOSIS — E78.5 HYPERLIPIDEMIA, UNSPECIFIED HYPERLIPIDEMIA TYPE: ICD-10-CM

## 2021-10-21 DIAGNOSIS — E88.81 METABOLIC SYNDROME: ICD-10-CM

## 2021-10-21 PROCEDURE — 99213 OFFICE O/P EST LOW 20 MIN: CPT | Performed by: NURSE PRACTITIONER

## 2021-10-21 RX ORDER — DULOXETIN HYDROCHLORIDE 20 MG/1
20 CAPSULE, DELAYED RELEASE ORAL DAILY
Qty: 90 CAPSULE | Refills: 2 | Status: SHIPPED | OUTPATIENT
Start: 2021-10-21 | End: 2022-07-13 | Stop reason: SDUPTHER

## 2021-10-21 RX ORDER — PHENTERMINE HYDROCHLORIDE 15 MG/1
15 CAPSULE ORAL
Qty: 30 CAPSULE | Refills: 0 | Status: SHIPPED | OUTPATIENT
Start: 2021-10-21 | End: 2021-11-18 | Stop reason: SDUPTHER

## 2021-10-21 RX ORDER — LIRAGLUTIDE 6 MG/ML
3 INJECTION, SOLUTION SUBCUTANEOUS DAILY
Qty: 15 ML | Refills: 0 | Status: SHIPPED | OUTPATIENT
Start: 2021-10-21 | End: 2021-11-18 | Stop reason: SDUPTHER

## 2021-10-21 NOTE — PROGRESS NOTES
"     Office Note      Date: 10/21/2021  Patient Name: Renae Bolton  MRN: [id number]  : 1975     Chief Complaint  No chief complaint on file.  Subjective  Subjective           Renae Bolton presents to [facility] for obesity management. s/p LSG/HHR 19 w/ Dr. Onofre. Goal weight 150lb. Co-existing insulin resistance addressed today. Patient is satisfied with weight loss progress. Appetite is well controlled. Reports no side effects of prescribed medications today. The patient is taking multivitamin and is taking fish oil.  The patient is not using a food journal. She is still having a lot of stress. Feels like food choices have slipped some but portions are still small. Some days does protein shakes but other days she \"just cant stomach them\".   The patient is exercising with a FITT score of:    Frequency Intensity Time Strength Training   [x]   0, none [x]   0 [x]   0 [x]   0   []   1 (1-2x/week) []   1 (light) []   1 (<10 min) []   1 (1x/week)   []   2 (3-5x/week) []   2 (moderate) []   2 (10-20 min) []   2 (2x/week)   []   3 (daily) []   3 (moderately hard)  []   4 (very hard) []   3 (20-30 min)  []   4 (>30 min) []   3 (3-4x/week)       Review of Systems   Constitutional: Negative for fatigue.   Eyes: Negative for visual disturbance.   Cardiovascular: Negative for chest pain and palpitations.   Gastrointestinal: Positive for abdominal pain. Negative for constipation, diarrhea, nausea and GERD.   Neurological: Negative for memory problem.        Parasthesias negative   Psychiatric/Behavioral: Negative for sleep disturbance and depressed mood. The patient is not nervous/anxious.        Objective    Body mass index is 27.81 kg/m².   Start weight: 190 pounds.    Total Loss/%Loss of BBW: -14.74/ -28  Change in weight since last visit: -2.5  Body composition analysis completed and showed:   %body fat: 39.3  Measurements (in inches)  Waist: 33  Vital Signs: " "  /66 (BP Location: Left arm, Patient Position: Sitting, Cuff Size: Adult)   Pulse 82   Temp 97.7 °F (36.5 °C) (Temporal)   Resp 18   Ht 162.6 cm (64\")   Wt 73.5 kg (162 lb)   SpO2 98%   BMI 27.81 kg/m²     Physical Exam   General appears stated age and normal appearance   HEENT PERRLA, EOM intact and conjunctivae normal   Chest/lungs Normal rate, Regular rhythm and Breathing is unlabored      Extremities without edema   Neuro Good historian and No focal deficit   Skin Warm, dry, intact   Psych normal behavior, normal thought content and normal concentration     Result Review :                Assessment / Plan        Diagnoses and all orders for this visit:    1. Overweight (Primary)  Assessment & Plan:  Patient's (Body mass index is 27.81 kg/m².) indicates that they are overweight with health conditions that include dyslipidemias and metabolic syndrome . Weight is improving with lifestyle modifications. BMI is is above average; BMI management plan is completed. We discussed low calorie, low carb based diet program, portion control, increasing exercise, management of depression/anxiety/stress to control compensatory eating and pharmacologic options including phentermine and saxenda.    I have instructed the patient to continue with pursuit of medical weight loss as a part of this program. Patient does meet criteria for use of anorectics at this time as BMI >27 and is not at treatment goal.     Continue nutritional focus and work towards new exercise FITT goal of: 2-2-4-2. Try [employer]+.  The current plan for this month includes: continue to work on lifestyle behavioral changes. Write down foods to help identify what is causing stomach pain/bowel problems. Add back exercise.     Continue sympathomimetic (medication refilled).  This patient 1) has no evidence of serious cardiovascular disease; 2) does not have serious psychiatric disease or a history of substance abuse; 3) has been informed about " "weight loss medications that are FDA approved for long-term use and told that these have been all documented to be safe and effective whereas phentermine has not; 4) does not demonstrate a clinically significant increase in pulse or blood pressure when taking phentermine; and 5) demonstrate significant weight loss while using the medication.  Patient understands that all antiobesity medications are contraindicated in pregnancy.  Patient denies a history of glaucoma.  Patient understands that long-term use of phentermine is considered off label use of this medication, however, that the endocrine Society and recent research supports that long-term use of phentermine does not appear to have detrimental health effects when used and the appropriate patient.  In addition, a 2019 study published in an obesity journal on [id number] patient's concluded that \"recommendations to limit phentermine to less than 3 months do not align with current concept of pharmacologic treatment of obesity\", and that \"long-term phentermine users experience greater weight loss without apparent increases in cardiovascular risk\".    We reviewed potential side effects including insomnia, dry mouth, increased heart rate and blood pressure, increased anxiety.  We reviewed reducing caffeine consumption while taking phentermine.  especially if the patient is experience side effects.  The potential risk and benefits of phentermine have been reviewed with the patient, and alternative treatment options were discussed.  All questions were answered, and the patient wishes to move forward with this medication.         Orders:  -     Liraglutide (Saxenda) 18 MG/3ML injection pen; Inject 3 mg under the skin into the appropriate area as directed Daily.  Dispense: 15 mL; Refill: 0  -     Insulin Pen Needle 32G X 4 MM misc; Use to inject Saxenda once daily  Dispense: 100 each; Refill: 0  -     phentermine 15 MG capsule; Take 1 capsule by mouth Every Morning.  " Dispense: 30 capsule; Refill: 0    2. Metabolic syndrome  Assessment & Plan:  Continue low carb diet, add back regular exercise, weight reduction.       3. Depression, unspecified depression type  -     DULoxetine (CYMBALTA) 20 MG capsule; Take 1 capsule by mouth Daily.  Dispense: 90 capsule; Refill: 2    4. Hyperlipidemia, unspecified hyperlipidemia type  -     Lipid Panel; Future      I spent 23 minutes caring for Renae on this date of service. This time includes time spent by me in the following activities:obtaining and/or reviewing a separately obtained history, performing a medically appropriate examination and/or evaluation , counseling and educating the patient/family/caregiver, ordering medications, tests, or procedures and documenting information in the medical record  Follow Up   Return in about 4 weeks (around 11/18/2021) for Next scheduled follow up.  Patient was given instructions and counseling regarding her condition or for health maintenance advice. Please see specific information pulled into the AVS if appropriate.     Bindu Lechuga, APRN  10/21/2021

## 2021-10-21 NOTE — ASSESSMENT & PLAN NOTE
Patient's (Body mass index is 27.81 kg/m².) indicates that they are overweight with health conditions that include dyslipidemias and metabolic syndrome . Weight is improving with lifestyle modifications. BMI is is above average; BMI management plan is completed. We discussed low calorie, low carb based diet program, portion control, increasing exercise, management of depression/anxiety/stress to control compensatory eating and pharmacologic options including phentermine and saxenda.    I have instructed the patient to continue with pursuit of medical weight loss as a part of this program. Patient does meet criteria for use of anorectics at this time as BMI >27 and is not at treatment goal.     Continue nutritional focus and work towards new exercise FITT goal of: 2-2-4-2. Try [employer]+.  The current plan for this month includes: continue to work on lifestyle behavioral changes. Write down foods to help identify what is causing stomach pain/bowel problems. Add back exercise.     Continue sympathomimetic (medication refilled).  This patient 1) has no evidence of serious cardiovascular disease; 2) does not have serious psychiatric disease or a history of substance abuse; 3) has been informed about weight loss medications that are FDA approved for long-term use and told that these have been all documented to be safe and effective whereas phentermine has not; 4) does not demonstrate a clinically significant increase in pulse or blood pressure when taking phentermine; and 5) demonstrate significant weight loss while using the medication.  Patient understands that all antiobesity medications are contraindicated in pregnancy.  Patient denies a history of glaucoma.  Patient understands that long-term use of phentermine is considered off label use of this medication, however, that the endocrine Society and recent research supports that long-term use of phentermine does not appear to have detrimental health effects when  "used and the appropriate patient.  In addition, a 2019 study published in an obesity journal on [id number] patient's concluded that \"recommendations to limit phentermine to less than 3 months do not align with current concept of pharmacologic treatment of obesity\", and that \"long-term phentermine users experience greater weight loss without apparent increases in cardiovascular risk\".    We reviewed potential side effects including insomnia, dry mouth, increased heart rate and blood pressure, increased anxiety.  We reviewed reducing caffeine consumption while taking phentermine.  especially if the patient is experience side effects.  The potential risk and benefits of phentermine have been reviewed with the patient, and alternative treatment options were discussed.  All questions were answered, and the patient wishes to move forward with this medication.       "

## 2021-10-27 ENCOUNTER — PATIENT MESSAGE (OUTPATIENT)
Dept: NEUROLOGY | Facility: CLINIC | Age: 46
End: 2021-10-27

## 2021-11-18 ENCOUNTER — OFFICE VISIT (OUTPATIENT)
Dept: BARIATRICS/WEIGHT MGMT | Facility: CLINIC | Age: 46
End: 2021-11-18

## 2021-11-18 VITALS
HEART RATE: 92 BPM | HEIGHT: 64 IN | SYSTOLIC BLOOD PRESSURE: 116 MMHG | RESPIRATION RATE: 18 BRPM | BODY MASS INDEX: 27.74 KG/M2 | OXYGEN SATURATION: 98 % | WEIGHT: 162.5 LBS | TEMPERATURE: 97.1 F | DIASTOLIC BLOOD PRESSURE: 66 MMHG

## 2021-11-18 DIAGNOSIS — E88.81 METABOLIC SYNDROME: ICD-10-CM

## 2021-11-18 DIAGNOSIS — E66.3 OVERWEIGHT: Primary | ICD-10-CM

## 2021-11-18 PROCEDURE — 99214 OFFICE O/P EST MOD 30 MIN: CPT | Performed by: NURSE PRACTITIONER

## 2021-11-18 RX ORDER — LIRAGLUTIDE 6 MG/ML
3 INJECTION, SOLUTION SUBCUTANEOUS DAILY
Qty: 15 ML | Refills: 0 | Status: SHIPPED | OUTPATIENT
Start: 2021-11-18 | End: 2021-12-15 | Stop reason: SDUPTHER

## 2021-11-18 RX ORDER — PHENTERMINE HYDROCHLORIDE 15 MG/1
15 CAPSULE ORAL
Qty: 30 CAPSULE | Refills: 0 | Status: SHIPPED | OUTPATIENT
Start: 2021-11-18 | End: 2021-12-15 | Stop reason: SDUPTHER

## 2021-11-18 NOTE — ASSESSMENT & PLAN NOTE
Patient's (Body mass index is 27.89 kg/m².) indicates that they are overweight with health conditions that include obstructive sleep apnea, dyslipidemias, GERD and metabolic synd . Weight is improving with treatment. BMI is is above average; BMI management plan is completed. We discussed low calorie, low carb based diet program, portion control, increasing exercise, joining a fitness center or start home based exercise program, pharmacologic options including saxenda and phentermine and an radha-based approach such as [email or web address] Pal or Lose It.     I have instructed the patient to continue with pursuit of medical weight loss as a part of this program. Patient does meet criteria for use of anorectics at this time as BMI >27 and is not at treatment goal.     Continue nutritional focus and work towards new exercise FITT goal of: 2-2-4-2.  The current plan for this month includes: Add exercise. Bring back food journal. Get refocused.     Continue sympathomimetic (medication refilled).  This patient 1) has no evidence of serious cardiovascular disease; 2) does not have serious psychiatric disease or a history of substance abuse; 3) has been informed about weight loss medications that are FDA approved for long-term use and told that these have been all documented to be safe and effective whereas phentermine has not; 4) does not demonstrate a clinically significant increase in pulse or blood pressure when taking phentermine; and 5) demonstrate significant weight loss while using the medication.  Patient understands that all antiobesity medications are contraindicated in pregnancy.  Patient denies a history of glaucoma.  Patient understands that long-term use of phentermine is considered off label use of this medication, however, that the endocrine Society and recent research supports that long-term use of phentermine does not appear to have detrimental health effects when used and the appropriate patient.  In addition, a 2019  "study published in an obesity journal on [id number] patient's concluded that \"recommendations to limit phentermine to less than 3 months do not align with current concept of pharmacologic treatment of obesity\", and that \"long-term phentermine users experience greater weight loss without apparent increases in cardiovascular risk\".    We reviewed potential side effects including insomnia, dry mouth, increased heart rate and blood pressure, increased anxiety.  We reviewed reducing caffeine consumption while taking phentermine.  especially if the patient is experience side effects.  The potential risk and benefits of phentermine have been reviewed with the patient, and alternative treatment options were discussed.  All questions were answered, and the patient wishes to move forward with this medication.      "

## 2021-11-18 NOTE — PROGRESS NOTES
"     Office Note      Date: 2021  Patient Name: Renae Bolton  MRN: [id number]  : 1975     Chief Complaint  Obesity and Follow-up  Subjective  Subjective           Renae Bolton presents to [facility] for obesity management. s/p LSG/HHR 19 w/ Dr. Onofre.  pre-anastacia weight: 232.5, gonzalez 183lb, final goal 150lb. Co-existing hyperlipid and pre-diabetes. Patient is unsure with weight loss progress. Appetite is moderately controlled. Reports no side effects of prescribed medications today. The patient is taking multivitamin and is taking fish oil.  The patient is not using a food journal. Started and discovered that beans, whole wheat bread, and oats were causing GI symptoms. Trying to eliminate those food. The patient is not exercising, trying to find a new . Has had a toothache for 2 weeks. Had had a root canal.     Review of Systems   Constitutional: Negative for fatigue.   Eyes: Negative for visual disturbance.   Cardiovascular: Negative for chest pain and palpitations.   Gastrointestinal: Negative for abdominal pain, constipation, diarrhea, nausea and GERD.   Neurological: Negative for dizziness, tremors, light-headedness, headache and memory problem.        Parasthesias negative   Psychiatric/Behavioral: Negative for sleep disturbance and depressed mood. The patient is not nervous/anxious.        Objective     Body mass index is 27.89 kg/m².   Start weight: 190 pounds.    Total Loss/%Loss of BBW: -14.47/-27.5  Change in weight since last visit: +0.5  Body composition analysis completed and showed:   %body fat: 39.1  Measurements (in inches)  Waist: 33  Vital Signs:   /66 (BP Location: Left arm, Patient Position: Sitting, Cuff Size: Adult)   Pulse 92   Temp 97.1 °F (36.2 °C) (Temporal)   Resp 18   Ht 162.6 cm (64\")   Wt 73.7 kg (162 lb 8 oz)   SpO2 98%   BMI 27.89 kg/m²     Physical Exam   General appears stated age and normal " appearance   HEENT PERRLA, EOM intact and conjunctivae normal   Chest/lungs Normal rate, Regular rhythm and Breathing is unlabored   Extremities without edema   Neuro Good historian and No focal deficit   Skin Warm, dry, intact   Psych normal behavior, normal thought content and normal concentration     Result Review :                Assessment / Plan        Diagnoses and all orders for this visit:    1. Overweight (Primary)  Assessment & Plan:  Patient's (Body mass index is 27.89 kg/m².) indicates that they are overweight with health conditions that include obstructive sleep apnea, dyslipidemias, GERD and metabolic synd . Weight is improving with treatment. BMI is is above average; BMI management plan is completed. We discussed low calorie, low carb based diet program, portion control, increasing exercise, joining a fitness center or start home based exercise program, pharmacologic options including saxenda and phentermine and an radha-based approach such as [employer] Pal or Lose It.     I have instructed the patient to continue with pursuit of medical weight loss as a part of this program. Patient does meet criteria for use of anorectics at this time as BMI >27 and is not at treatment goal.     Continue nutritional focus and work towards new exercise FITT goal of: 2-2-4-2.  The current plan for this month includes: Add exercise. Bring back food journal. Get refocused.     Continue sympathomimetic (medication refilled).  This patient 1) has no evidence of serious cardiovascular disease; 2) does not have serious psychiatric disease or a history of substance abuse; 3) has been informed about weight loss medications that are FDA approved for long-term use and told that these have been all documented to be safe and effective whereas phentermine has not; 4) does not demonstrate a clinically significant increase in pulse or blood pressure when taking phentermine; and 5) demonstrate significant weight loss while using the  "medication.  Patient understands that all antiobesity medications are contraindicated in pregnancy.  Patient denies a history of glaucoma.  Patient understands that long-term use of phentermine is considered off label use of this medication, however, that the endocrine Society and recent research supports that long-term use of phentermine does not appear to have detrimental health effects when used and the appropriate patient.  In addition, a 2019 study published in an obesity journal on [id number] patient's concluded that \"recommendations to limit phentermine to less than 3 months do not align with current concept of pharmacologic treatment of obesity\", and that \"long-term phentermine users experience greater weight loss without apparent increases in cardiovascular risk\".    We reviewed potential side effects including insomnia, dry mouth, increased heart rate and blood pressure, increased anxiety.  We reviewed reducing caffeine consumption while taking phentermine.  especially if the patient is experience side effects.  The potential risk and benefits of phentermine have been reviewed with the patient, and alternative treatment options were discussed.  All questions were answered, and the patient wishes to move forward with this medication.        Orders:  -     phentermine 15 MG capsule; Take 1 capsule by mouth Every Morning.  Dispense: 30 capsule; Refill: 0  -     Liraglutide (Saxenda) 18 MG/3ML injection pen; Inject 3 mg under the skin into the appropriate area as directed Daily.  Dispense: 15 mL; Refill: 0    2. Metabolic syndrome  Assessment & Plan:  Low carb diet, regular physical activity, and weight reduction of 10-15%. Continue saxenda.        I spent 30 minutes caring for Renae on this date of service. This time includes time spent by me in the following activities:preparing for the visit, obtaining and/or reviewing a separately obtained history, performing a medically appropriate examination and/or " evaluation , counseling and educating the patient/family/caregiver, ordering medications, tests, or procedures and documenting information in the medical record  Follow Up   Return in about 4 weeks (around 12/16/2021) for Next scheduled follow up.  Patient was given instructions and counseling regarding her condition or for health maintenance advice. Please see specific information pulled into the AVS if appropriate.     Bindu Lechuga, APRN  11/18/2021

## 2021-12-07 DIAGNOSIS — E55.9 HYPOVITAMINOSIS D: ICD-10-CM

## 2021-12-07 DIAGNOSIS — Z90.3 POSTGASTRECTOMY MALABSORPTION: ICD-10-CM

## 2021-12-07 DIAGNOSIS — E78.5 HYPERLIPIDEMIA, UNSPECIFIED HYPERLIPIDEMIA TYPE: ICD-10-CM

## 2021-12-07 DIAGNOSIS — R53.83 FATIGUE, UNSPECIFIED TYPE: ICD-10-CM

## 2021-12-07 DIAGNOSIS — Z13.21 MALNUTRITION SCREEN: ICD-10-CM

## 2021-12-07 DIAGNOSIS — Z13.0 SCREENING, IRON DEFICIENCY ANEMIA: ICD-10-CM

## 2021-12-07 DIAGNOSIS — K91.2 POSTGASTRECTOMY MALABSORPTION: ICD-10-CM

## 2021-12-15 NOTE — PROGRESS NOTES
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  Never actually taken it internally.   • Latex Anaphylaxis and Rash   • Morphine And Related Rash     Rash (if gets on skin).  Never taken it internally   • Penicillins Shortness Of Breath and Palpitations     Keflex OK   • Oxycodone Other (See Comments)     Dizziness, confusion     • Topiramate Other (See Comments)     Pt states she can take NAME BRAND ONLY.  Generic brand \"doesn't work\"       Vitals:    21 1003   BP: 136/74   BP Location: Left arm   Patient Position: Sitting   Cuff Size: Adult   Pulse: 82   Resp: 18   Temp: 98.7 °F (37.1 °C)   TempSrc: Temporal   SpO2: 98%   Weight: 71.9 kg (158 lb 8 oz)   Height: 162.6 cm (64\")     Recent Weight History:   Wt Readings from Last 6 Encounters:   21 71.9 kg (158 lb 8 oz)   21 73.7 kg (162 lb 8 oz)   10/21/21 73.5 kg (162 lb)   21 74.8 kg (165 lb)   21 74.6 kg (164 lb 8 oz)   09/15/21 77.4 kg (170 lb 9.6 oz)       Last office visit weight (at [facility] visit) : 162.5  Change in weight since last visit: -4  Start Weight: 190  Total Loss%: -16.58%/-31.5  Loss of BBW: -31.5    Body composition analysis completed and showed:  %body fat: 39.4  Total fat mass (in lbs): 62.5  Fat free mass (in lbs): 96.0  Total body water (in lb): 70.5  BMR: 1421     Measurements (in inches)  Waist: 32    The patient is exercising with a FITT score of:    Frequency   Intensity Time Strength Training   [x]   0 None  [x]   0 None  [x]   0 None  [x]   0 None    []   1 (1-2x/week) []   1 (light) []   1 (<10 " min) []   1 (1x/week)   []   2 (3-5x/week) []   2 (moderate) []   2 (10-20 min) []   2 (2x/week)   []   3 (daily)   []   3 (moderately hard)  []   4 (very hard) []   3 (20-30 min)  []   4 (>30 min) []   3 (3-4x/week)       Office visit for Renae Bolton, a 46 y.o. female, established patient for medical weight management. Patient is satisfied with weight loss progress.    Hunger control has remained unchanged The patient is not exercising. The patient is not using a food journal. The patient is checking weight regularly. The patient rates current efforts as 5 out of 10. Body mass index is 27.21 kg/m². and has not reached treatment goal.     Review of Systems:  Review of Systems   Constitutional: Negative for fatigue.   Eyes: Negative for visual disturbance.   Cardiovascular: Negative for chest pain. Palpitations:     Gastrointestinal: Negative for abdominal pain, constipation, diarrhea, nausea and GERD.   Neurological: Negative for dizziness, tremors, light-headedness, headache and memory problem.        Parasthesias negative   Psychiatric/Behavioral: Positive for sleep disturbance. Negative for depressed mood. The patient is nervous/anxious (patient states she is under a lot of stress/anxiety).        Physical Exam:  Physical Exam  Vitals reviewed.   Constitutional:       Appearance: She is obese. She is not ill-appearing.   HENT:      Head:      Comments: masked  Pulmonary:      Effort: Pulmonary effort is normal.   Neurological:      Mental Status: She is alert.   Psychiatric:         Attention and Perception: Attention and perception normal.         Behavior: Behavior is cooperative.      Comments: Tearful at times when discussing stress in life          ASSESSMENT/PLAN:   Diagnoses and all orders for this visit:    1. Overweight (Primary)  Assessment & Plan:  Patient's (Body mass index is 27.21 kg/m².) indicates that they are overweight with health conditions that include obstructive sleep apnea,  dyslipidemias, GERD and osteoarthritis . Weight is improving with treatment. BMI is is above average; BMI management plan is completed. We discussed low calorie, low carb based diet program, portion control and increasing exercise.   --Continue Saxenda and phentermine Rx sent in  --Patient's exercise regime has been off track since she lost her .  Does notice this decreases her stress level.  Goal to get back on track possibly starting workout videos at home.  Her  like to work at        Orders:  -     Liraglutide (Saxenda) 18 MG/3ML injection pen; Inject 3 mg under the skin into the appropriate area as directed Daily.  Dispense: 15 mL; Refill: 0  -     phentermine 15 MG capsule; Take 1 capsule by mouth Every Morning.  Dispense: 30 capsule; Refill: 0      I have instructed the patient to continue with pursuit of medical weight loss as a part of this program. Patient does meet criteria for use of anorectics at this time as BMI >27 and body fat percentage >30%.      spent 40 minutes on this date of service. This time includes time spent by me in the following activities:preparing for the visit, counseling and educating the patient/family/caregiver, ordering medications, tests, or procedures and documenting information in the medical record.    Plan of care reviewed with the patient at the conclusion of today's visit. We discussed the risks, benefits, and limitations of treatments. Continue medications and OTC supplements as discussed. Patient verbalizes understanding of and agreement with management plan.      Return in about 4 weeks (around 1/13/2022).      LAURITA Fernandez

## 2021-12-16 ENCOUNTER — OFFICE VISIT (OUTPATIENT)
Dept: BARIATRICS/WEIGHT MGMT | Facility: CLINIC | Age: 46
End: 2021-12-16

## 2021-12-16 VITALS
TEMPERATURE: 98.7 F | BODY MASS INDEX: 27.06 KG/M2 | WEIGHT: 158.5 LBS | HEART RATE: 82 BPM | HEIGHT: 64 IN | SYSTOLIC BLOOD PRESSURE: 136 MMHG | OXYGEN SATURATION: 98 % | RESPIRATION RATE: 18 BRPM | DIASTOLIC BLOOD PRESSURE: 74 MMHG

## 2021-12-16 DIAGNOSIS — E66.3 OVERWEIGHT: Primary | ICD-10-CM

## 2021-12-16 PROCEDURE — 99215 OFFICE O/P EST HI 40 MIN: CPT | Performed by: NURSE PRACTITIONER

## 2021-12-16 RX ORDER — LIRAGLUTIDE 6 MG/ML
3 INJECTION, SOLUTION SUBCUTANEOUS DAILY
Qty: 15 ML | Refills: 0 | Status: SHIPPED | OUTPATIENT
Start: 2021-12-16 | End: 2022-01-18 | Stop reason: SDUPTHER

## 2021-12-16 RX ORDER — PHENTERMINE HYDROCHLORIDE 15 MG/1
15 CAPSULE ORAL
Qty: 30 CAPSULE | Refills: 0 | Status: SHIPPED | OUTPATIENT
Start: 2021-12-16 | End: 2022-01-18 | Stop reason: SDUPTHER

## 2021-12-16 NOTE — ASSESSMENT & PLAN NOTE
Patient's (Body mass index is 27.21 kg/m².) indicates that they are overweight with health conditions that include obstructive sleep apnea, dyslipidemias, GERD and osteoarthritis . Weight is improving with treatment. BMI is is above average; BMI management plan is completed. We discussed low calorie, low carb based diet program, portion control and increasing exercise.   --Continue Saxenda and phentermine Rx sent in  --Patient's exercise regime has been off track since she lost her .  Does notice this decreases her stress level.  Goal to get back on track possibly starting workout videos at home.  Her  like to work at

## 2021-12-21 NOTE — NURSING NOTE
Anesthesia Pre Eval Note    Anesthesia ROS/Med Hx        Anesthetic Complication History:  Patient does not have a history of anesthetic complications      Pulmonary Review:    Positive for COPD   Positive for asthma    Neuro/Psych Review:    Positive for psychiatric history    Cardiovascular Review:    Positive for hypertension    GI/HEPATIC/RENAL Review:    Positive for GERD    End/Other Review:  Positive for diabetes  Positive for obesity class III - 40.00 - 49.99  Positive for arthritis  Positive for chronic pain      Relevant Problems   No relevant active problems       Physical Exam     Airway   Mallampati: III  TM Distance: >3 FB  Neck ROM: Limited    Cardiovascular  Cardiovascular exam normal    Dental Exam  Dental exam normal    Pulmonary Exam  Pulmonary exam normal    Abdominal Exam  Abdominal exam normal      Anesthesia Plan:  No phone call attempted due to:  ASA Status: 3  Anesthesia Type: General    Induction: Intravenous  Premedication: IV      Post-op Pain Management: Single Shot Block      Checklist  Reviewed: Lab Results, Past Med History, Allergies, Care Everywhere, Patient Summary, Beta Blocker Status, Nursing Notes, EKG, Medications, Problem list, NPO Status and Outside Records  Consent/Risks Discussed Statement:  The proposed anesthetic plan, including its risks and benefits, have been discussed with the Patient along with the risks and benefits of alternatives. Questions were encouraged and answered and the patient and/or representative understands and agrees to proceed.        I discussed with the patient (and/or patient's legal representative) the risks and benefits of the proposed anesthesia plan, General, which may include services performed by other anesthesia providers.    Alternative anesthesia plans, if available, were reviewed with the patient (and/or patient's legal representative). Discussion has been held with the patient (and/or patient's legal representative) regarding risks of  Pt ambulated length of hallway and back to room.   anesthesia, which include emergent situations that may require change in anesthesia plan.  The patient (and/or patient's legal representative) has indicated understanding, his/her questions have been answered, and he/she wishes to proceed with the planned anesthetic.

## 2021-12-29 DIAGNOSIS — N32.81 OVERACTIVE BLADDER: ICD-10-CM

## 2022-01-12 ENCOUNTER — TELEPHONE (OUTPATIENT)
Dept: UROLOGY | Facility: CLINIC | Age: 47
End: 2022-01-12

## 2022-01-12 DIAGNOSIS — N32.81 OVERACTIVE BLADDER: ICD-10-CM

## 2022-01-12 NOTE — TELEPHONE ENCOUNTER
Caller: JUWAN MCPHERSON     Relationship: SELF     Best call back number: [phone]    PT CALLING TO RESCHEDULE APPT BC PT HAS COVID, PT HAD TO BE MOVED TO [place], PT SAID SHE WILL RUN OUT OF HER MYRBETRIQ BY THAT TIME, PT WANTING TO KNOW IF SHE CAN GET A REFILL TO LAST HER TO HER APPOINTMENT.

## 2022-01-18 NOTE — PROGRESS NOTES
"[facility] [place] [facility]  [address]  [place] [address]    --phentermine and Saxenda  --lost , able to get back to exercise?     Name: Renae Bolton  : 1975    There were no vitals taken for this visit.    No chief complaint on file.       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  Never actually taken it internally.   • Latex Anaphylaxis and Rash   • Morphine And Related Rash     Rash (if gets on skin).  Never taken it internally   • Penicillins Shortness Of Breath and Palpitations     Keflex OK   • Oxycodone Other (See Comments)     Dizziness, confusion     • Topiramate Other (See Comments)     Pt states she can take NAME BRAND ONLY.  Generic brand \"doesn't work\"       There were no vitals filed for this visit.      Recent Weight History:   Wt Readings from Last 6 Encounters:   21 71.9 kg (158 lb 8 oz)   21 73.7 kg (162 lb 8 oz)   10/21/21 73.5 kg (162 lb)   21 74.8 kg (165 lb)   21 74.6 kg (164 lb 8 oz)   09/15/21 77.4 kg (170 lb 9.6 oz)       Last office visit weight (at [facility] visit) : ***  Change in weight since last visit: ***  Start Weight: ***  Total Loss%: ***  Loss of BBW: ***    Body composition analysis completed and showed:  %body fat: ***  Total fat mass (in lbs): ***  Fat free mass (in lbs): ***  Total body water (in lb): ***  BMR: ***     Measurements (in inches)  Waist: ***    The patient is exercising with a FITT score of:    Frequency   Intensity Time Strength Training   []   0 None  []   0 None  []   0 None  []   0 None    []   1 (1-2x/week) []   1 (light) []   1 (<10 min) []   1 (1x/week)   []   2 (3-5x/week) []   2 (moderate) []   2 (10-20 min) []   2 (2x/week)   []   3 (daily)   []   3 " "(moderately hard)  []   4 (very hard) []   3 (20-30 min)  []   4 (>30 min) []   3 (3-4x/week)       Office visit for Renae Bolton, a 46 y.o. female, established patient for medical weight management. Patient is {Satisfied/unsatisfied/unsure:94782} with weight loss progress.    Hunger control has {IMPROVED:23336} The patient {is / IS NOT:39173::\"is not\"} exercising. The patient {is / IS NOT:23392::\"is not\"} using a food journal. The patient {is / IS NOT:04133::\"is not\"} checking weight regularly. The patient rates current efforts as *** out of 10. There is no height or weight on file to calculate BMI. and has not reached treatment goal.     Review of Systems:  Review of Systems    Physical Exam:  Physical Exam     ASSESSMENT/PLAN:   There are no diagnoses linked to this encounter.    I have instructed the patient to continue with pursuit of medical weight loss as a part of this program. Patient {Does(not):32967::\"does\"} meet criteria for use of anorectics at this time as {criteria:49221::\"BMI >27\"}. Continue nutritional focus and work towards new exercise FITT goal of:     Frequency   Intensity Time Strength Training   []   0 None  []   0 None  []   0 None  []   0 None    []   1 (1-2x/week) []   1 (light) []   1 (<10 min) []   1 (1x/week)   []   2 (3-5x/week) []   2 (moderate) []   2 (10-20 min) []   2 (2x/week)   []   3 (daily)   []   3 (moderately hard)  []   4 (very hard) []   3 (20-30 min)  []   4 (>30 min) []   3 (3-4x/week)       The current plan for this month includes: {WeightLossInst:37962}       I spent 30 minutes on this date of service. This time includes time spent by me in the following activities:preparing for the visit, counseling and educating the patient/family/caregiver, ordering medications, tests, or procedures and documenting information in the medical record.    Plan of care reviewed with the patient at the conclusion of today's visit. We discussed the risks, benefits, and limitations of " treatments. Continue medications and OTC supplements as discussed. Patient verbalizes understanding of and agreement with management plan.      No follow-ups on file.      LAURITA Fernandez

## 2022-01-19 ENCOUNTER — TELEMEDICINE (OUTPATIENT)
Dept: BARIATRICS/WEIGHT MGMT | Facility: CLINIC | Age: 47
End: 2022-01-19

## 2022-01-19 VITALS
BODY MASS INDEX: 26.94 KG/M2 | DIASTOLIC BLOOD PRESSURE: 64 MMHG | SYSTOLIC BLOOD PRESSURE: 103 MMHG | HEART RATE: 64 BPM | WEIGHT: 157.8 LBS | HEIGHT: 64 IN

## 2022-01-19 DIAGNOSIS — E66.3 OVERWEIGHT: ICD-10-CM

## 2022-01-19 PROCEDURE — 99213 OFFICE O/P EST LOW 20 MIN: CPT | Performed by: NURSE PRACTITIONER

## 2022-01-19 RX ORDER — LIRAGLUTIDE 6 MG/ML
3 INJECTION, SOLUTION SUBCUTANEOUS DAILY
Qty: 15 ML | Refills: 0 | Status: SHIPPED | OUTPATIENT
Start: 2022-01-19 | End: 2022-02-16 | Stop reason: SDUPTHER

## 2022-01-19 RX ORDER — PHENTERMINE HYDROCHLORIDE 15 MG/1
15 CAPSULE ORAL
Qty: 30 CAPSULE | Refills: 0 | Status: SHIPPED | OUTPATIENT
Start: 2022-01-19 | End: 2022-02-16 | Stop reason: ALTCHOICE

## 2022-01-19 NOTE — ASSESSMENT & PLAN NOTE
Patient's (Body mass index is 27.09 kg/m².) indicates that they are overweight with health conditions that include dyslipidemias, GERD and osteoarthritis . Weight is improving with treatment. BMI is is above average; BMI management plan is completed. We discussed low calorie, low carb based diet program, portion control and increasing exercise.     --Has changed daughters room into an exercise routines. Looking to watch exercise video.   --Going through some stress: has had covid and last   --Trying to get get back in goal of tracking: using paper chart and adivsed to track protein, carb. Ask to bring in to next office visit for review. If she prefers paper, will provide paper log at office.   --Had been on a round of steroids   --Goal of 1/2 lb to 1 lb a week weight loss.   --Son's wedding was delays due to family getting covid.

## 2022-01-19 NOTE — PROGRESS NOTES
"[facility] [place] [facility]   [address]  [place] [address]  [phone]    Name: Renae Bolton  : 1975  Patient Care Team:  Renae Beard MD as PCP - General    Chief Complaint;:   Chief Complaint   Patient presents with   • Weight Loss   • Obesity   • Follow-up        Patient presents during the COVID-19 pandemic/federally declared state of public health emergency.   This service was conducted via Zoom. Patient is being seen via telehealth service due to current public health emergency.     Virtual visit for Renae Bolton, a 46 y.o. female, established patient for medical weight loss.     Patient is satisfied with weight loss progress  Hunger control has improved The patient is not exercising. The patient is not using a food journal. The patient is checking weight regularly.  Body mass index is 27.09 kg/m². and has not reached treatment goal.       Recent Weight History:   Wt Readings from Last 6 Encounters:   22 71.6 kg (157 lb 12.8 oz)   21 71.9 kg (158 lb 8 oz)   21 73.7 kg (162 lb 8 oz)   10/21/21 73.5 kg (162 lb)   21 74.8 kg (165 lb)   21 74.6 kg (164 lb 8 oz)       Today's weight: 157  Start Weight: 193  Total Loss: -36    VS   Vitals:    22 1317   BP: 103/64   Pulse: 64   Weight: 71.6 kg (157 lb 12.8 oz)   Height: 162.6 cm (64\")         Physical Exam:    General:  .well developed; well nourished  no acute distress  obese      ASSESSMENT/PLAN:   Diagnoses and all orders for this visit:    1. Overweight  Assessment & Plan:  Patient's (Body mass index is 27.09 kg/m².) indicates that they are overweight with health conditions that include dyslipidemias, GERD and osteoarthritis . Weight is improving with treatment. BMI is is above average; BMI management plan is completed. We discussed low calorie, low carb based diet program, portion control and increasing exercise.     --Has changed daughters " room into an exercise routines. Looking to watch exercise video.   --Going through some stress: has had covid and last   --Trying to get get back in goal of tracking: using paper chart and adivsed to track protein, carb. Ask to bring in to next office visit for review. If she prefers paper, will provide paper log at office.   --Had been on a round of steroids   --Goal of 1/2 lb to 1 lb a week weight loss.   --Son's wedding was delays due to family getting covid.     Orders:  -     phentermine 15 MG capsule; Take 1 capsule by mouth Every Morning.  Dispense: 30 capsule; Refill: 0  -     Liraglutide (Saxenda) 18 MG/3ML injection pen; Inject 3 mg under the skin into the appropriate area as directed Daily.  Dispense: 15 mL; Refill: 0      I have instructed the patient to continue with pursuit of medical weight loss as a part of this program. Patient does meet criteria for use of anorectics at this time as BMI >27 and body fat percentage >30%.     Note: This was an audio and video enabled telemedicine encounter to comply with physical distancing guidelines during the COVID-19 pandemic.  Consent for televisit was obtained prior to the visit. Refills of controlled substances are being provided in this context because of the state of emergency and current social distancing guidance due to COVID-19. We will resume face-to-face visits as permitted and as advised by public health officials.     I spent 30 minutes on this date of service. This time includes time spent by me in the following activities:preparing for the visit, counseling and educating the patient/family/caregiver, ordering medications, tests, or procedures and documenting information in the medical record.    Plan of care reviewed with the patient at the conclusion of today's visit. We discussed the risks, benefits, and limitations of treatments. Continue medications and OTC supplements as discussed. Patient verbalizes understanding of and agreement with  management plan.      Return in about 4 weeks (around 2/16/2022).      LAURITA Fernandez

## 2022-01-25 ENCOUNTER — OFFICE VISIT (OUTPATIENT)
Dept: OBSTETRICS AND GYNECOLOGY | Facility: CLINIC | Age: 47
End: 2022-01-25

## 2022-01-25 VITALS
HEIGHT: 64 IN | SYSTOLIC BLOOD PRESSURE: 118 MMHG | WEIGHT: 165 LBS | BODY MASS INDEX: 28.17 KG/M2 | DIASTOLIC BLOOD PRESSURE: 66 MMHG

## 2022-01-25 DIAGNOSIS — B00.9 RECURRENT HSV (HERPES SIMPLEX VIRUS): ICD-10-CM

## 2022-01-25 DIAGNOSIS — Z12.31 ENCOUNTER FOR SCREENING MAMMOGRAM FOR BREAST CANCER: ICD-10-CM

## 2022-01-25 DIAGNOSIS — Z01.419 ENCOUNTER FOR GYNECOLOGICAL EXAMINATION (GENERAL) (ROUTINE) WITHOUT ABNORMAL FINDINGS: Primary | ICD-10-CM

## 2022-01-25 PROCEDURE — 99396 PREV VISIT EST AGE 40-64: CPT | Performed by: OBSTETRICS & GYNECOLOGY

## 2022-01-25 RX ORDER — FAMCICLOVIR 250 MG/1
250 TABLET ORAL 3 TIMES DAILY
Qty: 270 TABLET | Refills: 4 | Status: SHIPPED | OUTPATIENT
Start: 2022-01-25

## 2022-01-25 RX ORDER — SUMATRIPTAN AND NAPROXEN SODIUM 85; 500 MG/1; MG/1
1 TABLET, FILM COATED ORAL
COMMUNITY
End: 2022-10-12 | Stop reason: SDUPTHER

## 2022-01-26 NOTE — PROGRESS NOTES
"Chief Complaint  Gynecologic Exam     History of Present Illness:  Patient is 46 y.o.  who presents to [facility] here for her annual examination.  Patient is without complaints.  Patient did have her mammogram last year at [facility].  Patient continues on Famvir.  Patient continues to have occasional outbreaks.  Patient desires to continue with her current medication.    History  Past Medical History:   Diagnosis Date   • Allergic rhinitis approx 10 years ago   • Anxiety    • Arrhythmia     SVT w/ PVCs, on propranolol, follows w/ Dr. Liang   • Aspirin long-term use     for heart health   • Depression    • Dyspepsia    • Dyspnea on exertion    • Endometriosis    • Fatigue    • Female infertility    • Generalized edema     r/t weight gain   • Genital HSV     Famvir for prophylaxis   • GERD (gastroesophageal reflux disease)     daily Prilosec, denies prior eval   • Gestational diabetes    • Hard to intubate     states was told this after her hysterectomy.  states had a \"lidocaine lollipop\"   • Hyperemesis gravidarum    • Hyperlipidemia 2021   • Hypertension    • Interstitial cystitis    • Kidney stones    • Migraine     Topamax w/ prn Treximet/Phenergan/Zofran   • Mitral valve prolapse    • Normal vaginal Papanicolaou smear in patient with history of hysterectomy 2016   • Obesity    • MAREN (obstructive sleep apnea)    • Peripheral edema    • PONV (postoperative nausea and vomiting)    • Post concussion syndrome     s/p fall w/ frontal lobe head injury 2017, on Elavil   • Sleep apnea     newly dx, CPAP compliant   • Stress incontinence     follows w/ Dr. Galvan, on Myrbetriq, s/p cystoscopy w/ bulking agent   • Wears contact lenses      Current Outpatient Medications on File Prior to Visit   Medication Sig Dispense Refill   • acyclovir (ZOVIRAX) 5 % ointment Apply  topically to the appropriate area as directed Every 3 (Three) Hours. (Patient taking differently: Apply 1 " application topically to the appropriate area as directed Take As Directed.) 30 g 6   • B Complex Vitamins (vitamin b complex) capsule capsule Take 1 capsule by mouth Daily.     • Biotin (Biotin 5000) 5 MG capsule Take 1 capsule by mouth Daily.     • cetirizine (zyrTEC) 10 MG tablet Take 1 tablet by mouth Daily. 90 tablet 3   • Cholecalciferol (Vitamin D3) 250 MCG (14844 UT) tablet Take 20,000 Units by mouth Daily.     • clobetasol propionate (CLOBEX) 0.05 % shampoo apply TO HAIR AND SCALP as directed  0   • cyclobenzaprine (FLEXERIL) 5 MG tablet Take 1 tablet by mouth 3 (Three) Times a Day As Needed. 270 tablet 0   • cyclobenzaprine (FLEXERIL) 5 MG tablet Take 1 tablet by mouth 3 (Three) Times a Day As Needed. 270 tablet 0   • DULoxetine (CYMBALTA) 20 MG capsule Take 1 capsule by mouth Daily. 90 capsule 2   • Insulin Pen Needle 32G X 4 MM misc Use to inject Saxenda once daily 100 each 0   • Liraglutide (Saxenda) 18 MG/3ML injection pen Inject 3 mg under the skin into the appropriate area as directed Daily. 15 mL 0   • MAGNESIUM CITRATE PO Take 270 mg by mouth Daily. *magnesium citrimate+B6 for migraines     • Mirabegron ER (Myrbetriq) 50 MG tablet sustained-release 24 hour 24 hr tablet Take 1 tablet by mouth Daily. 90 tablet 3   • nabumetone (RELAFEN) 500 MG tablet Take 1 tablet by mouth 2 (Two) Times a Day As Needed for Mild Pain . 30 tablet 2   • NON FORMULARY Multivitamin patch daily   Collagen patch daily     • Omega-3 Fatty Acids (fish oil) 1000 MG capsule capsule Take 1 capsule by mouth 2 (Two) Times a Day With Meals.     • ondansetron (ZOFRAN) 4 MG tablet Take 1 tablet by mouth Every 6 (Six) Hours As Needed. 120 tablet 3   • ondansetron (ZOFRAN) 4 MG tablet Take 1 tablet by mouth Every 6 (Six) Hours As Needed. 120 tablet 3   • pantoprazole (PROTONIX) 40 MG EC tablet Take 1 tablet by mouth Daily 90 tablet 3   • phentermine 15 MG capsule Take 1 capsule by mouth Every Morning. 30 capsule 0   • Probiotic Product  "(PROBIOTIC PO) Take 1 capsule by mouth Daily.     • promethazine (PHENERGAN) 25 MG tablet Take 1 tablet by mouth Every 6 (Six) Hours As Needed. 30 tablet 4   • propranolol LA (INDERAL LA) 80 MG 24 hr capsule Take 1 capsule by mouth Daily. 90 capsule 3   • PSYLLIUM HUSK PO Take 1 capsule by mouth Daily.     • SUMAtriptan-naproxen (Treximet)  MG per tablet Take 1 tablet by mouth Every 2 (Two) Hours As Needed for Migraine. Take one tablet at onset of headache. May repeat dose one time in 2 hours if headache not relieved.     • Vitamin E 400 units tablet Take 400 Units by mouth Daily.     • azithromycin (ZITHROMAX) 250 MG tablet Take 2 tablets by mouth on day 1 then take 1 tablet by mouth for 4 days 6 tablet 0   • clindamycin (CLEOCIN) 150 MG capsule Take 1 capsule by mouth 2 (Two) Times a Day for 7 days 14 capsule 0   • dexamethasone (DECADRON) 6 MG tablet Take 1 tablet by mouth Daily. 5 tablet 0   • methylPREDNISolone (MEDROL) 4 MG dose pack Take tablets by mouth as directed on package  6-5-4-3-2-1 21 tablet 0   • Treximet  MG per tablet Take one tablet at onset of headache. May repeat dose one time in 2 hours if headache not relieved. 9 tablet 5     No current facility-administered medications on file prior to visit.     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  Never actually taken it internally.   • Latex Anaphylaxis and Rash   • Morphine And Related Rash     Rash (if gets on skin).  Never taken it internally   • Penicillins Shortness Of Breath and Palpitations     Keflex OK   • Oxycodone Other (See Comments)     Dizziness, confusion     • Topiramate Other (See Comments)     Pt states she can take NAME BRAND ONLY.  Generic brand \"doesn't work\"     Past Surgical History:   Procedure Laterality Date   • " ANTERIOR AND POSTERIOR VAGINAL REPAIR  06/23/2010    DR EDIN BYRD   • BREAST BIOPSY  [id number]    Left   • BREAST LUMPECTOMY Left 2015    benign   • CYSTOSCOPY W/ BULKING AGENT INJECTION N/A 6/25/2019    Procedure: CYSTOSCOPY WITH URETHRAL  BULKING AGENT INJECTION;  Surgeon: Jose Galvan MD;  Location: [facility] OR;  Service: Urology   • CYSTOSCOPY, DIMETHYL SULFOXIDE COCKTAIL  03/14/2011    WITH HYDRODISTENTION (DR AVINASH MADDOX)   • ENDOSCOPY     • ENDOSCOPY N/A 12/17/2019    Procedure: ESOPHAGOGASTRODUODENOSCOPY;  Surgeon: Tracee Onofre MD;  Location: [facility] OR;  Service: Bariatric   • ENDOSCOPY N/A 10/20/2020    Procedure: ESOPHAGOGASTRODUODENOSCOPY WITH BIOPSY;  Surgeon: Tracee Onofre MD;  Location: [facility] ENDOSCOPY;  Service: General;  Laterality: N/A;   • GASTRIC SLEEVE LAPAROSCOPIC N/A 12/17/2019    Procedure: GASTRIC SLEEVE LAPAROSCOPIC;  Surgeon: Tracee Onofre MD;  Location: [facility] OR;  Service: Bariatric   • HIATAL HERNIA REPAIR N/A 12/17/2019    Procedure: HIATAL HERNIA REPAIR LAPAROSCOPIC;  Surgeon: Tracee Onofre MD;  Location: [facility] OR;  Service: Bariatric   • HYSTERECTOMY  06/23/2010    [facility] ([facility])   • LAPAROSCOPIC CHOLECYSTECTOMY  2013    for stones   • TRANSVAGINAL TAPING SUSPENSION WITH OBTURATOR  06/23/2010    DR [facility]   • VAGINAL HYSTERECTOMY     • VAGINAL PROLAPSE REPAIR     • WISDOM TOOTH EXTRACTION  1990     Family History   Problem Relation Age of Onset   • Diabetes Father         Type II   • Hypertension Father    • Breast cancer Sister 34   • Hypertension Maternal Grandmother    • Alzheimer's disease Maternal Grandmother    • Breast cancer Maternal Grandmother    • Heart disease Maternal Grandmother    • Arthritis Maternal Grandmother    • Cancer Maternal Grandmother         Breast   • Depression Maternal Grandmother    • Hyperlipidemia Maternal Grandmother    • Hyperlipidemia Other    • Gout Other    • Migraines Other    • Breast  "cancer Maternal Aunt    • Stroke Maternal Grandfather    • Skin cancer Maternal Grandfather    • Hypertension Maternal Grandfather    • Arthritis Maternal Grandfather    • Cancer Maternal Grandfather         Lipoma   • Depression Maternal Grandfather    • Hearing loss Maternal Grandfather    • Hyperlipidemia Maternal Grandfather    • Diabetes Paternal Grandfather    • Hypertension Paternal Grandfather    • Hyperlipidemia Paternal Grandfather    • Hypertension Mother    • Cancer Sister         Breast     Social History     Socioeconomic History   • Marital status:    Tobacco Use   • Smoking status: Never Smoker   • Smokeless tobacco: Never Used   Vaping Use   • Vaping Use: Never used   Substance and Sexual Activity   • Alcohol use: No   • Drug use: No   • Sexual activity: Yes     Partners: Male     Birth control/protection: Surgical       Physical Examination:  Vital Signs: /66   Ht 162.6 cm (64\")   Wt 74.8 kg (165 lb)   BMI 28.32 kg/m²     General Appearance: alert, appears stated age, and cooperative  Breasts: Examined in supine position  Symmetric without masses or skin dimpling  Nipples normal without inversion, lesions or discharge  There are no palpable axillary nodes  Abdomen: no masses, no hepatomegaly, no splenomegaly, soft non-tender, no guarding and no rebound tenderness  Pelvic: Clinical staff was present for exam  External genitalia:  normal appearance of the external genitalia including Bartholin's and [place]'s glands.  :  urethral meatus normal;  Vaginal:  atrophic mucosal changes are present;  Cervix:  absent.  Uterus:  absent.  Adnexa:  non palpable bilaterally.  Pap smear done and specimen sent using Thin-Prep technique    Data Review:  The following data was reviewed by: Shabana Hedrick MD on 01/25/2022:     Labs:    Imaging:    Medical Records:  None    Assessment and Plan   Problem List Items Addressed This Visit     None      Visit Diagnoses     Encounter for gynecological " examination (general) (routine) without abnormal findings    -  Primary  Pap was done today.  If she does not receive the results of the Pap within 2 weeks  time, she was instructed to call to find out the results.  I explained to Renae that the recommendations for Pap smear interval in a low risk patient has lengthened to 3 years time if cytology alone normal or  5 years time if both cytology and HPV testing were normal.  I encouraged her to be seen yearly for a full physical exam including breast and pelvic exam even during the off years when PAP's will not be performed.    Relevant Orders    Pap IG, Rfx HPV ASCU    Encounter for screening mammogram for breast cancer      It is recommended per ACOG, for women at average risk to start annual mammogram screening at the age of 40 until the age of 75 and an individualized decision be made for women after age 75.  She was encouraged to continue getting yearly mammograms.  She should report any palpable breast lump(s) or skin changes regardless of mammographic findings.  I explained to Renae that notification regarding her mammogram results will come from the center performing the study.  Our office will not be routinely calling with mammogram results.  It is her responsibility to make sure that the results from the mammogram are communicated to her by the breast center.  If she has any questions about the results, she is welcome to call our office anytime.  The patient reports she has done her mammogram and will sign to obtain copy.    Renae was counseled regarding having clinical breast exams and breast self-awareness.  Women aged 29-39 years of age should have clinical breast exams every 1-3 years and yearly aged 40 and older.  The patient was counseled regarding breast self-awareness focusing on having a sense of what is normal for her breasts so that she can tell if there are changes.  Even small changes should be reported to provider.    Relevant Orders     Mammo Screening Digital Tomosynthesis Bilateral With CAD    Recurrent HSV (herpes simplex virus)        Relevant Medications    famciclovir (FAMVIR) 250 MG tablet          Follow Up/Instructions:  Follow up as noted.  Patient was given instructions and counseling regarding her condition or for health maintenance advice. Please see specific information pulled into the AVS if appropriate.     Note: Speech recognition transcription software may have been used to dictate portions of this document.  An attempt at proofreading has been made though minor errors in transcription may still be present.    This note was electronically signed.  Shabana Hedrick M.D.

## 2022-02-07 DIAGNOSIS — Z01.419 ENCOUNTER FOR GYNECOLOGICAL EXAMINATION (GENERAL) (ROUTINE) WITHOUT ABNORMAL FINDINGS: ICD-10-CM

## 2022-02-16 ENCOUNTER — OFFICE VISIT (OUTPATIENT)
Dept: BARIATRICS/WEIGHT MGMT | Facility: CLINIC | Age: 47
End: 2022-02-16

## 2022-02-16 VITALS
HEART RATE: 78 BPM | WEIGHT: 159.5 LBS | DIASTOLIC BLOOD PRESSURE: 68 MMHG | OXYGEN SATURATION: 98 % | HEIGHT: 64 IN | BODY MASS INDEX: 27.23 KG/M2 | TEMPERATURE: 98 F | SYSTOLIC BLOOD PRESSURE: 124 MMHG | RESPIRATION RATE: 18 BRPM

## 2022-02-16 DIAGNOSIS — E88.81 METABOLIC SYNDROME: ICD-10-CM

## 2022-02-16 DIAGNOSIS — E66.3 OVERWEIGHT: Primary | ICD-10-CM

## 2022-02-16 PROCEDURE — 99214 OFFICE O/P EST MOD 30 MIN: CPT | Performed by: NURSE PRACTITIONER

## 2022-02-16 RX ORDER — LIRAGLUTIDE 6 MG/ML
3 INJECTION, SOLUTION SUBCUTANEOUS DAILY
Qty: 15 ML | Refills: 0 | Status: SHIPPED | OUTPATIENT
Start: 2022-02-16 | End: 2022-03-16 | Stop reason: SDUPTHER

## 2022-02-16 RX ORDER — PHENTERMINE HYDROCHLORIDE 37.5 MG/1
TABLET ORAL
Qty: 14 TABLET | Refills: 0 | Status: SHIPPED | OUTPATIENT
Start: 2022-02-16 | End: 2022-03-16 | Stop reason: SDUPTHER

## 2022-02-16 RX ORDER — NALTREXONE HYDROCHLORIDE 50 MG/1
TABLET, FILM COATED ORAL
Qty: 30 TABLET | Refills: 2 | Status: SHIPPED | OUTPATIENT
Start: 2022-02-16 | End: 2022-03-16

## 2022-02-16 NOTE — PROGRESS NOTES
Office Note      Date: 2022  Patient Name: Renae Bolton  MRN: [id number]  : 1975       Chief Complaint  Obesity and Follow-up  Subjective  Subjective           Renae Bolton presents to [facility] for obesity management. s/p LSG/HHR 19 w/ Dr. Onofre  Start: 3/31 193lb, goal 155lb. Patient is unsure with weight loss progress. Disappointed in herself for making occasional bad choices. Appetite is poorly controlled, very intense sweets cravings. Reports no side effects of prescribed medications today. The patient is taking multivitamin and is taking fish oil.  The patient is using a food journal.The patient is exercising some, walking now that the weather is nice. Disappointed with herself for not working out, struggles finding the motivation to do it by herself. Plans to join the [employer]. Current FITT score of:    Frequency Intensity Time Strength Training   []   0, none []   0 []   0 [x]   0   [x]   1 (1-2x/week) [x]   1 (light) []   1 (<10 min) []   1 (1x/week)   []   2 (3-5x/week) []   2 (moderate) []   2 (10-20 min) []   2 (2x/week)   []   3 (daily) []   3 (moderately hard)  []   4 (very hard) []   3 (20-30 min)  [x]   4 (>30 min) []   3 (3-4x/week)       Review of Systems   Constitutional: Positive for fatigue (patient dx with covid in 2022).   Eyes: Negative for visual disturbance.   Cardiovascular: Negative for chest pain and palpitations.   Gastrointestinal: Negative for abdominal pain, constipation, diarrhea, nausea and GERD.   Neurological: Negative for dizziness, tremors, light-headedness, headache and memory problem.        Parasthesias negative   Psychiatric/Behavioral: Negative for sleep disturbance and depressed mood. The patient is not nervous/anxious.      Objective   Start weight: 190 pounds.    Total Loss/%Loss of BBW: -16.05  Change in weight since last visit:+  Body composition analysis completed and showed:   %body fat:  "39.0  Measurements (in inches)  Waist: 34  Vital Signs:   /68 (BP Location: Left arm, Patient Position: Sitting, Cuff Size: Adult)   Pulse 78   Temp 98 °F (36.7 °C) (Temporal)   Resp 18   Ht 162.6 cm (64\")   Wt 72.3 kg (159 lb 8 oz)   SpO2 98%   BMI 27.38 kg/m²     Physical Exam   General appears stated age, tearful and normal appearance   HEENT PERRLA, EOM intact and conjunctivae normal   Chest/lungs Normal rate, Regular rhythm and Breathing is unlabored   Extremities without edema   Neuro Good historian and No focal deficit   Skin Warm, dry, intact   Psych normal behavior, normal thought content and normal concentration     Result Review :                Assessment / Plan        Diagnoses and all orders for this visit:    1. Overweight (Primary)  Assessment & Plan:  Patient's (Body mass index is 27.38 kg/m².) indicates that they are overweight with health conditions that include obstructive sleep apnea, dyslipidemias, GERD and dysmetabolic syndrome . Weight is improving with treatment. BMI is is above average; BMI management plan is completed. We discussed low calorie, low carb based diet program, portion control, increasing exercise, management of depression/anxiety/stress to control compensatory eating, pharmacologic options including saxenda, phentermine, and naltrexone and an radha-based approach such as OKKAM Pal or Lose It.     I have instructed the patient to continue with pursuit of medical weight loss as a part of this program. Patient does meet criteria for use of anorectics at this time as BMI >27 and is not at treatment goal.     Continue nutritional focus and work towards new exercise FITT goal of: 2-2-4-2. Call today and join the Y.   The current plan for this month includes: adjust exercise as discussed, weight loss goal 4-6lbs this month and continue to work on lifestyle behavioral changes. Increase phentermine, add naltrexone for cravings, continue to focus on adequate protein and " "hydration.     Continue sympathomimetic (medication refilled).  This patient 1) has no evidence of serious cardiovascular disease; 2) does not have serious psychiatric disease or a history of substance abuse; 3) has been informed about weight loss medications that are FDA approved for long-term use and told that these have been all documented to be safe and effective whereas phentermine has not; 4) does not demonstrate a clinically significant increase in pulse or blood pressure when taking phentermine; and 5) demonstrate significant weight loss while using the medication.  Patient understands that all antiobesity medications are contraindicated in pregnancy.  Patient denies a history of glaucoma.  Patient understands that long-term use of phentermine is considered off label use of this medication, however, that the endocrine Society and recent research supports that long-term use of phentermine does not appear to have detrimental health effects when used and the appropriate patient.  In addition, a 2019 study published in an obesity journal on [id number] patient's concluded that \"recommendations to limit phentermine to less than 3 months do not align with current concept of pharmacologic treatment of obesity\", and that \"long-term phentermine users experience greater weight loss without apparent increases in cardiovascular risk\".    We reviewed potential side effects including insomnia, dry mouth, increased heart rate and blood pressure, increased anxiety.  We reviewed reducing caffeine consumption while taking phentermine.  especially if the patient is experience side effects.  The potential risk and benefits of phentermine have been reviewed with the patient, and alternative treatment options were discussed.  All questions were answered, and the patient wishes to move forward with this medication.        Orders:  -     phentermine (ADIPEX-P) 37.5 MG tablet; Take 1/2 tablet by mouth at 11 am.  Dispense: 14 tablet; " Refill: 0  -     naltrexone (DEPADE) 50 MG tablet; Take 1/2 tablet by mouth for 3 days or until nausea subsides, then increase to 1/2 tablet by mouth 2 (two) times a day.  Dispense: 30 tablet; Refill: 2  -     Liraglutide (Saxenda) 18 MG/3ML injection pen; Inject 3 mg under the skin into the appropriate area as directed Daily.  Dispense: 15 mL; Refill: 0    2. Metabolic syndrome  Assessment & Plan:  Low carb diet, regular physical activity, and weight reduction of 10-15%. Continue saxenda.             Follow Up   Return in about 4 weeks (around 3/16/2022) for Next scheduled follow up.  Patient was given instructions and counseling regarding her condition or for health maintenance advice. Please see specific information pulled into the AVS if appropriate.     Bindu Lechuga, APRN  02/16/2022

## 2022-02-16 NOTE — ASSESSMENT & PLAN NOTE
Patient's (Body mass index is 27.38 kg/m².) indicates that they are overweight with health conditions that include obstructive sleep apnea, dyslipidemias, GERD and dysmetabolic syndrome . Weight is improving with treatment. BMI is is above average; BMI management plan is completed. We discussed low calorie, low carb based diet program, portion control, increasing exercise, management of depression/anxiety/stress to control compensatory eating, pharmacologic options including saxenda, phentermine, and naltrexone and an radha-based approach such as fypio Pal or Lose It.     I have instructed the patient to continue with pursuit of medical weight loss as a part of this program. Patient does meet criteria for use of anorectics at this time as BMI >27 and is not at treatment goal.     Continue nutritional focus and work towards new exercise FITT goal of: 2-2-4-2. Call today and join the Y.   The current plan for this month includes: adjust exercise as discussed, weight loss goal 4-6lbs this month and continue to work on lifestyle behavioral changes. Increase phentermine, add naltrexone for cravings, continue to focus on adequate protein and hydration.     Continue sympathomimetic (medication refilled).  This patient 1) has no evidence of serious cardiovascular disease; 2) does not have serious psychiatric disease or a history of substance abuse; 3) has been informed about weight loss medications that are FDA approved for long-term use and told that these have been all documented to be safe and effective whereas phentermine has not; 4) does not demonstrate a clinically significant increase in pulse or blood pressure when taking phentermine; and 5) demonstrate significant weight loss while using the medication.  Patient understands that all antiobesity medications are contraindicated in pregnancy.  Patient denies a history of glaucoma.  Patient understands that long-term use of phentermine is considered off label use of  "this medication, however, that the endocrine Society and recent research supports that long-term use of phentermine does not appear to have detrimental health effects when used and the appropriate patient.  In addition, a 2019 study published in an obesity journal on [id number] patient's concluded that \"recommendations to limit phentermine to less than 3 months do not align with current concept of pharmacologic treatment of obesity\", and that \"long-term phentermine users experience greater weight loss without apparent increases in cardiovascular risk\".    We reviewed potential side effects including insomnia, dry mouth, increased heart rate and blood pressure, increased anxiety.  We reviewed reducing caffeine consumption while taking phentermine.  especially if the patient is experience side effects.  The potential risk and benefits of phentermine have been reviewed with the patient, and alternative treatment options were discussed.  All questions were answered, and the patient wishes to move forward with this medication.      "

## 2022-03-04 ENCOUNTER — OFFICE VISIT (OUTPATIENT)
Dept: NEUROLOGY | Facility: CLINIC | Age: 47
End: 2022-03-04

## 2022-03-04 VITALS
SYSTOLIC BLOOD PRESSURE: 128 MMHG | OXYGEN SATURATION: 100 % | HEIGHT: 64 IN | HEART RATE: 77 BPM | DIASTOLIC BLOOD PRESSURE: 68 MMHG | BODY MASS INDEX: 27.76 KG/M2 | TEMPERATURE: 97.5 F | WEIGHT: 162.6 LBS

## 2022-03-04 DIAGNOSIS — G44.209 TENSION HEADACHE: Primary | ICD-10-CM

## 2022-03-04 PROCEDURE — 99213 OFFICE O/P EST LOW 20 MIN: CPT | Performed by: NURSE PRACTITIONER

## 2022-03-04 NOTE — PROGRESS NOTES
Follow Up Neurology Office Visit      Patient Name: Renae Bolton    Referring Physician: No ref. provider found    Chief Complaint:    Chief Complaint   Patient presents with   • Follow-up      4 week med check.       History of Present Illness: Renae Bolton is a very pleasant 46 y.o. female who is here to follow up with Neurology for headaches.   She unfortunately has had COVID since previous visit. She has a mild case, and has recovered well.  Headaches have remained well controlled with B complex and mag, uses Treximet prn. Estimates no more than 3-4 doses of Treximet per month.  Not currently taking amitriptyline.    The following portions of the patient's history were reviewed and updated as appropriate: allergies, current medications, past family history, past medical history, past social history, past surgical history and problem list.    Subjective     Review of Systems:   Review of Systems   Neurological: Positive for headache.   All other systems reviewed and are negative.    Medications:     Current Outpatient Medications:   •  acyclovir (ZOVIRAX) 5 % ointment, Apply  topically to the appropriate area as directed Every 3 (Three) Hours. (Patient taking differently: Apply 1 application topically to the appropriate area as directed Take As Directed.), Disp: 30 g, Rfl: 6  •  B Complex Vitamins (vitamin b complex) capsule capsule, Take 1 capsule by mouth Daily., Disp: , Rfl:   •  Biotin (Biotin 5000) 5 MG capsule, Take 1 capsule by mouth Daily., Disp: , Rfl:   •  cetirizine (zyrTEC) 10 MG tablet, Take 1 tablet by mouth Daily., Disp: 90 tablet, Rfl: 3  •  Cholecalciferol (Vitamin D3) 250 MCG (62052 UT) tablet, Take 20,000 Units by mouth Daily., Disp: , Rfl:   •  cyclobenzaprine (FLEXERIL) 5 MG tablet, Take 1 tablet by mouth 3 (Three) Times a Day As Needed., Disp: 270 tablet, Rfl: 0  •  DULoxetine (CYMBALTA) 20 MG capsule, Take 1 capsule by mouth Daily., Disp: 90 capsule, Rfl: 2  •  Insulin  Pen Needle 32G X 4 MM misc, Use to inject Saxenda once daily, Disp: 100 each, Rfl: 0  •  Liraglutide (Saxenda) 18 MG/3ML injection pen, Inject 3 mg under the skin into the appropriate area as directed Daily., Disp: 15 mL, Rfl: 0  •  MAGNESIUM CITRATE PO, Take 270 mg by mouth Daily. *magnesium citrimate+B6 for migraines, Disp: , Rfl:   •  Mirabegron ER (Myrbetriq) 50 MG tablet sustained-release 24 hour 24 hr tablet, Take 1 tablet by mouth Daily., Disp: 90 tablet, Rfl: 3  •  nabumetone (RELAFEN) 500 MG tablet, Take 1 tablet by mouth 2 (Two) Times a Day As Needed for Mild Pain ., Disp: 30 tablet, Rfl: 2  •  naltrexone (DEPADE) 50 MG tablet, Take 1/2 tablet by mouth for 3 days or until nausea subsides, then increase to 1/2 tablet by mouth 2 (two) times a day., Disp: 30 tablet, Rfl: 2  •  NON FORMULARY, Multivitamin patch daily  Collagen patch daily, Disp: , Rfl:   •  Omega-3 Fatty Acids (fish oil) 1000 MG capsule capsule, Take 1 capsule by mouth 2 (Two) Times a Day With Meals., Disp: , Rfl:   •  ondansetron (ZOFRAN) 4 MG tablet, Take 1 tablet by mouth Every 6 (Six) Hours As Needed., Disp: 120 tablet, Rfl: 3  •  pantoprazole (PROTONIX) 40 MG EC tablet, Take 1 tablet by mouth Daily, Disp: 90 tablet, Rfl: 3  •  phentermine (ADIPEX-P) 37.5 MG tablet, Take 1/2 tablet by mouth at 11 am., Disp: 14 tablet, Rfl: 0  •  Probiotic Product (PROBIOTIC PO), Take 1 capsule by mouth Daily., Disp: , Rfl:   •  promethazine (PHENERGAN) 25 MG tablet, Take 1 tablet by mouth Every 6 (Six) Hours As Needed., Disp: 30 tablet, Rfl: 4  •  propranolol LA (INDERAL LA) 80 MG 24 hr capsule, Take 1 capsule by mouth Daily., Disp: 90 capsule, Rfl: 3  •  PSYLLIUM HUSK PO, Take 1 capsule by mouth Daily., Disp: , Rfl:   •  SUMAtriptan-naproxen (Treximet)  MG per tablet, Take 1 tablet by mouth Every 2 (Two) Hours As Needed for Migraine. Take one tablet at onset of headache. 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  Never actually taken it internally.   • Latex Anaphylaxis and Rash   • Morphine And Related Rash     Rash (if gets on skin).  Never taken it internally   • Penicillins Shortness Of Breath and Palpitations     Keflex OK   • Oxycodone Other (See Comments)     Dizziness, confusion     • Topiramate Other (See Comments)     Pt states she can take NAME BRAND ONLY.  Generic brand \"doesn't work\"       Objective     Physical Exam:  Vital Signs:   Vitals:    03/04/22 0816   BP: 128/68   BP Location: Left arm   Patient Position: Sitting   Cuff Size: Adult   Pulse: 77   Temp: 97.5 °F (36.4 °C)   TempSrc: Temporal   SpO2: 100%   Weight: 73.8 kg (162 lb 9.6 oz)   Height: 162.6 cm (64\")       Physical Exam  Vitals and nursing note reviewed.   Constitutional:       Appearance: Normal appearance.   Pulmonary:      Effort: Pulmonary effort is normal.   Neurological:      General: No focal deficit present.      Mental Status: She is oriented to person, place, and time. Mental status is at baseline.      Gait: Gait is intact.      Deep Tendon Reflexes: Strength normal.   Psychiatric:         Mood and Affect: Mood normal.         Speech: Speech normal.         Behavior: Behavior normal.         Thought Content: Thought content normal.         Judgment: Judgment normal.       Neurologic Exam     Mental Status   Oriented to person, place, and time.   Attention: normal. Concentration: normal. "   Speech: speech is normal   Level of consciousness: alert  Knowledge: good.   Normal comprehension.     Cranial Nerves   Cranial nerves II through XII intact.     Motor Exam   Muscle bulk: normal  Overall muscle tone: normal    Strength   Strength 5/5 throughout.     Gait, Coordination, and Reflexes     Gait  Gait: normal    Tremor   Resting tremor: absent    Results Review:   I reviewed the patient's new clinical results.  I have reviewed the patient's other medical records to include, labs, radiology and referrals.     Assessment / Plan      Assessment/Plan:   Diagnoses and all orders for this visit:    1. Tension headache (Primary)    Continue taking B complex and magnesium supplements for headache prevention, Treximet as needed.  I encouraged her to contact me if she develops any new or worsening headache symptoms.    Follow Up:   Return in about 6 months (around 9/4/2022) for Next scheduled follow up.     Marcie Crum APREDITH  [facility]   AS THE PROVIDER, I PERSONALLY WORE PPE DURING ENTIRE FACE TO FACE ENCOUNTER IN CLINIC WITH THE PATIENT. PATIENT ALSO WORE PPE DURING ENTIRE FACE TO FACE ENCOUNTER EXCEPT FOR A MAX OF 30 SECONDS DURING NEUROLOGICAL EVALUATION OF CRANIAL NERVES AND THEN MASK WAS PLACED BACK OVER PATIENT FACE FOR REMAINDER OF VISIT. I WASHED MY HANDS BEFORE AND AFTER VISIT.    Please note that portions of this note may have been completed with a voice recognition program. Efforts were made to edit the dictations, but occasionally words are mistranscribed.

## 2022-03-07 ENCOUNTER — OFFICE VISIT (OUTPATIENT)
Dept: UROLOGY | Facility: CLINIC | Age: 47
End: 2022-03-07

## 2022-03-07 VITALS
HEART RATE: 74 BPM | BODY MASS INDEX: 27.66 KG/M2 | HEIGHT: 64 IN | WEIGHT: 162 LBS | TEMPERATURE: 97.8 F | OXYGEN SATURATION: 98 % | DIASTOLIC BLOOD PRESSURE: 68 MMHG | SYSTOLIC BLOOD PRESSURE: 110 MMHG

## 2022-03-07 DIAGNOSIS — N39.46 MIXED STRESS AND URGE URINARY INCONTINENCE: Primary | ICD-10-CM

## 2022-03-07 PROCEDURE — 99213 OFFICE O/P EST LOW 20 MIN: CPT | Performed by: UROLOGY

## 2022-03-07 NOTE — PROGRESS NOTES
"Chief Complaint   Patient presents with   • overactive bladder     Patient in office today for F/U on overactive bladder        HPI  Ms. Bolton is a 46 y.o. female with history below in assessment, who presents for follow up.     At this visit pt doing very well but has to wait to empty sometimes and has post void dribbling.     Past Medical History:   Diagnosis Date   • Allergic rhinitis approx 10 years ago   • Anxiety    • Arrhythmia     SVT w/ PVCs, on propranolol, follows w/ Dr. Liang   • Aspirin long-term use     for heart health   • Depression    • Dyspepsia    • Dyspnea on exertion    • Endometriosis    • Fatigue    • Female infertility    • Generalized edema     r/t weight gain   • Genital HSV     Famvir for prophylaxis   • GERD (gastroesophageal reflux disease)     daily Prilosec, denies prior eval   • Gestational diabetes 2001   • Hard to intubate     states was told this after her hysterectomy.  states had a \"lidocaine lollipop\"   • Hyperemesis gravidarum    • Hyperlipidemia 9/21/2021   • Hypertension    • Interstitial cystitis    • Kidney stones    • Migraine     Topamax w/ prn Treximet/Phenergan/Zofran   • Mitral valve prolapse    • Normal vaginal Papanicolaou smear in patient with history of hysterectomy 09/16/2016   • Obesity    • MAREN (obstructive sleep apnea)    • Peripheral edema    • PONV (postoperative nausea and vomiting)    • Post concussion syndrome     s/p fall w/ frontal lobe head injury 9/2017, on Elavil   • Sleep apnea     newly dx, CPAP compliant   • Stress incontinence     follows w/ Dr. Galvan, on Myrbetriq, s/p cystoscopy w/ bulking agent   • Wears contact lenses        Past Surgical History:   Procedure Laterality Date   • ANTERIOR AND POSTERIOR VAGINAL REPAIR  06/23/2010    DR EDIN BYRD   • BREAST BIOPSY  [id number]    Left   • BREAST LUMPECTOMY Left 2015    benign   • CYSTOSCOPY W/ BULKING AGENT INJECTION N/A 6/25/2019    Procedure: CYSTOSCOPY WITH URETHRAL  BULKING AGENT INJECTION;  " Surgeon: Jose Galvan MD;  Location: [facility] OR;  Service: Urology   • CYSTOSCOPY, DIMETHYL SULFOXIDE COCKTAIL  03/14/2011    WITH HYDRODISTENTION (DR AVINASH MADDOX)   • ENDOSCOPY     • ENDOSCOPY N/A 12/17/2019    Procedure: ESOPHAGOGASTRODUODENOSCOPY;  Surgeon: Tracee Onofre MD;  Location: [facility] OR;  Service: Bariatric   • ENDOSCOPY N/A 10/20/2020    Procedure: ESOPHAGOGASTRODUODENOSCOPY WITH BIOPSY;  Surgeon: Tracee Onofre MD;  Location: [facility] ENDOSCOPY;  Service: General;  Laterality: N/A;   • GASTRIC SLEEVE LAPAROSCOPIC N/A 12/17/2019    Procedure: GASTRIC SLEEVE LAPAROSCOPIC;  Surgeon: Tracee Onofre MD;  Location: [facility] OR;  Service: Bariatric   • HIATAL HERNIA REPAIR N/A 12/17/2019    Procedure: HIATAL HERNIA REPAIR LAPAROSCOPIC;  Surgeon: Tracee Onofre MD;  Location: [facility] OR;  Service: Bariatric   • HYSTERECTOMY  06/23/2010    [facility] (DR EDIN BY[facility])   • LAPAROSCOPIC CHOLECYSTECTOMY  2013    for stones   • TRANSVAGINAL TAPING SUSPENSION WITH OBTURATOR  06/23/2010    DR EDIN BYRD   • VAGINAL HYSTERECTOMY     • VAGINAL PROLAPSE REPAIR     • WISDOM TOOTH EXTRACTION  1990         Current Outpatient Medications:   •  acyclovir (ZOVIRAX) 5 % ointment, Apply  topically to the appropriate area as directed Every 3 (Three) Hours. (Patient taking differently: Apply 1 application topically to the appropriate area as directed Take As Directed.), Disp: 30 g, Rfl: 6  •  B Complex Vitamins (vitamin b complex) capsule capsule, Take 1 capsule by mouth Daily., Disp: , Rfl:   •  Biotin (Biotin 5000) 5 MG capsule, Take 1 capsule by mouth Daily., Disp: , Rfl:   •  cetirizine (zyrTEC) 10 MG tablet, Take 1 tablet by mouth Daily., Disp: 90 tablet, Rfl: 3  •  Cholecalciferol (Vitamin D3) 250 MCG (46994 UT) tablet, Take 20,000 Units by mouth Daily., Disp: , Rfl:   •  clobetasol propionate (CLOBEX) 0.05 % shampoo, apply TO HAIR AND SCALP as directed, Disp: , Rfl: 0  •  cyclobenzaprine  (FLEXERIL) 5 MG tablet, Take 1 tablet by mouth 3 (Three) Times a Day As Needed., Disp: 270 tablet, Rfl: 0  •  DULoxetine (CYMBALTA) 20 MG capsule, Take 1 capsule by mouth Daily., Disp: 90 capsule, Rfl: 2  •  famciclovir (FAMVIR) 250 MG tablet, Take 1 tablet by mouth 3 (Three) Times a Day., Disp: 270 tablet, Rfl: 4  •  Insulin Pen Needle 32G X 4 MM misc, Use to inject Saxenda once daily, Disp: 100 each, Rfl: 0  •  Liraglutide (Saxenda) 18 MG/3ML injection pen, Inject 3 mg under the skin into the appropriate area as directed Daily., Disp: 15 mL, Rfl: 0  •  MAGNESIUM CITRATE PO, Take 270 mg by mouth Daily. *magnesium citrimate+B6 for migraines, Disp: , Rfl:   •  Mirabegron ER (Myrbetriq) 50 MG tablet sustained-release 24 hour 24 hr tablet, Take 1 tablet by mouth Daily., Disp: 90 tablet, Rfl: 3  •  naltrexone (DEPADE) 50 MG tablet, Take 1/2 tablet by mouth for 3 days or until nausea subsides, then increase to 1/2 tablet by mouth 2 (two) times a day. (Patient taking differently: Take 1/2 tablet by mouth for 3 days or until nausea subsides, then increase to 1/2 tablet by mouth 2 (two) times a day.), Disp: 30 tablet, Rfl: 2  •  NON FORMULARY, Multivitamin patch daily  Collagen patch daily, Disp: , Rfl:   •  Omega-3 Fatty Acids (fish oil) 1000 MG capsule capsule, Take 1 capsule by mouth 2 (Two) Times a Day With Meals., Disp: , Rfl:   •  ondansetron (ZOFRAN) 4 MG tablet, Take 1 tablet by mouth Every 6 (Six) Hours As Needed., Disp: 120 tablet, Rfl: 3  •  pantoprazole (PROTONIX) 40 MG EC tablet, Take 1 tablet by mouth Daily, Disp: 90 tablet, Rfl: 3  •  phentermine (ADIPEX-P) 37.5 MG tablet, Take 1/2 tablet by mouth at 11 am. (Patient taking differently: Take 1/2 tablet by mouth at 11 am.), Disp: 14 tablet, Rfl: 0  •  Probiotic Product (PROBIOTIC PO), Take 1 capsule by mouth Daily., Disp: , Rfl:   •  promethazine (PHENERGAN) 25 MG tablet, Take 1 tablet by mouth Every 6 (Six) Hours As Needed., Disp: 30 tablet, Rfl: 4  •   "propranolol LA (INDERAL LA) 80 MG 24 hr capsule, Take 1 capsule by mouth Daily., Disp: 90 capsule, Rfl: 3  •  PSYLLIUM HUSK PO, Take 1 capsule by mouth Daily., Disp: , Rfl:   •  SUMAtriptan-naproxen (TREXIMET)  MG per tablet, Take 1 tablet by mouth Every 2 (Two) Hours As Needed for Migraine. Take one tablet at onset of headache. May repeat dose one time in 2 hours if headache not relieved., Disp: , Rfl:   •  Vitamin E 400 units tablet, Take 400 Units by mouth Daily., Disp: , Rfl:   •  nabumetone (RELAFEN) 500 MG tablet, Take 1 tablet by mouth 2 (Two) Times a Day As Needed for Mild Pain ., Disp: 30 tablet, Rfl: 2     Physical Exam  Visit Vitals  /68 (BP Location: Right arm, Patient Position: Sitting, Cuff Size: Adult)   Pulse 74   Temp 97.8 °F (36.6 °C) (Temporal)   Ht 162.6 cm (64\")   Wt 73.5 kg (162 lb)   SpO2 98%   BMI 27.81 kg/m²       Labs  Brief Urine Lab Results  (Last result in the past 365 days)      Color   Clarity   Blood   Leuk Est   Nitrite   Protein   CREAT   Urine HCG        06/16/21 1004             146.4               Lab Results   Component Value Date    GLUCOSE 83 10/08/2021    CALCIUM 9.1 10/08/2021     10/08/2021    K 4.3 10/08/2021    CO2 27.3 10/08/2021     10/08/2021    BUN 17 10/08/2021    CREATININE 0.89 10/08/2021    EGFRIFAFRI 79 09/17/2020    EGFRIFNONA 68 10/08/2021    BCR 19.1 10/08/2021    ANIONGAP 7.7 10/08/2021       Lab Results   Component Value Date    WBC 5.82 10/08/2021    HGB 14.3 10/08/2021    HCT 42.5 10/08/2021    MCV 94.0 10/08/2021     10/08/2021       Radiographic Studies  No Images in the past 120 days found..    I have reviewed the above labs and imaging.     Assessment  46 y.o. female with with recurrent FELICIANO after TOT sling in 2010. She also has a history of IC that is well controlled with diet, and very mild OAB that is controlled with Myrbetriq.  She was using 2-3 pads per day and now is using a panty liner. She is s/p urethral bulking " agent injection on 6/25/2019. FELICIANO well controlled.     She has lost over 80 pounds, and her incontinence is very well controlled, therefore she may not need medication for overactive bladder anymore.    Plan  1. Try weaning off myrbetriq   2. FU in 1 yr

## 2022-03-16 ENCOUNTER — OFFICE VISIT (OUTPATIENT)
Dept: BARIATRICS/WEIGHT MGMT | Facility: CLINIC | Age: 47
End: 2022-03-16

## 2022-03-16 VITALS
HEIGHT: 64 IN | DIASTOLIC BLOOD PRESSURE: 64 MMHG | OXYGEN SATURATION: 98 % | SYSTOLIC BLOOD PRESSURE: 124 MMHG | WEIGHT: 160 LBS | HEART RATE: 73 BPM | TEMPERATURE: 97.8 F | RESPIRATION RATE: 18 BRPM | BODY MASS INDEX: 27.31 KG/M2

## 2022-03-16 DIAGNOSIS — E88.81 METABOLIC SYNDROME: ICD-10-CM

## 2022-03-16 DIAGNOSIS — E66.3 OVERWEIGHT: Primary | ICD-10-CM

## 2022-03-16 PROCEDURE — 99213 OFFICE O/P EST LOW 20 MIN: CPT | Performed by: NURSE PRACTITIONER

## 2022-03-16 RX ORDER — PHENTERMINE HYDROCHLORIDE 37.5 MG/1
TABLET ORAL
Qty: 14 TABLET | Refills: 0 | Status: SHIPPED | OUTPATIENT
Start: 2022-03-16 | End: 2022-04-13 | Stop reason: SDUPTHER

## 2022-03-16 RX ORDER — LIRAGLUTIDE 6 MG/ML
3 INJECTION, SOLUTION SUBCUTANEOUS DAILY
Qty: 15 ML | Refills: 0 | Status: SHIPPED | OUTPATIENT
Start: 2022-03-16 | End: 2022-04-28 | Stop reason: SDUPTHER

## 2022-03-16 RX ORDER — NALTREXONE HYDROCHLORIDE 50 MG/1
TABLET, FILM COATED ORAL
Qty: 30 TABLET | Refills: 2 | Status: SHIPPED | OUTPATIENT
Start: 2022-03-16 | End: 2022-07-13 | Stop reason: SDUPTHER

## 2022-03-16 NOTE — ASSESSMENT & PLAN NOTE
Low carb diet, regular physical activity, and weight reduction of 10-15%. Continue saxenda and metformin.

## 2022-03-16 NOTE — ASSESSMENT & PLAN NOTE
Patient's (Body mass index is 27.46 kg/m².) indicates that they are overweight with health conditions that include obstructive sleep apnea, dyslipidemias and GERD . Weight is improving with treatment. BMI is is above average; BMI management plan is completed. We discussed low calorie, low carb based diet program, portion control, increasing exercise, management of depression/anxiety/stress to control compensatory eating, pharmacologic options including phentermine, saxenda, naltrexone and an radha-based approach such as Yamsafer Pal or Lose It.    I have instructed the patient to continue with pursuit of medical weight loss as a part of this program. Patient does meet criteria for use of anorectics at this time as BMI >27 and is not at treatment goal.     Continue nutritional focus and work towards new exercise FITT goal of: 2-2-4-2. Continue with .   The current plan for this month includes: continue current exercise efforts, continue to work on lifestyle behavioral changes and continue nutrition focus. Improve sleep hygiene- set bedtime. Add more veggies. OK to use miralax. Consider changing phentermine next visit (increase or pulse therapy)  Or switch to phendimetrazine.

## 2022-03-16 NOTE — PROGRESS NOTES
Office Note      Date: 2022  Patient Name: Renae Bolton  MRN: [id number]  : 1975    Chief Complaint  Obesity and Follow-up  Subjective  Subjective           Renae Bolton presents to [facility] for obesity management. s/p LSG/HHR 19 w/ Dr. Onofre. s/p LSG/HHR 19 w/ Dr. Onofre. pre-anastacia weight: 232.5, gonzalez 183lb, final goal 150lb.   Patient is unsure with weight loss progress. Appetite is moderately controlled, naltrexone has helped her to not think about food/sweets as much. Had some nausea intially but zofran helped. Reports no side effects of prescribed medications today. The patient is taking multivitamin and is taking fish oil.  The patient is not using a food journal.  Body mass index is 27.46 kg/m².   The patient is exercising with a  again. [facility] in [facility].  with a FITT score of:    Frequency Intensity Time Strength Training   []   0, none []   0 []   0 []   0   []   1 (1-2x/week) []   1 (light) []   1 (<10 min) []   1 (1x/week)   [x]   2 (3-5x/week) [x]   2 (moderate) []   2 (10-20 min) [x]   2 (2x/week)   []   3 (daily) []   3 (moderately hard)  []   4 (very hard) []   3 (20-30 min)  [x]   4 (>30 min) []   3 (3-4x/week)       Start weight:190 pounds.    Total Loss/%Loss of BBW: -15.79  Change in weight since last visit: +0.5    Review of Systems   Constitutional: Negative for fatigue.   Eyes: Negative for visual disturbance.   Cardiovascular: Negative for chest pain and palpitations.   Gastrointestinal: Negative for abdominal pain, constipation, diarrhea, nausea and GERD.   Neurological: Negative for dizziness, tremors, light-headedness, headache and memory problem.        Parasthesias negative   Psychiatric/Behavioral: Negative for sleep disturbance and depressed mood. The patient is not nervous/anxious.        Objective     Body composition analysis completed and showed:   %body fat:  "38.2  Measurements (in inches)  Waist:33.5  Vital Signs:   /64 (BP Location: Right arm, Patient Position: Sitting, Cuff Size: Adult)   Pulse 73   Temp 97.8 °F (36.6 °C) (Temporal)   Resp 18   Ht 162.6 cm (64\")   Wt 72.6 kg (160 lb)   SpO2 98%   BMI 27.46 kg/m²     Physical Exam   General appears stated age and normal appearance   HEENT PERRLA, EOM intact and conjunctivae normal   Chest/lungs Normal rate, Regular rhythm and Breathing is unlabored   Extremities without edema   Neuro Good historian and No focal deficit   Skin Warm, dry, intact   Psych normal behavior, normal thought content and normal concentration     Result Review :                Assessment / Plan        Diagnoses and all orders for this visit:    1. Overweight (Primary)  Assessment & Plan:  Patient's (Body mass index is 27.46 kg/m².) indicates that they are overweight with health conditions that include obstructive sleep apnea, dyslipidemias and GERD . Weight is improving with treatment. BMI is is above average; BMI management plan is completed. We discussed low calorie, low carb based diet program, portion control, increasing exercise, management of depression/anxiety/stress to control compensatory eating, pharmacologic options including phentermine, saxenda, naltrexone and an radha-based approach such as Nextance Pal or Lose It.    I have instructed the patient to continue with pursuit of medical weight loss as a part of this program. Patient does meet criteria for use of anorectics at this time as BMI >27 and is not at treatment goal.     Continue nutritional focus and work towards new exercise FITT goal of: 2-2-4-2. Continue with .   The current plan for this month includes: continue current exercise efforts, continue to work on lifestyle behavioral changes and continue nutrition focus. Improve sleep hygiene- set bedtime. Add more veggies. OK to use miralax. Consider changing phentermine next visit (increase or pulse " therapy)  Or switch to phendimetrazine.        Orders:  -     phentermine (ADIPEX-P) 37.5 MG tablet; Take 1/2 tablet by mouth in the morning.  Dispense: 14 tablet; Refill: 0  -     Liraglutide (Saxenda) 18 MG/3ML injection pen; Inject 3 mg under the skin into the appropriate area as directed Daily.  Dispense: 15 mL; Refill: 0  -     naltrexone (DEPADE) 50 MG tablet; Take 1/2 tablet by mouth 2 (two) times a day.  Dispense: 30 tablet; Refill: 2    2. Metabolic syndrome  Assessment & Plan:  Low carb diet, regular physical activity, and weight reduction of 10-15%. Continue saxenda and metformin.             Follow Up   Return in about 4 weeks (around 4/13/2022) for Next scheduled follow up.  Patient was given instructions and counseling regarding her condition or for health maintenance advice. Please see specific information pulled into the AVS if appropriate.     Bindu Lechuga, LAURITA  03/16/2022

## 2022-03-23 ENCOUNTER — TELEMEDICINE (OUTPATIENT)
Dept: BARIATRICS/WEIGHT MGMT | Facility: CLINIC | Age: 47
End: 2022-03-23

## 2022-03-23 VITALS — BODY MASS INDEX: 27.31 KG/M2 | HEIGHT: 64 IN | WEIGHT: 160 LBS

## 2022-03-23 DIAGNOSIS — K21.9 GASTROESOPHAGEAL REFLUX DISEASE, UNSPECIFIED WHETHER ESOPHAGITIS PRESENT: Primary | ICD-10-CM

## 2022-03-23 PROCEDURE — 99214 OFFICE O/P EST MOD 30 MIN: CPT | Performed by: PHYSICIAN ASSISTANT

## 2022-03-23 RX ORDER — DEXLANSOPRAZOLE 60 MG/1
60 CAPSULE, DELAYED RELEASE ORAL DAILY
Qty: 90 CAPSULE | Refills: 3 | Status: SHIPPED | OUTPATIENT
Start: 2022-03-23 | End: 2023-06-17

## 2022-03-23 NOTE — PROGRESS NOTES
"[facility]  [address]  [address]  [facility] [id number] [phone]      Patient Name:  Renae Bolton.  :  1975      Date of Visit: 2022      Reason for Visit:  Annual Eval - 2 years postop      HPI:  Renae Bolton is a 46 y.o. female s/p LSG/HHR 19 w/ Dr. Onofre      Following w/ [facility] - on Saxenda, Phentermine, Naltrexone - mostly satisfied w/ progress.  Working w/  as well.   Still hoping to lose more.       Does have ongoing issues w/ heartburn and GI distress.  Takes Protonix 40mg qPM.  Recently added Pepcid qAM which helps.  Did better w/ Dexilant (but [facility] insurance requested the change to Protonix).  Last UGI 2020 showed reflux but was o/w unremarkable.  Does not want to pursue further eval at this time, but requesting RX for Dexilant if insurance will cover.  s/p leo.     No other issues/concerns.  Last labs 2021 (w/ PCP) - Vit D supratherapeutic (was taking 20,000u/day at that time).  Has decreased Vit D supplementation to 5000u/day.  Taking Bcomplex + Biotin and wearing patchAID MVI.       Presurgery weight: 224 pounds.  Today's weight is 72.6 kg (160 lb) pounds, today's  Body mass index is 27.46 kg/m²., and her weight loss since surgery is 64 pounds.       Past Medical History:   Diagnosis Date   • Allergic rhinitis approx 10 years ago   • Anxiety    • Arrhythmia     SVT w/ PVCs, on propranolol, follows w/ Dr. Liang   • Depression    • Dyspepsia    • Dyspnea on exertion    • Endometriosis    • Fatigue    • Female infertility    • Generalized edema     r/t weight gain   • Genital HSV     Famvir for prophylaxis   • GERD (gastroesophageal reflux disease)    • Gestational diabetes    • Hard to intubate     states was told this after her hysterectomy.  states had a \"lidocaine lollipop\"   • Hyperemesis gravidarum    • Hyperlipidemia 2021   • Hypertension    • Interstitial cystitis    • Kidney " stones    • Migraine     prn Treximet/Phenergan/Zofran   • Mitral valve prolapse    • Normal vaginal Papanicolaou smear in patient with history of hysterectomy 09/16/2016   • Peripheral edema    • PONV (postoperative nausea and vomiting)    • Post concussion syndrome     s/p fall w/ frontal lobe head injury 9/2017, previously on Elavil   • Sleep apnea     newly dx, CPAP compliant   • Stress incontinence     follows w/ Dr. Galvan, on Myrbetriq, s/p cystoscopy w/ bulking agent   • Wears contact lenses      Past Surgical History:   Procedure Laterality Date   • ANTERIOR AND POSTERIOR VAGINAL REPAIR  06/23/2010    DR EDIN BYRD   • BREAST LUMPECTOMY Left 2015    benign   • CYSTOSCOPY W/ BULKING AGENT INJECTION N/A 06/25/2019    Procedure: CYSTOSCOPY WITH URETHRAL  BULKING AGENT INJECTION;  Surgeon: Jose Galvan MD;  Location: [facility] OR;  Service: Urology   • CYSTOSCOPY, DIMETHYL SULFOXIDE COCKTAIL  03/14/2011    WITH HYDRODISTENTION (DR AVINASH MADDOX)   • ENDOSCOPY N/A 12/17/2019    Procedure: ESOPHAGOGASTRODUODENOSCOPY;  Surgeon: Tracee Onofre MD;  Location: [facility] OR;  Service: Bariatric   • ENDOSCOPY N/A 10/20/2020    Procedure: ESOPHAGOGASTRODUODENOSCOPY WITH BIOPSY;  Surgeon: Tracee Onofre MD;  Location: [facility] ENDOSCOPY;  Service: General;  Laterality: N/A;   • GASTRIC SLEEVE LAPAROSCOPIC N/A 12/17/2019    Procedure: GASTRIC SLEEVE LAPAROSCOPIC;  Surgeon: Tracee Onofre MD;  Location: [facility] OR;  Service: Bariatric   • HIATAL HERNIA REPAIR N/A 12/17/2019    Procedure: HIATAL HERNIA REPAIR LAPAROSCOPIC;  Surgeon: Tracee Onofre MD;  Location: [facility] OR;  Service: Bariatric   • HYSTERECTOMY  06/23/2010    LAV (DR EDIN BYRD)   • LAPAROSCOPIC CHOLECYSTECTOMY  2013    for stones   • TRANSVAGINAL TAPING SUSPENSION WITH OBTURATOR  06/23/2010    DR EDIN BYRD   • WISDOM TOOTH EXTRACTION  1990     Outpatient Medications Marked as Taking for the 3/23/22 encounter (Telemedicine) with  Dina Ibarra PA   Medication Sig Dispense Refill   • B Complex Vitamins (vitamin b complex) capsule capsule Take 1 capsule by mouth Daily.     • Biotin (Biotin 5000) 5 MG capsule Take 1 capsule by mouth Daily.     • cetirizine (zyrTEC) 10 MG tablet Take 1 tablet by mouth Daily. 90 tablet 3   • cyclobenzaprine (FLEXERIL) 5 MG tablet Take 1 tablet by mouth 3 (Three) Times a Day As Needed. 270 tablet 0   • DULoxetine (CYMBALTA) 20 MG capsule Take 1 capsule by mouth Daily. 90 capsule 2   • famciclovir (FAMVIR) 250 MG tablet Take 1 tablet by mouth 3 (Three) Times a Day. 270 tablet 4   • Liraglutide (Saxenda) 18 MG/3ML injection pen Inject 3 mg under the skin into the appropriate area as directed Daily. 15 mL 0   • MAGNESIUM CITRATE PO Take 270 mg by mouth Daily. *magnesium citrimate+B6 for migraines     • Mirabegron ER (Myrbetriq) 50 MG tablet sustained-release 24 hour 24 hr tablet Take 1 tablet by mouth Daily. 90 tablet 3   • nabumetone (RELAFEN) 500 MG tablet Take 1 tablet by mouth 2 (Two) Times a Day As Needed for Mild Pain . 30 tablet 2   • naltrexone (DEPADE) 50 MG tablet Take 1/2 tablet by mouth 2 (two) times a day. 30 tablet 2   • NON FORMULARY Multivitamin patch daily   Collagen patch daily     • Omega-3 Fatty Acids (fish oil) 1000 MG capsule capsule Take 1 capsule by mouth 2 (Two) Times a Day With Meals.     • ondansetron (ZOFRAN) 4 MG tablet Take 1 tablet by mouth Every 6 (Six) Hours As Needed. 120 tablet 3   • pantoprazole (PROTONIX) 40 MG EC tablet Take 1 tablet by mouth Daily 90 tablet 3   • phentermine (ADIPEX-P) 37.5 MG tablet Take 1/2 tablet by mouth in the morning. 14 tablet 0   • Probiotic Product (PROBIOTIC PO) Take 1 capsule by mouth Daily.     • promethazine (PHENERGAN) 25 MG tablet Take 1 tablet by mouth Every 6 (Six) Hours As Needed. 30 tablet 4   • propranolol LA (INDERAL LA) 80 MG 24 hr capsule Take 1 capsule by mouth Daily. 90 capsule 3   • PSYLLIUM HUSK PO Take 1 capsule by mouth Daily.   "   • SUMAtriptan-naproxen (TREXIMET)  MG per tablet Take 1 tablet by mouth Every 2 (Two) Hours As Needed for Migraine. Take one tablet at onset of headache. May repeat dose one time in 2 hours if headache not relieved.     • Vitamin E 400 units tablet Take 400 Units by mouth Daily.       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  Never actually taken it internally.   • Latex Anaphylaxis and Rash   • Morphine And Related Rash     Rash (if gets on skin).  Never taken it internally   • Penicillins Shortness Of Breath and Palpitations     Keflex OK   • Oxycodone Other (See Comments)     Dizziness, confusion     • Topiramate Other (See Comments)     Pt states she can take NAME BRAND ONLY.  Generic brand \"doesn't work\"       Social History     Socioeconomic History   • Marital status:    Tobacco Use   • Smoking status: Never Smoker   • Smokeless tobacco: Never Used   Vaping Use   • Vaping Use: Never used   Substance and Sexual Activity   • Alcohol use: No   • Drug use: No   • Sexual activity: Yes     Partners: Male     Birth control/protection: Surgical       Ht 162.6 cm (64\")   Wt 72.6 kg (160 lb)   BMI 27.46 kg/m²   Physical Exam   Constitutional: She is oriented to person, place, and time. She appears well-developed. No distress.   Eyes: No scleral icterus.   Pulmonary/Chest: Effort normal.   Neurological: She is alert and oriented to person, place, and time.         Assessment:   2 years s/p LSG/HHR 12/17/19 w/ Dr. Onofre    ICD-10-CM ICD-9-CM   1. Gastroesophageal reflux disease, unspecified whether esophagitis present  K21.9 530.81     Patient's Body mass index is 27.46 kg/m². indicating that she is overweight (BMI 25-29.9). Patient's (Body mass index is 27.46 kg/m².) indicates that they are " overweight with health conditions that include see above . Weight is improving with treatment. BMI is is above average; BMI management plan is completed. We discussed see plan.      Plan:   Encouraged to continue following w/ [facility].  Continue w/ good food choices, healthy habits and routine exercise.  Offered further evaluation of reflux/GI distress w/ repeat UGI, but patient declined at this point.  She would like to attempt to resume treatment with Dexilant if insurance will cover - RX escribed, will attempt to PA if needed/able.  Will follow up w/ PCP as planned 6/2022 for repeat labs - declined repeat labs at present.  Continue current vitamin regimen.  Call w/ issues/concerns.    The patient was instructed to follow up in 1 year, sooner if needed.       I spent 30 minutes on this patient encounter, which includes chart review, evaluation & management, patient education and counseling r/t healthy habits and necessary dietary/lifestyle modifications for continued success/weight loss.       Note: This was an audio and video enabled telemedicine encounter to comply with social distancing guidelines during the COVID-19 pandemic.  Consent was obtained prior to the visit.

## 2022-04-13 ENCOUNTER — OFFICE VISIT (OUTPATIENT)
Dept: BARIATRICS/WEIGHT MGMT | Facility: CLINIC | Age: 47
End: 2022-04-13

## 2022-04-13 VITALS
WEIGHT: 157.5 LBS | TEMPERATURE: 97.6 F | BODY MASS INDEX: 26.89 KG/M2 | DIASTOLIC BLOOD PRESSURE: 66 MMHG | OXYGEN SATURATION: 98 % | HEIGHT: 64 IN | SYSTOLIC BLOOD PRESSURE: 128 MMHG | HEART RATE: 96 BPM | RESPIRATION RATE: 18 BRPM

## 2022-04-13 DIAGNOSIS — E66.3 OVERWEIGHT: Primary | ICD-10-CM

## 2022-04-13 DIAGNOSIS — E88.81 METABOLIC SYNDROME: ICD-10-CM

## 2022-04-13 PROCEDURE — 99213 OFFICE O/P EST LOW 20 MIN: CPT | Performed by: NURSE PRACTITIONER

## 2022-04-13 RX ORDER — PHENTERMINE HYDROCHLORIDE 37.5 MG/1
TABLET ORAL
Qty: 14 TABLET | Refills: 0 | Status: SHIPPED | OUTPATIENT
Start: 2022-04-13 | End: 2022-05-17 | Stop reason: SDUPTHER

## 2022-04-13 NOTE — PROGRESS NOTES
Office Note      Date: 2022  Patient Name: Renae Bolton  MRN: [id number]  : 1975     Chief Complaint  Obesity and Follow-up  Subjective  Subjective           Renae Bolton presents to [facility] for obesity management. s/p LSG/HHR 19 w/ Dr. Onofre. Pre-surgical weight: 232.5lb, gonzalez 183lb, final goal 150lb.  Patient is unsure with weight loss progress. Appetite is moderately controlled, cravings for sweets are resolved with naltrexone. Reports continued constipation with wegovy. Was afraid to add miralax because she was concerned it would cause bloating or gas. The patient is taking multivitamin and is taking fish oil.  The patient is not using a food journal. Not meal planning as much but plans on getting back to it. Had a three week revival at her [employer] so she was grabbing something from home to attend those nightly services.   The patient is exercising about twice a week with  for about an hour with a FITT score of:    Frequency Intensity Time Strength Training   []   0, none []   0 []   0 []   0   [x]   1 (1-2x/week) []   1 (light) []   1 (<10 min) []   1 (1x/week)   []   2 (3-5x/week) [x]   2 (moderate) []   2 (10-20 min) [x]   2 (2x/week)   []   3 (daily) [x]   3 (moderately hard)  []   4 (very hard) []   3 (20-30 min)  [x]   4 (>30 min) []   3 (3-4x/week)       Review of Systems   Constitutional: Negative for fatigue.   Eyes: Negative for visual disturbance.   Cardiovascular: Negative for chest pain and palpitations.   Gastrointestinal: Negative for abdominal pain, constipation, diarrhea, nausea and GERD.   Neurological: Negative for dizziness, tremors, light-headedness, headache and memory problem.        Parasthesias negative   Psychiatric/Behavioral: Negative for sleep disturbance and depressed mood. The patient is not nervous/anxious.        Objective    Body mass index is 27.03 kg/m².   Start weight: 190 pounds.    Total  "Loss/%Loss of BBW: -17.11%  Change in weight since last visit: -2.5lb  Body composition analysis completed and showed:   %body fat: 37.5  Measurements (in inches)  Waist: 32  Vital Signs:   /66 (BP Location: Right arm, Patient Position: Sitting, Cuff Size: Adult)   Pulse 96   Temp 97.6 °F (36.4 °C) (Temporal)   Resp 18   Ht 162.6 cm (64\")   Wt 71.4 kg (157 lb 8 oz)   SpO2 98%   BMI 27.03 kg/m²     Physical Exam   General appears stated age and normal appearance   HEENT PERRLA, EOM intact and conjunctivae normal   Chest/lungs Normal rate, Regular rhythm and Breathing is unlabored   Extremities without edema   Neuro Good historian and No focal deficit   Skin Warm, dry, intact   Psych normal behavior, normal thought content and normal concentration     Result Review :                Assessment / Plan        Diagnoses and all orders for this visit:    1. Overweight (Primary)  Assessment & Plan:  Patient's (Body mass index is 27.03 kg/m².) indicates that they are overweight with health conditions that include MAREN, hyperlipidemia, GERD, back pain, anxiety and depression, metabolic syndrome. Weight is improving with treatment. BMI is is above average; BMI management plan is completed. We discussed low calorie, low carb based diet program, portion control, increasing exercise, management of depression/anxiety/stress to control compensatory eating, pharmacologic options including saxenda, naltrexone and an radha-based approach such as Jobvite Pal or Lose It.    I have instructed the patient to continue with pursuit of medical weight loss as a part of this program. Patient does meet criteria for use of anorectics at this time as BMI >27 and is not at treatment goal.     Continue nutritional focus and work towards new exercise FITT goal of: 2-2-4-2.  The current plan for this month includes: continue to work on lifestyle behavioral changes. Increase exercise to at least 3 days a week, bring back meal planning. " Goal 4lb. Add miralax for constipation with saxenda.        Orders:  -     phentermine (ADIPEX-P) 37.5 MG tablet; Take 1/2 tablet by mouth in the morning.  Dispense: 14 tablet; Refill: 0    2. Metabolic syndrome  Assessment & Plan:  Low carb diet, regular physical activity, and weight reduction of 10-15%. Continue saxenda              Follow Up   Return in about 4 weeks (around 5/11/2022) for Next scheduled follow up.  Patient was given instructions and counseling regarding her condition or for health maintenance advice. Please see specific information pulled into the AVS if appropriate.     Bindu Lechuga, APRN  04/13/2022

## 2022-04-13 NOTE — ASSESSMENT & PLAN NOTE
Patient's (Body mass index is 27.03 kg/m².) indicates that they are overweight with health conditions that include MAREN, hyperlipidemia, GERD, back pain, anxiety and depression, metabolic syndrome. Weight is improving with treatment. BMI is is above average; BMI management plan is completed. We discussed low calorie, low carb based diet program, portion control, increasing exercise, management of depression/anxiety/stress to control compensatory eating, pharmacologic options including saxenda, naltrexone and an radha-based approach such as Ryan Pal or Lose It.    I have instructed the patient to continue with pursuit of medical weight loss as a part of this program. Patient does meet criteria for use of anorectics at this time as BMI >27 and is not at treatment goal.     Continue nutritional focus and work towards new exercise FITT goal of: 2-2-4-2.  The current plan for this month includes: continue to work on lifestyle behavioral changes. Increase exercise to at least 3 days a week, bring back meal planning. Goal 4lb. Add miralax for constipation with saxenda.

## 2022-04-28 DIAGNOSIS — E66.3 OVERWEIGHT: ICD-10-CM

## 2022-04-29 RX ORDER — LIRAGLUTIDE 6 MG/ML
3 INJECTION, SOLUTION SUBCUTANEOUS DAILY
Qty: 15 ML | Refills: 0 | Status: SHIPPED | OUTPATIENT
Start: 2022-04-29 | End: 2022-06-10 | Stop reason: SDUPTHER

## 2022-05-17 ENCOUNTER — TELEMEDICINE (OUTPATIENT)
Dept: BARIATRICS/WEIGHT MGMT | Facility: CLINIC | Age: 47
End: 2022-05-17

## 2022-05-17 VITALS
WEIGHT: 159.5 LBS | BODY MASS INDEX: 27.23 KG/M2 | DIASTOLIC BLOOD PRESSURE: 63 MMHG | HEART RATE: 75 BPM | HEIGHT: 64 IN | SYSTOLIC BLOOD PRESSURE: 105 MMHG

## 2022-05-17 DIAGNOSIS — F32.A ANXIETY AND DEPRESSION: ICD-10-CM

## 2022-05-17 DIAGNOSIS — F41.9 ANXIETY AND DEPRESSION: ICD-10-CM

## 2022-05-17 DIAGNOSIS — E66.3 OVERWEIGHT: Primary | ICD-10-CM

## 2022-05-17 PROCEDURE — 99213 OFFICE O/P EST LOW 20 MIN: CPT | Performed by: NURSE PRACTITIONER

## 2022-05-17 RX ORDER — PHENTERMINE HYDROCHLORIDE 37.5 MG/1
TABLET ORAL
Qty: 14 TABLET | Refills: 0 | Status: SHIPPED | OUTPATIENT
Start: 2022-05-17 | End: 2022-06-15 | Stop reason: SDUPTHER

## 2022-05-17 NOTE — ASSESSMENT & PLAN NOTE
Patient's (Body mass index is 27.38 kg/m².) indicates that they are overweight with health conditions that include obstructive sleep apnea, dyslipidemias, GERD and back pain, anxiety and depression, metabolic syndrome . Weight is improving with treatment. BMI is is above average; BMI management plan is completed. We discussed low calorie, low carb based diet program, portion control, increasing exercise, management of depression/anxiety/stress to control compensatory eating, pharmacologic options including saxenda, naltrexone, phentermine and an radha-based approach such as Asana Pal or Lose It.    I have instructed the patient to continue with pursuit of medical weight loss as a part of this program. Patient does meet criteria for use of anorectics at this time as BMI >27 and is not at treatment goal.     Continue nutritional focus and work towards new exercise FITT goal of: 2-2-4-2. Add 2-3 more days of cardio.   The current plan for this month includes: adjust exercise as discussed and continue nutrition focus, bring food journal to next visit. Goal 3-5lb.     Continue sympathomimetic (medication refilled).  This patient 1) has no evidence of serious cardiovascular disease; 2) does not have serious psychiatric disease or a history of substance abuse; 3) has been informed about weight loss medications that are FDA approved for long-term use and told that these have been all documented to be safe and effective whereas phentermine has not; 4) does not demonstrate a clinically significant increase in pulse or blood pressure when taking phentermine; and 5) demonstrate significant weight loss while using the medication.  Patient understands that all antiobesity medications are contraindicated in pregnancy.  Patient denies a history of glaucoma.  Patient understands that long-term use of phentermine is considered off label use of this medication, however, that the endocrine Society and recent research supports that  "long-term use of phentermine does not appear to have detrimental health effects when used and the appropriate patient.  In addition, a 2019 study published in an obesity journal on [id number] patient's concluded that \"recommendations to limit phentermine to less than 3 months do not align with current concept of pharmacologic treatment of obesity\", and that \"long-term phentermine users experience greater weight loss without apparent increases in cardiovascular risk\".    We reviewed potential side effects including insomnia, dry mouth, increased heart rate and blood pressure, increased anxiety.  We reviewed reducing caffeine consumption while taking phentermine.  especially if the patient is experience side effects.  The potential risk and benefits of phentermine have been reviewed with the patient, and alternative treatment options were discussed.  All questions were answered, and the patient wishes to move forward with this medication.           "

## 2022-05-17 NOTE — ASSESSMENT & PLAN NOTE
Patient's depression is recurrent and is moderate without psychosis. Their depression is currently active and the condition is unchanged, grieving pregnancy loss of her son's baby. This will be reassessed at the next regular appointment. F/U as described:patient depression is being managed by their pcp.

## 2022-06-10 DIAGNOSIS — E66.3 OVERWEIGHT: ICD-10-CM

## 2022-06-10 RX ORDER — LIRAGLUTIDE 6 MG/ML
3 INJECTION, SOLUTION SUBCUTANEOUS DAILY
Qty: 15 ML | Refills: 0 | Status: SHIPPED | OUTPATIENT
Start: 2022-06-10 | End: 2022-07-13 | Stop reason: SDUPTHER

## 2022-06-15 ENCOUNTER — TELEMEDICINE (OUTPATIENT)
Dept: BARIATRICS/WEIGHT MGMT | Facility: CLINIC | Age: 47
End: 2022-06-15

## 2022-06-15 VITALS
BODY MASS INDEX: 27.31 KG/M2 | SYSTOLIC BLOOD PRESSURE: 100 MMHG | HEART RATE: 71 BPM | WEIGHT: 160 LBS | HEIGHT: 64 IN | DIASTOLIC BLOOD PRESSURE: 60 MMHG

## 2022-06-15 DIAGNOSIS — E66.3 OVERWEIGHT: Primary | ICD-10-CM

## 2022-06-15 DIAGNOSIS — E88.81 METABOLIC SYNDROME: ICD-10-CM

## 2022-06-15 PROCEDURE — 99214 OFFICE O/P EST MOD 30 MIN: CPT | Performed by: NURSE PRACTITIONER

## 2022-06-15 RX ORDER — PHENTERMINE HYDROCHLORIDE 37.5 MG/1
TABLET ORAL
Qty: 14 TABLET | Refills: 0 | Status: SHIPPED | OUTPATIENT
Start: 2022-06-15 | End: 2022-07-13 | Stop reason: SDUPTHER

## 2022-06-15 NOTE — ASSESSMENT & PLAN NOTE
Low carb diet, regular physical activity, and weight reduction of 10-15% (accomplished). Continue liraglutide.      no

## 2022-06-15 NOTE — ASSESSMENT & PLAN NOTE
Patient's (Body mass index is 27.46 kg/m².) indicates that they are overweight with health conditions that include obstructive sleep apnea, dyslipidemias, GERD and back pain, anxiety, IR . Weight is improving with treatment. BMI is is above average; BMI management plan is completed. We discussed low calorie, low carb based diet program, portion control, increasing exercise, management of depression/anxiety/stress to control compensatory eating, pharmacologic options including saxenda, phentermine, naltrexone and an radha-based approach such as OneSun Pal or Lose It.    I have instructed the patient to continue with pursuit of medical weight loss as a part of this program. Patient does meet criteria for use of anorectics at this time as BMI >27 and is not at treatment goal.     Continue nutritional focus and work towards new exercise FITT goal of:   The current plan for this month includes: continue current exercise efforts and continue to work on lifestyle behavioral changes. Restart food journal radha and record calories and macronutrients. Add back evening dose of naltrexone. Set boundaries around sweets, currently consuming almost daily. Goal 4-6lb- get back to goal of 155lb then start taper with phentermine.     Continue sympathomimetic (medication refilled).  This patient 1) has no evidence of serious cardiovascular disease; 2) does not have serious psychiatric disease or a history of substance abuse; 3) has been informed about weight loss medications that are FDA approved for long-term use and told that these have been all documented to be safe and effective whereas phentermine has not; 4) does not demonstrate a clinically significant increase in pulse or blood pressure when taking phentermine; and 5) demonstrate significant weight loss while using the medication.  Patient understands that all antiobesity medications are contraindicated in pregnancy.  Patient denies a history of glaucoma.  Patient understands  "that long-term use of phentermine is considered off label use of this medication, however, that the endocrine Society and recent research supports that long-term use of phentermine does not appear to have detrimental health effects when used and the appropriate patient.  In addition, a 2019 study published in an obesity journal on [id number] patient's concluded that \"recommendations to limit phentermine to less than 3 months do not align with current concept of pharmacologic treatment of obesity\", and that \"long-term phentermine users experience greater weight loss without apparent increases in cardiovascular risk\".    We reviewed potential side effects including insomnia, dry mouth, increased heart rate and blood pressure, increased anxiety.  We reviewed reducing caffeine consumption while taking phentermine.  especially if the patient is experience side effects.  The potential risk and benefits of phentermine have been reviewed with the patient, and alternative treatment options were discussed.  All questions were answered, and the patient wishes to move forward with this medication.       "

## 2022-06-15 NOTE — PROGRESS NOTES
Office Note      Date: 06/15/2022  Patient Name: Renae Bolton  MRN: [id number]  : 1975    Patient presents during the COVID-19 pandemic/federally declared state of public health emergency.  This service was conducted via [employer].  Patient is located at her home address.  Provider is located at her home address. The use of a video visit has been reviewed with the patient and verbal informed consent has been obtained.  Subjective  Subjective     Chief Complaint  Obesity Management follow-up    Subjective          Renae Bolton presents to [facility] via telehealth for obesity management.     Patient is unsatisfied with weight loss progress. Appetite is moderately controlled. Still feeling full quickly and stops.  Reports nausea and constipation as side effects of saxenda, tolerable with zofran and miralax. Forgetting to take evening dose of naltrexone. Still under tremendous stress at work and with pregnancy loss (DIL).   The patient is exercising at the gym at least 3 times a week, is physically active every day. Restarted food journal. Also keeping headache log for PCP.     Daily intake:  B: toast with PB  S: strawberry lemon slushie (low calorie)  L: chicken and snow peas with a few bites of rice  D: egg and 2 pieces of lao and 1/2 biscuit  S: 1/2 banana with medicine    B: PB on toast  S: ice cream bar  L: 1/2 egg salad sandwich with a few chips  D: bologna and cheese with tomato,   S: 1/2 banana    Saturday:  B: skipped   L: 1 beef taco  D: 1/2 bologna and tomato sandwich, lettuce wrap burger  S: cinnamon donuts    Review of Systems   Constitutional: Positive for fatigue.   Eyes: Negative for visual disturbance.   Cardiovascular: Negative for chest pain and palpitations.   Gastrointestinal: Positive for constipation (improved with miralax), nausea (zofran several times a week) and GERD. Negative for abdominal pain and diarrhea.   Endocrine: Negative for  "polydipsia, polyphagia and polyuria.   Musculoskeletal: Positive for myalgias (upper legs, ^vitamin D helps). Negative for arthralgias.   Neurological: Positive for dizziness (with position change) and headache (restarted with chiropractor and helps). Negative for tremors, light-headedness and memory problem.        Parasthesias negative   Psychiatric/Behavioral: Positive for sleep disturbance and stress. Negative for depressed mood. The patient is not nervous/anxious.      Objective   Body mass index is 27.46 kg/m².  Start weight: 193 pounds.    Total weight loss: -33 pounds/17%  Change in weight since last visit: -1lb    Measurements (in inches)  Waist: 34\"  /60   Pulse 71   Ht 162.6 cm (64\")   Wt 72.6 kg (160 lb)   BMI 27.46 kg/m²       Result Review :                 Assessment and Plan    Diagnoses and all orders for this visit:    1. Overweight (Primary)  Assessment & Plan:  Patient's (Body mass index is 27.46 kg/m².) indicates that they are overweight with health conditions that include obstructive sleep apnea, dyslipidemias, GERD and back pain, anxiety, IR . Weight is improving with treatment. BMI is is above average; BMI management plan is completed. We discussed low calorie, low carb based diet program, portion control, increasing exercise, management of depression/anxiety/stress to control compensatory eating, pharmacologic options including saxenda, phentermine, naltrexone and an radha-based approach such as [employer] Pal or Lose It.    I have instructed the patient to continue with pursuit of medical weight loss as a part of this program. Patient does meet criteria for use of anorectics at this time as BMI >27 and is not at treatment goal.     Continue nutritional focus and work towards new exercise FITT goal of:   The current plan for this month includes: continue current exercise efforts and continue to work on lifestyle behavioral changes. Restart food journal radha and record calories and " "macronutrients. Add back evening dose of naltrexone. Set boundaries around sweets, currently consuming almost daily. Goal 4-6lb- get back to goal of 155lb then start taper with phentermine.     Continue sympathomimetic (medication refilled).  This patient 1) has no evidence of serious cardiovascular disease; 2) does not have serious psychiatric disease or a history of substance abuse; 3) has been informed about weight loss medications that are FDA approved for long-term use and told that these have been all documented to be safe and effective whereas phentermine has not; 4) does not demonstrate a clinically significant increase in pulse or blood pressure when taking phentermine; and 5) demonstrate significant weight loss while using the medication.  Patient understands that all antiobesity medications are contraindicated in pregnancy.  Patient denies a history of glaucoma.  Patient understands that long-term use of phentermine is considered off label use of this medication, however, that the endocrine Society and recent research supports that long-term use of phentermine does not appear to have detrimental health effects when used and the appropriate patient.  In addition, a 2019 study published in an obesity journal on 13,972 patient's concluded that \"recommendations to limit phentermine to less than 3 months do not align with current concept of pharmacologic treatment of obesity\", and that \"long-term phentermine users experience greater weight loss without apparent increases in cardiovascular risk\".    We reviewed potential side effects including insomnia, dry mouth, increased heart rate and blood pressure, increased anxiety.  We reviewed reducing caffeine consumption while taking phentermine.  especially if the patient is experience side effects.  The potential risk and benefits of phentermine have been reviewed with the patient, and alternative treatment options were discussed.  All questions were answered, and " the patient wishes to move forward with this medication.         Orders:  -     phentermine (ADIPEX-P) 37.5 MG tablet; Take 1/2 tablet by mouth in the morning.  Dispense: 14 tablet; Refill: 0    2. Metabolic syndrome  Assessment & Plan:  Low carb diet, regular physical activity, and weight reduction of 10-15% (accomplished). Continue liraglutide.           I spent 31 minutes caring for Renae on this date of service. This time includes time spent by me in the following activities:preparing for the visit, obtaining and/or reviewing a separately obtained history, performing a medically appropriate examination and/or evaluation , counseling and educating the patient/family/caregiver, ordering medications, tests, or procedures and documenting information in the medical record  Follow Up   Return in about 4 weeks (around 7/13/2022) for Next scheduled follow up.  Patient was given instructions and counseling regarding her condition or for health maintenance advice. Please see specific information pulled into the AVS if appropriate.     LAURITA Michel

## 2022-06-22 RX ORDER — ACYCLOVIR 50 MG/G
OINTMENT TOPICAL
Qty: 30 G | Refills: 6 | Status: SHIPPED | OUTPATIENT
Start: 2022-06-22

## 2022-07-06 ENCOUNTER — PRIOR AUTHORIZATION (OUTPATIENT)
Dept: BARIATRICS/WEIGHT MGMT | Facility: CLINIC | Age: 47
End: 2022-07-06

## 2022-07-06 NOTE — TELEPHONE ENCOUNTER
Key: [id number] - [place] Case ID: [id number]    Waiting determination    -------    Called [facility] [phone]    Spoke w/ Prior Auth / Appeals Dept.     RX Dexilant 60mg daily #90, prescriber Samir GA    DX:  GERD K21.9    Clinical Hx:     s/p LSG/HHR 12/2019 w/ Dr. Onofre.  Having ongoing issues w/ uncontrolled GERD.    12/2019 - 10/2020 Omeprazole 40mg daily, increased to BID dosing w/ incomplete relief.     11/2020 - 9/2021 Dexilant 60mg daily - symptoms controlled    9/2021 - 3/2022  Pharmacy Formulary requested change to Protonix 40mg daily, had recurrent/uncontrolled issues w/ GERD.     3/2022 - 6/2022  Dexilant RX approved, patient again had symptom relief, but has since run out of medication.      Currently taking Nexium, Pepcid and Carafate w/ ongoing issues.      -----    Faxing LOV (3/23/22) + clinical notes to: [phone]    Case ID # [id number]    24 hr turnaround time expected.      Electronically signed by JESSICA Palacios, 07/06/22, 2:45 PM EDT.

## 2022-07-13 ENCOUNTER — TELEMEDICINE (OUTPATIENT)
Dept: BARIATRICS/WEIGHT MGMT | Facility: CLINIC | Age: 47
End: 2022-07-13

## 2022-07-13 VITALS
DIASTOLIC BLOOD PRESSURE: 58 MMHG | SYSTOLIC BLOOD PRESSURE: 113 MMHG | HEART RATE: 67 BPM | WEIGHT: 161 LBS | BODY MASS INDEX: 27.49 KG/M2 | HEIGHT: 64 IN

## 2022-07-13 DIAGNOSIS — F32.A ANXIETY AND DEPRESSION: ICD-10-CM

## 2022-07-13 DIAGNOSIS — F41.9 ANXIETY AND DEPRESSION: ICD-10-CM

## 2022-07-13 DIAGNOSIS — E88.81 METABOLIC SYNDROME: ICD-10-CM

## 2022-07-13 DIAGNOSIS — E66.3 OVERWEIGHT: Primary | ICD-10-CM

## 2022-07-13 PROCEDURE — 99214 OFFICE O/P EST MOD 30 MIN: CPT | Performed by: NURSE PRACTITIONER

## 2022-07-13 RX ORDER — PHENTERMINE HYDROCHLORIDE 37.5 MG/1
TABLET ORAL
Qty: 14 TABLET | Refills: 1 | Status: SHIPPED | OUTPATIENT
Start: 2022-07-13 | End: 2022-08-10 | Stop reason: SDUPTHER

## 2022-07-13 RX ORDER — NALTREXONE HYDROCHLORIDE 50 MG/1
TABLET, FILM COATED ORAL
Qty: 30 TABLET | Refills: 2 | Status: SHIPPED | OUTPATIENT
Start: 2022-07-13 | End: 2022-10-13 | Stop reason: SDUPTHER

## 2022-07-13 RX ORDER — DULOXETIN HYDROCHLORIDE 20 MG/1
20 CAPSULE, DELAYED RELEASE ORAL DAILY
Qty: 90 CAPSULE | Refills: 2 | Status: SHIPPED | OUTPATIENT
Start: 2022-07-13

## 2022-07-13 RX ORDER — LIRAGLUTIDE 6 MG/ML
3 INJECTION, SOLUTION SUBCUTANEOUS DAILY
Qty: 15 ML | Refills: 2 | Status: SHIPPED | OUTPATIENT
Start: 2022-07-13 | End: 2022-10-13 | Stop reason: SDUPTHER

## 2022-07-13 NOTE — PROGRESS NOTES
Office Note      Date: 2022  Patient Name: Renae Bolton  MRN: [id number]  : 1975    Patient presents during the COVID-19 pandemic/federally declared state of public health emergency.  This service was conducted via Zoom.  Patient is located at her work address.  Provider is located at her home address. The use of a video visit has been reviewed with the patient and verbal informed consent has been obtained.  Subjective  Subjective     Chief Complaint  Obesity Management follow-up    Subjective          Renae Bolton presents to [facility] via telehealth for obesity management. s/p LSG/HHR 19 w/ Dr. Onofre. Pre-surgery weight: 232.5, gonzalez 183lb, final goal 150lb    Patient is unsatisfied with weight loss progress. She has made several changes- started boot camp with , increased water, added back daily MR protein shake, using food journal off and on and hitting macronutrient goals. Decreased sweets. Appetite is well controlled. She notices significant improvement in sweets cravings when she remembers to take naltrexone in the evening. Created an alarm to remind her to take it, also carries it in her purse. Reports no side effects of prescribed medications today other than constipation with Saxenda that is manageable with 1/2 capful of miralax. Still taking phentermine daily.   She is still under tremendous stress at work. Not sleeping well, having nightmares which has happened in the past.     The patient is exercising with a FITT score of: 2-3-3-3. Started boot camp with .   Review of Systems   Constitutional: Negative for fatigue.   Eyes: Negative for visual disturbance.   Cardiovascular: Negative for chest pain and palpitations.   Gastrointestinal: Positive for constipation and nausea. Negative for abdominal pain, diarrhea and GERD.   Endocrine: Negative for polydipsia, polyphagia and polyuria.   Neurological: Positive for  "headache. Negative for dizziness, tremors, light-headedness and memory problem.        Parasthesias negative   Psychiatric/Behavioral: Positive for sleep disturbance (nightmares). Negative for depressed mood. The patient is not nervous/anxious.        Objective   Body mass index is 27.64 kg/m².  Start weight MWM: 193 pounds.    Total weight loss: -32 pounds/16.5%  Change in weight since last visit: +1    Measurements (in inches)  Waist: 32\"  /58   Pulse 67   Ht 162.6 cm (64\")   Wt 73 kg (161 lb)   BMI 27.64 kg/m²       Result Review :                 Assessment and Plan    Diagnoses and all orders for this visit:    1. Overweight (Primary)  Assessment & Plan:  Patient's (Body mass index is 27.64 kg/m².) indicates that they are overweight with health conditions that include obstructive sleep apnea, dyslipidemias, GERD and back pain, dysmet synd, depression . Weight is improving with treatment. BMI is is above average; BMI management plan is completed. We discussed low calorie, low carb based diet program, portion control, increasing exercise, management of depression/anxiety/stress to control compensatory eating, pharmacologic options including saxenda, phentermine, naltrexone and an radha-based approach such as [employer] Pal or Lose It.    I have instructed the patient to continue with pursuit of medical weight loss as a part of this program. Patient does meet criteria for use of anorectics at this time as BMI >27 and is not at treatment goal.     Continue nutritional focus and work towards new exercise FITT goal of:   The current plan for this month includes: continue current exercise efforts and continue nutrition focus. Goal 4lb. Ask  to start taking measurements of waist/arms/thighs. May be gaining muslce mass. We will review body composition analysis at next office visit. Plan to taper off of phentermine when <155lb.     Continue sympathomimetic (medication refilled).  This patient 1) has no " "evidence of serious cardiovascular disease; 2) does not have serious psychiatric disease or a history of substance abuse; 3) has been informed about weight loss medications that are FDA approved for long-term use and told that these have been all documented to be safe and effective whereas phentermine has not; 4) does not demonstrate a clinically significant increase in pulse or blood pressure when taking phentermine; and 5) demonstrate significant weight loss while using the medication.  Patient understands that all antiobesity medications are contraindicated in pregnancy.  Patient denies a history of glaucoma.  Patient understands that long-term use of phentermine is considered off label use of this medication, however, that the endocrine Society and recent research supports that long-term use of phentermine does not appear to have detrimental health effects when used and the appropriate patient.  In addition, a 2019 study published in an obesity journal on 13,972 patient's concluded that \"recommendations to limit phentermine to less than 3 months do not align with current concept of pharmacologic treatment of obesity\", and that \"long-term phentermine users experience greater weight loss without apparent increases in cardiovascular risk\".    We reviewed potential side effects including insomnia, dry mouth, increased heart rate and blood pressure, increased anxiety.  We reviewed reducing caffeine consumption while taking phentermine.  especially if the patient is experience side effects.  The potential risk and benefits of phentermine have been reviewed with the patient, and alternative treatment options were discussed.  All questions were answered, and the patient wishes to move forward with this medication.         Orders:  -     Insulin Pen Needle 32G X 4 MM misc; Use to inject Saxenda once daily  Dispense: 100 each; Refill: 0  -     Liraglutide (Saxenda) 18 MG/3ML injection pen; Inject 3 mg under the skin into " the appropriate area as directed Daily.  Dispense: 15 mL; Refill: 2  -     naltrexone (DEPADE) 50 MG tablet; Take 1/2 tablet by mouth 2 (two) times a day.  Dispense: 30 tablet; Refill: 2  -     phentermine (ADIPEX-P) 37.5 MG tablet; Take 1/2 tablet by mouth in the morning.  Dispense: 14 tablet; Refill: 1    2. Anxiety and depression  Assessment & Plan:  Patient's depression is recurrent and is moderate without psychosis. Their depression is currently active and the condition is improving with treatment. This will be reassessed in 3 months. F/U as described:patient will continue current medication therapy.    Orders:  -     DULoxetine (CYMBALTA) 20 MG capsule; Take 1 capsule by mouth Daily.  Dispense: 90 capsule; Refill: 2    3. Metabolic syndrome  Assessment & Plan:  Low carb diet, regular physical activity, and weight reduction of 10-15%. Continue saxenda.          I spent 35 minutes caring for Renae on this date of service. This time includes time spent by me in the following activities:preparing for the visit, obtaining and/or reviewing a separately obtained history, performing a medically appropriate examination and/or evaluation , counseling and educating the patient/family/caregiver, ordering medications, tests, or procedures and documenting information in the medical record  Follow Up   Return in about 4 weeks (around 8/10/2022) for Next scheduled follow up.  Patient was given instructions and counseling regarding her condition or for health maintenance advice. Please see specific information pulled into the AVS if appropriate.     LAURITA Michel

## 2022-07-13 NOTE — ASSESSMENT & PLAN NOTE
Patient's (Body mass index is 27.64 kg/m².) indicates that they are overweight with health conditions that include obstructive sleep apnea, dyslipidemias, GERD and back pain, dysmet synd, depression . Weight is improving with treatment. BMI is is above average; BMI management plan is completed. We discussed low calorie, low carb based diet program, portion control, increasing exercise, management of depression/anxiety/stress to control compensatory eating, pharmacologic options including saxenda, phentermine, naltrexone and an radha-based approach such as [employer] Pal or Lose It.    I have instructed the patient to continue with pursuit of medical weight loss as a part of this program. Patient does meet criteria for use of anorectics at this time as BMI >27 and is not at treatment goal.     Continue nutritional focus and work towards new exercise FITT goal of:   The current plan for this month includes: continue current exercise efforts and continue nutrition focus. Goal 4lb. Ask  to start taking measurements of waist/arms/thighs. May be gaining muslce mass. We will review body composition analysis at next office visit. Plan to taper off of phentermine when <155lb.     Continue sympathomimetic (medication refilled).  This patient 1) has no evidence of serious cardiovascular disease; 2) does not have serious psychiatric disease or a history of substance abuse; 3) has been informed about weight loss medications that are FDA approved for long-term use and told that these have been all documented to be safe and effective whereas phentermine has not; 4) does not demonstrate a clinically significant increase in pulse or blood pressure when taking phentermine; and 5) demonstrate significant weight loss while using the medication.  Patient understands that all antiobesity medications are contraindicated in pregnancy.  Patient denies a history of glaucoma.  Patient understands that long-term use of phentermine is  "considered off label use of this medication, however, that the endocrine Society and recent research supports that long-term use of phentermine does not appear to have detrimental health effects when used and the appropriate patient.  In addition, a 2019 study published in an obesity journal on 13,972 patient's concluded that \"recommendations to limit phentermine to less than 3 months do not align with current concept of pharmacologic treatment of obesity\", and that \"long-term phentermine users experience greater weight loss without apparent increases in cardiovascular risk\".    We reviewed potential side effects including insomnia, dry mouth, increased heart rate and blood pressure, increased anxiety.  We reviewed reducing caffeine consumption while taking phentermine.  especially if the patient is experience side effects.  The potential risk and benefits of phentermine have been reviewed with the patient, and alternative treatment options were discussed.  All questions were answered, and the patient wishes to move forward with this medication.       "

## 2022-07-14 ENCOUNTER — LAB REQUISITION (OUTPATIENT)
Dept: LAB | Facility: HOSPITAL | Age: 47
End: 2022-07-14

## 2022-07-14 DIAGNOSIS — Z00.00 ROUTINE GENERAL MEDICAL EXAMINATION AT A HEALTH CARE FACILITY: ICD-10-CM

## 2022-07-14 PROCEDURE — U0004 COV-19 TEST NON-CDC HGH THRU: HCPCS | Performed by: NURSE PRACTITIONER

## 2022-07-15 LAB — SARS-COV-2 RNA NOSE QL NAA+PROBE: NOT DETECTED

## 2022-08-10 ENCOUNTER — OFFICE VISIT (OUTPATIENT)
Dept: BARIATRICS/WEIGHT MGMT | Facility: CLINIC | Age: 47
End: 2022-08-10

## 2022-08-10 VITALS
HEIGHT: 64 IN | DIASTOLIC BLOOD PRESSURE: 72 MMHG | HEART RATE: 78 BPM | WEIGHT: 161.5 LBS | BODY MASS INDEX: 27.57 KG/M2 | OXYGEN SATURATION: 97 % | SYSTOLIC BLOOD PRESSURE: 130 MMHG

## 2022-08-10 DIAGNOSIS — E88.81 METABOLIC SYNDROME: ICD-10-CM

## 2022-08-10 DIAGNOSIS — E66.3 OVERWEIGHT: Primary | ICD-10-CM

## 2022-08-10 PROCEDURE — 99214 OFFICE O/P EST MOD 30 MIN: CPT | Performed by: NURSE PRACTITIONER

## 2022-08-10 RX ORDER — PHENTERMINE HYDROCHLORIDE 37.5 MG/1
TABLET ORAL
Qty: 14 TABLET | Refills: 1 | Status: SHIPPED | OUTPATIENT
Start: 2022-08-10 | End: 2022-09-13 | Stop reason: SDUPTHER

## 2022-08-10 NOTE — PROGRESS NOTES
[facility]  [address]  [place] [address]     Office Note      Date: 08/10/2022  Patient Name: Renae Bolton  MRN: [id number]  : 1975  Subjective  Subjective     Chief Complaint  Obesity Management follow-up          Renae Bolton presents to [facility] for obesity management. s/p LSG/HHR 19 w/ Dr. Onofre.  Pre-surgery weight: 232.5, gonzalez 183lb, final goal 150lb  Patient is unsure with weight loss progress. Appetite is well controlled. Reports no side effects of prescribed medications today. The patient is taking multivitamin and is taking fish oil.  The patient is using a food journal off and on.     B: skipping but previously did shakes  L: packs most days, fish filet and peach, a few chips and queso  S: yogurt, nuts, cheese cubes  Workout then swimming  D: 1 taco and a few grapes  Still doing great with water  The patient is exercising with a FITT score of:    Frequency Intensity Time Strength Training   []   0, none []   0 []   0 []   0   []   1 (1-2x/week) []   1 (light) []   1 (<10 min) []   1 (1x/week)   [x]   2 (3-5x/week) [x]   2 (moderate) []   2 (10-20 min) [x]   2 (2x/week)   []   3 (daily) [x]   3 (moderately hard)  []   4 (very hard) []   3 (20-30 min)  [x]   4 (>30 min) []   3 (3-4x/week)     Review of Systems   Constitutional: Negative for appetite change and fatigue.   Eyes: Negative for blurred vision and visual disturbance.   Cardiovascular: Negative for chest pain and palpitations.   Gastrointestinal: Negative for abdominal pain, constipation, diarrhea, nausea and GERD.   Endocrine: Negative for polydipsia, polyphagia and polyuria.   Musculoskeletal: Negative for arthralgias and myalgias.   Neurological: Negative for dizziness, tremors, light-headedness, headache and memory problem.        Parasthesias negative   Psychiatric/Behavioral: Negative for sleep disturbance and depressed mood.  "The patient is not nervous/anxious.        Objective   Start weight: 193 pounds.    Total Loss lb/%Loss of beginning body weight (BBW): -31.5lb/-16.32%  Change in weight since last visit: +1.5    Body mass index is 27.72 kg/m².   Body composition analysis completed and showed:   %body fat: 31.1  Measurements (in inches)  Waist: 33.5  R Arm: 11.5\"  L arm: 12\"  Vital Signs:   /72   Pulse 78   Ht 162.6 cm (64\")   Wt 73.3 kg (161 lb 8 oz)   SpO2 97%   BMI 27.72 kg/m²     Physical Exam   General appears stated age and normal appearance   HEENT PERRLA, EOM intact and conjunctivae normal   Chest/lungs Normal rate, Regular rhythm and Breathing is unlabored   Extremities without edema   Neuro Good historian and No focal deficit   Skin Warm, dry, intact   Psych normal behavior, normal thought content and normal concentration     Result Review :                Assessment / Plan        Diagnoses and all orders for this visit:    1. Overweight (Primary)  Assessment & Plan:  Patient's (Body mass index is 27.72 kg/m².) indicates that they are overweight with health conditions that include obstructive sleep apnea, dyslipidemias, GERD and metabolic syndrome . Weight is improving with treatment. BMI is is above average; BMI management plan is completed. We discussed low calorie, low carb based diet program, portion control, increasing exercise, management of depression/anxiety/stress to control compensatory eating, pharmacologic options including phentermine, saxenda, naltrexone and an radha-based approach such as [employer] Pal or Lose It.     I have instructed the patient to continue with pursuit of medical weight loss as a part of this program. Patient does meet criteria for use of anorectics at this time as BMI >27 and is not at treatment goal.     Continue nutritional focus and work towards new exercise FITT goal of: 2-2-4-2.  The current plan for this month includes: continue to work on lifestyle behavioral changes. Set " "alarm reminder for evening naltrexone. Add back morning protein shake, getting bored with current product so plans to try a new one. Continue current medications. Discussed plan to start tapering phentermine once at goal of 155lb or by March.     Continue sympathomimetic (medication refilled).  This patient 1) has no evidence of serious cardiovascular disease; 2) does not have serious psychiatric disease or a history of substance abuse; 3) has been informed about weight loss medications that are FDA approved for long-term use and told that these have been all documented to be safe and effective whereas phentermine has not; 4) does not demonstrate a clinically significant increase in pulse or blood pressure when taking phentermine; and 5) demonstrate significant weight loss while using the medication.  Patient understands that all antiobesity medications are contraindicated in pregnancy.  Patient denies a history of glaucoma.  Patient understands that long-term use of phentermine is considered off label use of this medication, however, that the endocrine Society and recent research supports that long-term use of phentermine does not appear to have detrimental health effects when used and the appropriate patient.  In addition, a 2019 study published in an obesity journal on 13,972 patient's concluded that \"recommendations to limit phentermine to less than 3 months do not align with current concept of pharmacologic treatment of obesity\", and that \"long-term phentermine users experience greater weight loss without apparent increases in cardiovascular risk\".    We reviewed potential side effects including insomnia, dry mouth, increased heart rate and blood pressure, increased anxiety.  We reviewed reducing caffeine consumption while taking phentermine.  especially if the patient is experience side effects.  The potential risk and benefits of phentermine have been reviewed with the patient, and alternative treatment " options were discussed.  All questions were answered, and the patient wishes to move forward with this medication.        Orders:  -     phentermine (ADIPEX-P) 37.5 MG tablet; Take 1/2 tablet by mouth in the morning.  Dispense: 14 tablet; Refill: 1    2. Metabolic syndrome  Assessment & Plan:  Low carb diet, regular physical activity, and weight reduction of 10-15%. Continue saxenda.      I spent 31 minutes caring for Renae on this date of service. This time includes time spent by me in the following activities:preparing for the visit, obtaining and/or reviewing a separately obtained history, performing a medically appropriate examination and/or evaluation , counseling and educating the patient/family/caregiver, ordering medications, tests, or procedures and documenting information in the medical record  Follow Up   Return in about 4 weeks (around 9/7/2022) for Next scheduled follow up.  Patient was given instructions and counseling regarding her condition or for health maintenance advice. Please see specific information pulled into the AVS if appropriate.     Bindu Lechuga, LAURITA  08/10/2022

## 2022-08-10 NOTE — ASSESSMENT & PLAN NOTE
Patient's (Body mass index is 27.72 kg/m².) indicates that they are overweight with health conditions that include obstructive sleep apnea, dyslipidemias, GERD and metabolic syndrome . Weight is improving with treatment. BMI is is above average; BMI management plan is completed. We discussed low calorie, low carb based diet program, portion control, increasing exercise, management of depression/anxiety/stress to control compensatory eating, pharmacologic options including phentermine, saxenda, naltrexone and an radha-based approach such as [employer] Pal or Lose It.     I have instructed the patient to continue with pursuit of medical weight loss as a part of this program. Patient does meet criteria for use of anorectics at this time as BMI >27 and is not at treatment goal.     Continue nutritional focus and work towards new exercise FITT goal of: 2-2-4-2.  The current plan for this month includes: continue to work on lifestyle behavioral changes. Set alarm reminder for evening naltrexone. Add back morning protein shake, getting bored with current product so plans to try a new one. Continue current medications. Discussed plan to start tapering phentermine once at goal of 155lb or by March.     Continue sympathomimetic (medication refilled).  This patient 1) has no evidence of serious cardiovascular disease; 2) does not have serious psychiatric disease or a history of substance abuse; 3) has been informed about weight loss medications that are FDA approved for long-term use and told that these have been all documented to be safe and effective whereas phentermine has not; 4) does not demonstrate a clinically significant increase in pulse or blood pressure when taking phentermine; and 5) demonstrate significant weight loss while using the medication.  Patient understands that all antiobesity medications are contraindicated in pregnancy.  Patient denies a history of glaucoma.  Patient understands that long-term use of  "phentermine is considered off label use of this medication, however, that the endocrine Society and recent research supports that long-term use of phentermine does not appear to have detrimental health effects when used and the appropriate patient.  In addition, a 2019 study published in an obesity journal on [id number] patient's concluded that \"recommendations to limit phentermine to less than 3 months do not align with current concept of pharmacologic treatment of obesity\", and that \"long-term phentermine users experience greater weight loss without apparent increases in cardiovascular risk\".    We reviewed potential side effects including insomnia, dry mouth, increased heart rate and blood pressure, increased anxiety.  We reviewed reducing caffeine consumption while taking phentermine.  especially if the patient is experience side effects.  The potential risk and benefits of phentermine have been reviewed with the patient, and alternative treatment options were discussed.  All questions were answered, and the patient wishes to move forward with this medication.      "

## 2022-09-13 ENCOUNTER — OFFICE VISIT (OUTPATIENT)
Dept: BARIATRICS/WEIGHT MGMT | Facility: CLINIC | Age: 47
End: 2022-09-13

## 2022-09-13 VITALS
TEMPERATURE: 97.1 F | HEART RATE: 83 BPM | DIASTOLIC BLOOD PRESSURE: 72 MMHG | HEIGHT: 64 IN | SYSTOLIC BLOOD PRESSURE: 128 MMHG | WEIGHT: 160.5 LBS | BODY MASS INDEX: 27.4 KG/M2

## 2022-09-13 DIAGNOSIS — E66.3 OVERWEIGHT: Primary | ICD-10-CM

## 2022-09-13 DIAGNOSIS — I47.1 SUPRAVENTRICULAR TACHYCARDIA: ICD-10-CM

## 2022-09-13 PROCEDURE — 99213 OFFICE O/P EST LOW 20 MIN: CPT | Performed by: NURSE PRACTITIONER

## 2022-09-13 RX ORDER — PHENTERMINE HYDROCHLORIDE 37.5 MG/1
18.75 TABLET ORAL EVERY MORNING
Qty: 14 TABLET | Refills: 0 | Status: SHIPPED | OUTPATIENT
Start: 2022-09-13 | End: 2022-10-13 | Stop reason: SDUPTHER

## 2022-09-13 NOTE — PROGRESS NOTES
[facility]  [address]  [place] [address]     Office Note      Date: 2022  Patient Name: Renae Bolton  MRN: [id number]  : 1975  Subjective  Subjective     Chief Complaint  Obesity Management follow-up          Renae Bolton presents to [facility] for obesity management. s/p LSG/HHR 19 w/ Dr. Onofre. Pre-surgery weight: 232.5, gonzalez 183lb, final goal 150lb.  Saw cardiologist and propranolol is being decreased to 60mg due to hypotension (weight loss).   Patient is satisfied with weight loss progress. Appetite is moderately controlled. Went to [place] for vacation, did well with meals- packed nuts and healthy snacks, had protein for breakfast. Her  is ready to work on his diet and decrease sweets so that will help.  Reports no side effects of prescribed medications today. The patient is taking multivitamin and is taking fish oil. The patient is using a food journal.  Has meals planned for the week.   The patient is exercising with  2 times. with a FITT score of:    Frequency Intensity Time Strength Training   []   0, none []   0 []   0 [x]   0   []   1 (1-2x/week) []   1 (light) []   1 (<10 min) []   1 (1x/week)   [x]   2 (3-5x/week) [x]   2 (moderate) []   2 (10-20 min) []   2 (2x/week)   []   3 (daily) []   3 (moderately hard)  []   4 (very hard) []   3 (20-30 min)  [x]   4 (>30 min) []   3 (3-4x/week)     Review of Systems   Constitutional: Negative for appetite change and fatigue.   Eyes: Negative for blurred vision and visual disturbance.   Cardiovascular: Negative for chest pain and palpitations.   Gastrointestinal: Positive for constipation and nausea. Negative for abdominal pain, diarrhea and GERD.   Endocrine: Negative for polydipsia, polyphagia and polyuria.   Musculoskeletal: Negative for arthralgias and myalgias.   Neurological: Negative for dizziness, tremors, light-headedness,  "headache and memory problem.        Parasthesias negative   Psychiatric/Behavioral: Negative for sleep disturbance and depressed mood. The patient is not nervous/anxious.        Objective   Start weight: 193 pounds.    Total Loss lb/%Loss of beginning body weight (BBW): -0.5lb/-17.10%  Change in weight since last visit: -0.5    Body mass index is 27.55 kg/m².   Body composition analysis completed and showed:   %body fat: 37.3  Measurements (in inches)  Waist: 34\"     Vital Signs:   /72   Pulse 83   Temp 97.1 °F (36.2 °C)   Ht 162.6 cm (64\")   Wt 72.8 kg (160 lb 8 oz)   BMI 27.55 kg/m²     Physical Exam   General appears stated age and normal appearance   HEENT PERRLA, EOM intact and conjunctivae normal   Chest/lungs Normal rate, Regular rhythm and Breathing is unlabored   Extremities without edema   Neuro Good historian and No focal deficit   Skin Warm, dry, intact   Psych normal behavior, normal thought content and normal concentration     Result Review :                Assessment / Plan        Diagnoses and all orders for this visit:    1. Overweight (Primary)  Assessment & Plan:  Patient's (Body mass index is 27.55 kg/m².) indicates that they are overweight with health conditions that include dyslipidemias, GERD and anxiety, arrhythmia . Weight is improving with treatment. BMI is is above average; BMI management plan is completed. We discussed low calorie, low carb based diet program, portion control, increasing exercise, management of depression/anxiety/stress to control compensatory eating, pharmacologic options including saxenda, phentermine, naltrexone and an radha-based approach such as [employer] Pal or Lose It.    I have instructed the patient to continue with pursuit of medical weight loss as a part of this program. Patient does meet criteria for use of anorectics at this time as BMI >27 and is not at treatment goal.     Continue nutritional focus and work towards new exercise FITT goal of: " "2-2-4-2.  The current plan for this month includes: continue current exercise efforts and continue to work on lifestyle behavioral changes with daily food journal/planner. Continue current medications. We discussed that while higher doses of Wegovy are available, in order to qualify for continued use her weight would need to decrease by 4% in 3 months and that is beyond the treatment goal we set. For stability of treatment, continue saxenda (patient is satisfied with this medication). Finds naltrexone to be very helpful for sweets cravings (understands this is off label), phentermine is also very helpful. Again, we discussed taper of phentermine at 155lb or January.     Continue sympathomimetic (medication refilled).  This patient 1) has no evidence of serious cardiovascular disease; 2) does not have serious psychiatric disease or a history of substance abuse; 3) has been informed about weight loss medications that are FDA approved for long-term use and told that these have been all documented to be safe and effective whereas phentermine has not; 4) does not demonstrate a clinically significant increase in pulse or blood pressure when taking phentermine; and 5) demonstrate significant weight loss while using the medication.  Patient understands that all antiobesity medications are contraindicated in pregnancy.  Patient denies a history of glaucoma.  Patient understands that long-term use of phentermine is considered off label use of this medication, however, that the endocrine Society and recent research supports that long-term use of phentermine does not appear to have detrimental health effects when used and the appropriate patient.  In addition, a 2019 study published in an obesity journal on [id number] patient's concluded that \"recommendations to limit phentermine to less than 3 months do not align with current concept of pharmacologic treatment of obesity\", and that \"long-term phentermine users experience greater " "weight loss without apparent increases in cardiovascular risk\".    We reviewed potential side effects including insomnia, dry mouth, increased heart rate and blood pressure, increased anxiety.  We reviewed reducing caffeine consumption while taking phentermine.  especially if the patient is experience side effects.  The potential risk and benefits of phentermine have been reviewed with the patient, and alternative treatment options were discussed.  All questions were answered, and the patient wishes to move forward with this medication.         Orders:  -     phentermine (ADIPEX-P) 37.5 MG tablet; Take 1/2 tablet by mouth in the morning.  Dispense: 14 tablet; Refill: 0    2. Supraventricular tachycardia (HCC)  Assessment & Plan:  Dr. Liang (cardiology) decreased propranolol to 60mg since BP is stable and even hypotensive at times. Continue with obesity management.           Follow Up   Return in about 4 weeks (around 10/11/2022) for Next scheduled follow up.  Patient was given instructions and counseling regarding her condition or for health maintenance advice. Please see specific information pulled into the AVS if appropriate.     Bindu Lechuga, LAURITA  09/13/2022   "

## 2022-09-13 NOTE — ASSESSMENT & PLAN NOTE
Patient's (Body mass index is 27.55 kg/m².) indicates that they are overweight with health conditions that include dyslipidemias, GERD and anxiety, arrhythmia . Weight is improving with treatment. BMI is is above average; BMI management plan is completed. We discussed low calorie, low carb based diet program, portion control, increasing exercise, management of depression/anxiety/stress to control compensatory eating, pharmacologic options including saxenda, phentermine, naltrexone and an radha-based approach such as [employer] Pal or Lose It.    I have instructed the patient to continue with pursuit of medical weight loss as a part of this program. Patient does meet criteria for use of anorectics at this time as BMI >27 and is not at treatment goal.     Continue nutritional focus and work towards new exercise FITT goal of: 2-2-4-2.  The current plan for this month includes: continue current exercise efforts and continue to work on lifestyle behavioral changes with daily food journal/planner. Continue current medications. We discussed that while higher doses of Wegovy are available, in order to qualify for continued use her weight would need to decrease by 4% in 3 months and that is beyond the treatment goal we set. For stability of treatment, continue saxenda (patient is satisfied with this medication). Finds naltrexone to be very helpful for sweets cravings (understands this is off label), phentermine is also very helpful. Again, we discussed taper of phentermine at 155lb or January.     Continue sympathomimetic (medication refilled).  This patient 1) has no evidence of serious cardiovascular disease; 2) does not have serious psychiatric disease or a history of substance abuse; 3) has been informed about weight loss medications that are FDA approved for long-term use and told that these have been all documented to be safe and effective whereas phentermine has not; 4) does not demonstrate a clinically significant  "increase in pulse or blood pressure when taking phentermine; and 5) demonstrate significant weight loss while using the medication.  Patient understands that all antiobesity medications are contraindicated in pregnancy.  Patient denies a history of glaucoma.  Patient understands that long-term use of phentermine is considered off label use of this medication, however, that the endocrine Society and recent research supports that long-term use of phentermine does not appear to have detrimental health effects when used and the appropriate patient.  In addition, a 2019 study published in an obesity journal on [id number] patient's concluded that \"recommendations to limit phentermine to less than 3 months do not align with current concept of pharmacologic treatment of obesity\", and that \"long-term phentermine users experience greater weight loss without apparent increases in cardiovascular risk\".    We reviewed potential side effects including insomnia, dry mouth, increased heart rate and blood pressure, increased anxiety.  We reviewed reducing caffeine consumption while taking phentermine.  especially if the patient is experience side effects.  The potential risk and benefits of phentermine have been reviewed with the patient, and alternative treatment options were discussed.  All questions were answered, and the patient wishes to move forward with this medication.       "

## 2022-09-13 NOTE — ASSESSMENT & PLAN NOTE
Dr. Liang (cardiology) decreased propranolol to 60mg since BP is stable and even hypotensive at times. Continue with obesity management.

## 2022-09-15 ENCOUNTER — PRIOR AUTHORIZATION (OUTPATIENT)
Dept: BARIATRICS/WEIGHT MGMT | Facility: CLINIC | Age: 47
End: 2022-09-15

## 2022-09-18 PROCEDURE — U0004 COV-19 TEST NON-CDC HGH THRU: HCPCS | Performed by: NURSE PRACTITIONER

## 2022-10-12 ENCOUNTER — OFFICE VISIT (OUTPATIENT)
Dept: NEUROLOGY | Facility: CLINIC | Age: 47
End: 2022-10-12

## 2022-10-12 VITALS
HEIGHT: 64 IN | DIASTOLIC BLOOD PRESSURE: 60 MMHG | TEMPERATURE: 98 F | OXYGEN SATURATION: 100 % | WEIGHT: 163.8 LBS | HEART RATE: 59 BPM | SYSTOLIC BLOOD PRESSURE: 120 MMHG | BODY MASS INDEX: 27.96 KG/M2

## 2022-10-12 DIAGNOSIS — G43.009 MIGRAINE WITHOUT AURA AND WITHOUT STATUS MIGRAINOSUS, NOT INTRACTABLE: Primary | ICD-10-CM

## 2022-10-12 PROCEDURE — 99213 OFFICE O/P EST LOW 20 MIN: CPT | Performed by: NURSE PRACTITIONER

## 2022-10-12 RX ORDER — PROCHLORPERAZINE MALEATE 5 MG/1
5 TABLET ORAL EVERY 8 HOURS PRN
Qty: 60 TABLET | Refills: 2 | Status: SHIPPED | OUTPATIENT
Start: 2022-10-12 | End: 2022-11-11

## 2022-10-12 RX ORDER — AMITRIPTYLINE HYDROCHLORIDE 10 MG/1
TABLET, FILM COATED ORAL
Qty: 67 TABLET | Refills: 2 | Status: SHIPPED | OUTPATIENT
Start: 2022-10-12 | End: 2023-01-13 | Stop reason: SDUPTHER

## 2022-10-12 RX ORDER — ATOGEPANT 60 MG/1
60 TABLET ORAL DAILY
Qty: 4 TABLET | Refills: 0 | COMMUNITY
Start: 2022-10-12 | End: 2023-01-13 | Stop reason: SDUPTHER

## 2022-10-12 RX ORDER — SUMATRIPTAN AND NAPROXEN SODIUM 85; 500 MG/1; MG/1
1 TABLET, FILM COATED ORAL ONCE AS NEEDED
Qty: 9 TABLET | Refills: 11 | Status: SHIPPED | OUTPATIENT
Start: 2022-10-12

## 2022-10-12 RX ORDER — ATOGEPANT 60 MG/1
60 TABLET ORAL DAILY
Qty: 30 TABLET | Refills: 11 | Status: SHIPPED | OUTPATIENT
Start: 2022-10-12 | End: 2022-10-13 | Stop reason: SDUPTHER

## 2022-10-12 NOTE — PROGRESS NOTES
Follow Up Neurology Office Visit      Patient Name: Renae Bolton    Referring Physician: No ref. 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  Unfortunately, her headaches have worsened recently.  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  Asking about options for preventative therapy.    The following portions of the patient's history were reviewed and updated as appropriate: allergies, current medications, past family history, past medical history, past social history, past surgical history and problem list.    Subjective     Review of Systems:   Review of Systems   Constitutional: Positive for fatigue.   HENT: Positive for dental problem.    Gastrointestinal: Positive for constipation.   Neurological: Positive for headache.   Psychiatric/Behavioral: Positive for sleep disturbance and stress.   All other systems reviewed and are negative.    Medications:     Current Outpatient Medications:   •  acyclovir (ZOVIRAX) 5 % ointment, Apply topically to the appropriate area as directed Every 3 (Three) Hours., Disp: 30 g, Rfl: 6  •  albuterol sulfate HFA (Ventolin HFA) 108 (90 Base) MCG/ACT inhaler, Inhale 1-2 puffs by mouth every 4 to 6 hours as needed, Disp: 8.5 g, Rfl: 3  •  B Complex Vitamins (vitamin b complex) capsule capsule, Take 1 capsule by mouth Daily., Disp: , Rfl:   •  baclofen (LIORESAL) 10 MG tablet, TAKE 1 TABLET 3 TIMES DAILY., Disp: 30 tablet, Rfl: 1  •  Biotin (Biotin 5000) 5 MG capsule, Take 1 capsule by mouth Daily., Disp: , Rfl:   •  cetirizine (zyrTEC) 10 MG tablet, Take 1 tablet by mouth Daily., Disp: 90 tablet, Rfl: 3  •  Cholecalciferol (Vitamin D3) 250 MCG (16783 UT)  tablet, Take 5,000 Units by mouth Daily., Disp: , Rfl:   •  cyclobenzaprine (FLEXERIL) 5 MG tablet, Take 1 tablet by mouth At Night As Needed., Disp: 90 tablet, Rfl: 1  •  DULoxetine (CYMBALTA) 20 MG capsule, Take 1 capsule by mouth Daily., Disp: 90 capsule, Rfl: 2  •  esomeprazole (nexIUM) 40 MG capsule, Take 1 capsule by mouth Daily., Disp: 30 capsule, Rfl: 11  •  famciclovir (FAMVIR) 250 MG tablet, Take 1 tablet by mouth 3 (Three) Times a Day., Disp: 270 tablet, Rfl: 4  •  Insulin Pen Needle 32G X 4 MM misc, Use to inject Saxenda once daily, Disp: 100 each, Rfl: 0  •  Liraglutide (Saxenda) 18 MG/3ML injection pen, Inject 3 mg under the skin into the appropriate area as directed Daily., Disp: 15 mL, Rfl: 2  •  MAGNESIUM CITRATE PO, Take 270 mg by mouth Daily. *magnesium citrimate+B6 for migraines, Disp: , Rfl:   •  Mirabegron ER (Myrbetriq) 50 MG tablet sustained-release 24 hour 24 hr tablet, Take 1 tablet by mouth Daily., Disp: 90 tablet, Rfl: 3  •  naltrexone (DEPADE) 50 MG tablet, Take 1/2 tablet by mouth 2 (two) times a day., Disp: 30 tablet, Rfl: 2  •  NON FORMULARY, Multivitamin patch daily  Collagen patch daily, Disp: , Rfl:   •  Omega-3 Fatty Acids (fish oil) 1000 MG capsule capsule, Take 1 capsule by mouth 2 (Two) Times a Day With Meals., Disp: , Rfl:   •  ondansetron (ZOFRAN) 4 MG tablet, Take 1 tablet by mouth Every 6 (Six) Hours As Needed., Disp: 120 tablet, Rfl: 3  •  phentermine (ADIPEX-P) 37.5 MG tablet, Take 0.5 tablets by mouth Every Morning., Disp: 14 tablet, Rfl: 0  •  Probiotic Product (PROBIOTIC PO), Take 1 capsule by mouth Daily., Disp: , Rfl:   •  promethazine (PHENERGAN) 25 MG tablet, Take 1 tablet by mouth Every 6 (Six) Hours As Needed., Disp: 30 tablet, Rfl: 4  •  propranolol LA (Inderal LA) 60 MG 24 hr capsule, Take 1 capsule by mouth Daily., Disp: 90 capsule, Rfl: 3  •  PSYLLIUM HUSK PO, Take 1 capsule by mouth Daily., Disp: , Rfl:   •  sucralfate (CARAFATE) 1 g tablet, Take 1 tablet by  "mouth 4 (Four) Times a Day before meals and at bedtime, Disp: 120 tablet, Rfl: 3  •  SUMAtriptan-naproxen (TREXIMET)  MG per tablet, Take 1 tablet by mouth 1 (One) Time As Needed for Migraine for up to 2 doses. May repeat dose one time in 2 hours if headache not relieved., Disp: 9 tablet, Rfl: 11  •  Vitamin E 400 units tablet, Take 400 Units by mouth Daily., Disp: , Rfl:   •  amitriptyline (ELAVIL) 10 MG tablet, Take 1 tablet by mouth Every Night for 7 days, THEN 2 tablets Every Night for 30 days., Disp: 67 tablet, Rfl: 2  •  Atogepant (Qulipta) 60 MG tablet, Take 1 tablet by mouth Daily., Disp: 30 tablet, Rfl: 11  •  Atogepant (Qulipta) 60 MG tablet, Take 1 tablet by mouth Daily., Disp: 4 tablet, Rfl: 0  •  prochlorperazine (COMPAZINE) 5 MG tablet, Take 1 tablet by mouth Every 8 (Eight) Hours As Needed for Nausea or Vomiting with migraine, Disp: 60 tablet, Rfl: 2    Allergies:   Allergies   Allergen Reactions   • Amoxicillin Shortness Of Breath and Palpitations     Keflex OK   • Caffeine Arrhythmia   • Demerol [Meperidine] Rash     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  Never actually taken it internally.   • Latex Anaphylaxis and Rash   • Morphine And Related Rash     Rash (if gets on skin).  Never taken it internally   • Penicillins Shortness Of Breath and Palpitations     Keflex OK   • Oxycodone Other (See Comments)     Dizziness, confusion     • Topiramate Other (See Comments)     Pt states she can take NAME BRAND ONLY.  Generic brand \"doesn't work\"       Objective     Physical Exam:  Vital Signs:   Vitals:    10/12/22 1514   BP: 120/60   BP Location: Left arm   Patient Position: Sitting   Cuff Size: Adult   Pulse: 59   Temp: 98 °F (36.7 °C)   SpO2: 100%   Weight: 74.3 kg (163 lb 12.8 oz)   Height: 162.6 cm (64\")   PainSc: 0-No pain       Physical Exam  Vitals and nursing note reviewed.   Constitutional:       Appearance: Normal appearance.   Pulmonary: "      Effort: Pulmonary effort is normal.   Neurological:      Mental Status: She is oriented to person, place, and time. Mental status is at baseline.      Cranial Nerves: Cranial nerves 2-12 are intact.      Motor: Motor strength is normal.      Gait: Gait is intact.   Psychiatric:         Mood and Affect: Mood normal.         Speech: Speech normal.         Behavior: Behavior normal.         Thought Content: Thought content normal.         Judgment: Judgment normal.       Neurologic Exam     Mental Status   Oriented to person, place, and time.   Attention: normal. Concentration: normal.   Speech: speech is normal   Level of consciousness: alert  Knowledge: good.   Normal comprehension.     Cranial Nerves   Cranial nerves II through XII intact.     Motor Exam   Muscle bulk: normal  Overall muscle tone: normal    Strength   Strength 5/5 throughout.     Gait, Coordination, and Reflexes     Gait  Gait: normal    Tremor   Resting tremor: absent    Results Review:   I reviewed the patient's new clinical results.  I have reviewed the patient's other medical records to include, labs, radiology and referrals.     Assessment / Plan      Assessment/Plan:   Diagnoses and all orders for this visit:    1. Migraine without aura and without status migrainosus, not intractable (Primary)  -     Atogepant (Qulipta) 60 MG tablet; Take 1 tablet by mouth Daily.  Dispense: 30 tablet; Refill: 11  -     amitriptyline (ELAVIL) 10 MG tablet; Take 1 tablet by mouth Every Night for 7 days, THEN 2 tablets Every Night for 30 days.  Dispense: 67 tablet; Refill: 2  -     prochlorperazine (COMPAZINE) 5 MG tablet; Take 1 tablet by mouth Every 8 (Eight) Hours As Needed for Nausea or Vomiting with migraine  Dispense: 60 tablet; Refill: 2  -     SUMAtriptan-naproxen (TREXIMET)  MG per tablet; Take 1 tablet by mouth 1 (One) Time As Needed for Migraine for up to 2 doses. May repeat dose one time in 2 hours if headache not relieved.  Dispense: 9  tablet; Refill: 11    Other orders  -     Atogepant (Qulipta) 60 MG tablet; Take 1 tablet by mouth Daily.  Dispense: 4 tablet; Refill: 0    Patient has unfortunately experienced recent increase in frequency of migraine headaches.  She experiences several migraine headache episodes each week.  She has tried multiple daily preventatives.  We discussed resuming Trokendi, resuming amitriptyline, anti-CGRP therapy, or Botox injections.  Patient will resume amitriptyline with the appeal of treating poor sleep, stress, and bruxism as well as headaches.  We discussed other daily preventatives, as patient is having frequent migraine episodes she will begin Qulipta and sample pack provided today.  We discussed possible side effects of worsening constipation, if this occurs she will use half tablet daily and contact me if this does not help.  May consider resuming Trokendi as well with appeal of weight loss and headache control.  She has been taking Zofran frequently for nausea with headaches, we discussed that frequent use of Zofran can worsen headaches, will use Compazine instead.  She also mentions taking Carafate daily, I have discussed that this may interfere with absorption of medications.  I counseled her to take Carafate at least 4 hours before other medications, or other medications at least 30 minutes prior to Carafate.    Follow Up:   Return in about 3 months (around 1/12/2023) for Next scheduled follow up.     Marcie Crum APREDITH  [facility]   AS THE PROVIDER, I PERSONALLY WORE PPE DURING ENTIRE FACE TO FACE ENCOUNTER IN CLINIC WITH THE PATIENT. PATIENT ALSO WORE PPE DURING ENTIRE FACE TO FACE ENCOUNTER EXCEPT FOR A MAX OF 30 SECONDS DURING NEUROLOGICAL EVALUATION OF CRANIAL NERVES AND THEN MASK WAS PLACED BACK OVER PATIENT FACE FOR REMAINDER OF VISIT. I WASHED MY HANDS BEFORE AND AFTER VISIT.      Please note that portions of this note may have been completed with a voice recognition  program. Efforts were made to edit the dictations, but occasionally words are mistranscribed.

## 2022-10-13 ENCOUNTER — TELEMEDICINE (OUTPATIENT)
Dept: BARIATRICS/WEIGHT MGMT | Facility: CLINIC | Age: 47
End: 2022-10-13

## 2022-10-13 VITALS
WEIGHT: 161 LBS | BODY MASS INDEX: 27.49 KG/M2 | HEIGHT: 64 IN | HEART RATE: 80 BPM | DIASTOLIC BLOOD PRESSURE: 70 MMHG | SYSTOLIC BLOOD PRESSURE: 123 MMHG

## 2022-10-13 DIAGNOSIS — E88.81 METABOLIC SYNDROME: ICD-10-CM

## 2022-10-13 DIAGNOSIS — E66.3 OVERWEIGHT: Primary | ICD-10-CM

## 2022-10-13 PROCEDURE — 99214 OFFICE O/P EST MOD 30 MIN: CPT | Performed by: NURSE PRACTITIONER

## 2022-10-13 RX ORDER — LIRAGLUTIDE 6 MG/ML
3 INJECTION, SOLUTION SUBCUTANEOUS DAILY
Qty: 15 ML | Refills: 2 | Status: SHIPPED | OUTPATIENT
Start: 2022-10-13 | End: 2023-01-17 | Stop reason: SDUPTHER

## 2022-10-13 RX ORDER — NALTREXONE HYDROCHLORIDE 50 MG/1
TABLET, FILM COATED ORAL
Qty: 30 TABLET | Refills: 2 | Status: SHIPPED | OUTPATIENT
Start: 2022-10-13 | End: 2023-01-17 | Stop reason: SDUPTHER

## 2022-10-13 RX ORDER — PHENTERMINE HYDROCHLORIDE 37.5 MG/1
18.75 TABLET ORAL EVERY MORNING
Qty: 14 TABLET | Refills: 0 | Status: SHIPPED | OUTPATIENT
Start: 2022-10-13 | End: 2022-11-15 | Stop reason: SDUPTHER

## 2022-10-13 NOTE — PROGRESS NOTES
Office Note      Date: 10/13/2022  Patient Name: Renae Bolton  MRN: [id number]  : 1975    Patient presents during the COVID-19 pandemic/federally declared [facility] of public health emergency.  This service was conducted via Zoom.  Patient is located at her home address.  Provider is located at her primary office location. The use of a video visit has been reviewed with the patient and verbal informed consent has been obtained.  Subjective  Subjective     Chief Complaint  Obesity Management follow-up    Subjective          Renae Bolton presents to [facility] via telehealth for obesity management.   Saw neurologist due to increase in headaches because of invisalign. Started Qulipta and will likely be restarting amitriptyline also. Having cataract evaluation in November for blurry vision in the evening.  Cardiologist dropped her to 60mg on propranolol (feels her heart rate increases, feels dizzy with standing up while working out. Has noticed a change in her exercise tolerance due to this).   Patient is satisfied with weight loss progress. Appetite is well controlled. She was sick, missed work for 2 weeks, got out of routine, was stressed trying to catch up. This week restarted with meal planning and at the gym. Exercise room at home is available again since her renovation is complete. Reports no side effects of prescribed medications today (constipation controlled with OTC meds).     Review of Systems   Constitutional: Positive for fatigue. Negative for appetite change.   Eyes: Positive for blurred vision (saw opthamologist, cataracts). Negative for double vision and visual disturbance.   Cardiovascular: Negative for chest pain, palpitations and leg swelling.   Gastrointestinal: Positive for constipation, nausea and GERD. Negative for abdominal pain, diarrhea and vomiting.   Endocrine: Negative for polydipsia, polyphagia and polyuria.   Musculoskeletal:  "Positive for neck pain (chronic, sees chiropractor weekly). Negative for arthralgias, back pain and myalgias.   Neurological: Positive for headache. Negative for dizziness, tremors, light-headedness and memory problem.        Parasthesias negative   Psychiatric/Behavioral: Positive for sleep disturbance (chronic, restarting amitriptyline ) and stress. Negative for depressed mood. The patient is not nervous/anxious.        Objective   Body mass index is 27.64 kg/m².  Start weight MWM: 193 pounds.    Total weight loss: -38 pounds/-19%  Change in weight since last visit: +0.5lb    Measurements (in inches)  Waist: 35\"  /70   Pulse 80   Ht 162.6 cm (64\")   Wt 73 kg (161 lb)   BMI 27.64 kg/m²       Result Review :                 Assessment and Plan    Diagnoses and all orders for this visit:    1. Overweight (Primary)  Assessment & Plan:  Patient's (Body mass index is 27.64 kg/m².) indicates that they are overweight with health conditions that include obstructive sleep apnea, dyslipidemias, GERD and cervical pain, metabolic syndrome, anxiety/depression . Weight is improving with treatment. BMI is is above average; BMI management plan is completed. We discussed low calorie, low carb based diet program, portion control, increasing exercise, joining a fitness center or start home based exercise program, management of depression/anxiety/stress to control compensatory eating, pharmacologic options including phentermine, saxenda, naltrexone and an radha-based approach such as Nitric Bio Pal or Lose It.    I have instructed the patient to continue with pursuit of medical weight loss as a part of this program. Patient does meet criteria for use of anorectics at this time as BMI >27 and is not at treatment goal.     Continue nutritional focus and work towards new exercise FITT goal of:   The current plan for this month includes: adjust exercise as discussed, weight loss goal 4-6lbs this month and continue to work on " "lifestyle behavioral changes. Advised to contact cardiology about dizziness and heart racing during exercise, propranolol may need to be increased back. (understands propranolol can be weight positive so we will be prepared, same with amitryptiline for headaches/insomnia).Treatment goal 155lb, again the plan is to start taper of phentermine in January with close monitoring. \"Obesity is a chronic recurring disease that requires continuous effort to control.\"       Orders:  -     phentermine (ADIPEX-P) 37.5 MG tablet; Take 0.5 tablets by mouth Every Morning.  Dispense: 14 tablet; Refill: 0  -     naltrexone (DEPADE) 50 MG tablet; Take 1/2 tablet by mouth 2 (two) times a day.  Dispense: 30 tablet; Refill: 2  -     Liraglutide (Saxenda) 18 MG/3ML injection pen; Inject 3 mg under the skin into the appropriate area as directed Daily.  Dispense: 15 mL; Refill: 2    2. Metabolic syndrome  Assessment & Plan:  Low carb diet, regular physical activity, and weight reduction of 10-15%. Continue saxenda.           I spent 31 minutes caring for Renae on this date of service. This time includes time spent by me in the following activities:preparing for the visit, obtaining and/or reviewing a separately obtained history, performing a medically appropriate examination and/or evaluation , counseling and educating the patient/family/caregiver, ordering medications, tests, or procedures and documenting information in the medical record  Follow Up   Return in about 4 weeks (around 11/10/2022) for Next scheduled follow up.  Patient was given instructions and counseling regarding her condition or for health maintenance advice. Please see specific information pulled into the AVS if appropriate.     LAURITA Michel  "

## 2022-10-13 NOTE — ASSESSMENT & PLAN NOTE
"Patient's (Body mass index is 27.64 kg/m².) indicates that they are overweight with health conditions that include obstructive sleep apnea, dyslipidemias, GERD and cervical pain, metabolic syndrome, anxiety/depression . Weight is improving with treatment. BMI is is above average; BMI management plan is completed. We discussed low calorie, low carb based diet program, portion control, increasing exercise, joining a fitness center or start home based exercise program, management of depression/anxiety/stress to control compensatory eating, pharmacologic options including phentermine, saxenda, naltrexone and an radha-based approach such as IT MOVES IT Pal or Lose It.    I have instructed the patient to continue with pursuit of medical weight loss as a part of this program. Patient does meet criteria for use of anorectics at this time as BMI >27 and is not at treatment goal.     Continue nutritional focus and work towards new exercise FITT goal of:   The current plan for this month includes: adjust exercise as discussed, weight loss goal 4-6lbs this month and continue to work on lifestyle behavioral changes. Advised to contact cardiology about dizziness and heart racing during exercise, propranolol may need to be increased back. (understands propranolol can be weight positive so we will be prepared, same with amitryptiline for headaches/insomnia).Treatment goal 155lb, again the plan is to start taper of phentermine in January with close monitoring. \"Obesity is a chronic recurring disease that requires continuous effort to control.\"     "

## 2022-10-14 NOTE — NURSING NOTE
[place] called back. She stated patient doesn't need an IV because she's had enough intake. On her way here. Jennifer Clemente RN    impaired balance/decreased strength

## 2022-11-11 DIAGNOSIS — E66.3 OVERWEIGHT: ICD-10-CM

## 2022-11-11 NOTE — TELEPHONE ENCOUNTER
Rx Refill Note  Requested Prescriptions     Pending Prescriptions Disp Refills   • Insulin Pen Needle (BD Pen Needle Lilly U/F) 32G X 4 MM misc 100 each 0     Sig: Use to inject Saxenda once daily      Last office visit with prescribing clinician: 9/13/2022      Next office visit with prescribing clinician: 11/15/2022            Tanja Olmstead MA  11/11/22, 14:36 EST

## 2022-11-15 ENCOUNTER — PATIENT MESSAGE (OUTPATIENT)
Dept: BARIATRICS/WEIGHT MGMT | Facility: CLINIC | Age: 47
End: 2022-11-15

## 2022-11-15 ENCOUNTER — OFFICE VISIT (OUTPATIENT)
Dept: BARIATRICS/WEIGHT MGMT | Facility: CLINIC | Age: 47
End: 2022-11-15

## 2022-11-15 VITALS
HEART RATE: 78 BPM | DIASTOLIC BLOOD PRESSURE: 62 MMHG | SYSTOLIC BLOOD PRESSURE: 98 MMHG | BODY MASS INDEX: 26.98 KG/M2 | WEIGHT: 158 LBS | HEIGHT: 64 IN | OXYGEN SATURATION: 100 %

## 2022-11-15 DIAGNOSIS — F32.A ANXIETY AND DEPRESSION: ICD-10-CM

## 2022-11-15 DIAGNOSIS — E66.3 OVERWEIGHT: Primary | ICD-10-CM

## 2022-11-15 DIAGNOSIS — F41.9 ANXIETY AND DEPRESSION: ICD-10-CM

## 2022-11-15 PROCEDURE — 99213 OFFICE O/P EST LOW 20 MIN: CPT | Performed by: NURSE PRACTITIONER

## 2022-11-15 RX ORDER — PHENTERMINE HYDROCHLORIDE 37.5 MG/1
18.75 TABLET ORAL EVERY MORNING
Qty: 14 TABLET | Refills: 0 | Status: SHIPPED | OUTPATIENT
Start: 2022-11-15 | End: 2022-12-01 | Stop reason: SDUPTHER

## 2022-11-15 NOTE — ASSESSMENT & PLAN NOTE
Patient's (Body mass index is 27.12 kg/m².) indicates that they are overweight with health conditions that include dyslipidemias and metabolic syndrome . Weight is improving with treatment. BMI is is above average; BMI management plan is completed. We discussed low calorie, low carb based diet program, portion control, increasing exercise, management of depression/anxiety/stress to control compensatory eating, pharmacologic options including phentermine, saxenda, naltrexone and an radha-based approach such as [employer] Pal or Lose It.    I have instructed the patient to continue with pursuit of medical weight loss as a part of this program. Patient does meet criteria for use of anorectics at this time as BMI > 27 with coexisting, hypercholesterolemia, is not at treatment goal and this medication is indicated for LONG TERM use for management of obesity.     Continue nutritional focus and work towards new exercise FITT goal of: 2-2-4-2.  The current plan for this month includes: continue current exercise efforts and continue to work on lifestyle behavioral changes- increase protein, bring back focus.     Continue sympathomimetic (medication refilled).  This patient 1) has no evidence of serious cardiovascular disease; 2) does not have serious psychiatric disease or a history of substance abuse; 3) has been informed about weight loss medications that are FDA approved for long-term use and told that these have been all documented to be safe and effective whereas phentermine has not; 4) does not demonstrate a clinically significant increase in pulse or blood pressure when taking phentermine; and 5) demonstrate significant weight loss while using the medication.  Patient understands that all antiobesity medications are contraindicated in pregnancy.  Patient denies a history of glaucoma.  Patient understands that long-term use of phentermine is considered off label use of this medication, however, that the endocrine Society  "and recent research supports that long-term use of phentermine does not appear to have detrimental health effects when used and the appropriate patient.  In addition, a 2019 study published in an obesity journal on [id number] patient's concluded that \"recommendations to limit phentermine to less than 3 months do not align with current concept of pharmacologic treatment of obesity\", and that \"long-term phentermine users experience greater weight loss without apparent increases in cardiovascular risk\".    We reviewed potential side effects including insomnia, dry mouth, increased heart rate and blood pressure, increased anxiety.  We reviewed reducing caffeine consumption while taking phentermine.  especially if the patient is experience side effects.  The potential risk and benefits of phentermine have been reviewed with the patient, and alternative treatment options were discussed.  All questions were answered, and the patient wishes to move forward with this medication.       "

## 2022-11-15 NOTE — PROGRESS NOTES
[facility]  [address]  [place] [address]     Office Note      Date: 11/15/2022  Patient Name: Renae Bolton  MRN: [id number]  : 1975  Subjective  Subjective     Chief Complaint  Obesity Management follow-up          Renae Bolton presents to [facility] for obesity management.   Patient is unsatisfied with weight loss progress. Appetite is well controlled. Very stressed at home and at work. Still has good restriction, eats small portions, but has not been getting adequate protein and is stress eating more (allowing herself chips or other comfort foods on occasion). Doing well with water intake. Reports no side effects of prescribed medications today. She is taking a new medication for her migraines, qulipta. Also back on propanolol 80mg and amitriptyline. The patient is taking multivitamin and is taking fish oil.  The patient is using a food journal.    The patient is still exercising with a  with a FITT score of:    Frequency Intensity Time Strength Training   []   0, none []   0 []   0 []   0   [x]   1 (1-2x/week) []   1 (light) []   1 (<10 min) []   1 (1x/week)   []   2 (3-5x/week) [x]   2 (moderate) []   2 (10-20 min) [x]   2 (2x/week)   []   3 (daily) []   3 (moderately hard)  []   4 (very hard) []   3 (20-30 min)  [x]   4 (>30 min) []   3 (3-4x/week)     Review of Systems   Constitutional: Negative for appetite change and fatigue.   Eyes: Negative for blurred vision, double vision and visual disturbance.   Cardiovascular: Negative for chest pain and palpitations.   Gastrointestinal: Negative for abdominal pain, constipation, diarrhea, nausea, vomiting and GERD.   Endocrine: Negative for polydipsia, polyphagia and polyuria.   Musculoskeletal: Negative for arthralgias, back pain and myalgias.   Neurological: Negative for dizziness, tremors, light-headedness, headache and memory problem.         "Parasthesias negative   Psychiatric/Behavioral: Negative for sleep disturbance, depressed mood and stress. The patient is not nervous/anxious.      Objective   Start weight: 193 pounds.    Total Loss lb/%Loss of beginning body weight (BBW): -35lb/-18.13%  Change in weight since last visit: -3lb    Body mass index is 27.12 kg/m².   Body composition analysis completed and showed:   %body fat: 34.9  Measurements (in inches)  Waist: 35    Vital Signs:   BP 98/62   Pulse 78   Ht 162.6 cm (64\")   Wt 71.7 kg (158 lb)   SpO2 100%   BMI 27.12 kg/m²     Physical Exam   General appears stated age and normal appearance   HEENT PERRLA, EOM intact and conjunctivae normal   Chest/lungs Normal rate, Regular rhythm and Breathing is unlabored   Extremities without edema   Neuro Good historian and No focal deficit   Skin Warm, dry, intact   Psych normal behavior, normal thought content and normal concentration     Result Review :                Assessment / Plan        Diagnoses and all orders for this visit:    1. Overweight (Primary)  Assessment & Plan:  Patient's (Body mass index is 27.12 kg/m².) indicates that they are overweight with health conditions that include dyslipidemias and metabolic syndrome . Weight is improving with treatment. BMI is is above average; BMI management plan is completed. We discussed low calorie, low carb based diet program, portion control, increasing exercise, management of depression/anxiety/stress to control compensatory eating, pharmacologic options including phentermine, saxenda, naltrexone and an radha-based approach such as Waveseis Pal or Lose It.    I have instructed the patient to continue with pursuit of medical weight loss as a part of this program. Patient does meet criteria for use of anorectics at this time as BMI > 27 with coexisting, hypercholesterolemia, is not at treatment goal and this medication is indicated for LONG TERM use for management of obesity.     Continue nutritional " "focus and work towards new exercise FITT goal of: 2-2-4-2.  The current plan for this month includes: continue current exercise efforts and continue to work on lifestyle behavioral changes- increase protein, bring back focus.     Continue sympathomimetic (medication refilled).  This patient 1) has no evidence of serious cardiovascular disease; 2) does not have serious psychiatric disease or a history of substance abuse; 3) has been informed about weight loss medications that are FDA approved for long-term use and told that these have been all documented to be safe and effective whereas phentermine has not; 4) does not demonstrate a clinically significant increase in pulse or blood pressure when taking phentermine; and 5) demonstrate significant weight loss while using the medication.  Patient understands that all antiobesity medications are contraindicated in pregnancy.  Patient denies a history of glaucoma.  Patient understands that long-term use of phentermine is considered off label use of this medication, however, that the endocrine Society and recent research supports that long-term use of phentermine does not appear to have detrimental health effects when used and the appropriate patient.  In addition, a 2019 study published in an obesity journal on [id number] patient's concluded that \"recommendations to limit phentermine to less than 3 months do not align with current concept of pharmacologic treatment of obesity\", and that \"long-term phentermine users experience greater weight loss without apparent increases in cardiovascular risk\".    We reviewed potential side effects including insomnia, dry mouth, increased heart rate and blood pressure, increased anxiety.  We reviewed reducing caffeine consumption while taking phentermine.  especially if the patient is experience side effects.  The potential risk and benefits of phentermine have been reviewed with the patient, and alternative treatment options were " discussed.  All questions were answered, and the patient wishes to move forward with this medication.         Orders:  -     phentermine (ADIPEX-P) 37.5 MG tablet; Take 1/2 tablet by mouth Every Morning.  Dispense: 14 tablet; Refill: 0    2. Anxiety and depression  Assessment & Plan:  Patient's depression is recurrent and is moderate without psychosis. Their depression is currently active and the condition is stable. This will be reassessed at the next regular appointment. F/U as described:patient depression is being managed by their pcp, continue to avoid stress eating and continue excellent physical activity.          Follow Up {Instructions Charge Capture  Follow-up Communications :23}  Return in about 4 weeks (around 12/13/2022) for Next scheduled follow up.  Patient was given instructions and counseling regarding her condition or for health maintenance advice. Please see specific information pulled into the AVS if appropriate.     Bindu Lechuga, APRN  11/15/2022

## 2022-11-15 NOTE — ASSESSMENT & PLAN NOTE
Patient's depression is recurrent and is moderate without psychosis. Their depression is currently active and the condition is stable. This will be reassessed at the next regular appointment. F/U as described:patient depression is being managed by their pcp, continue to avoid stress eating and continue excellent physical activity.

## 2022-11-16 RX ORDER — PEN NEEDLE, DIABETIC 32GX 5/32"
1 NEEDLE, DISPOSABLE MISCELLANEOUS DAILY
Qty: 100 EACH | Refills: 0 | Status: SHIPPED | OUTPATIENT
Start: 2022-11-16 | End: 2023-02-13 | Stop reason: SDUPTHER

## 2022-11-16 NOTE — TELEPHONE ENCOUNTER
From: Renae Bolton  To: LAURITA Ibanez  Sent: 11/15/2022 9:05 PM EST  Subject: Refill for needles for Saxenda pen    Bindu  I need a refill for the needles for my Saxenda pens. Would you please send a request in for me? I thought the pharmacy had already done so, but no. And I forgot today when I saw you.     Thank you!  Renae

## 2022-11-29 ENCOUNTER — PATIENT MESSAGE (OUTPATIENT)
Dept: BARIATRICS/WEIGHT MGMT | Facility: CLINIC | Age: 47
End: 2022-11-29

## 2022-11-29 DIAGNOSIS — E66.3 OVERWEIGHT: ICD-10-CM

## 2022-12-01 RX ORDER — PHENTERMINE HYDROCHLORIDE 37.5 MG/1
18.75 TABLET ORAL EVERY MORNING
Qty: 14 TABLET | Refills: 0 | Status: SHIPPED | OUTPATIENT
Start: 2022-12-01 | End: 2023-01-17 | Stop reason: SDUPTHER

## 2022-12-01 NOTE — TELEPHONE ENCOUNTER
From: Renae Bolton  To: LAURITA Ibanez  Sent: 11/29/2022 1:22 PM EST  Subject: December Appointment    Layo Ruano  I had a call from the office today stating they have to cancel our December appointment and do not have another opening until January...I am scheduled for January...I wanted to make sure I would still be able to get my refills? By looking on my medication list, it appears I have a refill for the Saxenda and the Naltrexone, but not on the Phentermine...I am hoping the Saxenda refill is accurate. Would you be able to refill for me prior to your leave?  Have a nice Elsie break!    Thank you! Renae

## 2022-12-13 ENCOUNTER — TRANSCRIBE ORDERS (OUTPATIENT)
Dept: ADMINISTRATIVE | Facility: HOSPITAL | Age: 47
End: 2022-12-13

## 2022-12-13 DIAGNOSIS — R11.0 NAUSEA: ICD-10-CM

## 2022-12-13 DIAGNOSIS — R10.9 ABDOMINAL PAIN, UNSPECIFIED ABDOMINAL LOCATION: Primary | ICD-10-CM

## 2023-01-10 ENCOUNTER — HOSPITAL ENCOUNTER (OUTPATIENT)
Dept: CT IMAGING | Facility: HOSPITAL | Age: 48
Discharge: HOME | End: 2023-01-10
Admitting: FAMILY MEDICINE
Payer: COMMERCIAL

## 2023-01-10 DIAGNOSIS — R10.9 ABDOMINAL PAIN, UNSPECIFIED ABDOMINAL LOCATION: ICD-10-CM

## 2023-01-10 DIAGNOSIS — R11.0 NAUSEA: ICD-10-CM

## 2023-01-10 PROCEDURE — 74177 CT ABD & PELVIS W/CONTRAST: CPT

## 2023-01-10 PROCEDURE — 0 IOPAMIDOL PER 1 ML: Performed by: FAMILY MEDICINE

## 2023-01-10 PROCEDURE — 0 DIATRIZOATE MEGLUMINE & SODIUM PER 1 ML: Performed by: FAMILY MEDICINE

## 2023-01-10 RX ADMIN — DIATRIZOATE MEGLUMINE AND DIATRIZOATE SODIUM 30 ML: 660; 100 LIQUID ORAL; RECTAL at 17:55

## 2023-01-10 RX ADMIN — IOPAMIDOL 100 ML: 510 INJECTION, SOLUTION INTRAVASCULAR at 17:55

## 2023-01-13 ENCOUNTER — OFFICE VISIT (OUTPATIENT)
Dept: NEUROLOGY | Facility: CLINIC | Age: 48
End: 2023-01-13
Payer: COMMERCIAL

## 2023-01-13 VITALS
TEMPERATURE: 97 F | SYSTOLIC BLOOD PRESSURE: 98 MMHG | DIASTOLIC BLOOD PRESSURE: 62 MMHG | HEART RATE: 65 BPM | OXYGEN SATURATION: 100 % | RESPIRATION RATE: 14 BRPM | WEIGHT: 158 LBS | BODY MASS INDEX: 26.98 KG/M2 | HEIGHT: 64 IN

## 2023-01-13 DIAGNOSIS — G43.009 MIGRAINE WITHOUT AURA AND WITHOUT STATUS MIGRAINOSUS, NOT INTRACTABLE: Primary | ICD-10-CM

## 2023-01-13 PROCEDURE — 99213 OFFICE O/P EST LOW 20 MIN: CPT | Performed by: NURSE PRACTITIONER

## 2023-01-13 RX ORDER — ATOGEPANT 60 MG/1
60 TABLET ORAL DAILY
Qty: 30 TABLET | Refills: 11 | COMMUNITY
Start: 2023-01-13 | End: 2023-01-13

## 2023-01-13 RX ORDER — AMITRIPTYLINE HYDROCHLORIDE 10 MG/1
10 TABLET, FILM COATED ORAL EVERY EVENING
Qty: 90 TABLET | Refills: 3 | Status: SHIPPED | OUTPATIENT
Start: 2023-01-13

## 2023-01-13 RX ORDER — ATOGEPANT 60 MG/1
60 TABLET ORAL DAILY
Qty: 30 TABLET | Refills: 11 | Status: SHIPPED | OUTPATIENT
Start: 2023-01-13

## 2023-01-13 NOTE — PROGRESS NOTES
Follow Up Neurology Office Visit      Patient Name: Renae Bolton    Referring Physician: No ref. provider found    Chief Complaint:    Chief Complaint   Patient presents with   • Migraine     followup       History of Present Illness: Renae Bolton is a very pleasant 47 y.o. female who is here to follow up with Neurology for migraine headaches.  She is quite pleased with current therapy, reports that migraine headaches have almost completely ceased since starting Qulipta.  She estimates that she has only had to use rescue medication once since October.    The following portions of the patient's history were reviewed and updated as appropriate: allergies, current medications, past family history, past medical history, past social history, past surgical history and problem list.    Subjective     Review of Systems:   Review of Systems    Medications:     Current Outpatient Medications:   •  acyclovir (ZOVIRAX) 5 % ointment, Apply topically to the appropriate area as directed Every 3 (Three) Hours., Disp: 30 g, Rfl: 6  •  albuterol sulfate HFA (Ventolin HFA) 108 (90 Base) MCG/ACT inhaler, Inhale 1-2 puffs by mouth every 4 to 6 hours as needed, Disp: 8.5 g, Rfl: 3  •  amitriptyline (ELAVIL) 10 MG tablet, Take 1 tablet by mouth Every Evening., Disp: 90 tablet, Rfl: 3  •  Atogepant (Qulipta) 60 MG tablet, Take 1 tablet by mouth Daily., Disp: 30 tablet, Rfl: 11  •  B Complex Vitamins (vitamin b complex) capsule capsule, Take 1 capsule by mouth Daily., Disp: , Rfl:   •  Biotin (Biotin 5000) 5 MG capsule, Take 1 capsule by mouth Daily., Disp: , Rfl:   •  cetirizine (zyrTEC) 10 MG tablet, Take 1 tablet by mouth Daily., Disp: 90 tablet, Rfl: 3  •  Cholecalciferol (Vitamin D3) 250 MCG (58232 UT) tablet, Take 5,000 Units by mouth Daily., Disp: , Rfl:   •  cyclobenzaprine (FLEXERIL) 5 MG tablet, Take 1 tablet by mouth At Night As Needed., Disp: 90 tablet, Rfl: 1  •  dexlansoprazole (Dexilant) 60 MG capsule, Take  1 capsule by mouth Daily for 90 days., Disp: 90 capsule, Rfl: 3  •  DULoxetine (CYMBALTA) 20 MG capsule, Take 1 capsule by mouth Daily., Disp: 90 capsule, Rfl: 2  •  erythromycin (ROMYCIN) 5 MG/GM ophthalmic ointment, Apply a 1/4 inch strip to the affected eye 2 (Two) Times A Day for 3 days before but not day of surgery, Disp: 3.5 g, Rfl: 1  •  esomeprazole (nexIUM) 40 MG capsule, Take 1 capsule by mouth Daily., Disp: 30 capsule, Rfl: 11  •  famciclovir (FAMVIR) 250 MG tablet, Take 1 tablet by mouth 3 (Three) Times a Day., Disp: 270 tablet, Rfl: 4  •  Insulin Pen Needle (BD Pen Needle Lilly U/F) 32G X 4 MM misc, Use to inject Saxenda once daily, Disp: 100 each, Rfl: 0  •  Liraglutide (Saxenda) 18 MG/3ML injection pen, Inject 3 mg under the skin into the appropriate area as directed Daily., Disp: 15 mL, Rfl: 2  •  MAGNESIUM CITRATE PO, Take 270 mg by mouth Daily. *magnesium citrimate+B6 for migraines, Disp: , Rfl:   •  Mirabegron ER (Myrbetriq) 50 MG tablet sustained-release 24 hour 24 hr tablet, Take 1 tablet by mouth Daily., Disp: 90 tablet, Rfl: 3  •  naltrexone (DEPADE) 50 MG tablet, Take 1/2 tablet by mouth 2 (two) times a day., Disp: 30 tablet, Rfl: 2  •  NON FORMULARY, Multivitamin patch daily  Collagen patch daily, Disp: , Rfl:   •  Omega-3 Fatty Acids (fish oil) 1000 MG capsule capsule, Take 1 capsule by mouth 2 (Two) Times a Day With Meals., Disp: , Rfl:   •  ondansetron (ZOFRAN) 4 MG tablet, Take 1 tablet by mouth Every 6 (Six) Hours As Needed., Disp: 120 tablet, Rfl: 3  •  phentermine (ADIPEX-P) 37.5 MG tablet, Take 1/2 tablet by mouth Every Morning., Disp: 14 tablet, Rfl: 0  •  Probiotic Product (PROBIOTIC PO), Take 1 capsule by mouth Daily., Disp: , Rfl:   •  promethazine (PHENERGAN) 25 MG tablet, Take 1 tablet by mouth Every 6 (Six) Hours As Needed for nausea, Disp: 60 tablet, Rfl: 1  •  propranolol LA (INDERAL LA) 80 MG 24 hr capsule, Take 1 capsule by mouth Daily., Disp: 90 capsule, Rfl: 3  •   "PSYLLIUM HUSK PO, Take 1 capsule by mouth Daily., Disp: , Rfl:   •  SUMAtriptan-naproxen (TREXIMET)  MG per tablet, Take 1 tablet by mouth 1 (One) Time As Needed for Migraine for up to 2 doses. May repeat dose one time in 2 hours if headache not relieved., Disp: 9 tablet, Rfl: 11  •  Vitamin E 400 units tablet, Take 400 Units by mouth Daily., Disp: , Rfl:   •  baclofen (LIORESAL) 10 MG tablet, TAKE 1 TABLET 3 TIMES DAILY., Disp: 30 tablet, Rfl: 1  •  ondansetron ODT (ZOFRAN-ODT) 4 MG disintegrating tablet, Place 1 tablet on the tongue Every 8 (Eight) Hours As Needed for nausea, Disp: 20 tablet, Rfl: 0  •  propranolol LA (INDERAL LA) 80 MG 24 hr capsule, Take 1 capsule by mouth Daily., Disp: 90 capsule, Rfl: 3    Allergies:   Allergies   Allergen Reactions   • Amoxicillin Shortness Of Breath and Palpitations     Keflex OK   • Caffeine Arrhythmia   • Demerol [Meperidine] Rash     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  Never actually taken it internally.   • Latex Anaphylaxis and Rash   • Morphine Rash     Rash (if gets on skin).  Never taken it internally   • Morphine And Related Rash     Rash (if gets on skin).  Never taken it internally   • Penicillins Shortness Of Breath and Palpitations     Keflex OK  Keflex OK  Keflex OK   • Oxycodone Other (See Comments)     Dizziness, confusion     • Topiramate Other (See Comments)     Pt states she can take NAME BRAND ONLY.  Generic brand \"doesn't work\"       Objective     Physical Exam:  Vital Signs:   Vitals:    01/13/23 0809   BP: 98/62   Pulse: 65   Resp: 14   Temp: 97 °F (36.1 °C)   SpO2: 100%   Weight: 71.7 kg (158 lb)   Height: 162.6 cm (64\")       Physical Exam  Vitals and nursing note reviewed.   Constitutional:       Appearance: Normal appearance.   Pulmonary:      Effort: Pulmonary effort is normal.   Neurological:      Mental Status: She is oriented to person, place, and time. Mental status is at baseline. "      Cranial Nerves: Cranial nerves 2-12 are intact.      Motor: Motor strength is normal.      Gait: Gait is intact.   Psychiatric:         Mood and Affect: Mood normal.         Speech: Speech normal.         Behavior: Behavior normal.       Neurologic Exam     Mental Status   Oriented to person, place, and time.   Attention: normal. Concentration: normal.   Speech: speech is normal   Level of consciousness: alert  Knowledge: good.   Normal comprehension.     Cranial Nerves   Cranial nerves II through XII intact.     Motor Exam   Muscle bulk: normal  Overall muscle tone: normal    Strength   Strength 5/5 throughout.     Gait, Coordination, and Reflexes     Gait  Gait: normal    Tremor   Resting tremor: absent    Results Review:   I have reviewed the patient's other medical records to include, labs, radiology and referrals.     Assessment / Plan      Assessment/Plan:   Diagnoses and all orders for this visit:    1. Migraine without aura and without status migrainosus, not intractable (Primary)  -     Discontinue: Atogepant (Qulipta) 60 MG tablet; Take 1 tablet by mouth Daily.  Dispense: 30 tablet; Refill: 11  -     amitriptyline (ELAVIL) 10 MG tablet; Take 1 tablet by mouth Every Evening.  Dispense: 90 tablet; Refill: 3  -     Atogepant (Qulipta) 60 MG tablet; Take 1 tablet by mouth Daily.  Dispense: 30 tablet; Refill: 11    Continue current medications.  Patient encouraged to contact provider with any change in headache pattern.    Follow Up:   Return in about 6 months (around 7/13/2023).     LAURITA Sainz  [facility]   AS THE PROVIDER, I PERSONALLY WORE PPE DURING ENTIRE FACE TO FACE ENCOUNTER IN CLINIC WITH THE PATIENT. PATIENT ALSO WORE PPE DURING ENTIRE FACE TO FACE ENCOUNTER EXCEPT FOR A MAX OF 30 SECONDS DURING NEUROLOGICAL EVALUATION OF CRANIAL NERVES AND THEN MASK WAS PLACED BACK OVER PATIENT FACE FOR REMAINDER OF VISIT. I WASHED MY HANDS BEFORE AND AFTER VISIT.      Please  note that portions of this note may have been completed with a voice recognition program. Efforts were made to edit the dictations, but occasionally words are mistranscribed.

## 2023-01-17 ENCOUNTER — TELEPHONE (OUTPATIENT)
Dept: BARIATRICS/WEIGHT MGMT | Facility: CLINIC | Age: 48
End: 2023-01-17
Payer: COMMERCIAL

## 2023-01-17 DIAGNOSIS — E66.3 OVERWEIGHT: ICD-10-CM

## 2023-01-17 RX ORDER — PHENTERMINE HYDROCHLORIDE 37.5 MG/1
18.75 TABLET ORAL EVERY MORNING
Qty: 14 TABLET | Refills: 0 | Status: SHIPPED | OUTPATIENT
Start: 2023-01-17 | End: 2023-02-13 | Stop reason: SDUPTHER

## 2023-01-17 RX ORDER — NALTREXONE HYDROCHLORIDE 50 MG/1
TABLET, FILM COATED ORAL
Qty: 30 TABLET | Refills: 2 | Status: SHIPPED | OUTPATIENT
Start: 2023-01-17

## 2023-01-17 RX ORDER — LIRAGLUTIDE 6 MG/ML
3 INJECTION, SOLUTION SUBCUTANEOUS DAILY
Qty: 15 ML | Refills: 2 | Status: SHIPPED | OUTPATIENT
Start: 2023-01-17

## 2023-01-17 NOTE — TELEPHONE ENCOUNTER
Rx Refill Note  Requested Prescriptions     Pending Prescriptions Disp Refills   • naltrexone (DEPADE) 50 MG tablet 30 tablet 2     Sig: Take 1/2 tablet by mouth 2 (two) times a day.   • Liraglutide (Saxenda) 18 MG/3ML injection pen 15 mL 2     Sig: Inject 3 mg under the skin into the appropriate area as directed Daily.   • phentermine (ADIPEX-P) 37.5 MG tablet 14 tablet 0     Sig: Take 1/2 tablet by mouth Every Morning.      Last office visit with prescribing clinician: 11/15/2022   Last telemedicine visit with prescribing clinician: 1/26/2023   Next office visit with prescribing clinician: 1/26/2023                         Would you like a call back once the refill request has been completed: [] Yes [] No    If the office needs to give you a call back, can they leave a voicemail: [] Yes [] No    Tanja Olmstead MA  01/17/23, 11:50 EST

## 2023-01-17 NOTE — TELEPHONE ENCOUNTER
Patient called and left voicemail:  Needs medication called in because she has been rescheduled 3 times by us.     Called patient back:  Let her know I have put refill request in and as soon as Bindu approves them they will be at the pharmacy.

## 2023-01-19 ENCOUNTER — PATIENT ROUNDING (BHMG ONLY) (OUTPATIENT)
Dept: SURGERY | Facility: CLINIC | Age: 48
End: 2023-01-19
Payer: COMMERCIAL

## 2023-01-19 ENCOUNTER — OFFICE VISIT (OUTPATIENT)
Dept: SURGERY | Facility: CLINIC | Age: 48
End: 2023-01-19
Payer: COMMERCIAL

## 2023-01-19 VITALS
TEMPERATURE: 96.6 F | DIASTOLIC BLOOD PRESSURE: 86 MMHG | BODY MASS INDEX: 26.98 KG/M2 | OXYGEN SATURATION: 99 % | HEIGHT: 64 IN | SYSTOLIC BLOOD PRESSURE: 124 MMHG | WEIGHT: 158 LBS | HEART RATE: 80 BPM

## 2023-01-19 DIAGNOSIS — K59.00 CONSTIPATION, UNSPECIFIED CONSTIPATION TYPE: Primary | ICD-10-CM

## 2023-01-19 DIAGNOSIS — R11.0 NAUSEA: ICD-10-CM

## 2023-01-19 PROCEDURE — 99213 OFFICE O/P EST LOW 20 MIN: CPT | Performed by: SURGERY

## 2023-01-19 RX ORDER — FAMOTIDINE 20 MG/1
20 TABLET, FILM COATED ORAL EVERY MORNING
COMMUNITY

## 2023-01-19 NOTE — PROGRESS NOTES
January 19, 2023    Hello, may I speak with Renae Bolton?    My name is Noemi       I am  with [facility] [address]  [facility] [address] [phone].    Before we get started may I verify your date of birth? 1975    I am calling to officially welcome you to our practice and ask about your recent visit. Is this a good time to talk? yes    Tell me about your visit with us. What things went well? Great       We're always looking for ways to make our patients' experiences even better. Do you have recommendations on ways we may improve?  no    Overall were you satisfied with your first visit to our practice? yes       I appreciate you taking the time to speak with me today. Is there anything else I can do for you? no      Thank you, and have a great day.

## 2023-01-26 ENCOUNTER — TELEMEDICINE (OUTPATIENT)
Dept: BARIATRICS/WEIGHT MGMT | Facility: CLINIC | Age: 48
End: 2023-01-26
Payer: COMMERCIAL

## 2023-01-26 VITALS
WEIGHT: 157 LBS | HEIGHT: 64 IN | BODY MASS INDEX: 26.8 KG/M2 | HEART RATE: 75 BPM | SYSTOLIC BLOOD PRESSURE: 112 MMHG | DIASTOLIC BLOOD PRESSURE: 66 MMHG

## 2023-01-26 DIAGNOSIS — E88.81 METABOLIC SYNDROME: ICD-10-CM

## 2023-01-26 DIAGNOSIS — E66.3 OVERWEIGHT: Primary | ICD-10-CM

## 2023-01-26 PROCEDURE — 99214 OFFICE O/P EST MOD 30 MIN: CPT | Performed by: NURSE PRACTITIONER

## 2023-01-26 NOTE — PROGRESS NOTES
Office Note      Date: 2023  Patient Name: Renae Bolton  MRN: [id number]  : 1975    Patient presents during the COVID-19 pandemic/federally declared [facility] of public health emergency.  This service was conducted via Zoom. Patient is located at her home address.  Provider is located at her primary office location. The use of a video visit has been reviewed with the patient and verbal informed consent has been obtained.  Subjective  Subjective     Chief Complaint  Obesity Management follow-up    Subjective          Renae Bolton presents to [facility] via telehealth for obesity management. s/p LSG/HHR 19 w/ Dr. Onofre.  Pre-surgery weight: 232.5, gonzalez 183lb, final goal 150lb.     Patient is unsatisfied with weight loss progress. Appetite is well controlled, better since starting dexilant since she's not as nauseated. Reports no side effects of prescribed medications today. Started linzess yesterday with general surgery.   The patient is not exercising since her eye surgery. Can't lift, pull, bend over for at least 4-5 weeks. Started chocolate fairlife protein shake and it's not giving her stomach cramps.     Review of Systems   Constitutional: Negative for appetite change and fatigue.   HENT: Negative for trouble swallowing.    Eyes: Negative for blurred vision, double vision and visual disturbance.   Cardiovascular: Negative for chest pain, palpitations and leg swelling.   Gastrointestinal: Positive for abdominal pain (lower spasms/cramping), constipation (increased stool softener, started linzess) and nausea. Negative for diarrhea, vomiting and GERD.   Endocrine: Negative for polydipsia, polyphagia and polyuria.   Musculoskeletal: Negative for arthralgias, back pain and myalgias.   Neurological: Negative for dizziness, tremors, light-headedness, headache and memory problem.        Parasthesias negative   Psychiatric/Behavioral: Positive for  "sleep disturbance and stress (working a lot of overtime). Negative for depressed mood. The patient is nervous/anxious.      Objective   Body mass index is 26.95 kg/m².  Start weight MWM: 193 pounds.    Total weight loss: -36 pounds/-18%  Change in weight since last visit: -1lb    Measurements (in inches)  Waist: 35\"  /66   Pulse 75   Ht 162.6 cm (64\")   Wt 71.2 kg (157 lb)   BMI 26.95 kg/m²       Result Review :                 Assessment and Plan    Diagnoses and all orders for this visit:    1. Overweight (Primary)  Assessment & Plan:  Patient's (Body mass index is 26.95 kg/m².) indicates that they are overweight with health conditions that include obstructive sleep apnea, dyslipidemias, GERD and back pain, anxiety and depression, metabolic syndrome . Weight is improving with treatment. BMI is is above average; BMI management plan is completed. We discussed low calorie, low carb based diet program, portion control, increasing exercise, management of depression/anxiety/stress to control compensatory eating, pharmacologic options including phentermine, saxenda and an radha-based approach such as allyve Pal or Lose It.    I have instructed the patient to continue with pursuit of medical weight loss as a part of this program. Patient does meet criteria for use of anorectics at this time as BMI >25 with , hyperlipidemia and is not at treatment goal.     Continue nutritional focus and work towards new exercise FITT goal of: 0. Restricted at this time due to eye operation,  The current plan for this month includes: continue to work on lifestyle behavioral changes. Incorporate protein shake 1-2 times daily. Goal to maintain weight loss over the next 4-5 weeks.          2. Metabolic syndrome  Assessment & Plan:  Stable. Low carb diet, regular physical activity, and weight reduction of 10-15%.           I spent 31 minutes caring for Renae on this date of service. This time includes time spent by me in the " following activities:preparing for the visit, obtaining and/or reviewing a separately obtained history, performing a medically appropriate examination and/or evaluation , counseling and educating the patient/family/caregiver, ordering medications, tests, or procedures and documenting information in the medical record  Follow Up   Return in about 4 weeks (around 2/23/2023) for Next scheduled follow up.  Patient was given instructions and counseling regarding her condition or for health maintenance advice. Please see specific information pulled into the AVS if appropriate.     Bindu Lechuga, APRN

## 2023-01-26 NOTE — ASSESSMENT & PLAN NOTE
Patient's (Body mass index is 26.95 kg/m².) indicates that they are overweight with health conditions that include obstructive sleep apnea, dyslipidemias, GERD and back pain, anxiety and depression, metabolic syndrome . Weight is improving with treatment. BMI is is above average; BMI management plan is completed. We discussed low calorie, low carb based diet program, portion control, increasing exercise, management of depression/anxiety/stress to control compensatory eating, pharmacologic options including phentermine, saxenda and an radha-based approach such as HomeStars Pal or Lose It.    I have instructed the patient to continue with pursuit of medical weight loss as a part of this program. Patient does meet criteria for use of anorectics at this time as BMI >25 with , hyperlipidemia and is not at treatment goal.     Continue nutritional focus and work towards new exercise FITT goal of: 0. Restricted at this time due to eye operation,  The current plan for this month includes: continue to work on lifestyle behavioral changes. Incorporate protein shake 1-2 times daily. Goal to maintain weight loss over the next 4-5 weeks.

## 2023-02-06 NOTE — PROGRESS NOTES
Subjective   Renae Bolton is a 47 y.o. female.   Chief Complaint   Patient presents with   • Abdominal Pain     Follow up       History of Present Illness   Patient is in the office for follow up of abdominal pain. Pain states she is seeing some improvement since starting medication. Patient had the stomach the flu during this process.     Still on bid stool softeners + linzess  Not good with linzess alone.   Happy with combo  bms on monst days  2 days of pain      The following portions of the patient's history were reviewed and updated as appropriate: allergies, current medications, past family history, past medical history, past social history, past surgical history and problem list.    Patient Active Problem List   Diagnosis   • Stress incontinence   • Fatigue   • Dyspepsia   • Dyspnea on exertion   • Hard to intubate   • Nausea and vomiting   • Sleep apnea   • Migraines   • Interstitial cystitis   • GERD (gastroesophageal reflux disease)   • Anxiety and depression   • Anxiety   • Post concussion syndrome   • Genital HSV   • Arrhythmia   • Generalized edema   • Aspirin long-term use   • Overweight   • Back pain   • Metabolic syndrome   • Seasonal allergies   • Primary insomnia   • Hyperlipidemia   • Allergic rhinitis   • Generalized abdominal pain   • History of sleeve gastrectomy   • Vitamin D deficiency   • Constipation       Past Medical History:   Diagnosis Date   • Allergic rhinitis approx 10 years ago   • Anxiety    • Arrhythmia     SVT w/ PVCs, on propranolol, follows w/ Dr. Liang   • Chronic venous insufficiency     per Dr. Liang note 8/17/22   • Depression    • Diabetes mellitus (HCC) 2001    Gestational   • Dyspepsia    • Dyspnea on exertion     resolved after weight loss/ gastric sleeve   • Endometriosis    • Fatigue    • Female infertility    • Generalized edema     r/t weight gain   • Genital HSV     Famvir for prophylaxis   • GERD (gastroesophageal reflux disease)    • Gestational diabetes  2001   • Hard to intubate     states was told this after her hysterectomy.  states procedures following gastric sleeve have been without issues   • Hyperemesis gravidarum    • Hyperlipidemia 09/21/2021   • Hypertension     resloved after gastric sleeve/weight loss   • Interstitial cystitis    • Kidney stones    • Migraine     occasional   • Mitral valve prolapse     pt denies/unaware; mitral valve regurge per Dr. Liang note 8/17/22   • Normal vaginal Papanicolaou smear in patient with history of hysterectomy 09/16/2016   • Peripheral edema    • PONV (postoperative nausea and vomiting)    • Post concussion syndrome     s/p fall w/ frontal lobe head injury 9/2017, previously on Elavil   • Sleep apnea     resloved after gastric sleeve/weight loss   • Stress incontinence     follows w/ Dr. Galvan, on Myrbetriq, s/p cystoscopy w/ bulking agent   • SVT (supraventricular tachycardia) ([facility])     followed by Dr. Liang - see note 8/17/22 scanned into media       Past Surgical History:   Procedure Laterality Date   • ANTERIOR AND POSTERIOR VAGINAL REPAIR  06/23/2010    DR [facility]   • BREAST BIOPSY  [id number]    Left   • BREAST LUMPECTOMY Left 2015    benign   • CYSTOSCOPY W/ BULKING AGENT INJECTION N/A 06/25/2019    Procedure: CYSTOSCOPY WITH URETHRAL  BULKING AGENT INJECTION;  Surgeon: Jose Galvan MD;  Location: [facility] OR;  Service: Urology   • CYSTOSCOPY, DIMETHYL SULFOXIDE COCKTAIL  03/14/2011    WITH HYDRODISTENTION (DR AVINASH MADDOX)   • ENDOSCOPY N/A 12/17/2019    Procedure: ESOPHAGOGASTRODUODENOSCOPY;  Surgeon: Tracee Onofre MD;  Location: [facility] OR;  Service: Bariatric   • ENDOSCOPY N/A 10/20/2020    Procedure: ESOPHAGOGASTRODUODENOSCOPY WITH BIOPSY;  Surgeon: Tracee Onofre MD;  Location: [facility] ENDOSCOPY;  Service: General;  Laterality: N/A;   • GASTRIC SLEEVE LAPAROSCOPIC N/A 12/17/2019    Procedure: GASTRIC SLEEVE LAPAROSCOPIC;  Surgeon: Tracee Onofre MD;  Location: [facility] OR;   Service: Bariatric   • HIATAL HERNIA REPAIR N/A 12/17/2019    Procedure: HIATAL HERNIA REPAIR LAPAROSCOPIC;  Surgeon: Tracee Onofre MD;  Location: [facility];  Service: Bariatric   • HYSTERECTOMY  06/23/2010    [facility] ([facility])   • LAPAROSCOPIC CHOLECYSTECTOMY  2013    for stones   • TRANSVAGINAL TAPING SUSPENSION WITH OBTURATOR  06/23/2010    DR EDIN [facility]   • WISDOM TOOTH EXTRACTION  1990       Medications:     Current Outpatient Medications:   •  acyclovir (ZOVIRAX) 5 % ointment, Apply topically to the appropriate area as directed Every 3 (Three) Hours. (Patient taking differently: Apply  topically to the appropriate area as directed As Needed.), Disp: 30 g, Rfl: 6  •  albuterol sulfate HFA (Ventolin HFA) 108 (90 Base) MCG/ACT inhaler, Inhale 1-2 puffs by mouth every 4 to 6 hours as needed (Patient not taking: Reported on 2/13/2023), Disp: 8.5 g, Rfl: 3  •  amitriptyline (ELAVIL) 10 MG tablet, Take 1 tablet by mouth Every Evening., Disp: 90 tablet, Rfl: 3  •  Atogepant (Qulipta) 60 MG tablet, Take 1 tablet by mouth Daily. (Patient taking differently: Take 30 mg by mouth Daily.), Disp: 30 tablet, Rfl: 11  •  B Complex Vitamins (vitamin b complex) capsule capsule, Take 1 capsule by mouth Daily., Disp: , Rfl:   •  Biotin (Biotin 5000) 5 MG capsule, Take 1 capsule by mouth Daily., Disp: , Rfl:   •  cetirizine (zyrTEC) 10 MG tablet, Take 1 tablet by mouth Daily., Disp: 90 tablet, Rfl: 3  •  Cholecalciferol (Vitamin D3) 250 MCG (40202 UT) tablet, Take 10,000 Units by mouth Daily., Disp: , Rfl:   •  cyclobenzaprine (FLEXERIL) 5 MG tablet, Take 1 tablet by mouth At Night As Needed., Disp: 90 tablet, Rfl: 1  •  dexlansoprazole (Dexilant) 60 MG capsule, Take 1 capsule by mouth Daily for 90 days., Disp: 90 capsule, Rfl: 3  •  DULoxetine (CYMBALTA) 20 MG capsule, Take 1 capsule by mouth Daily., Disp: 90 capsule, Rfl: 2  •  famciclovir (FAMVIR) 250 MG tablet, Take 1 tablet by mouth 3 (Three) Times a Day. (Patient  taking differently: Take 250 mg by mouth 2 (Two) Times a Day.), Disp: 270 tablet, Rfl: 4  •  famotidine (PEPCID) 20 MG tablet, Take 20 mg by mouth Every Morning., Disp: , Rfl:   •  linaclotide (Linzess) 145 MCG capsule capsule, Take 1 capsule by mouth Every Morning Before Breakfast., Disp: 90 capsule, Rfl: 3  •  Liraglutide (Saxenda) 18 MG/3ML injection pen, Inject 3 mg under the skin into the appropriate area as directed Daily., Disp: 15 mL, Rfl: 2  •  MAGNESIUM CITRATE PO, Take 270 mg by mouth Daily. *magnesium citrimate+B6 for migraines, Disp: , Rfl:   •  Mirabegron ER (Myrbetriq) 50 MG tablet sustained-release 24 hour 24 hr tablet, Take 1 tablet by mouth Daily. (Patient taking differently: Take 50 mg by mouth Every Other Day.), Disp: 90 tablet, Rfl: 3  •  naltrexone (DEPADE) 50 MG tablet, Take 1/2 tablet by mouth 2 (two) times a day. (Patient taking differently: Daily. Take 1/2 tablet by mouth 2 (two) times a day.), Disp: 30 tablet, Rfl: 2  •  NON FORMULARY, Multivitamin patch daily, Disp: , Rfl:   •  Omega-3 Fatty Acids (fish oil) 1000 MG capsule capsule, Take 1 capsule by mouth 2 (Two) Times a Day With Meals., Disp: , Rfl:   •  ondansetron ODT (ZOFRAN-ODT) 4 MG disintegrating tablet, Place 1 tablet on the tongue Every 8 (Eight) Hours As Needed for nausea, Disp: 20 tablet, Rfl: 0  •  Probiotic Product (PROBIOTIC PO), Take 1 capsule by mouth Daily., Disp: , Rfl:   •  promethazine (PHENERGAN) 25 MG tablet, Take 1 tablet by mouth Every 6 (Six) Hours As Needed for nausea, Disp: 60 tablet, Rfl: 1  •  propranolol LA (INDERAL LA) 80 MG 24 hr capsule, Take 1 capsule by mouth Daily., Disp: 90 capsule, Rfl: 3  •  PSYLLIUM HUSK PO, Take 1 capsule by mouth Daily., Disp: , Rfl:   •  SUMAtriptan-naproxen (TREXIMET)  MG per tablet, Take 1 tablet by mouth 1 (One) Time As Needed for Migraine for up to 2 doses. May repeat dose one time in 2 hours if headache not relieved., Disp: 9 tablet, Rfl: 11  •  Vitamin E 400 units  "tablet, Take 400 Units by mouth Daily., Disp: , Rfl:   •  Insulin Pen Needle (BD Pen Needle Lilly U/F) 32G X 4 MM misc, Use to inject Saxenda once daily, Disp: 100 each, Rfl: 0  •  phentermine (ADIPEX-P) 37.5 MG tablet, Take 1/2 tablet by mouth Every Morning. 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  Never actually taken it internally.   • Latex Anaphylaxis and Rash   • Morphine Rash     Rash (if gets on skin).  Never taken it internally   • Morphine And Related Rash     Rash (if gets on skin).  Never taken it internally   • Penicillins Shortness Of Breath and Palpitations     Keflex OK  Keflex OK  Keflex OK   • Oxycodone Other (See Comments)     Dizziness, confusion     • Topiramate Other (See Comments)     Pt states she can take NAME BRAND ONLY.  Generic brand \"doesn't work\"         Family History   Problem Relation Age of Onset   • Hypertension Mother    • Diabetes Father         Type II   • Hypertension Father    • Breast cancer Sister 34   • Hypertension Maternal Grandmother    • Alzheimer's disease Maternal Grandmother    • Breast cancer Maternal Grandmother    • Heart disease Maternal Grandmother    • Arthritis Maternal Grandmother    • Cancer Maternal Grandmother         Breast   • Depression Maternal Grandmother    • Hyperlipidemia Maternal Grandmother    • Stroke Maternal Grandfather    • Skin cancer Maternal Grandfather    • Hypertension Maternal Grandfather    • Arthritis Maternal Grandfather    • Cancer Maternal Grandfather    • Depression Maternal " "Grandfather    • Hearing loss Maternal Grandfather    • Hyperlipidemia Maternal Grandfather    • Diabetes Paternal Grandfather    • Hypertension Paternal Grandfather    • Hyperlipidemia Paternal Grandfather    • Breast cancer Maternal Aunt    • Hyperlipidemia Other    • Gout Other    • Migraines Other        Social History     Socioeconomic History   • Marital status:    Tobacco Use   • Smoking status: Never   • Smokeless tobacco: Never   Vaping Use   • Vaping Use: Never used   Substance and Sexual Activity   • Alcohol use: No   • Drug use: No   • Sexual activity: Defer     Partners: Male     Birth control/protection: Surgical       Review of Systems   Constitutional: Negative for chills, fever and unexpected weight change.   HENT: Negative for hearing loss, trouble swallowing and voice change.    Eyes: Negative for visual disturbance.   Respiratory: Negative for apnea, cough, chest tightness, shortness of breath and wheezing.    Cardiovascular: Negative for chest pain, palpitations and leg swelling.   Gastrointestinal: Positive for constipation. Negative for abdominal distention, abdominal pain, anal bleeding, blood in stool, diarrhea, nausea, rectal pain and vomiting.   Endocrine: Negative for cold intolerance and heat intolerance.   Genitourinary: Negative for difficulty urinating, dysuria and flank pain.   Musculoskeletal: Negative for back pain and gait problem.   Skin: Negative for color change, rash and wound.   Neurological: Negative for dizziness, syncope, speech difficulty, weakness, light-headedness, numbness and headaches.   Hematological: Negative for adenopathy. Does not bruise/bleed easily.   Psychiatric/Behavioral: Negative for confusion. The patient is not nervous/anxious.        Objective    /74 (BP Location: Right arm, Patient Position: Sitting, Cuff Size: Adult)   Pulse 75   Temp 97.5 °F (36.4 °C) (Infrared)   Ht 162.6 cm (64\") Comment: patient recorded  Wt 73 kg (161 lb)   SpO2 " 100%   BMI 27.64 kg/m²     Physical Exam  Constitutional:       Appearance: She is well-developed.   HENT:      Head: Normocephalic and atraumatic.   Eyes:      General: No scleral icterus.  Cardiovascular:      Rate and Rhythm: Regular rhythm.   Pulmonary:      Effort: Pulmonary effort is normal.   Abdominal:      General: There is no distension.      Palpations: Abdomen is soft.      Tenderness: There is no abdominal tenderness.   Musculoskeletal:      Cervical back: Neck supple.   Skin:     General: Skin is warm and dry.   Neurological:      Mental Status: She is alert and oriented to person, place, and time.   Psychiatric:         Behavior: Behavior normal.         Assessment & Plan   Diagnoses and all orders for this visit:    1. Nausea (Primary)  -     Case Request; Standing  -     lactated ringers infusion  -     sodium chloride 0.9 % flush 10 mL  -     sodium chloride 0.9 % flush 10 mL  -     sodium chloride 0.9 % infusion 40 mL  -     Case Request    2. Constipation, unspecified constipation type  -     Case Request; Standing  -     lactated ringers infusion  -     sodium chloride 0.9 % flush 10 mL  -     sodium chloride 0.9 % flush 10 mL  -     sodium chloride 0.9 % infusion 40 mL  -     Case Request    3. Generalized abdominal pain  -     Case Request; Standing  -     lactated ringers infusion  -     sodium chloride 0.9 % flush 10 mL  -     sodium chloride 0.9 % flush 10 mL  -     sodium chloride 0.9 % infusion 40 mL  -     Case Request    Other orders  -     Sodium Sulfate-Mag Sulfate-KCl 4834-178-582 MG tablet; Take 24 tablets by mouth As Directed.  Dispense: 24 tablet; Refill: 0  -     linaclotide (Linzess) 145 MCG capsule capsule; Take 1 capsule by mouth Every Morning Before Breakfast.  Dispense: 90 capsule; Refill: 3  -     Follow Anesthesia Guidelines / Protocol; Future  -     Obtain Informed Consent; Future  -     Provide NPO Instructions to Patient; Future  -     Follow Anesthesia Guidelines /  Protocol; Standing  -     Verify Bowel Prep Was Successful; Standing  -     Give Tap Water Enema If Bowel Prep Insufficient; Standing  -     Notify Physician - Standard; Standing  -     Insert Peripheral IV; Standing  -     Saline Lock & Maintain IV Access; Standing        We discussed EGD for diagnostic purposes..  We discussed the indications for upper endoscopy as well as the risks, benefits and alternatives to this procedure.   The patient was given an opportunity to ask questions.  The patient verbalized understanding of these recommendations and the plan of care. The patient is willing to proceed with endoscopy and has signed informed consent in the office today.  My office will arrange scheduling for the EGD procedure and pre-admission testing.      We discussed colonoscopy for diagnostic purposes.  We discussed the indications for colonoscopy as well as the risks, benefits and alternatives to this procedure. Risks including but not limited to perforation, bleeding,need for blood transfusion or emergent surgery ,and missed neoplasm were reviewed in detail with the patient. The necessary bowel preparation and pre-procedure clear liquid diet was explained in detail.  A written instructional handout was also provided.  Electronic prescriptions for miralax and dulcolax were sent to the patient's pharmacy.  The patient was given an opportunity to ask questions.  The patient verbalized understanding of these recommendations and the plan of care. The patient is willing to proceed with colonoscopy and has signed informed consent in the office today.  My office will arrange scheduling for the colonoscopy procedure and pre-admission testing.

## 2023-02-09 ENCOUNTER — OFFICE VISIT (OUTPATIENT)
Dept: SURGERY | Facility: CLINIC | Age: 48
End: 2023-02-09
Payer: COMMERCIAL

## 2023-02-09 VITALS
DIASTOLIC BLOOD PRESSURE: 74 MMHG | HEIGHT: 64 IN | BODY MASS INDEX: 27.49 KG/M2 | SYSTOLIC BLOOD PRESSURE: 118 MMHG | WEIGHT: 161 LBS | OXYGEN SATURATION: 100 % | HEART RATE: 75 BPM | TEMPERATURE: 97.5 F

## 2023-02-09 DIAGNOSIS — R11.0 NAUSEA: Primary | ICD-10-CM

## 2023-02-09 DIAGNOSIS — K59.00 CONSTIPATION, UNSPECIFIED CONSTIPATION TYPE: ICD-10-CM

## 2023-02-09 DIAGNOSIS — R10.84 GENERALIZED ABDOMINAL PAIN: ICD-10-CM

## 2023-02-09 PROBLEM — J30.9 ALLERGIC RHINITIS: Status: ACTIVE | Noted: 2020-12-12

## 2023-02-09 PROBLEM — R11.2 NAUSEA AND VOMITING: Status: ACTIVE | Noted: 2023-01-09

## 2023-02-09 PROBLEM — E55.9 VITAMIN D DEFICIENCY: Status: ACTIVE | Noted: 2021-12-05

## 2023-02-09 PROBLEM — Z90.3 HISTORY OF SLEEVE GASTRECTOMY: Status: ACTIVE | Noted: 2021-12-02

## 2023-02-09 PROCEDURE — 99213 OFFICE O/P EST LOW 20 MIN: CPT | Performed by: SURGERY

## 2023-02-09 RX ORDER — SODIUM CHLORIDE, SODIUM LACTATE, POTASSIUM CHLORIDE, CALCIUM CHLORIDE 600; 310; 30; 20 MG/100ML; MG/100ML; MG/100ML; MG/100ML
50 INJECTION, SOLUTION INTRAVENOUS CONTINUOUS
Status: CANCELLED | OUTPATIENT
Start: 2023-02-09

## 2023-02-09 RX ORDER — SODIUM CHLORIDE 0.9 % (FLUSH) 0.9 %
10 SYRINGE (ML) INJECTION AS NEEDED
Status: CANCELLED | OUTPATIENT
Start: 2023-02-09

## 2023-02-09 RX ORDER — SODIUM CHLORIDE 0.9 % (FLUSH) 0.9 %
10 SYRINGE (ML) INJECTION EVERY 12 HOURS SCHEDULED
Status: CANCELLED | OUTPATIENT
Start: 2023-02-09

## 2023-02-09 RX ORDER — SODIUM CHLORIDE 9 MG/ML
40 INJECTION, SOLUTION INTRAVENOUS AS NEEDED
Status: CANCELLED | OUTPATIENT
Start: 2023-02-09

## 2023-02-13 ENCOUNTER — TELEPHONE (OUTPATIENT)
Dept: SURGERY | Facility: CLINIC | Age: 48
End: 2023-02-13
Payer: COMMERCIAL

## 2023-02-13 ENCOUNTER — OFFICE VISIT (OUTPATIENT)
Dept: BARIATRICS/WEIGHT MGMT | Facility: CLINIC | Age: 48
End: 2023-02-13
Payer: COMMERCIAL

## 2023-02-13 VITALS
SYSTOLIC BLOOD PRESSURE: 112 MMHG | OXYGEN SATURATION: 100 % | WEIGHT: 160 LBS | DIASTOLIC BLOOD PRESSURE: 58 MMHG | HEIGHT: 64 IN | BODY MASS INDEX: 27.31 KG/M2 | HEART RATE: 78 BPM

## 2023-02-13 DIAGNOSIS — E88.81 METABOLIC SYNDROME: ICD-10-CM

## 2023-02-13 DIAGNOSIS — Z90.3 POSTGASTRECTOMY MALABSORPTION: ICD-10-CM

## 2023-02-13 DIAGNOSIS — R53.83 FATIGUE, UNSPECIFIED TYPE: ICD-10-CM

## 2023-02-13 DIAGNOSIS — E66.3 OVERWEIGHT: Primary | ICD-10-CM

## 2023-02-13 DIAGNOSIS — Z13.21 MALNUTRITION SCREEN: ICD-10-CM

## 2023-02-13 DIAGNOSIS — K21.9 GASTROESOPHAGEAL REFLUX DISEASE, UNSPECIFIED WHETHER ESOPHAGITIS PRESENT: ICD-10-CM

## 2023-02-13 DIAGNOSIS — F41.9 ANXIETY: ICD-10-CM

## 2023-02-13 DIAGNOSIS — E78.5 HYPERLIPIDEMIA, UNSPECIFIED HYPERLIPIDEMIA TYPE: ICD-10-CM

## 2023-02-13 DIAGNOSIS — K91.2 POSTGASTRECTOMY MALABSORPTION: ICD-10-CM

## 2023-02-13 PROCEDURE — 99214 OFFICE O/P EST MOD 30 MIN: CPT | Performed by: NURSE PRACTITIONER

## 2023-02-13 RX ORDER — PHENTERMINE HYDROCHLORIDE 37.5 MG/1
18.75 TABLET ORAL EVERY MORNING
Qty: 14 TABLET | Refills: 0 | Status: SHIPPED | OUTPATIENT
Start: 2023-02-13 | End: 2023-03-02

## 2023-02-13 RX ORDER — PEN NEEDLE, DIABETIC 32GX 5/32"
1 NEEDLE, DISPOSABLE MISCELLANEOUS DAILY
Qty: 100 EACH | Refills: 0 | Status: SHIPPED | OUTPATIENT
Start: 2023-02-13

## 2023-02-13 NOTE — ASSESSMENT & PLAN NOTE
Patient's (Body mass index is 27.46 kg/m².) indicates that they are overweight with health conditions that include dyslipidemias, GERD and insulin resistance, anxiety . Weight is improving with treatment. BMI is is above average; BMI management plan is completed. We discussed low calorie, low carb based diet program, portion control, increasing exercise, pharmacologic options including saxenda, phentermine, naltrexone and an radha-based approach such as Product[employer] Pal or Lose It.    I have instructed the patient to continue with pursuit of medical weight loss as a part of this program. Patient does meet criteria for use of anorectics at this time as BMI > 27 with coexisting, hyperlipidemia and is not at treatment goal.     Continue nutritional focus and work towards new exercise FITT goal of: 2-2-4-0. Increase as tolerated and as recommended by eye surgeon.  The current plan for this month includes: continue to work on lifestyle behavioral changes with 2 protein shakes daily. Continue saxenda, discussed this is likely a long term medication and there is significant risk of weight recurrence with discontinuation. She is frustrated that she keeps having health hurdles and can't exercise the way she wants to. Check full lab panel today including labs for bariatrics, I will forward the results to them.     Continue sympathomimetic (medication refilled).  This patient 1) has no evidence of serious cardiovascular disease; 2) does not have serious psychiatric disease or a history of substance abuse; 3) has been informed about weight loss medications that are FDA approved for long-term use and told that these have been all documented to be safe and effective whereas phentermine has not; 4) does not demonstrate a clinically significant increase in pulse or blood pressure when taking phentermine; and 5) demonstrate significant weight loss while using the medication.  Patient understands that all antiobesity medications are  "contraindicated in pregnancy.  Patient denies a history of glaucoma.  Patient understands that long-term use of phentermine is considered off label use of this medication, however, that the endocrine Society and recent research supports that long-term use of phentermine does not appear to have detrimental health effects when used and the appropriate patient.  In addition, a 2019 study published in an obesity journal on [id number] patient's concluded that \"recommendations to limit phentermine to less than 3 months do not align with current concept of pharmacologic treatment of obesity\", and that \"long-term phentermine users experience greater weight loss without apparent increases in cardiovascular risk\".    We reviewed potential side effects including insomnia, dry mouth, increased heart rate and blood pressure, increased anxiety.  We reviewed reducing caffeine consumption while taking phentermine.  especially if the patient is experience side effects.  The potential risk and benefits of phentermine have been reviewed with the patient, and alternative treatment options were discussed.  All questions were answered, and the patient wishes to move forward with this medication.       "

## 2023-02-13 NOTE — ASSESSMENT & PLAN NOTE
Repeat labwork today. Low carb diet, regular physical activity, and weight reduction of 10-15%.

## 2023-02-13 NOTE — PROGRESS NOTES
[facility]  [address]  [place] [address]     Office Note      Date: 2023  Patient Name: Renae Bolton  MRN: [id number]  : 1975  Subjective  Subjective     Chief Complaint  Obesity Management follow-up          Renae Bolton presents to [facility] for obesity management. s/p LSG/HHR 19 w/ Dr. Onofre. Pre-surgery weight: 232.5, gonzalez 183lb, final goal 150lb.  Patient is unsatisfied with weight loss progress. Appetite is poorly controlled. Stopped saxenda for a week, had a stomach bug. Just worked back up to 2.4mg. Has also been off of phentermine since Friday, has a EGD and colonoscopy for chronic constipation. Surgeon is aware that she is on saxenda and phentermine. Frequently forgetting afternoon naltrexone dose, knows her evening cravings and hunger are much better when she remembers it. Reports no side effects of prescribed medications today. The patient is taking multivitamin and is taking fish oil.  The patient is not using a food journal.  Doing one protein shake a day, tolerating well now.   The patient is not exercising, can resume walking or elliptical, still can't lift weights or sit ups or anything that will cause pressure in her head due to eye surgery.    Review of Systems   Constitutional: Positive for appetite change. Negative for fatigue.   Eyes: Negative for blurred vision, double vision and visual disturbance.   Cardiovascular: Negative for chest pain and palpitations.   Gastrointestinal: Positive for abdominal pain, constipation, nausea and GERD. Negative for diarrhea and vomiting.   Endocrine: Negative for polydipsia, polyphagia and polyuria.   Musculoskeletal: Negative for arthralgias, back pain and myalgias.   Neurological: Positive for dizziness and light-headedness. Negative for tremors, headache and memory problem.        Parasthesias negative   Psychiatric/Behavioral: Positive  "for sleep disturbance and stress. Negative for depressed mood. The patient is nervous/anxious.        Objective   Start weight: 193 pounds.    Total Loss lb/%Loss of beginning body weight (BBW): -33lb/-17.10%  Change in weight since last visit: +3  Has maintained weight loss for an entire year now.      Body mass index is 27.46 kg/m².   Body composition analysis completed and showed:   %body fat: 38.8  Measurements (in inches)  Waist: 35.5    Vital Signs:   /58   Pulse 78   Ht 162.6 cm (64\")   Wt 72.6 kg (160 lb)   SpO2 100%   BMI 27.46 kg/m²     Physical Exam   General appears stated age and normal appearance   HEENT PERRLA, EOM intact and conjunctivae normal   Chest/lungs Normal rate, Regular rhythm, Breathing is unlabored and Clear to auscultation bilaterally   Extremities without edema   Neuro Good historian and No focal deficit   Skin Warm, dry, intact   Psych normal behavior, normal thought content and normal concentration     Result Review :                Assessment / Plan        Diagnoses and all orders for this visit:    1. Overweight (Primary)  Assessment & Plan:  Patient's (Body mass index is 27.46 kg/m².) indicates that they are overweight with health conditions that include dyslipidemias, GERD and insulin resistance, anxiety . Weight is improving with treatment. BMI is is above average; BMI management plan is completed. We discussed low calorie, low carb based diet program, portion control, increasing exercise, pharmacologic options including saxenda, phentermine, naltrexone and an radha-based approach such as [facility] Pal or Lose It.    I have instructed the patient to continue with pursuit of medical weight loss as a part of this program. Patient does meet criteria for use of anorectics at this time as BMI > 27 with coexisting, hyperlipidemia and is not at treatment goal.     Continue nutritional focus and work towards new exercise FITT goal of: 2-2-4-0. Increase as tolerated and as " "recommended by eye surgeon.  The current plan for this month includes: continue to work on lifestyle behavioral changes with 2 protein shakes daily. Continue saxenda, discussed this is likely a long term medication and there is significant risk of weight recurrence with discontinuation. She is frustrated that she keeps having health hurdles and can't exercise the way she wants to. Check full lab panel today including labs for bariatrics, I will forward the results to them.     Continue sympathomimetic (medication refilled).  This patient 1) has no evidence of serious cardiovascular disease; 2) does not have serious psychiatric disease or a history of substance abuse; 3) has been informed about weight loss medications that are FDA approved for long-term use and told that these have been all documented to be safe and effective whereas phentermine has not; 4) does not demonstrate a clinically significant increase in pulse or blood pressure when taking phentermine; and 5) demonstrate significant weight loss while using the medication.  Patient understands that all antiobesity medications are contraindicated in pregnancy.  Patient denies a history of glaucoma.  Patient understands that long-term use of phentermine is considered off label use of this medication, however, that the endocrine Society and recent research supports that long-term use of phentermine does not appear to have detrimental health effects when used and the appropriate patient.  In addition, a 2019 study published in an obesity journal on [id number] patient's concluded that \"recommendations to limit phentermine to less than 3 months do not align with current concept of pharmacologic treatment of obesity\", and that \"long-term phentermine users experience greater weight loss without apparent increases in cardiovascular risk\".    We reviewed potential side effects including insomnia, dry mouth, increased heart rate and blood pressure, increased anxiety.  We " reviewed reducing caffeine consumption while taking phentermine.  especially if the patient is experience side effects.  The potential risk and benefits of phentermine have been reviewed with the patient, and alternative treatment options were discussed.  All questions were answered, and the patient wishes to move forward with this medication.         Orders:  -     phentermine (ADIPEX-P) 37.5 MG tablet; Take 1/2 tablet by mouth Every Morning.  Dispense: 14 tablet; Refill: 0  -     Insulin Pen Needle (BD Pen Needle Lilly U/F) 32G X 4 MM misc; Use to inject Saxenda once daily  Dispense: 100 each; Refill: 0  -     Comprehensive Metabolic Panel  -     Calcitriol (1,25 di-OH Vitamin D)  -     Hemoglobin A1c  -     Lipid Panel  -     Insulin, Total    2. Gastroesophageal reflux disease, unspecified whether esophagitis present  -     Comprehensive Metabolic Panel  -     CBC & Differential  -     Vitamin B12    3. Hyperlipidemia, unspecified hyperlipidemia type  Assessment & Plan:  Continue with healthy lifestyle changes. Repeat labwork today.       4. Anxiety  -     TSH  -     T4, Free    5. Metabolic syndrome  Assessment & Plan:  Repeat labwork today. Low carb diet, regular physical activity, and weight reduction of 10-15%.       Orders:  -     Comprehensive Metabolic Panel  -     Hemoglobin A1c  -     Lipid Panel  -     Insulin, Total    6. Malnutrition screen  -     CBC & Differential  -     Calcitriol (1,25 di-OH Vitamin D)  -     Vitamin B12  -     Folate  -     Iron  -     Vitamin B1, Whole Blood  -     Ferritin  -     Methylmalonic Acid, Serum    7. Postgastrectomy malabsorption  -     TSH  -     T4, Free  -     Vitamin B12  -     Folate  -     Iron  -     Vitamin B1, Whole Blood  -     Ferritin  -     Methylmalonic Acid, Serum    8. Fatigue, unspecified type  -     CBC & Differential  -     Calcitriol (1,25 di-OH Vitamin D)  -     TSH  -     T4, Free  -     Vitamin B12  -     Folate  -     Iron  -      Ferritin      I spent 32 minutes caring for Renae on this date of service. This time includes time spent by me in the following activities:preparing for the visit, obtaining and/or reviewing a separately obtained history, performing a medically appropriate examination and/or evaluation , counseling and educating the patient/family/caregiver, ordering medications, tests, or procedures and documenting information in the medical record  Follow Up   Return in about 4 weeks (around 3/13/2023) for Next scheduled follow up.  Patient was given instructions and counseling regarding her condition or for health maintenance advice. Please see specific information pulled into the AVS if appropriate.     Bindu Lechuga, APRN  02/13/2023

## 2023-02-23 LAB
1,25(OH)2D SERPL-MCNC: 55.1 PG/ML (ref 24.8–81.5)
ALBUMIN SERPL-MCNC: 4.3 G/DL (ref 3.8–4.8)
ALBUMIN/GLOB SERPL: 1.8 {RATIO} (ref 1.2–2.2)
ALP SERPL-CCNC: 57 IU/L (ref 44–121)
ALT SERPL-CCNC: 27 IU/L (ref 0–32)
AST SERPL-CCNC: 20 IU/L (ref 0–40)
BASOPHILS # BLD AUTO: 0 X10E3/UL (ref 0–0.2)
BASOPHILS NFR BLD AUTO: 1 %
BILIRUB SERPL-MCNC: 0.3 MG/DL (ref 0–1.2)
BUN SERPL-MCNC: 18 MG/DL (ref 6–24)
BUN/CREAT SERPL: 19 (ref 9–23)
CALCIUM SERPL-MCNC: 9.4 MG/DL (ref 8.7–10.2)
CHLORIDE SERPL-SCNC: 105 MMOL/L (ref 96–106)
CHOLEST SERPL-MCNC: 209 MG/DL (ref 100–199)
CO2 SERPL-SCNC: 27 MMOL/L (ref 20–29)
CREAT SERPL-MCNC: 0.93 MG/DL (ref 0.57–1)
EGFRCR SERPLBLD CKD-EPI 2021: 75 ML/MIN/1.73
EOSINOPHIL # BLD AUTO: 0.2 X10E3/UL (ref 0–0.4)
EOSINOPHIL NFR BLD AUTO: 3 %
ERYTHROCYTE [DISTWIDTH] IN BLOOD BY AUTOMATED COUNT: 12.8 % (ref 11.7–15.4)
FERRITIN SERPL-MCNC: 73 NG/ML (ref 15–150)
FOLATE SERPL-MCNC: 10.6 NG/ML
GLOBULIN SER CALC-MCNC: 2.4 G/DL (ref 1.5–4.5)
GLUCOSE SERPL-MCNC: 90 MG/DL (ref 70–99)
HBA1C MFR BLD: 5.3 % (ref 4.8–5.6)
HCT VFR BLD AUTO: 43.3 % (ref 34–46.6)
HDLC SERPL-MCNC: 61 MG/DL
HGB BLD-MCNC: 14.3 G/DL (ref 11.1–15.9)
IMM GRANULOCYTES # BLD AUTO: 0 X10E3/UL (ref 0–0.1)
IMM GRANULOCYTES NFR BLD AUTO: 0 %
INSULIN SERPL-ACNC: 11.4 UIU/ML (ref 2.6–24.9)
IRON SERPL-MCNC: 71 UG/DL (ref 27–159)
LDLC SERPL CALC-MCNC: 135 MG/DL (ref 0–99)
LYMPHOCYTES # BLD AUTO: 1.6 X10E3/UL (ref 0.7–3.1)
LYMPHOCYTES NFR BLD AUTO: 30 %
MCH RBC QN AUTO: 31.1 PG (ref 26.6–33)
MCHC RBC AUTO-ENTMCNC: 33 G/DL (ref 31.5–35.7)
MCV RBC AUTO: 94 FL (ref 79–97)
METHYLMALONATE SERPL-SCNC: 155 NMOL/L (ref 0–378)
MONOCYTES # BLD AUTO: 0.5 X10E3/UL (ref 0.1–0.9)
MONOCYTES NFR BLD AUTO: 9 %
NEUTROPHILS # BLD AUTO: 3.1 X10E3/UL (ref 1.4–7)
NEUTROPHILS NFR BLD AUTO: 57 %
PLATELET # BLD AUTO: 301 X10E3/UL (ref 150–450)
POTASSIUM SERPL-SCNC: 4.4 MMOL/L (ref 3.5–5.2)
PROT SERPL-MCNC: 6.7 G/DL (ref 6–8.5)
RBC # BLD AUTO: 4.6 X10E6/UL (ref 3.77–5.28)
SODIUM SERPL-SCNC: 143 MMOL/L (ref 134–144)
T4 FREE SERPL-MCNC: 1.42 NG/DL (ref 0.82–1.77)
TRIGL SERPL-MCNC: 70 MG/DL (ref 0–149)
TSH SERPL DL<=0.005 MIU/L-ACNC: 2.34 UIU/ML (ref 0.45–4.5)
VIT B1 BLD-SCNC: 191.6 NMOL/L (ref 66.5–200)
VIT B12 SERPL-MCNC: 832 PG/ML (ref 232–1245)
VLDLC SERPL CALC-MCNC: 13 MG/DL (ref 5–40)
WBC # BLD AUTO: 5.3 X10E3/UL (ref 3.4–10.8)

## 2023-03-01 DIAGNOSIS — N32.81 OVERACTIVE BLADDER: ICD-10-CM

## 2023-03-13 ENCOUNTER — TELEMEDICINE (OUTPATIENT)
Dept: BARIATRICS/WEIGHT MGMT | Facility: CLINIC | Age: 48
End: 2023-03-13
Payer: COMMERCIAL

## 2023-03-13 VITALS
BODY MASS INDEX: 27.49 KG/M2 | WEIGHT: 161 LBS | SYSTOLIC BLOOD PRESSURE: 116 MMHG | HEIGHT: 64 IN | HEART RATE: 81 BPM | DIASTOLIC BLOOD PRESSURE: 61 MMHG

## 2023-03-13 DIAGNOSIS — E66.3 OVERWEIGHT: Primary | ICD-10-CM

## 2023-03-13 DIAGNOSIS — E88.81 METABOLIC SYNDROME: ICD-10-CM

## 2023-03-13 PROCEDURE — 99214 OFFICE O/P EST MOD 30 MIN: CPT | Performed by: NURSE PRACTITIONER

## 2023-03-13 NOTE — PROGRESS NOTES
"     Office Note      Date: 2023  Patient Name: Renae Bolton  MRN: [id number]  : 1975    Patient presents during the COVID-19 pandemic/federally declared [facility] emergency.  This service was conducted via Zoom.  Patient is located at her home address.  Provider is located at her primary office location. The use of a video visit has been reviewed with the patient and verbal informed consent has been obtained.  Subjective  Subjective     Chief Complaint  Obesity Management follow-up    Subjective          Renae Bolton presents to [facility] via telehealth for obesity management.     Patient is satisfied with weight loss progress. Bouncing back and forth a pound or two. Feeling bloated. Good days and bad days (doesn't want to eat because it exaggerates her symptoms). Not always making good choices. Might have baked potatoes/french fries. Still tries to eat fuit every day although her system doesn't do well with skin or seeds. Eggs really upset her stomach. Tolerating protein shakes most days, getting 3-4 a week. Appetite is moderately controlled. Had 2-3 days of terrible diarrhea and vomiting, stopped saxenda during that time. EGD rescheduled for next week. Has to be off of phentermine for 2 weeks. Has 29 1/2 tablets left.  Reports no side effects of prescribed medications today.   The patient is not exercising with  right now. Medical bills are getting too high with insurance policy change and multiple tests this year. Looking into perks at work program.     Objective   Body mass index is 27.64 kg/m².  Start weight: 193 pounds.    Total weight loss: -32 pounds/-16%  Change in weight since last visit: +1lb    Measurements (in inches)  Waist: 35.5\"  /61   Pulse 81   Ht 162.6 cm (64\")   Wt 73 kg (161 lb)   BMI 27.64 kg/m²       Result Review :                 Assessment and Plan    Diagnoses and all orders for this " visit:    1. Overweight (Primary)  Assessment & Plan:  Patient's (Body mass index is 27.64 kg/m².) indicates that they are overweight with health conditions that include obstructive sleep apnea, dyslipidemias, GERD and metabolic syndrome . Weight is improving with treatment. BMI is is above average; BMI management plan is completed. We discussed low calorie, low carb based diet program, portion control, increasing exercise, joining a fitness center or start home based exercise program, management of depression/anxiety/stress to control compensatory eating, pharmacologic options including saxenda, phentermine, wellbutrin/naltrexone and an radha-based approach such as [employer] Pal or Lose It.    I have instructed the patient to continue with pursuit of medical weight loss as a part of this program. Patient does meet criteria for use of anorectics at this time as BMI > 27 with coexisting and is not at treatment goal.     Continue nutritional focus and work towards new exercise FITT goal of: 2-2-4-2-. Bring back exercise- looking into program through work.   The current plan for this month includes: continue to work on lifestyle behavioral changes and continue nutrition focus- continue to be mindful of protein and water intake. Continue current medications. She's had several months of health setbacks, is maintaining weight which is a major accomplishment. Treatment goal 155lb.          2. Metabolic syndrome  Assessment & Plan:  Denies hypoglycemia. Low carb diet, regular physical activity, and weight reduction of 10-15%. Continue saxenda.           I spent 30 minutes caring for Renae on this date of service. This time includes time spent by me in the following activities:preparing for the visit, performing a medically appropriate examination and/or evaluation , counseling and educating the patient/family/caregiver, ordering medications, tests, or procedures and documenting information in the medical record  Follow Up    Return in about 4 weeks (around 4/10/2023) for Next scheduled follow up.  Patient was given instructions and counseling regarding her condition or for health maintenance advice. Please see specific information pulled into the AVS if appropriate.     LAURITA Michel

## 2023-03-13 NOTE — ASSESSMENT & PLAN NOTE
Denies hypoglycemia. Low carb diet, regular physical activity, and weight reduction of 10-15%. Continue saxenda.

## 2023-03-13 NOTE — ASSESSMENT & PLAN NOTE
Patient's (Body mass index is 27.64 kg/m².) indicates that they are overweight with health conditions that include obstructive sleep apnea, dyslipidemias, GERD and metabolic syndrome . Weight is improving with treatment. BMI is is above average; BMI management plan is completed. We discussed low calorie, low carb based diet program, portion control, increasing exercise, joining a fitness center or start home based exercise program, management of depression/anxiety/stress to control compensatory eating, pharmacologic options including saxenda, phentermine, wellbutrin/naltrexone and an radha-based approach such as [employer] Pal or Lose It.    I have instructed the patient to continue with pursuit of medical weight loss as a part of this program. Patient does meet criteria for use of anorectics at this time as BMI > 27 with coexisting and is not at treatment goal.     Continue nutritional focus and work towards new exercise FITT goal of: 2-2-4-2-. Bring back exercise- looking into program through work.   The current plan for this month includes: continue to work on lifestyle behavioral changes and continue nutrition focus- continue to be mindful of protein and water intake. Continue current medications. She's had several months of health setbacks, is maintaining weight which is a major accomplishment. Treatment goal 155lb.

## 2023-03-16 NOTE — PROGRESS NOTES
Subjective   Renae Bolton is a 47 y.o. female.   Chief Complaint   Patient presents with   • Follow-up     nausea and constipation       History of Present Illness     Ms. Bolton returns to the office today to discuss her nausea and constipation.  She was scheduled for EGD and colonoscopy for further evaluation but cancelled the procedures due to concerns related to her out of pocket costs.  She remains on Dexilant. She also had prescriptions for ondansetron and phenergan prn.  She has been taking linzess and senna for her constipation.         The following portions of the patient's history were reviewed and updated as appropriate: allergies, current medications, past family history, past medical history, past social history, past surgical history and problem list.    Review of Systems   Constitutional: Negative for chills, fever and unexpected weight change.   HENT: Negative for hearing loss, trouble swallowing and voice change.    Eyes: Negative for visual disturbance.   Respiratory: Negative for apnea, cough, chest tightness, shortness of breath and wheezing.    Cardiovascular: Negative for chest pain, palpitations and leg swelling.   Gastrointestinal: Positive for constipation and nausea. Negative for abdominal distention, abdominal pain, anal bleeding, blood in stool, diarrhea, rectal pain and vomiting.   Endocrine: Negative for cold intolerance and heat intolerance.   Genitourinary: Negative for difficulty urinating, dysuria and flank pain.   Musculoskeletal: Negative for back pain and gait problem.   Skin: Negative for color change, rash and wound.   Neurological: Negative for dizziness, syncope, speech difficulty, weakness, light-headedness, numbness and headaches.   Hematological: Negative for adenopathy. Does not bruise/bleed easily.   Psychiatric/Behavioral: Negative for confusion. The patient is not nervous/anxious.        Objective    /64   Pulse 72   Temp 98.4 °F (36.9 °C) (Temporal)    "Resp 18   Ht 162.6 cm (64\")   Wt 74.1 kg (163 lb 6.4 oz)   SpO2 99%   BMI 28.05 kg/m²     Physical Exam  Constitutional:       Appearance: She is well-developed.   HENT:      Head: Normocephalic and atraumatic.   Cardiovascular:      Rate and Rhythm: Regular rhythm.   Pulmonary:      Effort: Pulmonary effort is normal.   Abdominal:      General: There is no distension.      Palpations: Abdomen is soft.      Tenderness: There is no abdominal tenderness.   Skin:     General: Skin is warm and dry.   Neurological:      Mental Status: She is alert and oriented to person, place, and time.   Psychiatric:         Behavior: Behavior normal.         Assessment & Plan   Diagnoses and all orders for this visit:    1. Chronic nausea (Primary)    2. Chronic idiopathic constipation      I had a long discussion with Ms. Bolton in the office today regarding EGD and Colonoscopy and their respective indications.  We discussed strategies for managing her nausea and constipation.  She understands the importance of rescheduling the procedures in the near future.  She will call back when she is ready to move forward.  I also advised her to keep her scheduled follow up with bariatric surgery,    "

## 2023-03-23 ENCOUNTER — OFFICE VISIT (OUTPATIENT)
Dept: SURGERY | Facility: CLINIC | Age: 48
End: 2023-03-23
Payer: COMMERCIAL

## 2023-03-23 VITALS
BODY MASS INDEX: 27.9 KG/M2 | OXYGEN SATURATION: 99 % | DIASTOLIC BLOOD PRESSURE: 64 MMHG | TEMPERATURE: 98.4 F | WEIGHT: 163.4 LBS | HEIGHT: 64 IN | HEART RATE: 72 BPM | RESPIRATION RATE: 18 BRPM | SYSTOLIC BLOOD PRESSURE: 124 MMHG

## 2023-03-23 DIAGNOSIS — K59.04 CHRONIC IDIOPATHIC CONSTIPATION: ICD-10-CM

## 2023-03-23 DIAGNOSIS — R11.0 CHRONIC NAUSEA: Primary | ICD-10-CM

## 2023-03-23 PROCEDURE — 99212 OFFICE O/P EST SF 10 MIN: CPT | Performed by: SURGERY

## 2023-03-23 RX ORDER — PHENTERMINE HYDROCHLORIDE 30 MG/1
30 CAPSULE ORAL EVERY MORNING
COMMUNITY
End: 2023-04-13

## 2023-03-30 ENCOUNTER — TELEMEDICINE (OUTPATIENT)
Dept: BARIATRICS/WEIGHT MGMT | Facility: CLINIC | Age: 48
End: 2023-03-30
Payer: COMMERCIAL

## 2023-03-30 VITALS — WEIGHT: 162 LBS | BODY MASS INDEX: 27.66 KG/M2 | HEIGHT: 64 IN

## 2023-03-30 DIAGNOSIS — R53.83 FATIGUE, UNSPECIFIED TYPE: Primary | ICD-10-CM

## 2023-03-30 DIAGNOSIS — Z90.3 POSTGASTRECTOMY MALABSORPTION: ICD-10-CM

## 2023-03-30 DIAGNOSIS — Z13.0 SCREENING, IRON DEFICIENCY ANEMIA: ICD-10-CM

## 2023-03-30 DIAGNOSIS — Z13.21 MALNUTRITION SCREEN: ICD-10-CM

## 2023-03-30 DIAGNOSIS — E55.9 HYPOVITAMINOSIS D: ICD-10-CM

## 2023-03-30 DIAGNOSIS — K91.2 POSTGASTRECTOMY MALABSORPTION: ICD-10-CM

## 2023-03-30 PROCEDURE — 99423 OL DIG E/M SVC 21+ MIN: CPT | Performed by: SURGERY

## 2023-03-30 NOTE — PROGRESS NOTES
[facility]ariatric Surgery  [address]  [address]  [facility] [id number] [phone]        Patient Name:  Renae Bolton.  :  1975      Date of Visit: 3/30/2023      Reason for Visit:   3 years postop      HPI: Renae Bolton is a 47 y.o. female s/p LSG/HHR 19 w/ Dr. Onofre    Follows with Bindu Lechuga for phentermine and Saxenda.  Feels like weight has stalled.    I reviewed her 2023 labs that Bindu ordered: no vitamin deficiencies.    Seeing Dr. Palmer for bloating, lower abdominal pain, constipation.  Pending colonoscopy/EGD with Dr. Palmer.    Doing well.  No major issues/concerns. Denies dysphagia, reflux, nausea, vomiting and abdominal pain.  Getting <90g prot/day.  Drinking 64 fluid oz/day. Last labs revealed  no vitamin deficiencies.   Taking Patches: MVI, biotin, vit E. Of note, she was on biotin at the time of her last labs including TSH.  On dexilant.  Exercise:  2 days per week + walking the other few days.     Presurgery weight: 224 pounds.  Today's weight is 73.5 kg (162 lb) pounds, today's  Body mass index is 27.81 kg/m²., and weight loss since surgery is 62 pounds.  Her highest weight ever is 237, so total weight loss is 77.  Weight gonzalez was 157.    Past Medical History:   Diagnosis Date   • Allergic rhinitis approx 10 years ago   • Anxiety    • Arrhythmia     SVT w/ PVCs, on propranolol, follows w/ Dr. Liang   • Chronic venous insufficiency     per Dr. Liang note 22   • Depression    • Diabetes mellitus (HCC)     Gestational   • Dyspepsia    • Dyspnea on exertion     resolved after weight loss/ gastric sleeve   • Endometriosis    • Fatigue    • Female infertility    • Generalized edema     r/t weight gain   • Genital HSV     Famvir for prophylaxis   • GERD (gastroesophageal reflux disease)    • Gestational diabetes    • Hard to intubate     states was told this after her hysterectomy.  states procedures  following gastric sleeve have been without issues   • Hyperemesis gravidarum    • Hyperlipidemia 09/21/2021   • Hypertension     resloved after gastric sleeve/weight loss   • Interstitial cystitis    • Kidney stones    • Migraine     occasional   • Mitral valve prolapse     pt denies/unaware; mitral valve regurge per Dr. Liang note 8/17/22   • Normal vaginal Papanicolaou smear in patient with history of hysterectomy 09/16/2016   • Peripheral edema    • PONV (postoperative nausea and vomiting)    • Post concussion syndrome     s/p fall w/ frontal lobe head injury 9/2017, previously on Elavil   • Sleep apnea     resloved after gastric sleeve/weight loss   • Stress incontinence     follows w/ Dr. Galvan, on Myrbetriq, s/p cystoscopy w/ bulking agent   • SVT (supraventricular tachycardia) ([facility])     followed by Dr. Liang - see note 8/17/22 scanned into media     Past Surgical History:   Procedure Laterality Date   • ANTERIOR AND POSTERIOR VAGINAL REPAIR  06/23/2010    DR EDIN BYRD   • BREAST BIOPSY  [id number]    Left   • BREAST LUMPECTOMY Left 2015    benign   • CYSTOSCOPY W/ BULKING AGENT INJECTION N/A 06/25/2019    Procedure: CYSTOSCOPY WITH URETHRAL  BULKING AGENT INJECTION;  Surgeon: Jose Galvan MD;  Location: [facility] OR;  Service: Urology   • CYSTOSCOPY, DIMETHYL SULFOXIDE COCKTAIL  03/14/2011    WITH HYDRODISTENTION (DR AVINASH MADDOX)   • ENDOSCOPY N/A 12/17/2019    Procedure: ESOPHAGOGASTRODUODENOSCOPY;  Surgeon: Tracee Onofre MD;  Location: [facility] OR;  Service: Bariatric   • ENDOSCOPY N/A 10/20/2020    Procedure: ESOPHAGOGASTRODUODENOSCOPY WITH BIOPSY;  Surgeon: Tracee Onofre MD;  Location: [facility] ENDOSCOPY;  Service: General;  Laterality: N/A;   • GASTRIC SLEEVE LAPAROSCOPIC N/A 12/17/2019    Procedure: GASTRIC SLEEVE LAPAROSCOPIC;  Surgeon: Tracee Onofre MD;  Location: [facility] OR;  Service: Bariatric   • HIATAL HERNIA REPAIR N/A 12/17/2019    Procedure: HIATAL HERNIA REPAIR  "LAPAROSCOPIC;  Surgeon: Tracee Onofre MD;  Location: [facility];  Service: Bariatric   • HYSTERECTOMY  06/23/2010    [facility] (DR EDIN BYRD)   • LAPAROSCOPIC CHOLECYSTECTOMY  2013    for stones   • TRANSVAGINAL TAPING SUSPENSION WITH OBTURATOR  06/23/2010    DR EDIN BYRD   • WISDOM TOOTH EXTRACTION  1990     No outpatient medications have been marked as taking for the 3/30/23 encounter (Telemedicine) with Tracee Onofre MD.       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  Never actually taken it internally.   • Latex Anaphylaxis and Rash   • Morphine Rash     Rash (if gets on skin).  Never taken it internally   • Morphine And Related Rash     Rash (if gets on skin).  Never taken it internally   • Penicillins Shortness Of Breath and Palpitations     Keflex OK  Keflex OK  Keflex OK   • Oxycodone Other (See Comments)     Dizziness, confusion     • Topiramate Other (See Comments)     Pt states she can take NAME BRAND ONLY.  Generic brand \"doesn't work\"       Social History     Socioeconomic History   • Marital status:    Tobacco Use   • Smoking status: Never   • Smokeless tobacco: Never   Vaping Use   • Vaping Use: Never used   Substance and Sexual Activity   • Alcohol use: No   • Drug use: No   • Sexual activity: Defer     Partners: Male     Birth control/protection: Surgical       Ht 162.6 cm (64\")   Wt 73.5 kg (162 lb)   BMI 27.81 kg/m²     Physical Exam  Constitutional:       General: She is not in acute distress.     Appearance: Normal appearance. She is not ill-appearing.   HENT:      Head: Normocephalic and atraumatic.      Nose: Nose normal.   Eyes:      General: No scleral icterus.     Extraocular Movements: Extraocular movements intact.      Conjunctiva/sclera: Conjunctivae normal.      " Pupils: Pupils are equal, round, and reactive to light.   Pulmonary:      Effort: Pulmonary effort is normal. No respiratory distress.   Skin:     Coloration: Skin is not pale.   Neurological:      Mental Status: She is alert and oriented to person, place, and time.   Psychiatric:         Mood and Affect: Mood normal.         Behavior: Behavior normal.           Assessment:  3 years s/p LSG/HHR 12/17/19 w/ Dr. Onofre    ICD-10-CM ICD-9-CM   1. Fatigue, unspecified type  R53.83 780.79   2. Hypovitaminosis D  E55.9 268.9   3. Screening, iron deficiency anemia  Z13.0 V78.0   4. Postgastrectomy malabsorption  K91.2 579.3    Z90.3    5. Malnutrition screen  Z13.21 V77.2       BMI is >= 25 and <30. (Overweight) The following options were offered after discussion;: exercise counseling/recommendations and nutrition counseling/recommendations      Plan:  Doing well. Continue w/ good food choices and healthy habits.  Continue protein >70g/day.  Continue routine exercise.    Call w/ problems/concerns.     The patient was instructed to follow up in 1 year, sooner if needed.    This visit was conducted as a video visit, in an effort to limit spread of the novel coronavirus during the COVID-19 pandemic.  The patient gave consent.      Tracee Onofre MD

## 2023-04-13 ENCOUNTER — TELEMEDICINE (OUTPATIENT)
Dept: BARIATRICS/WEIGHT MGMT | Facility: CLINIC | Age: 48
End: 2023-04-13
Payer: COMMERCIAL

## 2023-04-13 VITALS
SYSTOLIC BLOOD PRESSURE: 122 MMHG | DIASTOLIC BLOOD PRESSURE: 79 MMHG | HEART RATE: 72 BPM | HEIGHT: 64 IN | BODY MASS INDEX: 28.17 KG/M2 | WEIGHT: 165 LBS

## 2023-04-13 DIAGNOSIS — F41.9 ANXIETY AND DEPRESSION: ICD-10-CM

## 2023-04-13 DIAGNOSIS — F32.A ANXIETY AND DEPRESSION: ICD-10-CM

## 2023-04-13 DIAGNOSIS — E66.3 OVERWEIGHT: Primary | ICD-10-CM

## 2023-04-13 RX ORDER — DULOXETIN HYDROCHLORIDE 30 MG/1
30 CAPSULE, DELAYED RELEASE ORAL DAILY
Qty: 30 CAPSULE | Refills: 2 | Status: SHIPPED | OUTPATIENT
Start: 2023-04-13

## 2023-04-13 RX ORDER — PHENTERMINE HYDROCHLORIDE 37.5 MG/1
TABLET ORAL
Qty: 28 TABLET | Refills: 0 | Status: SHIPPED | OUTPATIENT
Start: 2023-04-13

## 2023-04-13 RX ORDER — LIRAGLUTIDE 6 MG/ML
3 INJECTION, SOLUTION SUBCUTANEOUS DAILY
Qty: 15 ML | Refills: 2 | Status: SHIPPED | OUTPATIENT
Start: 2023-04-13

## 2023-04-13 NOTE — DISCHARGE INSTRUCTIONS
PAT PASS GIVEN/REVIEWED WITH PT.  VERBALIZED UNDERSTANDING OF THE FOLLOWING:  DO NOT EAT, DRINK, SMOKE, USE SMOKELESS TOBACCO OR CHEW GUM AFTER MIDNIGHT THE NIGHT BEFORE SURGERY.  THIS ALSO INCLUDES HARD CANDIES AND MINTS.    DO NOT SHAVE THE AREA TO BE OPERATED ON AT LEAST 48 HOURS PRIOR TO THE PROCEDURE.  DO NOT WEAR MAKE UP OR NAIL POLISH.  DO NOT LEAVE IN ANY PIERCING OR WEAR JEWELRY THE DAY OF SURGERY.      DO NOT USE ADHESIVES IF YOU WEAR DENTURES.    DO NOT WEAR EYE CONTACTS; BRING IN YOUR GLASSES.    ONLY TAKE MEDICATION THE MORNING OF YOUR PROCEDURE IF INSTRUCTED BY YOUR SURGEON WITH ENOUGH WATER TO SWALLOW THE MEDICATION.  IF YOUR SURGEON DID NOT SPECIFY WHICH MEDICATIONS TO TAKE, YOU WILL NEED TO CALL THEIR OFFICE FOR FURTHER INSTRUCTIONS AND DO AS THEY INSTRUCT.    LEAVE ANYTHING YOU CONSIDER VALUABLE AT HOME.    YOU WILL NEED TO ARRANGE FOR SOMEONE TO DRIVE YOU HOME AFTER SURGERY.  IT IS RECOMMENDED THAT YOU DO NOT DRIVE, WORK, DRINK ALCOHOL OR MAKE MAJOR DECISIONS FOR AT LEAST 24 HOURS AFTER YOUR PROCEDURE IS COMPLETE.      THE DAY OF YOUR PROCEDURE, BRING IN THE FOLLOWING IF APPLICABLE:   PICTURE ID AND INSURANCE/MEDICARE OR MEDICAID CARDS   COPY OF ADVANCED DIRECTIVE/LIVING WILL/POWER OR    CPAP/BIPAP/INHALERS   SKIN PREP SHEET   YOUR PREADMISSION TESTING PASS (IF NOT A PHONE HISTORY)        Chlorhexidine wipes along with instruction/verification sheet given to pt.  Instructed pt to apply stickers from chlorhexidine wipes to verification sheet once skin prep has been  completed, and to return to [facility] the day of the procedure.  Pt. Verbalizes understanding.

## 2023-04-13 NOTE — ASSESSMENT & PLAN NOTE
Patient's (Body mass index is 28.32 kg/m².) indicates that they are overweight with health conditions that include obstructive sleep apnea, dyslipidemias, GERD and metabolic syndrome . Weight is improving with treatment. BMI is is above average; BMI management plan is completed. We discussed low calorie, low carb based diet program, portion control, increasing exercise, management of depression/anxiety/stress to control compensatory eating, pharmacologic options including saxenda, phentermine, naltrexone and an radha-based approach such as [employer] Pal or Lose It.    I have instructed the patient to continue with pursuit of medical weight loss as a part of this program. Patient does meet criteria for use of anorectics at this time as BMI > 27 with coexisting and is not at treatment goal.     Continue nutritional focus and work towards new exercise FITT goal of: 2-2-4-2.  The current plan for this month includes: adjust exercise as discussed, weight loss goal 4-6lbs this month and continue to work on lifestyle behavioral changes. We discussed lack of motivation, comfort eating and resulting weight gain are symptoms of depression. We will increase cymbalta and also increase phentermine. For insomnia she is currently only taking one amitriptyline and can increase that to two if needed.  Bring back mindfulness with food journal and meal planning.

## 2023-04-13 NOTE — ASSESSMENT & PLAN NOTE
Patient's depression is recurrent and is moderate without psychosis. Their depression is currently active and the condition is worsening. This will be reassessed at the next regular appointment. F/U as described:patient was prescribed an antidepressant medicine and patient referred to Mental Health Specialist (through her employee health program).

## 2023-04-13 NOTE — PROGRESS NOTES
"     Office Note      Date: 2023  Patient Name: Renae Bolton  MRN: [id number]  : 1975    This service was conducted via [employer].  Patient is located at her work address.  Provider is located at her home address due to construction at office. The use of a video visit has been reviewed with the patient and informed consent has been obtained.  Subjective  Subjective     Chief Complaint  Obesity Management follow-up    Subjective          Renae Bolton presents to [facility] via telehealth for obesity management. s/p LSG/HHR 19 w/ Dr. Onofre. Pre-surgery weight: 232.5, gonzalez 183lb, final goal 150lb    Patient is unsatisfied with weight loss progress. Appetite is poorly controlled, still finding herself grazing. Reports no side effects of prescribed medications today. Didn't have EGD or colonoscopy yet, will be  or July. Has had no motivation these last couple of months. Hasn't started back exercising. No food journal at this time.     Review of Systems      Objective   Body mass index is 28.32 kg/m².  Start weight: 193 pounds.    Total weight loss: -28 pounds/-14.5%  Change in weight since last visit: +4lb    /79   Pulse 72   Ht 162.6 cm (64\")   Wt 74.8 kg (165 lb)   BMI 28.32 kg/m²       Result Review :                 Assessment and Plan    Diagnoses and all orders for this visit:    1. Overweight (Primary)  Assessment & Plan:  Patient's (Body mass index is 28.32 kg/m².) indicates that they are overweight with health conditions that include obstructive sleep apnea, dyslipidemias, GERD and metabolic syndrome . Weight is improving with treatment. BMI is is above average; BMI management plan is completed. We discussed low calorie, low carb based diet program, portion control, increasing exercise, management of depression/anxiety/stress to control compensatory eating, pharmacologic options including saxenda, phentermine, naltrexone and an " radha-based approach such as [employer] Pal or Lose It.    I have instructed the patient to continue with pursuit of medical weight loss as a part of this program. Patient does meet criteria for use of anorectics at this time as BMI > 27 with coexisting and is not at treatment goal.     Continue nutritional focus and work towards new exercise FITT goal of: 2-2-4-2.  The current plan for this month includes: adjust exercise as discussed, weight loss goal 4-6lbs this month and continue to work on lifestyle behavioral changes. We discussed lack of motivation, comfort eating and resulting weight gain are symptoms of depression. We will increase cymbalta and also increase phentermine. For insomnia she is currently only taking one amitriptyline and can increase that to two if needed.  Bring back mindfulness with food journal and meal planning.        Orders:  -     phentermine (ADIPEX-P) 37.5 MG tablet; Take 1/2 tablet at 11am and 1/2 tablet at 3pm.  Dispense: 28 tablet; Refill: 0  -     Liraglutide (Saxenda) 18 MG/3ML injection pen; Inject 3 mg under the skin into the appropriate area as directed Daily.  Dispense: 15 mL; Refill: 2    2. Anxiety and depression  Assessment & Plan:  Patient's depression is recurrent and is moderate without psychosis. Their depression is currently active and the condition is worsening. This will be reassessed at the next regular appointment. F/U as described:patient was prescribed an antidepressant medicine and patient referred to Mental Health Specialist (through her employee health program).    Orders:  -     DULoxetine (CYMBALTA) 30 MG capsule; Take 1 capsule by mouth Daily.  Dispense: 30 capsule; Refill: 2    I spent 40 minutes caring for Renae on this date of service. This time includes time spent by me in the following activities:preparing for the visit, performing a medically appropriate examination and/or evaluation , counseling and educating the patient/family/caregiver, ordering  medications, tests, or procedures and documenting information in the medical record  Follow Up   Return in about 4 weeks (around 5/11/2023) for Next scheduled follow up.  Patient was given instructions and counseling regarding her condition or for health maintenance advice. Please see specific information pulled into the AVS if appropriate.     Bindu Lechuga APRN

## 2023-04-17 ENCOUNTER — TELEPHONE (OUTPATIENT)
Dept: UROLOGY | Facility: CLINIC | Age: 48
End: 2023-04-17

## 2023-04-17 NOTE — TELEPHONE ENCOUNTER
Caller: Renae Bolton    Relationship to patient: SELF    Best call back number: [phone]    Patient is needing: PT HAD ANNUAL APPT TODAY W/DR CHAMBERS BUT PT ISN'T ABLE TO LEAVE WORK AND NEEDS TO RESCHEDULE. PLEASE GIVE PT A CALL TO RESCHEDULE    APPT HAS BEEN CANCELLED FOR TODAY.

## 2023-05-05 DIAGNOSIS — B00.9 RECURRENT HSV (HERPES SIMPLEX VIRUS): ICD-10-CM

## 2023-05-08 RX ORDER — FAMCICLOVIR 250 MG/1
250 TABLET ORAL 3 TIMES DAILY
Qty: 270 TABLET | Refills: 4 | Status: SHIPPED | OUTPATIENT
Start: 2023-05-08

## 2023-05-08 RX ORDER — DEXLANSOPRAZOLE 60 MG/1
60 CAPSULE, DELAYED RELEASE ORAL DAILY
Qty: 90 CAPSULE | Refills: 3 | Status: SHIPPED | OUTPATIENT
Start: 2023-05-08 | End: 2023-08-06

## 2023-05-17 ENCOUNTER — OFFICE VISIT (OUTPATIENT)
Dept: BARIATRICS/WEIGHT MGMT | Facility: CLINIC | Age: 48
End: 2023-05-17
Payer: COMMERCIAL

## 2023-05-17 ENCOUNTER — PRIOR AUTHORIZATION (OUTPATIENT)
Dept: BARIATRICS/WEIGHT MGMT | Facility: CLINIC | Age: 48
End: 2023-05-17
Payer: COMMERCIAL

## 2023-05-17 ENCOUNTER — TELEPHONE (OUTPATIENT)
Dept: UROLOGY | Facility: CLINIC | Age: 48
End: 2023-05-17

## 2023-05-17 VITALS
BODY MASS INDEX: 29.01 KG/M2 | HEIGHT: 64 IN | WEIGHT: 169.9 LBS | DIASTOLIC BLOOD PRESSURE: 78 MMHG | OXYGEN SATURATION: 99 % | HEART RATE: 70 BPM | SYSTOLIC BLOOD PRESSURE: 112 MMHG

## 2023-05-17 DIAGNOSIS — F32.A ANXIETY AND DEPRESSION: ICD-10-CM

## 2023-05-17 DIAGNOSIS — F41.9 ANXIETY AND DEPRESSION: ICD-10-CM

## 2023-05-17 DIAGNOSIS — E66.3 OVERWEIGHT: Primary | ICD-10-CM

## 2023-05-17 RX ORDER — LIRAGLUTIDE 6 MG/ML
3 INJECTION, SOLUTION SUBCUTANEOUS DAILY
Qty: 15 ML | Refills: 2 | Status: SHIPPED | OUTPATIENT
Start: 2023-05-17

## 2023-05-17 RX ORDER — PHENTERMINE HYDROCHLORIDE 37.5 MG/1
TABLET ORAL
Qty: 30 TABLET | Refills: 0 | Status: SHIPPED | OUTPATIENT
Start: 2023-05-17

## 2023-05-17 NOTE — ASSESSMENT & PLAN NOTE
Patient's depression is recurrent and is moderate without psychosis. Their depression is currently active and the condition is unchanged. This will be reassessed at the next regular appointment. F/U as described:patient will continue current medication therapy and explore EAP ([email or web address]). Just increased cymbalta about 2 weeks ago so she should begin to feel the benefits of that in the next few weeks.

## 2023-05-17 NOTE — PROGRESS NOTES
[facility]  [address]  [place] [address]     Office Note      Date: 2023  Patient Name: Renae Bolton  MRN: [id number]  : 1975  Subjective  Subjective     Chief Complaint  Obesity Management follow-up          Renae Bolton presents to [facility]NAGEMENT for obesity management. s/p LSG/HHR 19 w/ Dr. Onofre. Pre-surgery weight: 232.5, gonzalez 183lb, final goal 150lb.  Patient is unsure with weight loss progress. Appetite is moderately controlled, has improved with increased dose of phentermine. Cravings have decreased. Remembering to take naltrexone twice a day and that has helped also. Reports no side effects of prescribed medications today. Sleep is about the same, still going to bed too late, forgot she could increase amitriptyline. She is still so busy, worked 18 hours Monday. The patient is taking multivitamin and is taking fish oil.  The patient is not using a food journal. The patient is exercising, back with her  twice a week and is also walking 2 miles at the park with her . Going to [facility] in [place] for a few days with her daughter and son in law. FITT score of:    Frequency Intensity Time Strength Training   []   0, none []   0 []   0 []   0   [x]   1 (1-2x/week) []   1 (light) []   1 (<10 min) []   1 (1x/week)   []   2 (3-5x/week) [x]   2 (moderate) []   2 (10-20 min) [x]   2 (2x/week)   []   3 (daily) []   3 (moderately hard)  []   4 (very hard) []   3 (20-30 min)  [x]   4 (>30 min) []   3 (3-4x/week)     Review of Systems   Constitutional: Positive for fatigue. Negative for appetite change.   Eyes: Negative for blurred vision, double vision and visual disturbance.   Cardiovascular: Negative for chest pain and palpitations.   Gastrointestinal: Positive for constipation and nausea. Negative for abdominal pain, diarrhea, vomiting and GERD.   Endocrine: Negative for  "polydipsia, polyphagia and polyuria.   Musculoskeletal: Negative for arthralgias, back pain and myalgias.   Neurological: Positive for headache. Negative for dizziness, tremors, light-headedness and memory problem.        Parasthesias negative   Psychiatric/Behavioral: Positive for sleep disturbance and stress. Negative for depressed mood. The patient is not nervous/anxious.      Objective   Start weight: 193 pounds.    Total Loss lb/%Loss of beginning body weight (BBW): -23.1lb/-11.97%  Change in weight since last visit: +4.6    Body mass index is 29.16 kg/m².   Body composition analysis completed and showed:   %body fat: 39.8  Measurements (in inches)  Waist: 39    Vital Signs:   /78   Pulse 70   Ht 162.6 cm (64\")   Wt 77.1 kg (169 lb 14.4 oz)   SpO2 99%   BMI 29.16 kg/m²     Physical Exam   General appears stated age and normal appearance   HEENT PERRLA, EOM intact and conjunctivae normal   Chest/lungs Normal rate, Regular rhythm and Breathing is unlabored   Extremities without edema   Neuro Good historian and No focal deficit   Skin Warm, dry, intact   Psych normal behavior, normal thought content and normal concentration     Result Review :                Assessment / Plan        Diagnoses and all orders for this visit:    1. Overweight (Primary)  Assessment & Plan:  Patient's (Body mass index is 29.16 kg/m².) indicates that they are overweight with health conditions that include obstructive sleep apnea, dyslipidemias, GERD and metabolic syndrome . Weight is improving with treatment. BMI is is above average; BMI management plan is completed. We discussed low calorie, low carb based diet program, portion control, increasing exercise, management of depression/anxiety/stress to control compensatory eating, pharmacologic options including wegovy, phentermine, naltrexone and an radha-based approach such as [employer] Pal or Lose It.    I have instructed the patient to continue with pursuit of medical weight " loss as a part of this program. Patient does meet criteria for use of anorectics at this time as BMI > 27 with coexisting, hypercholesterolemia, impaired fasting glucose and is not at treatment goal.     Continue nutritional focus and work towards new exercise FITT goal of: 2-2-4-2.  The current plan for this month includes: continue current exercise efforts, weight loss goal 4-6lbs this month and continue nutrition focus. May be gaining muscle mass with recent addition of personal training sessions. Final goal 150lb.        Orders:  -     phentermine (ADIPEX-P) 37.5 MG tablet; Take 1/2 tablet by mouth at 11am and 1/2 tablet at 3pm.  Dispense: 30 tablet; Refill: 0  -     Liraglutide (Saxenda) 18 MG/3ML injection pen; Inject 3 mg under the skin into the appropriate area as directed Daily.  Dispense: 15 mL; Refill: 2    2. Anxiety and depression  Assessment & Plan:  Patient's depression is recurrent and is moderate without psychosis. Their depression is currently active and the condition is unchanged. This will be reassessed at the next regular appointment. F/U as described:patient will continue current medication therapy and explore EAP ([email or web address]). Just increased cymbalta about 2 weeks ago so she should begin to feel the benefits of that in the next few weeks.           Follow Up   Return in about 4 weeks (around 6/14/2023) for Next scheduled follow up.  Patient was given instructions and counseling regarding her condition or for health maintenance advice. Please see specific information pulled into the AVS if appropriate.     Bindu Lechuga, LAURITA  05/17/2023

## 2023-05-17 NOTE — TELEPHONE ENCOUNTER
Caller: Renae Bolton    Relationship to patient: Self    Best call back number: [phone]    Patient is needing: PT WANTS TO RESCHEDULE 6/8/2023 TO TELEHEALTH VISIT

## 2023-05-17 NOTE — ASSESSMENT & PLAN NOTE
Patient's (Body mass index is 29.16 kg/m².) indicates that they are overweight with health conditions that include obstructive sleep apnea, dyslipidemias, GERD and metabolic syndrome . Weight is improving with treatment. BMI is is above average; BMI management plan is completed. We discussed low calorie, low carb based diet program, portion control, increasing exercise, management of depression/anxiety/stress to control compensatory eating, pharmacologic options including wegovy, phentermine, naltrexone and an radha-based approach such as [employer] Pal or Lose It.    I have instructed the patient to continue with pursuit of medical weight loss as a part of this program. Patient does meet criteria for use of anorectics at this time as BMI > 27 with coexisting, hypercholesterolemia, impaired fasting glucose and is not at treatment goal.     Continue nutritional focus and work towards new exercise FITT goal of: 2-2-4-2.  The current plan for this month includes: continue current exercise efforts, weight loss goal 4-6lbs this month and continue nutrition focus. May be gaining muscle mass with recent addition of personal training sessions. Final goal 150lb.

## 2023-06-08 ENCOUNTER — TELEMEDICINE (OUTPATIENT)
Dept: UROLOGY | Facility: CLINIC | Age: 48
End: 2023-06-08
Payer: COMMERCIAL

## 2023-06-08 DIAGNOSIS — N39.46 MIXED STRESS AND URGE URINARY INCONTINENCE: Primary | ICD-10-CM

## 2023-06-08 NOTE — PROGRESS NOTES
No chief complaint on file.       HPI  Ms. Bolton is a 48 y.o. female with history below in assessment, who presents for follow up.     At this visit patient denies stress incontinence.  She is taking the Myrbetriq every other day, with good results.  She is unsure, but thinks that the urge incontinence returned when she tried stopping it last year.    Past Medical History:   Diagnosis Date    Allergic rhinitis approx 10 years ago    Anxiety     Arrhythmia     SVT w/ PVCs, on propranolol, follows w/ Dr. Liang    Chronic venous insufficiency     per Dr. Liang note 8/17/22    Depression     Diabetes mellitus 2001    Gestational    Dyspepsia     Dyspnea on exertion     resolved after weight loss/ gastric sleeve    Endometriosis     Fatigue     Female infertility     Generalized edema     r/t weight gain    Genital HSV     Famvir for prophylaxis    GERD (gastroesophageal reflux disease)     Gestational diabetes 2001    Hard to intubate     states was told this after her hysterectomy.  states procedures following gastric sleeve have been without issues    Hyperemesis gravidarum     Hyperlipidemia 09/21/2021    Hypertension     resloved after gastric sleeve/weight loss    Interstitial cystitis     Kidney stones     Migraine     occasional    Mitral valve prolapse     pt denies/unaware; mitral valve regurge per Dr. Liang note 8/17/22    Normal vaginal Papanicolaou smear in patient with history of hysterectomy 09/16/2016    Peripheral edema     PONV (postoperative nausea and vomiting)     Post concussion syndrome     s/p fall w/ frontal lobe head injury 9/2017, previously on Elavil    Sleep apnea     resloved after gastric sleeve/weight loss    Stress incontinence     follows w/ Dr. Galvan, on Myrbetriq, s/p cystoscopy w/ bulking agent    SVT (supraventricular tachycardia)     followed by Dr. Liang - see note 8/17/22 scanned into media       Past Surgical History:   Procedure Laterality Date    ANTERIOR AND POSTERIOR VAGINAL  REPAIR  06/23/2010    DR EDIN BYRD    BREAST BIOPSY  [id number]    Left    BREAST LUMPECTOMY Left 2015    benign    CYSTOSCOPY W/ BULKING AGENT INJECTION N/A 06/25/2019    Procedure: CYSTOSCOPY WITH URETHRAL  BULKING AGENT INJECTION;  Surgeon: Jose Galvan MD;  Location: [facility] OR;  Service: Urology    CYSTOSCOPY, DIMETHYL SULFOXIDE COCKTAIL  03/14/2011    WITH HYDRODISTENTION (DR AVINASH MADDOX)    ENDOSCOPY N/A 12/17/2019    Procedure: ESOPHAGOGASTRODUODENOSCOPY;  Surgeon: Tracee Onofre MD;  Location: [facility] OR;  Service: Bariatric    ENDOSCOPY N/A 10/20/2020    Procedure: ESOPHAGOGASTRODUODENOSCOPY WITH BIOPSY;  Surgeon: Tracee Onofre MD;  Location: [facility] ENDOSCOPY;  Service: General;  Laterality: N/A;    GASTRIC SLEEVE LAPAROSCOPIC N/A 12/17/2019    Procedure: GASTRIC SLEEVE LAPAROSCOPIC;  Surgeon: Tracee Onofre MD;  Location: [facility] OR;  Service: Bariatric    HIATAL HERNIA REPAIR N/A 12/17/2019    Procedure: HIATAL HERNIA REPAIR LAPAROSCOPIC;  Surgeon: Tracee Onofre MD;  Location: [facility] OR;  Service: Bariatric    HYSTERECTOMY  06/23/2010    LAVH (DR EDIN BYRD)    LAPAROSCOPIC CHOLECYSTECTOMY  2013    for stones    TRANSVAGINAL TAPING SUSPENSION WITH OBTURATOR  06/23/2010    DR EDIN BYRD    WISDOM TOOTH EXTRACTION  1990         Current Outpatient Medications:     acyclovir (ZOVIRAX) 5 % ointment, Apply topically to the appropriate area as directed Every 3 (Three) Hours., Disp: 30 g, Rfl: 6    albuterol sulfate HFA (Ventolin HFA) 108 (90 Base) MCG/ACT inhaler, Inhale 1-2 puffs by mouth every 4 to 6 hours as needed, Disp: 8.5 g, Rfl: 3    amitriptyline (ELAVIL) 10 MG tablet, Take 1 tablet by mouth Every Evening., Disp: 90 tablet, Rfl: 3    Atogepant (Qulipta) 60 MG tablet, Take 1 tablet by mouth Daily. (Patient taking differently: Take 30 mg by mouth Daily.), Disp: 30 tablet, Rfl: 11    B Complex Vitamins (vitamin b complex) capsule capsule, Take 1 capsule by mouth  Daily., Disp: , Rfl:     Biotin (Biotin 5000) 5 MG capsule, Take 1 capsule by mouth Daily., Disp: , Rfl:     cetirizine (zyrTEC) 10 MG tablet, Take 1 tablet by mouth Daily., Disp: 90 tablet, Rfl: 3    Cholecalciferol (Vitamin D3) 250 MCG (55942 UT) tablet, Take 10,000 Units by mouth Daily., Disp: , Rfl:     cyclobenzaprine (FLEXERIL) 5 MG tablet, Take 1 tablet by mouth At Night As Needed., Disp: 90 tablet, Rfl: 1    dexlansoprazole (Dexilant) 60 MG capsule, Take 1 capsule by mouth Daily for 90 days., Disp: 90 capsule, Rfl: 3    DULoxetine (CYMBALTA) 30 MG capsule, Take 1 capsule by mouth Daily., Disp: 30 capsule, Rfl: 2    famciclovir (FAMVIR) 250 MG tablet, Take 1 tablet by mouth 3 (Three) Times a Day., Disp: 270 tablet, Rfl: 4    famotidine (PEPCID) 20 MG tablet, Take 1 tablet by mouth Every Morning., Disp: , Rfl:     Insulin Pen Needle (BD Pen Needle Lilly U/F) 32G X 4 MM misc, Use to inject Saxenda once daily, Disp: 100 each, Rfl: 0    linaclotide (Linzess) 145 MCG capsule capsule, Take 1 capsule by mouth Every Morning Before Breakfast., Disp: 90 capsule, Rfl: 3    Liraglutide (Saxenda) 18 MG/3ML injection pen, Inject 3 mg under the skin into the appropriate area as directed Daily., Disp: 15 mL, Rfl: 2    MAGNESIUM CITRATE PO, Take 270 mg by mouth Daily. *magnesium citrimate+B6 for migraines, Disp: , Rfl:     naltrexone (DEPADE) 50 MG tablet, Take 1/2 tablet by mouth 2 (two) times a day. (Patient taking differently: Daily. Take 1/2 tablet by mouth 2 (two) times a day.), Disp: 30 tablet, Rfl: 2    NON FORMULARY, Multivitamin patch daily, Disp: , Rfl:     Omega-3 Fatty Acids (fish oil) 1000 MG capsule capsule, Take 1 capsule by mouth 2 (Two) Times a Day With Meals., Disp: , Rfl:     ondansetron ODT (ZOFRAN-ODT) 4 MG disintegrating tablet, Place 1 tablet on the tongue Every 8 (Eight) Hours As Needed for Nausea, Disp: 20 tablet, Rfl: 0    phentermine (ADIPEX-P) 37.5 MG tablet, Take 1/2 tablet by mouth at 11am and 1/2  tablet at 3pm., Disp: 30 tablet, Rfl: 0    Probiotic Product (PROBIOTIC PO), Take 1 capsule by mouth Daily., Disp: , Rfl:     promethazine (PHENERGAN) 25 MG tablet, Take 1 tablet by mouth Every 6 (Six) Hours As Needed for nausea, Disp: 60 tablet, Rfl: 1    propranolol LA (INDERAL LA) 80 MG 24 hr capsule, Take 1 capsule by mouth Daily., Disp: 90 capsule, Rfl: 3    PSYLLIUM HUSK PO, Take 1 capsule by mouth Daily., Disp: , Rfl:     SENNA PO, Take  by mouth., Disp: , Rfl:     Sodium Sulfate-Mag Sulfate-KCl 6270-490-798 MG tablet, Take 24 tablets by mouth As Directed., Disp: 24 tablet, Rfl: 0    SUMAtriptan-naproxen (TREXIMET)  MG per tablet, Take 1 tablet by mouth 1 (One) Time As Needed for Migraine for up to 2 doses. May repeat dose one time in 2 hours if headache not relieved., Disp: 9 tablet, Rfl: 11    Vitamin E 400 units tablet, Take 400 Units by mouth Daily., Disp: , Rfl:      Physical Exam  There were no vitals taken for this visit.    Labs  Brief Urine Lab Results       None            Lab Results   Component Value Date    GLUCOSE 90 02/13/2023    CALCIUM 9.4 02/13/2023     02/13/2023    K 4.4 02/13/2023    CO2 27 02/13/2023     02/13/2023    BUN 18 02/13/2023    CREATININE 0.93 02/13/2023    EGFRIFAFRI 79 09/17/2020    EGFRIFNONA 68 10/08/2021    BCR 19 02/13/2023    ANIONGAP 7.7 10/08/2021       Lab Results   Component Value Date    WBC 5.3 02/13/2023    HGB 14.3 02/13/2023    HCT 43.3 02/13/2023    MCV 94 02/13/2023     02/13/2023            Radiographic Studies  No Images in the past 120 days found..    I have reviewed the above labs and imaging.     Assessment  48 y.o. female with chronic mixed stress and urge incontinence, previously treated with bulking agent injection, and currently well managed on mirabegron.    Plan  1.  Decision was made to continue mirabegron every other day.  I will refill it for her.  She will get this refilled from her primary care doc in the future and  follow-up with us as needed.    This was an audio and video enabled telemedicine encounter.

## 2023-06-19 ENCOUNTER — TELEMEDICINE (OUTPATIENT)
Dept: BARIATRICS/WEIGHT MGMT | Facility: CLINIC | Age: 48
End: 2023-06-19
Payer: COMMERCIAL

## 2023-06-19 VITALS
DIASTOLIC BLOOD PRESSURE: 75 MMHG | SYSTOLIC BLOOD PRESSURE: 124 MMHG | WEIGHT: 169 LBS | HEART RATE: 74 BPM | HEIGHT: 64 IN | BODY MASS INDEX: 28.85 KG/M2

## 2023-06-19 DIAGNOSIS — E88.81 METABOLIC SYNDROME: ICD-10-CM

## 2023-06-19 DIAGNOSIS — E66.3 OVERWEIGHT: Primary | ICD-10-CM

## 2023-06-19 PROCEDURE — 99214 OFFICE O/P EST MOD 30 MIN: CPT | Performed by: NURSE PRACTITIONER

## 2023-06-19 RX ORDER — PHENTERMINE HYDROCHLORIDE 37.5 MG/1
TABLET ORAL
Qty: 30 TABLET | Refills: 0 | Status: SHIPPED | OUTPATIENT
Start: 2023-06-19

## 2023-06-19 NOTE — ASSESSMENT & PLAN NOTE
Stable, denies hypoglycemia. Low carb diet, regular physical activity, and weight reduction of 10-15%. Continue saxenda.

## 2023-06-19 NOTE — PROGRESS NOTES
"     Office Note      Date: 2023  Patient Name: Renae Bolton  MRN: [id number]  : 1975    This service was conducted via [employer].  Patient is located at her work address.  Provider is located at her work address. The use of a video visit has been reviewed with the patient and informed consent has been obtained.  Subjective  Subjective     Chief Complaint  Obesity Management follow-up    Subjective          Renae Bolton presents to [facility] via telehealth for obesity management. s/p LSG/HHR 19 w/ Dr. Onofre. Pre-surgery weight: 232.5, gonzalez 183lb, final goal 150lb.   Patient is satisfied with weight loss progress. Appetite is well controlled. Reports no side effects of prescribed medications today. No food journal at this time. Has been working on meal planning.  24 hour recall:  B: protein shake or eggs and yogurt  L: sandwich (lunch meat and cheese) or tuna salad  D: meat, veggie, salad  S: sweets (stress)- 1/2 piece of apple pie last night. Struggles with junk/sweets that are brought into her work. Keeps amounts small. Keeps protein shakes, ham and cheese or nuts at work to help her avoid sweets.  The patient is exercising with a  off and on. Plans to get more swimming in- has a pool at home. 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   Review of Systems      Objective   Body mass index is 29.01 kg/m².  Start weight: 193 pounds.    Total weight loss: -24 pounds/-12%  Change in weight since last visit: none    Measurements (in inches)  Waist: 37\"  /75   Pulse 74   Ht 162.6 cm (64\")   Wt 76.7 kg (169 lb)   BMI 29.01 kg/m²       Result Review :                 Assessment and Plan    Diagnoses and all orders for this visit:    1. Overweight (Primary)  Assessment & Plan:  Patient's (Body mass index is 29.01 kg/m².) indicates that they are overweight with health conditions that " include obstructive sleep apnea, dyslipidemias, GERD, and metabolic syndrome  . Weight is improving with treatment. BMI is is above average; BMI management plan is completed. We discussed low calorie, low carb based diet program, portion control, increasing exercise, joining a fitness center or start home based exercise program, management of depression/anxiety/stress to control compensatory eating, and pharmacologic options including phentermine, saxenda, naltrexone .    I have instructed the patient to continue with pursuit of medical weight loss as a part of this program. Patient does meet criteria for use of anorectics at this time as BMI > 27 with coexisting, hyperlipidemia, and is not at treatment goal.     Continue nutritional focus and work towards new exercise FITT goal of: 2-2-4-2.  The current plan for this month includes: adjust exercise as discussed, weight loss goal 4-6lbs this month, continue to work on lifestyle behavioral changes, and continue nutrition focus with meal planning (discussed chatGPT as a good resource for meal planning). Treatment goal 150lb.     Continue sympathomimetic (medication refilled).  This patient 1) has no evidence of serious cardiovascular disease; 2) does not have serious psychiatric disease or a history of substance abuse; 3) has been informed about weight loss medications that are FDA approved for long-term use and told that these have been all documented to be safe and effective whereas phentermine has not; 4) does not demonstrate a clinically significant increase in pulse or blood pressure when taking phentermine; and 5) demonstrate significant weight loss while using the medication.  Patient understands that all antiobesity medications are contraindicated in pregnancy.  Patient denies a history of glaucoma.  Patient understands that long-term use of phentermine is considered off label use of this medication, however, that the endocrine Society and recent research  "supports that long-term use of phentermine does not appear to have detrimental health effects when used and the appropriate patient.  In addition, a 2019 study published in an obesity journal on [id number] patient's concluded that \"recommendations to limit phentermine to less than 3 months do not align with current concept of pharmacologic treatment of obesity\", and that \"long-term phentermine users experience greater weight loss without apparent increases in cardiovascular risk\".    We reviewed potential side effects including insomnia, dry mouth, increased heart rate and blood pressure, increased anxiety.  We reviewed reducing caffeine consumption while taking phentermine.  especially if the patient is experience side effects.  The potential risk and benefits of phentermine have been reviewed with the patient, and alternative treatment options were discussed.  All questions were answered, and the patient wishes to move forward with this medication.          Orders:  -     phentermine (ADIPEX-P) 37.5 MG tablet; Take 1/2 tablet by mouth at 11am and 1/2 tablet at 3pm.  Dispense: 30 tablet; Refill: 0    2. Metabolic syndrome  Assessment & Plan:  Stable, denies hypoglycemia. Low carb diet, regular physical activity, and weight reduction of 10-15%. Continue saxenda.             I spent 32 minutes caring for Renae on this date of service. This time includes time spent by me in the following activities:preparing for the visit, reviewing tests, performing a medically appropriate examination and/or evaluation , counseling and educating the patient/family/caregiver, ordering medications, tests, or procedures, and documenting information in the medical record  Follow Up   Return in about 1 month (around 7/19/2023) for Next scheduled follow up.  Patient was given instructions and counseling regarding her condition or for health maintenance advice. Please see specific information pulled into the AVS if appropriate.     Bindu " LAURITA Lechuga

## 2023-07-28 ENCOUNTER — HOSPITAL ENCOUNTER (OUTPATIENT)
Dept: MAMMOGRAPHY | Facility: HOSPITAL | Age: 48
Discharge: HOME | End: 2023-07-28
Admitting: RADIOLOGY
Payer: COMMERCIAL

## 2023-07-28 DIAGNOSIS — R92.8 ABNORMAL MAMMOGRAM OF LEFT BREAST: ICD-10-CM

## 2023-07-28 PROCEDURE — 77065 DX MAMMO INCL CAD UNI: CPT

## 2023-07-28 PROCEDURE — G0279 TOMOSYNTHESIS, MAMMO: HCPCS

## 2023-08-17 ENCOUNTER — PRIOR AUTHORIZATION (OUTPATIENT)
Dept: BARIATRICS/WEIGHT MGMT | Facility: CLINIC | Age: 48
End: 2023-08-17
Payer: COMMERCIAL

## 2023-08-17 ENCOUNTER — OFFICE VISIT (OUTPATIENT)
Dept: BARIATRICS/WEIGHT MGMT | Facility: CLINIC | Age: 48
End: 2023-08-17
Payer: COMMERCIAL

## 2023-08-17 VITALS
WEIGHT: 172 LBS | HEIGHT: 64 IN | BODY MASS INDEX: 29.37 KG/M2 | HEART RATE: 75 BPM | OXYGEN SATURATION: 99 % | SYSTOLIC BLOOD PRESSURE: 116 MMHG | DIASTOLIC BLOOD PRESSURE: 60 MMHG

## 2023-08-17 DIAGNOSIS — E66.3 OVERWEIGHT: Primary | ICD-10-CM

## 2023-08-17 DIAGNOSIS — I47.1 SUPRAVENTRICULAR TACHYCARDIA: ICD-10-CM

## 2023-08-17 DIAGNOSIS — F32.A ANXIETY AND DEPRESSION: ICD-10-CM

## 2023-08-17 DIAGNOSIS — F41.9 ANXIETY AND DEPRESSION: ICD-10-CM

## 2023-08-17 RX ORDER — PHENTERMINE HYDROCHLORIDE 37.5 MG/1
TABLET ORAL
Qty: 30 TABLET | Refills: 0 | Status: SHIPPED | OUTPATIENT
Start: 2023-08-17

## 2023-08-17 RX ORDER — DULOXETIN HYDROCHLORIDE 30 MG/1
30 CAPSULE, DELAYED RELEASE ORAL DAILY
Qty: 90 CAPSULE | Refills: 0 | Status: SHIPPED | OUTPATIENT
Start: 2023-08-17

## 2023-08-17 RX ORDER — SEMAGLUTIDE 1.7 MG/.75ML
1.7 INJECTION, SOLUTION SUBCUTANEOUS WEEKLY
Qty: 3 ML | Refills: 2 | Status: SHIPPED | OUTPATIENT
Start: 2023-08-17

## 2023-08-17 NOTE — PROGRESS NOTES
[facility]  [address]  [place] [address]     Office Note      Date: 2023  Patient Name: Renae Bolton  MRN: [id number]  : 1975  Subjective  Subjective     Chief Complaint  Obesity Management follow-up          Renae Bolton presents to [facility] for obesity management. s/p LSG/HHR 19 w/ Dr. Onofre. Pre-surgery weight: 232.5, gonzalez 183lb, final goal 150lb. Patient is unsatisfied with weight loss progress. Appetite is moderately controlled. Reports no side effects of prescribed medications today. The patient is taking multivitamin and is taking fish oil.  The patient is not using a food journal but she pretty much eats the same things- protein based meals and snacks.   24 hour recall:  B: protein shake  L: fresh fruit, ham and cheese sandwich  S: 1/2 bagel with cream cheese  D: 1 piece of fish, 1/2 baked potato  S: 1/2 small cheeseburger, ice cream  She is doing much better with sweets since taking naltrexone consistently.   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 She has not been working with a  at this time, has had schedule conflicts.        Review of Systems   Constitutional:  Negative for appetite change and fatigue.   Eyes:  Negative for blurred vision, double vision and visual disturbance.   Cardiovascular:  Negative for chest pain and palpitations.   Gastrointestinal:  Negative for abdominal pain, constipation, diarrhea, nausea, vomiting and GERD.   Endocrine: Negative for polydipsia, polyphagia and polyuria.   Musculoskeletal:  Negative for arthralgias, back pain and myalgias.   Neurological:  Negative for dizziness, tremors, light-headedness, headache and memory problem.        Parasthesias negative   Psychiatric/Behavioral:  Negative for sleep disturbance, depressed mood and stress. The patient is not nervous/anxious.   "    Objective   Start weight: 193 pounds.    Total Loss lb/%Loss of beginning body weight (BBW): -21 lb/-10.88%  Change in weight since last visit: +3    Body mass index is 29.52 kg/mý.   Body composition analysis completed and showed:   %body fat: 99  Measurements (in inches)  Waist: 36.25    Vital Signs:   /60   Pulse 75   Ht 162.6 cm (64\")   Wt 78 kg (172 lb)   SpO2 99%   BMI 29.52 kg/mý     Physical Exam   General appears stated age and normal appearance   HEENT PERRLA, EOM intact, and conjunctivae normal   Chest/lungs Normal rate, Regular rhythm, and Breathing is unlabored   Extremities without edema   Neuro Good historian and No focal deficit   Skin Warm, dry, intact   Psych normal behavior, normal thought content, and normal concentration     Result Review :                Assessment / Plan        Diagnoses and all orders for this visit:    1. Overweight (Primary)  Assessment & Plan:  Patient's (Body mass index is 29.52 kg/mý.) indicates that they are overweight with health conditions that include obstructive sleep apnea, dyslipidemias, GERD, and metabolic syndrome, depression and anxiety  . Weight is improving with treatment. BMI is is above average; BMI management plan is completed. We discussed low calorie, low carb based diet program, portion control, increasing exercise, joining a fitness center or start home based exercise program, pharmacologic options including wegovy, phentermine, naltrexone, and an radha-based approach such as [employer] or Lose It.     I have instructed the patient to continue with pursuit of medical weight loss as a part of this program. Patient does meet criteria for use of anorectics at this time as BMI > 27 with coexisting, hyperlipidemia, hypertension, and is not at treatment goal.     Continue nutritional focus and work towards new exercise FITT goal of: 2-2-4-2.  The current plan for this month includes: adjust exercise as discussed and continue nutrition focus. " "Discontinue saxenda and start wegovy at 1.7mg due to plateau, weight has increased 12lb since January. Goal weight 150lb.     Start Wegovy. Denies family or personal history of pancreatitis, MTC, or MEN 2. Discussed common side effects of nausea, diarrhea, vomiting, constipation, stomach pain, headache, fatigue, upset stomach, dizziness, feeling bloated, belching, gas, stomach flu, heartburn.       Continue sympathomimetic (medication refilled).  This patient 1) has no evidence of serious cardiovascular disease; 2) does not have serious psychiatric disease or a history of substance abuse; 3) has been informed about weight loss medications that are FDA approved for long-term use and told that these have been all documented to be safe and effective whereas phentermine has not; 4) does not demonstrate a clinically significant increase in pulse or blood pressure when taking phentermine; and 5) demonstrate significant weight loss while using the medication.  Patient understands that all antiobesity medications are contraindicated in pregnancy.  Patient denies a history of glaucoma.  Patient understands that long-term use of phentermine is considered off label use of this medication, however, that the endocrine Society and recent research supports that long-term use of phentermine does not appear to have detrimental health effects when used and the appropriate patient.  In addition, a 2019 study published in an obesity journal on [id number] patient's concluded that \"recommendations to limit phentermine to less than 3 months do not align with current concept of pharmacologic treatment of obesity\", and that \"long-term phentermine users experience greater weight loss without apparent increases in cardiovascular risk\".    We reviewed potential side effects including insomnia, dry mouth, increased heart rate and blood pressure, increased anxiety.  We reviewed reducing caffeine consumption while taking phentermine.  especially if " the patient is experience side effects.  The potential risk and benefits of phentermine have been reviewed with the patient, and alternative treatment options were discussed.  All questions were answered, and the patient wishes to move forward with this medication.          Orders:  -     Semaglutide-Weight Management (Wegovy) 1.7 MG/0.75ML solution auto-injector; Inject 0.75 mL under the skin into the appropriate area as directed 1 (One) Time Per Week.  Dispense: 3 mL; Refill: 2  -     phentermine (ADIPEX-P) 37.5 MG tablet; Take 1/2 tablet by mouth at 11am and 1/2 tablet at 3pm.  Dispense: 30 tablet; Refill: 0    2. Anxiety and depression  Assessment & Plan:  Patient's depression is recurrent and is moderate without psychosis. Their depression is currently in partial remission and the condition is improving with treatment. This will be reassessed at the next regular appointment. F/U as described:patient will continue current medication therapy. Consider trying a taper again once she is settled into her new job. Increase exercise.     Orders:  -     DULoxetine (CYMBALTA) 30 MG capsule; Take 1 capsule by mouth Daily.  Dispense: 90 capsule; Refill: 0    3. Supraventricular tachycardia  Assessment & Plan:  Controlled, no recent episodes. On propranolol, tolerating phentermine well. Follows up with Dr. Liang today.             Follow Up   Return in about 1 month (around 9/17/2023) for Next scheduled follow up.  Patient was given instructions and counseling regarding her condition or for health maintenance advice. Please see specific information pulled into the AVS if appropriate.     Bindu Lechuga, LAURITA  08/17/2023

## 2023-08-17 NOTE — ASSESSMENT & PLAN NOTE
Patient's depression is recurrent and is moderate without psychosis. Their depression is currently in partial remission and the condition is improving with treatment. This will be reassessed at the next regular appointment. F/U as described:patient will continue current medication therapy. Consider trying a taper again once she is settled into her new job. Increase exercise.

## 2023-08-17 NOTE — ASSESSMENT & PLAN NOTE
Controlled, no recent episodes. On propranolol, tolerating phentermine well. Follows up with Dr. Liang today.

## 2023-08-17 NOTE — ASSESSMENT & PLAN NOTE
Patient's (Body mass index is 29.52 kg/mý.) indicates that they are overweight with health conditions that include obstructive sleep apnea, dyslipidemias, GERD, and metabolic syndrome, depression and anxiety  . Weight is improving with treatment. BMI is is above average; BMI management plan is completed. We discussed low calorie, low carb based diet program, portion control, increasing exercise, joining a fitness center or start home based exercise program, pharmacologic options including wegovy, phentermine, naltrexone, and an radha-based approach such as [employer] Pal or Lose It.     I have instructed the patient to continue with pursuit of medical weight loss as a part of this program. Patient does meet criteria for use of anorectics at this time as BMI > 27 with coexisting, hyperlipidemia, hypertension, and is not at treatment goal.     Continue nutritional focus and work towards new exercise FITT goal of: 2-2-4-2.  The current plan for this month includes: adjust exercise as discussed and continue nutrition focus. Discontinue saxenda and start wegovy at 1.7mg due to plateau, weight has increased 12lb since January. Goal weight 150lb.     Start Wegovy. Denies family or personal history of pancreatitis, MTC, or MEN 2. Discussed common side effects of nausea, diarrhea, vomiting, constipation, stomach pain, headache, fatigue, upset stomach, dizziness, feeling bloated, belching, gas, stomach flu, heartburn.       Continue sympathomimetic (medication refilled).  This patient 1) has no evidence of serious cardiovascular disease; 2) does not have serious psychiatric disease or a history of substance abuse; 3) has been informed about weight loss medications that are FDA approved for long-term use and told that these have been all documented to be safe and effective whereas phentermine has not; 4) does not demonstrate a clinically significant increase in pulse or blood pressure when taking phentermine; and 5) demonstrate  "significant weight loss while using the medication.  Patient understands that all antiobesity medications are contraindicated in pregnancy.  Patient denies a history of glaucoma.  Patient understands that long-term use of phentermine is considered off label use of this medication, however, that the endocrine Society and recent research supports that long-term use of phentermine does not appear to have detrimental health effects when used and the appropriate patient.  In addition, a 2019 study published in an obesity journal on [id number] patient's concluded that \"recommendations to limit phentermine to less than 3 months do not align with current concept of pharmacologic treatment of obesity\", and that \"long-term phentermine users experience greater weight loss without apparent increases in cardiovascular risk\".    We reviewed potential side effects including insomnia, dry mouth, increased heart rate and blood pressure, increased anxiety.  We reviewed reducing caffeine consumption while taking phentermine.  especially if the patient is experience side effects.  The potential risk and benefits of phentermine have been reviewed with the patient, and alternative treatment options were discussed.  All questions were answered, and the patient wishes to move forward with this medication.        "

## 2023-09-06 ENCOUNTER — OFFICE VISIT (OUTPATIENT)
Dept: OBSTETRICS AND GYNECOLOGY | Facility: CLINIC | Age: 48
End: 2023-09-06
Payer: COMMERCIAL

## 2023-09-06 VITALS
HEIGHT: 64 IN | BODY MASS INDEX: 30.39 KG/M2 | WEIGHT: 178 LBS | DIASTOLIC BLOOD PRESSURE: 68 MMHG | SYSTOLIC BLOOD PRESSURE: 124 MMHG

## 2023-09-06 DIAGNOSIS — B00.9 RECURRENT HSV (HERPES SIMPLEX VIRUS): ICD-10-CM

## 2023-09-06 DIAGNOSIS — Z01.411 ENCOUNTER FOR GYNECOLOGICAL EXAMINATION (GENERAL) (ROUTINE) WITH ABNORMAL FINDINGS: Primary | ICD-10-CM

## 2023-09-06 DIAGNOSIS — Z12.31 ENCOUNTER FOR SCREENING MAMMOGRAM FOR BREAST CANCER: ICD-10-CM

## 2023-09-06 DIAGNOSIS — R92.8 ABNORMAL MAMMOGRAM: ICD-10-CM

## 2023-09-06 DIAGNOSIS — Z80.3 FAMILY HISTORY OF BREAST CANCER: ICD-10-CM

## 2023-09-06 RX ORDER — ACYCLOVIR 50 MG/G
OINTMENT TOPICAL
Qty: 30 G | Refills: 6 | Status: SHIPPED | OUTPATIENT
Start: 2023-09-06

## 2023-09-06 RX ORDER — FAMCICLOVIR 250 MG/1
250 TABLET ORAL 3 TIMES DAILY
Qty: 270 TABLET | Refills: 4 | Status: SHIPPED | OUTPATIENT
Start: 2023-09-06

## 2023-09-07 NOTE — PROGRESS NOTES
Chief Complaint  Gynecologic Exam     History of Present Illness:  Patient is 48 y.o.  who presents to [facility] here for her annual examination.  Patient did have recent mammogram as noted.  Patient did require previous biopsy.  She does have a family history of breast cancer.  Patient had to change locations for her mammogram secondary to insurance issues.  She had previously been seen at the diagnostic breast center at [place].  Patient sees Dr. Beard for her primary care.  She did have labs earlier this year.  She reports no major changes in her health.  She has continued on her Famvir.  She has had a recent flareup.  She desires to continue with her current medication.    History  Past Medical History:   Diagnosis Date    Allergic rhinitis approx 10 years ago    Anxiety     Arrhythmia     SVT w/ PVCs, on propranolol, follows w/ Dr. Liang    Chronic venous insufficiency     per Dr. Liang note 22    Cluster headache     Depression     Diabetes mellitus     Gestational    Dyspepsia     Dyspnea on exertion     resolved after weight loss/ gastric sleeve    Endometriosis     Fatigue     Female infertility     Fibroid Hysterectomy    Generalized edema     r/t weight gain    Genital HSV     Famvir for prophylaxis    GERD (gastroesophageal reflux disease)     Gestational diabetes     Hard to intubate     states was told this after her hysterectomy.  states procedures following gastric sleeve have been without issues    Head injury     concussion with post concussion syndrome    Headache, tension-type     Hyperemesis gravidarum     Hyperlipidemia 2021    Hypertension     resloved after gastric sleeve/weight loss    Interstitial cystitis     Kidney stones     Migraine     occasional    Mitral valve prolapse     pt denies/unaware; mitral valve regurge per Dr. Liang note 22    Normal vaginal Papanicolaou smear in patient with history of hysterectomy 2016     Peripheral edema     PONV (postoperative nausea and vomiting)     Post concussion syndrome     s/p fall w/ frontal lobe head injury 9/2017, previously on Elavil    Sleep apnea     resloved after gastric sleeve/weight loss    Stress incontinence     follows w/ Dr. Galvan, on Myrbetriq, s/p cystoscopy w/ bulking agent    SVT (supraventricular tachycardia)     followed by Dr. Liang - see note 8/17/22 scanned into media     Current Outpatient Medications on File Prior to Visit   Medication Sig Dispense Refill    amitriptyline (ELAVIL) 10 MG tablet Take 1 tablet by mouth Every Evening. 90 tablet 1    Atogepant (Qulipta) 60 MG tablet Take 1 tablet by mouth Daily. 30 tablet 5    B Complex Vitamins (vitamin b complex) capsule capsule Take 1 capsule by mouth Daily.      Biotin (Biotin 5000) 5 MG capsule Take 1 capsule by mouth Daily.      cetirizine (zyrTEC) 10 MG tablet Take 1 tablet by mouth Daily. 90 tablet 3    Cholecalciferol (Vitamin D3) 250 MCG (11010 UT) tablet Take 10,000 Units by mouth Daily.      cyclobenzaprine (FLEXERIL) 5 MG tablet Take 1 tablet by mouth At Night As Needed. 90 tablet 1    dexlansoprazole (Dexilant) 60 MG capsule Take 1 capsule by mouth Daily for 90 days. 90 capsule 3    DULoxetine (CYMBALTA) 30 MG capsule Take 1 capsule by mouth Daily. 90 capsule 0    famotidine (PEPCID) 20 MG tablet Take 1 tablet by mouth Every Morning.      Insulin Pen Needle (BD Pen Needle Lilly U/F) 32G X 4 MM misc Use to inject Saxenda once daily 100 each 0    linaclotide (Linzess) 145 MCG capsule capsule Take 1 capsule by mouth Every Morning Before Breakfast. 90 capsule 3    MAGNESIUM CITRATE PO Take 270 mg by mouth Daily. *magnesium citrimate+B6 for migraines      Mirabegron ER (Myrbetriq) 25 MG tablet sustained-release 24 hour 24 hr tablet Take 1 tablet by mouth Daily. 30 tablet 11    naltrexone (DEPADE) 50 MG tablet Take 1/2 tablet by mouth 2 (two) times a day. 30 tablet 2    NON FORMULARY Multivitamin patch daily       Omega-3 Fatty Acids (fish oil) 1000 MG capsule capsule Take 1 capsule by mouth 2 (Two) Times a Day With Meals.      ondansetron ODT (ZOFRAN-ODT) 4 MG disintegrating tablet Place 1 tablet under the tongue Every 8 (Eight) Hours As Needed For Nausea 20 tablet 0    phentermine (ADIPEX-P) 37.5 MG tablet Take 1/2 tablet by mouth at 11am and 1/2 tablet at 3pm. 30 tablet 0    Probiotic Product (PROBIOTIC PO) Take 1 capsule by mouth Daily.      promethazine (PHENERGAN) 25 MG tablet Take 1 tablet by mouth Every 6 (Six) Hours As Needed for nausea 60 tablet 1    propranolol LA (INDERAL LA) 80 MG 24 hr capsule Take 1 capsule by mouth Daily. 90 capsule 3    PSYLLIUM HUSK PO Take 1 capsule by mouth Daily.      Semaglutide-Weight Management (Wegovy) 1.7 MG/0.75ML solution auto-injector Inject 0.75 mL under the skin into the appropriate area as directed 1 (One) Time Per Week. 3 mL 2    SENNA PO Take  by mouth.      SUMAtriptan-naproxen (TREXIMET)  MG per tablet Take 1 tablet by mouth 1 (One) Time As Needed for Migraine. May repeat dose one time in 2 hours if headache not relieved. 9 tablet 5    Vitamin E 400 units tablet Take 400 Units by mouth Daily.       No current facility-administered medications on file prior to visit.     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  Never actually taken it internally.    Latex Anaphylaxis and Rash    Morphine Rash     Rash (if gets on skin).  Never taken it internally    Morphine And Related Rash     Rash (if gets on skin).  Never taken it internally    Penicillins Shortness Of Breath and Palpitations     Keflex OK  Keflex OK  Keflex OK    Oxycodone Other (See Comments)     Dizziness, confusion      Topiramate Other (See Comments)     Pt states she can take NAME BRAND ONLY.  Generic brand  "\"doesn't work\"     Past Surgical History:   Procedure Laterality Date    ANTERIOR AND POSTERIOR VAGINAL REPAIR  06/23/2010    DR EDIN BYRD    BREAST BIOPSY  [id number]    Left    CATARACT EXTRACTION  Congenitial Cataract Removed January 2023    New lens both eyes    CYSTOSCOPY W/ BULKING AGENT INJECTION N/A 06/25/2019    Procedure: CYSTOSCOPY WITH URETHRAL  BULKING AGENT INJECTION;  Surgeon: Jose Galvan MD;  Location: [facility] OR;  Service: Urology    CYSTOSCOPY, DIMETHYL SULFOXIDE COCKTAIL  03/14/2011    WITH HYDRODISTENTION (DR AVINASH MADDOX)    ENDOSCOPY N/A 12/17/2019    Procedure: ESOPHAGOGASTRODUODENOSCOPY;  Surgeon: Tracee Onofre MD;  Location: [facility] OR;  Service: Bariatric    ENDOSCOPY N/A 10/20/2020    Procedure: ESOPHAGOGASTRODUODENOSCOPY WITH BIOPSY;  Surgeon: Tracee Onofre MD;  Location: [facility] ENDOSCOPY;  Service: General;  Laterality: N/A;    GASTRIC SLEEVE LAPAROSCOPIC N/A 12/17/2019    Procedure: GASTRIC SLEEVE LAPAROSCOPIC;  Surgeon: Tracee Onofre MD;  Location: [facility] OR;  Service: Bariatric    HIATAL HERNIA REPAIR N/A 12/17/2019    Procedure: HIATAL HERNIA REPAIR LAPAROSCOPIC;  Surgeon: Tracee Onofre MD;  Location: [facility] OR;  Service: Bariatric    HYSTERECTOMY  06/23/2010    [facility] (DR EDIN BYRD)    LAPAROSCOPIC CHOLECYSTECTOMY  2013    for stones    TRANSVAGINAL TAPING SUSPENSION WITH OBTURATOR  06/23/2010    DR EDIN BYRD    VAGINAL HYSTERECTOMY      VAGINAL PROLAPSE REPAIR      WISDOM TOOTH EXTRACTION  1990     Family History   Problem Relation Age of Onset    Hypertension Mother     Diabetes Father         Type II    Hypertension Father     Breast cancer Sister 34    Hypertension Maternal Grandmother     Alzheimer's disease Maternal Grandmother     Breast cancer Maternal Grandmother     Heart disease Maternal Grandmother     Arthritis Maternal Grandmother     Cancer Maternal Grandmother         Breast    Depression Maternal Grandmother     " "Hyperlipidemia Maternal Grandmother     Breast cancer Maternal Aunt     Breast cancer Paternal Aunt     Stroke Maternal Grandfather     Skin cancer Maternal Grandfather     Hypertension Maternal Grandfather     Arthritis Maternal Grandfather     Cancer Maternal Grandfather     Depression Maternal Grandfather     Hearing loss Maternal Grandfather     Hyperlipidemia Maternal Grandfather     Diabetes Paternal Grandfather     Hypertension Paternal Grandfather     Hyperlipidemia Paternal Grandfather     Hyperlipidemia Other     Gout Other     Migraines Other      Social History     Socioeconomic History    Marital status:    Tobacco Use    Smoking status: Never    Smokeless tobacco: Never   Vaping Use    Vaping Use: Never used   Substance and Sexual Activity    Alcohol use: No    Drug use: No    Sexual activity: Yes     Partners: Male     Birth control/protection: Surgical, Vasectomy       Physical Examination:  Vital Signs: /68   Ht 162.6 cm (64\")   Wt 80.7 kg (178 lb)   BMI 30.55 kg/m²     General Appearance: alert, appears stated age, and cooperative  Breasts: Examined in supine position  Symmetric without masses or skin dimpling  Nipples normal without inversion, lesions or discharge  There are no palpable axillary nodes  Abdomen: no masses, no hepatomegaly, no splenomegaly, soft non-tender, no guarding, and no rebound tenderness  Pelvic: Clinical staff was present for exam  External genitalia:  normal appearance of the external genitalia including Bartholin's and [place]'s glands.  :  urethral meatus normal;  Vaginal:  atrophic mucosal changes are present;  Cervix:  absent.  Uterus:  absent.  Adnexa:  non palpable bilaterally.  Pap smear done and specimen sent using Thin-Prep technique    Data Review:  The following data was reviewed by: Shabana Hedrick MD on 09/06/2023:     Labs:  Comprehensive Metabolic Panel (02/13/2023 09:32)  CBC & Differential (02/13/2023 09:32)  Calcitriol (1,25 di-OH Vitamin D) " (02/13/2023 09:32)  Hemoglobin A1c (02/13/2023 09:32)  Lipid Panel (02/13/2023 09:32)  Insulin, Total (02/13/2023 09:32)  TSH (02/13/2023 09:32)  T4, Free (02/13/2023 09:32)  Vitamin B12 (02/13/2023 09:32)  Folate (02/13/2023 09:32)  Iron (02/13/2023 09:32)  Vitamin B1, Whole Blood (02/13/2023 09:32)  Ferritin (02/13/2023 09:32)  Methylmalonic Acid, Serum (02/13/2023 09:32)  Imaging:  MM digital mammo screen with mirtha bilateral (11/15/2022 10:20)  MM digital mammo diagnostic left (11/28/2022 08:08)  MM Stereotactic breast biopsy left (11/30/2022 08:53)  MAMMO DIAGNOSTIC LEFT W CAD (11/30/2022 08:54)  CT Abdomen Pelvis With Contrast (01/10/2023 17:54)  Mammo Diagnostic Digital Tomosynthesis Left With CAD (07/28/2023 13:26)   Medical Records:  None    Assessment and Plan   1. Encounter for gynecological examination (general) (routine) with abnormal findings  Pap was done today.  If she does not receive the results of the Pap within 2 weeks  time, she was instructed to call to find out the results.  I explained to Renae that the recommendations for Pap smear interval in a low risk patient has lengthened to 3 years time if cytology alone normal or  5 years time if both cytology and HPV testing were normal.  I encouraged her to be seen yearly for a full physical exam including breast and pelvic exam even during the off years when PAP's will not be performed.   - LIQUID-BASED PAP SMEAR WITH HPV GENOTYPING IF ASCUS ([facility])    2. Encounter for screening mammogram for breast cancer  It is recommended per ACOG, for women at average risk to start annual mammogram screening at the age of 40 until the age of 75 and an individualized decision be made for women after age 75.  She was encouraged to continue getting yearly mammograms.  She should report any palpable breast lump(s) or skin changes regardless of mammographic findings.  I explained to Renae that notification regarding her mammogram results will come from the  center performing the study.  Our office will not be routinely calling with mammogram results.  It is her responsibility to make sure that the results from the mammogram are communicated to her by the breast center.  If she has any questions about the results, she is welcome to call our office anytime.  The patient reports she will schedule her mammogram.  We have discussed being seen at the [facility].  Patient is in agreement with plan.    Renae was counseled regarding having clinical breast exams and breast self-awareness.  Women aged 29-39 years of age should have clinical breast exams every 1-3 years and yearly aged 40 and older.  The patient was counseled regarding breast self-awareness focusing on having a sense of what is normal for her breasts so that she can tell if there are changes.  Even small changes should be reported to provider.     3. Recurrent HSV (herpes simplex virus)  Prescription is given for Famvir as noted.  Instructions and precautions are given.  Prescription is also given for Zovirax ointment as needed.  Instructions and precautions are given.  Patient is to follow-up if no improvement and/or changes in her symptoms.  - famciclovir (FAMVIR) 250 MG tablet; Take 1 tablet by mouth 3 (Three) Times a Day.  Dispense: 270 tablet; Refill: 4    4. Family history of breast cancer  Patient with family history of breast cancer.  Patient did have a stereotactic breast biopsy.  Will refer to the [facility] breast [place].  - Mammo Screening Digital Tomosynthesis Bilateral With CAD; Future    5. Abnormal mammogram  Patient with abnormal mammogram and follow-up as noted.  Will refer to [facility] breast [place].  - Mammo Screening Digital Tomosynthesis Bilateral With CAD; Future    Follow Up/Instructions:  Follow up as noted.  Patient was given instructions and counseling regarding her condition or for health maintenance advice. Please see specific  information pulled into the AVS if appropriate.     Note: Speech recognition transcription software may have been used to dictate portions of this document.  An attempt at proofreading has been made though minor errors in transcription may still be present.    This note was electronically signed.  Shabana Hedrick M.D.

## 2023-09-11 LAB — REF LAB TEST METHOD: NORMAL

## 2023-09-14 ENCOUNTER — TELEMEDICINE (OUTPATIENT)
Dept: BARIATRICS/WEIGHT MGMT | Facility: CLINIC | Age: 48
End: 2023-09-14
Payer: COMMERCIAL

## 2023-09-14 VITALS
HEIGHT: 64 IN | SYSTOLIC BLOOD PRESSURE: 100 MMHG | HEART RATE: 80 BPM | WEIGHT: 172 LBS | BODY MASS INDEX: 29.37 KG/M2 | DIASTOLIC BLOOD PRESSURE: 64 MMHG

## 2023-09-14 DIAGNOSIS — R10.13 DYSPEPSIA: ICD-10-CM

## 2023-09-14 DIAGNOSIS — E66.3 OVERWEIGHT: Primary | ICD-10-CM

## 2023-09-14 RX ORDER — PHENTERMINE HYDROCHLORIDE 37.5 MG/1
TABLET ORAL
Qty: 30 TABLET | Refills: 0 | Status: SHIPPED | OUTPATIENT
Start: 2023-09-14

## 2023-09-14 RX ORDER — SEMAGLUTIDE 2.4 MG/.75ML
2.4 INJECTION, SOLUTION SUBCUTANEOUS WEEKLY
Qty: 3 ML | Refills: 2 | Status: SHIPPED | OUTPATIENT
Start: 2023-09-14

## 2023-09-14 NOTE — ASSESSMENT & PLAN NOTE
Patient's (Body mass index is 29.52 kg/m².) indicates that they are overweight with health conditions that include obstructive sleep apnea, dyslipidemias, GERD, and metabolic syndrome  . Weight is unchanged. BMI is is above average; BMI management plan is completed. We discussed low calorie, low carb based diet program, portion control, increasing exercise, joining a fitness center or start home based exercise program, pharmacologic options including wegovy, phentermine, and an radha-based approach such as [employer] Pal or Lose It.     I have instructed the patient to continue with pursuit of medical weight loss as a part of this program. Patient does meet criteria for use of anorectics at this time as BMI > 27 with coexisting, hyperlipidemia, hypertension, and is not at treatment goal.     Continue nutritional focus and work towards new exercise FITT goal of: 2-2-4-2.  The current plan for this month includes:   - Adjust exercise as discussed  - Weight loss goal 4-6lbs this month  - Continue to work on lifestyle behavioral changes  - Continue to prioritize protein, fiber, and hydration.   - Complete four doses of wegovy 1.7mg then increase to 2.4mg.  - Treatment goal 155lb

## 2023-09-14 NOTE — PROGRESS NOTES
"     Office Note      Date: 2023  Patient Name: Renae Bolton  MRN: [id number]  : 1975    This service was conducted via [employer].  Patient is located at her home address.  Provider is located at her work address. The use of a video visit has been reviewed with the patient and informed consent has been obtained.  Subjective  Subjective     Chief Complaint  Obesity Management follow-up    Subjective          Renae Bolton presents to [facility] via telehealth for obesity management. s/p LSG/HHR 19 w/ Dr. Onofre. Pre-surgery weight: 232.5, gonzalez 183lb, final goal 150lb    Patient is satisfied with weight loss progress. Appetite is well controlled. Just started wegovy a few days ago, appetite has been suppressed this week but has also been sick. Ate ginger ale and crackers throughout the day yesterday due to nausea. Having constipation (was a problem even before medicine)- traveled and that usually results in constipation. Reports no other side effects of prescribed medications today. She is not keeping a food journal at this time. Didn't make great choices on vacation. Otherwise, she is drinking a protein shake for breakfast daily. Packing her lunch. Not snacking as much.   The patient is exercising, very physically active at her new job. Takes an occasional walk, plans to restart with her  in October.      Review of Systems      Objective   Body mass index is 29.52 kg/m².  Start weight: 193 pounds.    Total weight loss: -21 pounds/-10.8%  Change in weight since last visit: none    Measurements (in inches)  Waist: 37\"  /64   Pulse 80   Ht 162.6 cm (64\")   Wt 78 kg (172 lb)   BMI 29.52 kg/m²       Result Review :                 Assessment and Plan    Diagnoses and all orders for this visit:    1. Dyspepsia (Primary)  Assessment & Plan:  Newly identified. Possibly viral or possibly side effect of wegovy. Encouraged to include protein " in diet like chicken broth. Reassess next visit.       2. Overweight  Assessment & Plan:  Patient's (Body mass index is 29.52 kg/m².) indicates that they are overweight with health conditions that include obstructive sleep apnea, dyslipidemias, GERD, and metabolic syndrome  . Weight is unchanged. BMI is is above average; BMI management plan is completed. We discussed low calorie, low carb based diet program, portion control, increasing exercise, joining a fitness center or start home based exercise program, pharmacologic options including wegovy, phentermine, and an radha-based approach such as [employer] Pal or Lose It.     I have instructed the patient to continue with pursuit of medical weight loss as a part of this program. Patient does meet criteria for use of anorectics at this time as BMI > 27 with coexisting, hyperlipidemia, hypertension, and is not at treatment goal.     Continue nutritional focus and work towards new exercise FITT goal of: 2-2-4-2.  The current plan for this month includes:   - Adjust exercise as discussed  - Weight loss goal 4-6lbs this month  - Continue to work on lifestyle behavioral changes  - Continue to prioritize protein, fiber, and hydration.   - Complete four doses of wegovy 1.7mg then increase to 2.4mg.  - Treatment goal 155lb      Orders:  -     Semaglutide-Weight Management (Wegovy) 2.4 MG/0.75ML solution auto-injector; Inject 2.4 mg under the skin into the appropriate area as directed 1 (One) Time Per Week.  Dispense: 3 mL; Refill: 2  -     phentermine (ADIPEX-P) 37.5 MG tablet; Take 1/2 tablet by mouth at 11am and 1/2 tablet at 3pm.  Dispense: 30 tablet; Refill: 0          Follow Up   Return in about 1 month (around 10/14/2023) for Next scheduled follow up.  Patient was given instructions and counseling regarding her condition or for health maintenance advice. Please see specific information pulled into the AVS if appropriate.     LAURITA Michel

## 2023-09-14 NOTE — ASSESSMENT & PLAN NOTE
Newly identified. Possibly viral or possibly side effect of wegovy. Encouraged to include protein in diet like chicken broth. Reassess next visit.

## 2023-09-18 ENCOUNTER — PATIENT MESSAGE (OUTPATIENT)
Dept: BARIATRICS/WEIGHT MGMT | Facility: CLINIC | Age: 48
End: 2023-09-18
Payer: COMMERCIAL

## 2023-09-18 DIAGNOSIS — E66.3 OVERWEIGHT: Primary | ICD-10-CM

## 2023-09-20 ENCOUNTER — HOSPITAL ENCOUNTER (EMERGENCY)
Facility: HOSPITAL | Age: 48
Discharge: HOME | End: 2023-09-20
Attending: EMERGENCY MEDICINE
Payer: COMMERCIAL

## 2023-09-20 ENCOUNTER — APPOINTMENT (OUTPATIENT)
Dept: GENERAL RADIOLOGY | Facility: HOSPITAL | Age: 48
End: 2023-09-20
Payer: COMMERCIAL

## 2023-09-20 VITALS
BODY MASS INDEX: 28.68 KG/M2 | WEIGHT: 168 LBS | TEMPERATURE: 97.7 F | DIASTOLIC BLOOD PRESSURE: 51 MMHG | OXYGEN SATURATION: 99 % | HEART RATE: 63 BPM | SYSTOLIC BLOOD PRESSURE: 111 MMHG | RESPIRATION RATE: 18 BRPM | HEIGHT: 64 IN

## 2023-09-20 DIAGNOSIS — R07.9 CHEST PAIN, UNSPECIFIED TYPE: Primary | ICD-10-CM

## 2023-09-20 LAB
ALBUMIN SERPL-MCNC: 4.4 G/DL (ref 3.5–5.2)
ALBUMIN/GLOB SERPL: 1.7 G/DL
ALP SERPL-CCNC: 70 U/L (ref 39–117)
ALT SERPL W P-5'-P-CCNC: 20 U/L (ref 1–33)
ANION GAP SERPL CALCULATED.3IONS-SCNC: 10.2 MMOL/L (ref 5–15)
AST SERPL-CCNC: 20 U/L (ref 1–32)
BASOPHILS # BLD AUTO: 0.05 10*3/MM3 (ref 0–0.2)
BASOPHILS NFR BLD AUTO: 1 % (ref 0–1.5)
BILIRUB SERPL-MCNC: 0.6 MG/DL (ref 0–1.2)
BUN SERPL-MCNC: 11 MG/DL (ref 6–20)
BUN/CREAT SERPL: 11.3 (ref 7–25)
CALCIUM SPEC-SCNC: 9.5 MG/DL (ref 8.6–10.5)
CHLORIDE SERPL-SCNC: 101 MMOL/L (ref 98–107)
CO2 SERPL-SCNC: 26.8 MMOL/L (ref 22–29)
CREAT SERPL-MCNC: 0.97 MG/DL (ref 0.57–1)
DEPRECATED RDW RBC AUTO: 39.1 FL (ref 37–54)
EGFRCR SERPLBLD CKD-EPI 2021: 71.3 ML/MIN/1.73
EOSINOPHIL # BLD AUTO: 0.06 10*3/MM3 (ref 0–0.4)
EOSINOPHIL NFR BLD AUTO: 1.2 % (ref 0.3–6.2)
ERYTHROCYTE [DISTWIDTH] IN BLOOD BY AUTOMATED COUNT: 11.8 % (ref 12.3–15.4)
FLUAV RNA RESP QL NAA+PROBE: NOT DETECTED
FLUBV RNA RESP QL NAA+PROBE: NOT DETECTED
GEN 5 2HR TROPONIN T REFLEX: <6 NG/L
GLOBULIN UR ELPH-MCNC: 2.6 GM/DL
GLUCOSE SERPL-MCNC: 84 MG/DL (ref 65–99)
HCT VFR BLD AUTO: 44.6 % (ref 34–46.6)
HGB BLD-MCNC: 15.6 G/DL (ref 12–15.9)
HOLD SPECIMEN: NORMAL
HOLD SPECIMEN: NORMAL
IMM GRANULOCYTES # BLD AUTO: 0.01 10*3/MM3 (ref 0–0.05)
IMM GRANULOCYTES NFR BLD AUTO: 0.2 % (ref 0–0.5)
LYMPHOCYTES # BLD AUTO: 1.32 10*3/MM3 (ref 0.7–3.1)
LYMPHOCYTES NFR BLD AUTO: 27 % (ref 19.6–45.3)
MCH RBC QN AUTO: 31.6 PG (ref 26.6–33)
MCHC RBC AUTO-ENTMCNC: 35 G/DL (ref 31.5–35.7)
MCV RBC AUTO: 90.5 FL (ref 79–97)
MONOCYTES # BLD AUTO: 0.43 10*3/MM3 (ref 0.1–0.9)
MONOCYTES NFR BLD AUTO: 8.8 % (ref 5–12)
NEUTROPHILS NFR BLD AUTO: 3.01 10*3/MM3 (ref 1.7–7)
NEUTROPHILS NFR BLD AUTO: 61.8 % (ref 42.7–76)
NRBC BLD AUTO-RTO: 0 /100 WBC (ref 0–0.2)
PLATELET # BLD AUTO: 280 10*3/MM3 (ref 140–450)
PMV BLD AUTO: 10.1 FL (ref 6–12)
POTASSIUM SERPL-SCNC: 4 MMOL/L (ref 3.5–5.2)
PROT SERPL-MCNC: 7 G/DL (ref 6–8.5)
RBC # BLD AUTO: 4.93 10*6/MM3 (ref 3.77–5.28)
SARS-COV-2 RNA RESP QL NAA+PROBE: NOT DETECTED
SODIUM SERPL-SCNC: 138 MMOL/L (ref 136–145)
TROPONIN T DELTA: NORMAL
TROPONIN T SERPL HS-MCNC: <6 NG/L
WBC NRBC COR # BLD: 4.88 10*3/MM3 (ref 3.4–10.8)
WHOLE BLOOD HOLD COAG: NORMAL
WHOLE BLOOD HOLD SPECIMEN: NORMAL

## 2023-09-20 PROCEDURE — 80053 COMPREHEN METABOLIC PANEL: CPT | Performed by: EMERGENCY MEDICINE

## 2023-09-20 PROCEDURE — 71045 X-RAY EXAM CHEST 1 VIEW: CPT

## 2023-09-20 PROCEDURE — 85025 COMPLETE CBC W/AUTO DIFF WBC: CPT | Performed by: EMERGENCY MEDICINE

## 2023-09-20 PROCEDURE — 96375 TX/PRO/DX INJ NEW DRUG ADDON: CPT

## 2023-09-20 PROCEDURE — 25010000002 ONDANSETRON PER 1 MG: Performed by: PHYSICIAN ASSISTANT

## 2023-09-20 PROCEDURE — 99284 EMERGENCY DEPT VISIT MOD MDM: CPT

## 2023-09-20 PROCEDURE — 84484 ASSAY OF TROPONIN QUANT: CPT | Performed by: EMERGENCY MEDICINE

## 2023-09-20 PROCEDURE — 36415 COLL VENOUS BLD VENIPUNCTURE: CPT

## 2023-09-20 PROCEDURE — 87636 SARSCOV2 & INF A&B AMP PRB: CPT | Performed by: PHYSICIAN ASSISTANT

## 2023-09-20 PROCEDURE — 96374 THER/PROPH/DIAG INJ IV PUSH: CPT

## 2023-09-20 PROCEDURE — 93005 ELECTROCARDIOGRAM TRACING: CPT | Performed by: EMERGENCY MEDICINE

## 2023-09-20 RX ORDER — SODIUM CHLORIDE 0.9 % (FLUSH) 0.9 %
10 SYRINGE (ML) INJECTION AS NEEDED
Status: DISCONTINUED | OUTPATIENT
Start: 2023-09-20 | End: 2023-09-20 | Stop reason: HOSPADM

## 2023-09-20 RX ORDER — SEMAGLUTIDE 1 MG/.5ML
1 INJECTION, SOLUTION SUBCUTANEOUS WEEKLY
Qty: 2 ML | Refills: 0 | Status: SHIPPED | OUTPATIENT
Start: 2023-09-20

## 2023-09-20 RX ORDER — ASPIRIN 81 MG/1
324 TABLET, CHEWABLE ORAL ONCE
Status: COMPLETED | OUTPATIENT
Start: 2023-09-20 | End: 2023-09-20

## 2023-09-20 RX ORDER — FAMOTIDINE 10 MG/ML
20 INJECTION, SOLUTION INTRAVENOUS ONCE
Status: COMPLETED | OUTPATIENT
Start: 2023-09-20 | End: 2023-09-20

## 2023-09-20 RX ORDER — ONDANSETRON 4 MG/1
4 TABLET, ORALLY DISINTEGRATING ORAL EVERY 8 HOURS PRN
Qty: 12 TABLET | Refills: 0 | Status: SHIPPED | OUTPATIENT
Start: 2023-09-20

## 2023-09-20 RX ORDER — ONDANSETRON 2 MG/ML
4 INJECTION INTRAMUSCULAR; INTRAVENOUS ONCE
Status: COMPLETED | OUTPATIENT
Start: 2023-09-20 | End: 2023-09-20

## 2023-09-20 RX ADMIN — ONDANSETRON 4 MG: 2 INJECTION INTRAMUSCULAR; INTRAVENOUS at 13:37

## 2023-09-20 RX ADMIN — FAMOTIDINE 20 MG: 10 INJECTION INTRAVENOUS at 12:26

## 2023-09-20 RX ADMIN — ALUMINUM HYDROXIDE, MAGNESIUM HYDROXIDE, AND DIMETHICONE: 400; 400; 40 SUSPENSION ORAL at 12:25

## 2023-09-20 RX ADMIN — ASPIRIN 81 MG CHEWABLE TABLET 324 MG: 81 TABLET CHEWABLE at 12:26

## 2023-09-20 NOTE — DISCHARGE INSTRUCTIONS
Continue Pepcid as prescribed as needed.  Avoid spicy foods, caffeine, chocolate, NSAIDs, alcohol, acidic foods.  Drink plenty of water.  Eat small meals at a time more frequently.  Follow-up with your PCP for further outpatient evaluation if symptoms persist.  Follow-up with Dr. Liang, cardiologist, for further outpatient evaluation of chest pain if symptoms persist. Return to the ER for new or worsening symptoms or acute concerns.

## 2023-09-20 NOTE — Clinical Note
[facility] EMERGENCY DEPARTMENT  [address] [place] [address]  Phone: [phone]    Renae Bolton was seen and treated in our emergency department on 9/20/2023.  She may return to work on 09/21/2023.         Thank you for choosing [facility].    Alycia Lloyd MD

## 2023-09-20 NOTE — ED PROVIDER NOTES
Subjective:  Chief Complaint:  Chest pain    History of Present Illness:  Patient is a 48-year-old female with history of anxiety, arrhythmia which she is on propanolol for and follows with Dr. Liang, chronic venous insufficiency, among others presenting to the ER with complaints of chest pain, nausea, generally feeling unwell.  Patient states that she has no history of stents and is not on anticoagulants.  She states she does have some chest pain every now and then related to her arrhythmia but it became more constant last night.  She states that she is beginning to think it is related to her stomach as she has had some nausea and has had a hiatal hernia repair and gastric sleeve surgery.  She states that she has been nauseous and not felt well over the last week.  She does take Dexilant and sometimes takes Pepcid.  She denies any additional symptoms or complaints at this time. Patient eating crackers upon initial evaluation.      Nurses Notes reviewed and agree, including vitals, allergies, social history and prior medical history.     REVIEW OF SYSTEMS: All systems reviewed and not pertinent unless noted.  Review of Systems   Cardiovascular:  Positive for chest pain.   Gastrointestinal:  Positive for nausea.   All other systems reviewed and are negative.    Past Medical History:   Diagnosis Date    Allergic rhinitis approx 10 years ago    Anxiety     Arrhythmia     SVT w/ PVCs, on propranolol, follows w/ Dr. Liang    Chronic venous insufficiency     per Dr. Liang note 8/17/22    Cluster headache     Depression     Diabetes mellitus 2001    Gestational    Dyspepsia     Dyspnea on exertion     resolved after weight loss/ gastric sleeve    Endometriosis     Fatigue     Female infertility     Fibroid Hysterectomy    Generalized edema     r/t weight gain    Genital HSV     Famvir for prophylaxis    GERD (gastroesophageal reflux disease)     Gestational diabetes 2001    Hard to intubate     states was told this after  her hysterectomy.  states procedures following gastric sleeve have been without issues    Head injury     concussion with post concussion syndrome    Headache, tension-type     Hyperemesis gravidarum     Hyperlipidemia 09/21/2021    Hypertension     resloved after gastric sleeve/weight loss    Interstitial cystitis     Kidney stones     Migraine     occasional    Mitral valve prolapse     pt denies/unaware; mitral valve regurge per Dr. Liang note 8/17/22    Normal vaginal Papanicolaou smear in patient with history of hysterectomy 09/16/2016    Peripheral edema     PONV (postoperative nausea and vomiting)     Post concussion syndrome     s/p fall w/ frontal lobe head injury 9/2017, previously on Elavil    Sleep apnea     resloved after gastric sleeve/weight loss    Stress incontinence     follows w/ Dr. Galvan, on Myrbetriq, s/p cystoscopy w/ bulking agent    SVT (supraventricular tachycardia)     followed by Dr. Liang - see note 8/17/22 scanned into media       Allergies:    Amoxicillin, Caffeine, Demerol [meperidine], Latex, Morphine, Morphine and related, Penicillins, Oxycodone, and Topiramate      Past Surgical History:   Procedure Laterality Date    ANTERIOR AND POSTERIOR VAGINAL REPAIR  06/23/2010    DR EDIN BYRD    BREAST BIOPSY  [id number]    Left    CATARACT EXTRACTION  Congenitial Cataract Removed January 2023    New lens both eyes    CYSTOSCOPY W/ BULKING AGENT INJECTION N/A 06/25/2019    Procedure: CYSTOSCOPY WITH URETHRAL  BULKING AGENT INJECTION;  Surgeon: Jose Galvan MD;  Location: [facility] OR;  Service: Urology    CYSTOSCOPY, DIMETHYL SULFOXIDE COCKTAIL  03/14/2011    WITH HYDRODISTENTION (DR AVINASH MADDOX)    ENDOSCOPY N/A 12/17/2019    Procedure: ESOPHAGOGASTRODUODENOSCOPY;  Surgeon: Tracee Onofre MD;  Location: [facility] OR;  Service: Bariatric    ENDOSCOPY N/A 10/20/2020    Procedure: ESOPHAGOGASTRODUODENOSCOPY WITH BIOPSY;  Surgeon: Tracee Onofre MD;  Location: [facility]  ENDOSCOPY;  Service: General;  Laterality: N/A;    GASTRIC SLEEVE LAPAROSCOPIC N/A 12/17/2019    Procedure: GASTRIC SLEEVE LAPAROSCOPIC;  Surgeon: Tracee Onofre MD;  Location: [facility] OR;  Service: Bariatric    HIATAL HERNIA REPAIR N/A 12/17/2019    Procedure: HIATAL HERNIA REPAIR LAPAROSCOPIC;  Surgeon: Tracee Onofre MD;  Location: [facility] OR;  Service: Bariatric    HYSTERECTOMY  06/23/2010    LAV (DR EDIN BYRD)    LAPAROSCOPIC CHOLECYSTECTOMY  2013    for stones    TRANSVAGINAL TAPING SUSPENSION WITH OBTURATOR  06/23/2010    DR EDIN BYRD    VAGINAL HYSTERECTOMY      VAGINAL PROLAPSE REPAIR      WISDOM TOOTH EXTRACTION  1990         Social History     Socioeconomic History    Marital status:    Tobacco Use    Smoking status: Never    Smokeless tobacco: Never   Vaping Use    Vaping Use: Never used   Substance and Sexual Activity    Alcohol use: No    Drug use: No    Sexual activity: Yes     Partners: Male     Birth control/protection: Surgical, Vasectomy         Family History   Problem Relation Age of Onset    Hypertension Mother     Diabetes Father         Type II    Hypertension Father     Breast cancer Sister 34    Hypertension Maternal Grandmother     Alzheimer's disease Maternal Grandmother     Breast cancer Maternal Grandmother     Heart disease Maternal Grandmother     Arthritis Maternal Grandmother     Cancer Maternal Grandmother         Breast    Depression Maternal Grandmother     Hyperlipidemia Maternal Grandmother     Breast cancer Maternal Aunt     Breast cancer Paternal Aunt     Stroke Maternal Grandfather     Skin cancer Maternal Grandfather     Hypertension Maternal Grandfather     Arthritis Maternal Grandfather     Cancer Maternal Grandfather     Depression Maternal Grandfather     Hearing loss Maternal Grandfather     Hyperlipidemia Maternal Grandfather     Diabetes Paternal Grandfather     Hypertension Paternal Grandfather     Hyperlipidemia Paternal Grandfather      "Hyperlipidemia Other     Gout Other     Migraines Other        Objective  Physical Exam:  /51   Pulse 63   Temp 97.7 °F (36.5 °C) (Oral)   Resp 18   Ht 162.6 cm (64\")   Wt 76.2 kg (168 lb)   SpO2 99%   BMI 28.84 kg/m²      Physical Exam  Vitals and nursing note reviewed.   Constitutional:       General: She is not in acute distress.     Appearance: She is not toxic-appearing.   HENT:      Head: Normocephalic and atraumatic.   Eyes:      Extraocular Movements: Extraocular movements intact.   Cardiovascular:      Rate and Rhythm: Normal rate.      Heart sounds: Normal heart sounds.   Pulmonary:      Effort: Pulmonary effort is normal.      Breath sounds: Normal breath sounds.   Abdominal:      Palpations: Abdomen is soft.      Tenderness: There is no abdominal tenderness.   Musculoskeletal:         General: Normal range of motion.      Cervical back: Normal range of motion and neck supple.   Skin:     General: Skin is warm and dry.   Neurological:      General: No focal deficit present.      Mental Status: She is alert and oriented to person, place, and time.   Psychiatric:         Mood and Affect: Mood normal.         Behavior: Behavior normal.       Procedures    ED Course:    ED Course as of 09/20/23 1610   Wed Sep 20, 2023   1215 EKG interpreted by me reveals sinus rhythm with a rate of 81 bpm.  There is low QRS voltage in chest leads.  This is an atypical appearing EKG. [TB]      ED Course User Index  [TB] Alycia Lloyd MD       Lab Results (last 24 hours)       Procedure Component Value Units Date/Time    CBC & Differential [[id number]]  (Abnormal) Collected: 09/20/23 1117    Specimen: Blood Updated: 09/20/23 1124    Narrative:      The following orders were created for panel order CBC & Differential.  Procedure                               Abnormality         Status                     ---------                               -----------         ------                     CBC Auto " Differential[[id number]]        Abnormal            Final result                 Please view results for these tests on the individual orders.    Comprehensive Metabolic Panel [[id number]] Collected: 09/20/23 1117    Specimen: Blood Updated: 09/20/23 1142     Glucose 84 mg/dL      BUN 11 mg/dL      Creatinine 0.97 mg/dL      Sodium 138 mmol/L      Potassium 4.0 mmol/L      Chloride 101 mmol/L      CO2 26.8 mmol/L      Calcium 9.5 mg/dL      Total Protein 7.0 g/dL      Albumin 4.4 g/dL      ALT (SGPT) 20 U/L      AST (SGOT) 20 U/L      Alkaline Phosphatase 70 U/L      Total Bilirubin 0.6 mg/dL      Globulin 2.6 gm/dL      A/G Ratio 1.7 g/dL      BUN/Creatinine Ratio 11.3     Anion Gap 10.2 mmol/L      eGFR 71.3 mL/min/1.73     Narrative:      GFR Normal >60  Chronic Kidney Disease <60  Kidney Failure <15      High Sensitivity Troponin T [[id number]]  (Normal) Collected: 09/20/23 1117    Specimen: Blood Updated: 09/20/23 1144     HS Troponin T <6 ng/L     Narrative:      High Sensitive Troponin T Reference Range:  <10.0 ng/L- Negative Female for AMI  <15.0 ng/L- Negative Male for AMI  >=10 - Abnormal Female indicating possible myocardial injury.  >=15 - Abnormal Male indicating possible myocardial injury.   Clinicians would have to utilize clinical acumen, EKG, Troponin, and serial changes to determine if it is an Acute Myocardial Infarction or myocardial injury due to an underlying chronic condition.         CBC Auto Differential [[id number]]  (Abnormal) Collected: 09/20/23 1117    Specimen: Blood Updated: 09/20/23 1124     WBC 4.88 10*3/mm3      RBC 4.93 10*6/mm3      Hemoglobin 15.6 g/dL      Hematocrit 44.6 %      MCV 90.5 fL      MCH 31.6 pg      MCHC 35.0 g/dL      RDW 11.8 %      RDW-SD 39.1 fl      MPV 10.1 fL      Platelets 280 10*3/mm3      Neutrophil % 61.8 %      Lymphocyte % 27.0 %      Monocyte % 8.8 %      Eosinophil % 1.2 %      Basophil % 1.0 %      Immature Grans % 0.2 %      Neutrophils, Absolute 3.01  10*3/mm3      Lymphocytes, Absolute 1.32 10*3/mm3      Monocytes, Absolute 0.43 10*3/mm3      Eosinophils, Absolute 0.06 10*3/mm3      Basophils, Absolute 0.05 10*3/mm3      Immature Grans, Absolute 0.01 10*3/mm3      nRBC 0.0 /100 WBC     COVID-19 and FLU A/B PCR - Swab, Nasopharynx [[id number]]  (Normal) Collected: 09/20/23 1237    Specimen: Swab from Nasopharynx Updated: 09/20/23 1302     COVID19 Not Detected     Influenza A PCR Not Detected     Influenza B PCR Not Detected    Narrative:      Fact sheet for providers: https://www.fda.gov/media/439590/download    Fact sheet for patients: https://www.fda.gov/media/997136/download    Test performed by PCR.    High Sensitivity Troponin T 2Hr [[id number]] Collected: 09/20/23 1324    Specimen: Blood Updated: 09/20/23 1348     HS Troponin T <6 ng/L      Troponin T Delta --     Comment: Unable to calculate.       Narrative:      High Sensitive Troponin T Reference Range:  <10.0 ng/L- Negative Female for AMI  <15.0 ng/L- Negative Male for AMI  >=10 - Abnormal Female indicating possible myocardial injury.  >=15 - Abnormal Male indicating possible myocardial injury.   Clinicians would have to utilize clinical acumen, EKG, Troponin, and serial changes to determine if it is an Acute Myocardial Infarction or myocardial injury due to an underlying chronic condition.                  XR Chest 1 View    Result Date: 9/20/2023  CLINICAL INDICATION:  Chest Pain Triage Protocol  EXAMINATION TECHNIQUE: XR CHEST 1 VW-  COMPARISON: Radiographs 12/27/2019.  FINDINGS: Lungs are clear without focal airspace consolidation. No pneumothorax or large pleural effusion. Heart and mediastinal contours are within normal limits. No acute osseous or soft tissue abnormality. No free air under hemidiaphragms.      Impression: No acute cardiopulmonary findings.  Images personally reviewed, interpreted and dictated by DEEP Ignacio.     This report was signed and finalized on 9/20/2023 11:58 AM  by DEEP Ignacio.        HEART Score: 3    MDM  Patient was evaluated in the ER for nausea and chest pain that has worsened since last night and generally not feeling well over the last week. She is hemodynamically stable, nontoxic appearing on exam.  Differential diagnosis includes but is not limited to cardiac arrhythmia, ACS, costochondritis, GERD, among others.  Patient believes that symptoms may be related to GI issues as she has had a hiatal hernia repair and feels that symptoms have worsened since then and history of gastric sleeve surgery.  Initial plan includes CBC, CMP, troponins, COVID/flu swab, EKG, chest x-ray, and treatment with aspirin, GI cocktail, and Pepcid.    Patient reported she was still nauseous and was given Zofran.    Labs unremarkable.  Both troponins are negative.  Patient feeling better upon re-evaluation.  She is agreeable with plan for discharge.  She was advised to follow-up with her cardiologist, Dr. Liang, for further outpatient evaluation and definitive care if chest pain persist.  Follow-up with your PCP and your gastroenterologist for further outpatient evaluation as well.  Precautions were given for return to the ER for any new or worsening symptoms.    Final diagnoses:   Chest pain, unspecified type          Anna Stewart, JOAQUIM  09/20/23 1750

## 2023-10-11 DIAGNOSIS — G43.009 MIGRAINE WITHOUT AURA AND WITHOUT STATUS MIGRAINOSUS, NOT INTRACTABLE: ICD-10-CM

## 2023-10-11 DIAGNOSIS — E66.3 OVERWEIGHT: ICD-10-CM

## 2023-10-11 RX ORDER — SUMATRIPTAN AND NAPROXEN SODIUM 85; 500 MG/1; MG/1
1 TABLET, FILM COATED ORAL ONCE AS NEEDED
Qty: 9 TABLET | Refills: 5 | Status: SHIPPED | OUTPATIENT
Start: 2023-10-11 | End: 2023-10-12 | Stop reason: SDUPTHER

## 2023-10-11 RX ORDER — PHENTERMINE HYDROCHLORIDE 37.5 MG/1
TABLET ORAL
Qty: 30 TABLET | Refills: 0 | Status: SHIPPED | OUTPATIENT
Start: 2023-10-11

## 2023-10-12 DIAGNOSIS — G43.009 MIGRAINE WITHOUT AURA AND WITHOUT STATUS MIGRAINOSUS, NOT INTRACTABLE: ICD-10-CM

## 2023-10-12 RX ORDER — SUMATRIPTAN AND NAPROXEN SODIUM 85; 500 MG/1; MG/1
1 TABLET, FILM COATED ORAL ONCE AS NEEDED
Qty: 9 TABLET | Refills: 5 | Status: SHIPPED | OUTPATIENT
Start: 2023-10-12

## 2023-10-16 ENCOUNTER — PRIOR AUTHORIZATION (OUTPATIENT)
Dept: BARIATRICS/WEIGHT MGMT | Facility: CLINIC | Age: 48
End: 2023-10-16
Payer: COMMERCIAL

## 2023-10-16 RX ORDER — SEMAGLUTIDE 1 MG/.5ML
1 INJECTION, SOLUTION SUBCUTANEOUS WEEKLY
Qty: 2 ML | Refills: 0 | Status: SHIPPED | OUTPATIENT
Start: 2023-10-16

## 2023-10-16 NOTE — TELEPHONE ENCOUNTER
Renae Bolton (Key: [id number]) - [id number]  Dexlansoprazole 60MG dr capsules    APPROVED 10/16/2023 to 10/16/2024.

## 2023-11-09 ENCOUNTER — TELEMEDICINE (OUTPATIENT)
Dept: BARIATRICS/WEIGHT MGMT | Facility: CLINIC | Age: 48
End: 2023-11-09
Payer: COMMERCIAL

## 2023-11-09 VITALS — HEIGHT: 64 IN | WEIGHT: 158 LBS | BODY MASS INDEX: 26.98 KG/M2

## 2023-11-09 DIAGNOSIS — R11.0 NAUSEA: ICD-10-CM

## 2023-11-09 DIAGNOSIS — E66.3 OVERWEIGHT: Primary | ICD-10-CM

## 2023-11-09 NOTE — ASSESSMENT & PLAN NOTE
Chronic but worse in the last few months. Improving with decreased dose of wegovy to 1mg. OK to continue zofran, recent EKG and cardiac workup was WNL.

## 2023-11-09 NOTE — ASSESSMENT & PLAN NOTE
Patient's (Body mass index is 27.12 kg/m².) indicates that they are overweight with health conditions that include obstructive sleep apnea, dyslipidemias, GERD, and metabolic syndrome  . Weight is improving with treatment. BMI is is above average; BMI management plan is completed. We discussed low calorie, low carb based diet program, portion control, increasing exercise, and pharmacologic options including wegovy, phentermine .    I have instructed the patient to continue with pursuit of medical weight loss as a part of this program. Patient does meet criteria for use of anorectics at this time as BMI > 27 with coexisting, hyperlipidemia, impaired fasting glucose, is not at treatment goal, and this medication is indicated for LONG TERM use for management of obesity.     The current plan for this month includes:   - Continue to work on lifestyle behavioral changes  - Continue to prioritize protein, fiber, and hydration.   - Continue wegovy 1mg every 10 days for last 2 doses. If tolerated, we will try to continue this dose which depends on supply and prior authorization. Backup plan is to switch back to saxenda. Don't advise increasing back to wegovy 1.7mg until symptoms are fully resolved.   - Treatment goal 155lb.     Continue sympathomimetic (medication refilled).  This patient 1) has no evidence of serious cardiovascular disease; 2) does not have serious psychiatric disease or a history of substance abuse; 3) has been informed about weight loss medications that are FDA approved for long-term use and told that these have been all documented to be safe and effective whereas phentermine has not; 4) does not demonstrate a clinically significant increase in pulse or blood pressure when taking phentermine; and 5) demonstrate significant weight loss while using the medication.  Patient understands that all antiobesity medications are contraindicated in pregnancy.  Patient denies a history of glaucoma.  Patient  "understands that long-term use of phentermine is considered off label use of this medication, however, that the endocrine Society and recent research supports that long-term use of phentermine does not appear to have detrimental health effects when used and the appropriate patient.  In addition, a 2019 study published in an obesity journal on [id number] patient's concluded that \"recommendations to limit phentermine to less than 3 months do not align with current concept of pharmacologic treatment of obesity\", and that \"long-term phentermine users experience greater weight loss without apparent increases in cardiovascular risk\".    We reviewed potential side effects including insomnia, dry mouth, increased heart rate and blood pressure, increased anxiety.  We reviewed reducing caffeine consumption while taking phentermine.  especially if the patient is experience side effects.  The potential risk and benefits of phentermine have been reviewed with the patient, and alternative treatment options were discussed.  All questions were answered, and the patient wishes to move forward with this medication.       "

## 2023-11-09 NOTE — PROGRESS NOTES
"     Office Note      Date: 2023  Patient Name: Renae Bolton  MRN: [id number]  : 1975    This service was conducted via [employer].  Patient is located in [place].  Provider is located at her work address. The use of a video visit has been reviewed with the patient and informed consent has been obtained.  Subjective  Subjective     Chief Complaint  Obesity Management follow-up    Subjective          Renae Bolton presents to [facility] via telehealth for obesity management. s/p LSG/HHR 19 w/ Dr. Onofre. Pre-surgery weight: 232.5, gonzalez 183lb, final goal 150lb.     Patient is satisfied with weight loss progress. Appetite is well controlled. Side effect of wegovy: Over the last 2 months she has been extremely nauseated in the mornings, also developed some chest pain, went to the ER, non-cardiac in nature. She has since decreased wegovy to 1mg and skipped a dose last week and is feeling much better. She is able to eat more, has more energy. Has two more 1mg pens of wegovy and has two 1.7mg pens and 11 pens of saxenda. She also has several tablets of phentermine, has not been using it during her illness.   The patient is not exercising due to fatigue but now that she's feeling better she plans to gradually add back cardio.   Review of Systems      Objective   Start weight MWM: 193 pounds.    Total weight loss: -35 pounds/-18%  Change in weight since last visit: -10lb    Body mass index is 27.12 kg/m².   Measurements (in inches)     Ht 162.6 cm (64\")   Wt 71.7 kg (158 lb)   BMI 27.12 kg/m²       Result Review :                 Assessment and Plan    Diagnoses and all orders for this visit:    1. Overweight (Primary)  Assessment & Plan:  Patient's (Body mass index is 27.12 kg/m².) indicates that they are overweight with health conditions that include obstructive sleep apnea, dyslipidemias, GERD, and metabolic syndrome  . Weight is improving with treatment. " BMI is is above average; BMI management plan is completed. We discussed low calorie, low carb based diet program, portion control, increasing exercise, and pharmacologic options including wegovy, phentermine .    I have instructed the patient to continue with pursuit of medical weight loss as a part of this program. Patient does meet criteria for use of anorectics at this time as BMI > 27 with coexisting, hyperlipidemia, impaired fasting glucose, is not at treatment goal, and this medication is indicated for LONG TERM use for management of obesity.     The current plan for this month includes:   - Continue to work on lifestyle behavioral changes  - Continue to prioritize protein, fiber, and hydration.   - Continue wegovy 1mg every 10 days for last 2 doses. If tolerated, we will try to continue this dose which depends on supply and prior authorization. Backup plan is to switch back to saxenda. Don't advise increasing back to wegovy 1.7mg until symptoms are fully resolved.   - Treatment goal 155lb.     Continue sympathomimetic (medication refilled).  This patient 1) has no evidence of serious cardiovascular disease; 2) does not have serious psychiatric disease or a history of substance abuse; 3) has been informed about weight loss medications that are FDA approved for long-term use and told that these have been all documented to be safe and effective whereas phentermine has not; 4) does not demonstrate a clinically significant increase in pulse or blood pressure when taking phentermine; and 5) demonstrate significant weight loss while using the medication.  Patient understands that all antiobesity medications are contraindicated in pregnancy.  Patient denies a history of glaucoma.  Patient understands that long-term use of phentermine is considered off label use of this medication, however, that the endocrine Society and recent research supports that long-term use of phentermine does not appear to have detrimental  "health effects when used and the appropriate patient.  In addition, a 2019 study published in an obesity journal on [id number] patient's concluded that \"recommendations to limit phentermine to less than 3 months do not align with current concept of pharmacologic treatment of obesity\", and that \"long-term phentermine users experience greater weight loss without apparent increases in cardiovascular risk\".    We reviewed potential side effects including insomnia, dry mouth, increased heart rate and blood pressure, increased anxiety.  We reviewed reducing caffeine consumption while taking phentermine.  especially if the patient is experience side effects.  The potential risk and benefits of phentermine have been reviewed with the patient, and alternative treatment options were discussed.  All questions were answered, and the patient wishes to move forward with this medication.           2. Nausea  Assessment & Plan:  Chronic but worse in the last few months. Improving with decreased dose of wegovy to 1mg. OK to continue zofran, recent EKG and cardiac workup was WNL.             Follow Up   Return in about 1 month (around 12/9/2023) for Next scheduled follow up.  Patient was given instructions and counseling regarding her condition or for health maintenance advice. Please see specific information pulled into the AVS if appropriate.     LAURITA Michel    "

## 2023-11-10 DIAGNOSIS — E66.3 OVERWEIGHT: ICD-10-CM

## 2023-11-10 DIAGNOSIS — G43.009 MIGRAINE WITHOUT AURA AND WITHOUT STATUS MIGRAINOSUS, NOT INTRACTABLE: ICD-10-CM

## 2023-11-10 NOTE — TELEPHONE ENCOUNTER
Rx Refill Note  Requested Prescriptions     Pending Prescriptions Disp Refills    Semaglutide-Weight Management (Wegovy) 1 MG/0.5ML solution auto-injector 2 mL 0     Sig: Inject 0.5 mL under the skin into the appropriate area as directed 1 (One) Time Per Week.      Last office visit with prescribing clinician: 8/17/2023   Last telemedicine visit with prescribing clinician: 11/9/2023   Next office visit with prescribing clinician: 12/7/2023                         Would you like a call back once the refill request has been completed: [] Yes [] No    If the office needs to give you a call back, can they leave a voicemail: [] Yes [] No    Pat Zamarripa MA  11/10/23, 13:36 EST

## 2023-11-13 ENCOUNTER — PATIENT MESSAGE (OUTPATIENT)
Dept: BARIATRICS/WEIGHT MGMT | Facility: CLINIC | Age: 48
End: 2023-11-13
Payer: COMMERCIAL

## 2023-11-13 DIAGNOSIS — E66.3 OVERWEIGHT: Primary | ICD-10-CM

## 2023-11-13 RX ORDER — SUMATRIPTAN AND NAPROXEN SODIUM 85; 500 MG/1; MG/1
1 TABLET, FILM COATED ORAL ONCE AS NEEDED
Qty: 9 TABLET | Refills: 5 | Status: SHIPPED | OUTPATIENT
Start: 2023-11-13

## 2023-11-13 RX ORDER — SEMAGLUTIDE 1 MG/.5ML
1 INJECTION, SOLUTION SUBCUTANEOUS WEEKLY
Qty: 2 ML | Refills: 0 | Status: SHIPPED | OUTPATIENT
Start: 2023-11-13 | End: 2023-11-14

## 2023-11-14 RX ORDER — SEMAGLUTIDE 1.7 MG/.75ML
1.7 INJECTION, SOLUTION SUBCUTANEOUS WEEKLY
Qty: 3 ML | Refills: 2 | Status: SHIPPED | OUTPATIENT
Start: 2023-11-14

## 2023-11-14 NOTE — TELEPHONE ENCOUNTER
From: Renae Bolton  To: Bindu Lechuga  Sent: 11/13/2023 1:06 PM EST  Subject: Wegovy    Apparently the insurance company is not going to approve the 1 mg dose. However, it may not matter because the pharmacies cannot get that dosage. I’m inquiring now if they have the availability of the 1.7mg dose. I’ll let you know as soon as I do. Thank you!    Renae Bolton

## 2023-12-05 DIAGNOSIS — E66.3 OVERWEIGHT: ICD-10-CM

## 2023-12-05 DIAGNOSIS — G43.009 MIGRAINE WITHOUT AURA AND WITHOUT STATUS MIGRAINOSUS, NOT INTRACTABLE: ICD-10-CM

## 2023-12-05 RX ORDER — SUMATRIPTAN AND NAPROXEN SODIUM 85; 500 MG/1; MG/1
1 TABLET, FILM COATED ORAL ONCE AS NEEDED
Qty: 9 TABLET | Refills: 5 | OUTPATIENT
Start: 2023-12-05

## 2023-12-05 RX ORDER — SEMAGLUTIDE 1.7 MG/.75ML
1.7 INJECTION, SOLUTION SUBCUTANEOUS WEEKLY
Qty: 3 ML | Refills: 2 | Status: CANCELLED | OUTPATIENT
Start: 2023-12-05

## 2023-12-07 ENCOUNTER — OFFICE VISIT (OUTPATIENT)
Dept: BARIATRICS/WEIGHT MGMT | Facility: CLINIC | Age: 48
End: 2023-12-07
Payer: COMMERCIAL

## 2023-12-07 VITALS
HEIGHT: 64 IN | DIASTOLIC BLOOD PRESSURE: 70 MMHG | SYSTOLIC BLOOD PRESSURE: 106 MMHG | HEART RATE: 72 BPM | OXYGEN SATURATION: 99 % | WEIGHT: 153 LBS | BODY MASS INDEX: 26.12 KG/M2 | RESPIRATION RATE: 16 BRPM

## 2023-12-07 DIAGNOSIS — E88.810 METABOLIC SYNDROME: ICD-10-CM

## 2023-12-07 DIAGNOSIS — E66.3 OVERWEIGHT: Primary | ICD-10-CM

## 2023-12-07 RX ORDER — SEMAGLUTIDE 1.7 MG/.75ML
1.7 INJECTION, SOLUTION SUBCUTANEOUS WEEKLY
Qty: 3 ML | Refills: 2 | Status: SHIPPED | OUTPATIENT
Start: 2023-12-07

## 2023-12-07 NOTE — PROGRESS NOTES
[facility]  [address]  [place] [address]     Office Note      Date: 2023  Patient Name: Renae Bolton  MRN: [id number]  : 1975  Subjective  Subjective     Chief Complaint  Obesity Management follow-up          Renae Bolton presents to [facility] for obesity management. s/p LSG/HHR 19 w/ Dr. Onofre. Pre-surgery weight: 232.5, gonzalez 183lb, final goal 150lb  Patient is satisfied with weight loss progress. Appetite is well controlled. Reports no side effects of prescribed medications today. Has a little nausea but it is much more tolerable with taking wegovy every 10 days. Took 1.7mg  and has tolerated well so far. The patient is taking multivitamin and is taking fish oil.  The patient is not using a food journal.    24 hour recall:  B: eggs or fairlife protein shake  L: 1/2 turkey sandwich on low carb bread  D: homemade chili with phoebe hair pasta  The patient is not exercising but she is more active with her new job, always gets her steps in. FITT score of:    Frequency Intensity Time Strength Training   []   0, none []   0 []   0 [x]   0   []   1 (1-2x/week) [x]   1 (light) []   1 (<10 min) []   1 (1x/week)   [x]   2 (3-5x/week) []   2 (moderate) []   2 (10-20 min) []   2 (2x/week)   []   3 (daily) []   3 (moderately hard)  []   4 (very hard) []   3 (20-30 min)  [x]   4 (>30 min) []   3 (3-4x/week)       Review of Systems   Constitutional:  Negative for appetite change and fatigue.   Eyes:  Negative for blurred vision, double vision and visual disturbance.   Cardiovascular:  Negative for chest pain and palpitations.   Gastrointestinal:  Negative for abdominal pain, constipation, diarrhea, nausea, vomiting and GERD.   Endocrine: Negative for polydipsia, polyphagia and polyuria.   Musculoskeletal:  Negative for arthralgias, back pain and myalgias.   Neurological:  Negative for dizziness, tremors,  light-headedness, headache and memory problem.        Parasthesias negative   Psychiatric/Behavioral:  Negative for sleep disturbance, depressed mood and stress. The patient is not nervous/anxious.    All other systems reviewed and are negative.        Current Outpatient Medications:     acyclovir (ZOVIRAX) 5 % ointment, Apply topically to the appropriate area as directed Every 3 (Three) Hours., Disp: 30 g, Rfl: 6    amitriptyline (ELAVIL) 10 MG tablet, Take 1 tablet by mouth Every Evening., Disp: 90 tablet, Rfl: 1    Atogepant (Qulipta) 60 MG tablet, Take 1 tablet by mouth Daily., Disp: 30 tablet, Rfl: 5    B Complex Vitamins (vitamin b complex) capsule capsule, Take 1 capsule by mouth Daily., Disp: , Rfl:     Biotin (Biotin 5000) 5 MG capsule, Take 1 capsule by mouth Daily., Disp: , Rfl:     cetirizine (zyrTEC) 10 MG tablet, Take 1 tablet by mouth Daily., Disp: 90 tablet, Rfl: 3    Cholecalciferol (Vitamin D3) 250 MCG (06619 UT) tablet, Take 10,000 Units by mouth Daily., Disp: , Rfl:     cyclobenzaprine (FLEXERIL) 5 MG tablet, Take 1 tablet by mouth At Night As Needed., Disp: 90 tablet, Rfl: 1    dexlansoprazole (Dexilant) 60 MG capsule, Take 1 capsule by mouth Daily for 90 days., Disp: 90 capsule, Rfl: 3    DULoxetine (CYMBALTA) 30 MG capsule, Take 1 capsule by mouth Daily., Disp: 90 capsule, Rfl: 0    famciclovir (FAMVIR) 250 MG tablet, Take 1 tablet by mouth 3 (Three) Times a Day., Disp: 270 tablet, Rfl: 4    famotidine (PEPCID) 20 MG tablet, Take 1 tablet by mouth Every Morning., Disp: , Rfl:     linaclotide (Linzess) 145 MCG capsule capsule, Take 1 capsule by mouth Every Morning Before Breakfast., Disp: 90 capsule, Rfl: 3    MAGNESIUM CITRATE PO, Take 270 mg by mouth Daily. *magnesium citrimate+B6 for migraines, Disp: , Rfl:     Mirabegron ER (Myrbetriq) 25 MG tablet sustained-release 24 hour 24 hr tablet, Take 1 tablet by mouth Daily., Disp: 30 tablet, Rfl: 11    NON FORMULARY, Multivitamin patch daily,  "Disp: , Rfl:     Omega-3 Fatty Acids (fish oil) 1000 MG capsule capsule, Take 1 capsule by mouth 2 (Two) Times a Day With Meals., Disp: , Rfl:     ondansetron ODT (ZOFRAN-ODT) 4 MG disintegrating tablet, Place 1 tablet on the tongue Every 8 (Eight) Hours As Needed for Nausea or Vomiting., Disp: 12 tablet, Rfl: 0    Probiotic Product (PROBIOTIC PO), Take 1 capsule by mouth Daily., Disp: , Rfl:     promethazine (PHENERGAN) 25 MG tablet, Take 1 tablet by mouth Every 6 (Six) Hours As Needed., Disp: 60 tablet, Rfl: 1    propranolol LA (INDERAL LA) 80 MG 24 hr capsule, Take 1 capsule by mouth Daily., Disp: 90 capsule, Rfl: 3    PSYLLIUM HUSK PO, Take 1 capsule by mouth Daily., Disp: , Rfl:     Semaglutide-Weight Management (Wegovy) 1.7 MG/0.75ML solution auto-injector, Inject 0.75 mL under the skin into the appropriate area as directed 1 (One) Time Per Week., Disp: 3 mL, Rfl: 2    SUMAtriptan-naproxen (TREXIMET)  MG per tablet, Take 1 tablet by mouth 1 (One) Time As Needed for Migraine. May repeat dose one time in 2 hours if headache not relieved., Disp: 9 tablet, Rfl: 5    Vitamin E 400 units tablet, Take 400 Units by mouth Daily., Disp: , Rfl:     naltrexone (DEPADE) 50 MG tablet, Take 1/2 tablet by mouth 2 (two) times a day., Disp: 30 tablet, Rfl: 2    phentermine (ADIPEX-P) 37.5 MG tablet, Take 1/2 tablet by mouth at 11am and 1/2 tablet at 3pm., Disp: 30 tablet, Rfl: 0    Objective   Start weight: 193 pounds.    Total Loss lb/%Loss of beginning body weight (BBW): -40 lb/-20.73%  Change in weight since last visit: -5    Body mass index is 26.26 kg/m².   Body composition analysis completed and showed:   Body Fat %: 39.0    Measurements (in inches)  Waist Circumference: 33      Vital Signs:   /70 (BP Location: Left arm, Patient Position: Sitting)   Pulse 72   Resp 16   Ht 162.6 cm (64\")   Wt 69.4 kg (153 lb)   SpO2 99%   BMI 26.26 kg/m²     Physical Exam   General appears stated age and normal " appearance   HEENT PERRLA, EOM intact, and conjunctivae normal   Chest/lungs Normal rate, Regular rhythm, and Breathing is unlabored   Extremities without edema   Neuro Good historian and No focal deficit   Skin Warm, dry, intact   Psych normal behavior, normal thought content, and normal concentration     Result Review :                Assessment / Plan        Diagnoses and all orders for this visit:    1. Overweight (Primary)  Assessment & Plan:  Patient's (Body mass index is 26.26 kg/m².) indicates that they are overweight with health conditions that include obstructive sleep apnea, dyslipidemias, GERD, and metabolic syndrome  . Weight is improving with treatment. BMI is is above average; BMI management plan is completed. We discussed low calorie, low carb based diet program, portion control, increasing exercise, joining a fitness center or start home based exercise program, and pharmacologic options including wegovy, naltrexone PRN .    I have instructed the patient to continue with pursuit of medical weight loss as a part of this program. Patient does meet criteria for use of anorectics at this time as BMI > 27 with coexisting and this medication is indicated for LONG TERM use for management of obesity.     The current plan for this month includes:   - Adjust exercise as discussed  - Continue to prioritize protein, fiber, and hydration.   - Hold phentermine and naltrexoneunless needed for polyphagia.   - Continue wegovy 1.7mg every 10 days for one more dose and if tolerating well move to every 7 days.  - Treatment goal 155lb.        Orders:  -     Semaglutide-Weight Management (Wegovy) 1.7 MG/0.75ML solution auto-injector; Inject 0.75 mL under the skin into the appropriate area as directed 1 (One) Time Per Week.  Dispense: 3 mL; Refill: 2    2. Metabolic syndrome  Assessment & Plan:  Denies hypoglycemia. Continue low carb diet, regular physical activity, and weight reduction of 10-15%. Continue  wegovy.              Follow Up   Return in about 5 weeks (around 1/11/2024) for Next scheduled follow up.  Patient was given instructions and counseling regarding her condition or for health maintenance advice. Please see specific information pulled into the AVS if appropriate.     Bindu Lechuga, APRN  12/07/2023

## 2023-12-07 NOTE — ASSESSMENT & PLAN NOTE
Patient's (Body mass index is 26.26 kg/m².) indicates that they are overweight with health conditions that include obstructive sleep apnea, dyslipidemias, GERD, and metabolic syndrome  . Weight is improving with treatment. BMI is is above average; BMI management plan is completed. We discussed low calorie, low carb based diet program, portion control, increasing exercise, joining a fitness center or start home based exercise program, and pharmacologic options including wegovy, naltrexone PRN .    I have instructed the patient to continue with pursuit of medical weight loss as a part of this program. Patient does meet criteria for use of anorectics at this time as BMI > 27 with coexisting and this medication is indicated for LONG TERM use for management of obesity.     The current plan for this month includes:   - Adjust exercise as discussed  - Continue to prioritize protein, fiber, and hydration.   - Hold phentermine and naltrexoneunless needed for polyphagia.   - Continue wegovy 1.7mg every 10 days for one more dose and if tolerating well move to every 7 days.  - Treatment goal 155lb.

## 2023-12-11 RX ORDER — PHENTERMINE HYDROCHLORIDE 37.5 MG/1
TABLET ORAL
Qty: 30 TABLET | Refills: 0 | Status: SHIPPED | OUTPATIENT
Start: 2023-12-11

## 2023-12-11 RX ORDER — NALTREXONE HYDROCHLORIDE 50 MG/1
TABLET, FILM COATED ORAL
Qty: 30 TABLET | Refills: 2 | Status: SHIPPED | OUTPATIENT
Start: 2023-12-11

## 2023-12-21 ENCOUNTER — HOSPITAL ENCOUNTER (OUTPATIENT)
Dept: MAMMOGRAPHY | Facility: HOSPITAL | Age: 48
Discharge: HOME | End: 2023-12-21
Admitting: OBSTETRICS & GYNECOLOGY
Payer: COMMERCIAL

## 2023-12-21 DIAGNOSIS — Z80.3 FAMILY HISTORY OF BREAST CANCER: ICD-10-CM

## 2023-12-21 DIAGNOSIS — R92.8 ABNORMAL MAMMOGRAM: ICD-10-CM

## 2023-12-21 PROCEDURE — 77063 BREAST TOMOSYNTHESIS BI: CPT

## 2023-12-21 PROCEDURE — 77067 SCR MAMMO BI INCL CAD: CPT

## 2024-01-08 ENCOUNTER — TELEMEDICINE (OUTPATIENT)
Dept: BARIATRICS/WEIGHT MGMT | Facility: CLINIC | Age: 49
End: 2024-01-08
Payer: COMMERCIAL

## 2024-01-08 ENCOUNTER — OFFICE VISIT (OUTPATIENT)
Dept: NEUROLOGY | Facility: CLINIC | Age: 49
End: 2024-01-08
Payer: COMMERCIAL

## 2024-01-08 VITALS
WEIGHT: 150.4 LBS | HEIGHT: 64 IN | DIASTOLIC BLOOD PRESSURE: 59 MMHG | SYSTOLIC BLOOD PRESSURE: 103 MMHG | BODY MASS INDEX: 25.68 KG/M2 | HEART RATE: 84 BPM

## 2024-01-08 VITALS
OXYGEN SATURATION: 100 % | DIASTOLIC BLOOD PRESSURE: 70 MMHG | BODY MASS INDEX: 25.61 KG/M2 | TEMPERATURE: 99.3 F | SYSTOLIC BLOOD PRESSURE: 114 MMHG | HEIGHT: 64 IN | HEART RATE: 82 BPM | WEIGHT: 150 LBS

## 2024-01-08 DIAGNOSIS — E66.3 OVERWEIGHT: Primary | ICD-10-CM

## 2024-01-08 DIAGNOSIS — E88.810 METABOLIC SYNDROME: ICD-10-CM

## 2024-01-08 DIAGNOSIS — G43.009 MIGRAINE WITHOUT AURA AND WITHOUT STATUS MIGRAINOSUS, NOT INTRACTABLE: ICD-10-CM

## 2024-01-08 PROCEDURE — 99214 OFFICE O/P EST MOD 30 MIN: CPT | Performed by: NURSE PRACTITIONER

## 2024-01-08 RX ORDER — AMITRIPTYLINE HYDROCHLORIDE 10 MG/1
10 TABLET, FILM COATED ORAL EVERY EVENING
Qty: 90 TABLET | Refills: 1 | Status: SHIPPED | OUTPATIENT
Start: 2024-01-08

## 2024-01-08 RX ORDER — ATOGEPANT 60 MG/1
60 TABLET ORAL DAILY
Qty: 30 TABLET | Refills: 5 | Status: SHIPPED | OUTPATIENT
Start: 2024-01-08

## 2024-01-08 NOTE — ASSESSMENT & PLAN NOTE
Denies hypoglycemia. Continue low carb diet, regular physical activity, and weight reduction of 10-15%. Continue wegovy.

## 2024-01-08 NOTE — PROGRESS NOTES
"     Office Note      Date: 2024  Patient Name: Renae Bolton  MRN: [id number]  : 1975    This service was conducted via [employer].  Patient is located at her home address.  Provider is located at her work address. The use of a video visit has been reviewed with the patient and informed consent has been obtained.  Subjective  Subjective     Chief Complaint  Obesity Management follow-up    Subjective          Renae Bolton presents to [facility] via telehealth for obesity management. s/p LSG/HHR 19 w/ Dr. Onofre. Pre-surgery weight: 232.5, gonzalez 183lb, final goal 150lb.     Patient is satisfied with weight loss progress. Appetite is well controlled, still using Wegovy 1.7mg every 10 days. Not needing phentermine or naltrexone at this time. Getting full fast but is hungry more often. Reports no side effects of prescribed medications today.   24 hour recall:  B: skipped   S: pickle christina  L: 1/2 fish filet  D: 1 piece of ham, 1/2 cup corn and 5 mini potatoes  S: small apple  The patient is not exercising, plans to get new treadmill installed this week.    Objective   Start weight MWM: 193 pounds.    Total weight loss: -57 pounds/-29%  Change in weight since last visit: -3lb    Body mass index is 25.82 kg/m².   Measurements (in inches)     /59   Pulse 84   Ht 162.6 cm (64\")   Wt 68.2 kg (150 lb 6.4 oz)   BMI 25.82 kg/m²     Waist measurement 32\"    Result Review :                 Assessment and Plan    Diagnoses and all orders for this visit:    1. Overweight (Primary)  Assessment & Plan:  Patient's (Body mass index is 25.82 kg/m².) indicates that they are overweight with health conditions that include obstructive sleep apnea, dyslipidemias, and prediabetes  . Weight is improving with treatment. BMI is is above average; BMI management plan is completed. We discussed low calorie, low carb based diet program, portion control, increasing " exercise, and pharmacologic options including wellbutrin/naltrexone, phentermine .    I have instructed the patient to continue with pursuit of medical weight loss as a part of this program. Patient does meet criteria for use of anorectics at this time as hyperlipidemia and this medication is indicated for LONG TERM use for management of obesity.     The current plan for this month includes:   - Adjust exercise as discussed  - Continue to prioritize protein, fiber, and hydration.   - Continue wegovy 1.7mg every 10 days, tolerates better than every 7 days.   - Treatment goal 155lb.   - Extend follow up to 2 months. OK to add back naltrexone or phentermine PRN.            2. Metabolic syndrome  Assessment & Plan:  Denies hypoglycemia. Continue low carb diet, regular physical activity, and weight reduction of 10-15%. Continue wegovy.             Follow Up   Return in about 2 months (around 3/8/2024) for Next scheduled follow up.  Patient was given instructions and counseling regarding her condition or for health maintenance advice. Please see specific information pulled into the AVS if appropriate.     LAURITA Michel

## 2024-01-08 NOTE — ASSESSMENT & PLAN NOTE
Patient's (Body mass index is 25.82 kg/m².) indicates that they are overweight with health conditions that include obstructive sleep apnea, dyslipidemias, and prediabetes  . Weight is improving with treatment. BMI is is above average; BMI management plan is completed. We discussed low calorie, low carb based diet program, portion control, increasing exercise, and pharmacologic options including wellbutrin/naltrexone, phentermine .    I have instructed the patient to continue with pursuit of medical weight loss as a part of this program. Patient does meet criteria for use of anorectics at this time as hyperlipidemia and this medication is indicated for LONG TERM use for management of obesity.     The current plan for this month includes:   - Adjust exercise as discussed  - Continue to prioritize protein, fiber, and hydration.   - Continue wegovy 1.7mg every 10 days, tolerates better than every 7 days.   - Treatment goal 155lb.   - Extend follow up to 2 months. OK to add back naltrexone or phentermine PRN.

## 2024-01-08 NOTE — PROGRESS NOTES
"     Follow Up Office Visit      Patient Name: Renae Bolton  : 1975   MRN: [id number]     Chief Complaint:    Chief Complaint   Patient presents with    Follow-up     Migraines; 0/30 migraine days; med refills       History of Present Illness: Renae Bolton is a pleasant 48 y.o. female who is here today to follow up with headaches.  She says she has been doing well on half of Qulipta, Amitriptyline 10 mg at night and Treximet as needed which she has not been needing.  She says her migraine days decreased and she has not had 1 in the last couple of months.  She does have an occasional lighter headache \"sinus \"and she will use over-the-counter medicine for this.  Her headaches can occur on the sides of her head or the forehead region.  She did have associated photophobia and phonophobia with her migraines along with nausea.  She is here for medication refills and happy with current regimen                    Subjective      Review of Systems:   Review of Systems   Musculoskeletal:  Positive for neck pain.   Neurological:  Positive for headache.       I have reviewed and the following portions of the patient's history were updated as appropriate: past family history, past medical history, past social history, past surgical history and problem list.    Medications:     Current Outpatient Medications:     acyclovir (ZOVIRAX) 5 % ointment, Apply topically to the appropriate area as directed Every 3 (Three) Hours., Disp: 30 g, Rfl: 6    amitriptyline (ELAVIL) 10 MG tablet, Take 1 tablet by mouth Every Evening., Disp: 90 tablet, Rfl: 1    Atogepant (Qulipta) 60 MG tablet, Take 1 tablet by mouth Daily., Disp: 30 tablet, Rfl: 5    B Complex Vitamins (vitamin b complex) capsule capsule, Take 1 capsule by mouth Daily., Disp: , Rfl:     Biotin (Biotin 5000) 5 MG capsule, Take 1 capsule by mouth Daily., Disp: , Rfl:     cetirizine (zyrTEC) 10 MG tablet, Take 1 tablet by mouth Daily., Disp: 90 tablet, Rfl: 3   "  Cholecalciferol (Vitamin D3) 250 MCG (25117 UT) tablet, Take 10,000 Units by mouth Daily., Disp: , Rfl:     cyclobenzaprine (FLEXERIL) 5 MG tablet, Take 1 tablet by mouth At Night As Needed., Disp: 90 tablet, Rfl: 1    dexlansoprazole (Dexilant) 60 MG capsule, Take 1 capsule by mouth Daily for 90 days., Disp: 90 capsule, Rfl: 3    DULoxetine (CYMBALTA) 30 MG capsule, Take 1 capsule by mouth Daily., Disp: 90 capsule, Rfl: 0    famciclovir (FAMVIR) 250 MG tablet, Take 1 tablet by mouth 3 (Three) Times a Day., Disp: 270 tablet, Rfl: 4    famotidine (PEPCID) 20 MG tablet, Take 1 tablet by mouth Every Morning., Disp: , Rfl:     linaclotide (Linzess) 145 MCG capsule capsule, Take 1 capsule by mouth Every Morning Before Breakfast., Disp: 90 capsule, Rfl: 3    MAGNESIUM CITRATE PO, Take 270 mg by mouth Daily. *magnesium citrimate+B6 for migraines, Disp: , Rfl:     Mirabegron ER (Myrbetriq) 25 MG tablet sustained-release 24 hour 24 hr tablet, Take 1 tablet by mouth Daily., Disp: 30 tablet, Rfl: 11    naltrexone (DEPADE) 50 MG tablet, Take 1/2 tablet by mouth 2 (two) times a day., Disp: 30 tablet, Rfl: 2    NON FORMULARY, Multivitamin patch daily, Disp: , Rfl:     Omega-3 Fatty Acids (fish oil) 1000 MG capsule capsule, Take 1 capsule by mouth 2 (Two) Times a Day With Meals., Disp: , Rfl:     ondansetron ODT (ZOFRAN-ODT) 4 MG disintegrating tablet, Place 1 tablet on the tongue Every 8 (Eight) Hours As Needed for Nausea or Vomiting., Disp: 12 tablet, Rfl: 0    phentermine (ADIPEX-P) 37.5 MG tablet, Take 1/2 tablet by mouth at 11am and 1/2 tablet at 3pm., Disp: 30 tablet, Rfl: 0    Probiotic Product (PROBIOTIC PO), Take 1 capsule by mouth Daily., Disp: , Rfl:     promethazine (PHENERGAN) 25 MG tablet, Take 1 tablet by mouth Every 6 (Six) Hours As Needed., Disp: 60 tablet, Rfl: 1    propranolol LA (INDERAL LA) 80 MG 24 hr capsule, Take 1 capsule by mouth Daily., Disp: 90 capsule, Rfl: 3    PSYLLIUM HUSK PO, Take 1 capsule by  "mouth Daily., Disp: , Rfl:     Semaglutide-Weight Management (Wegovy) 1.7 MG/0.75ML solution auto-injector, Inject 0.75 mL under the skin into the appropriate area as directed 1 (One) Time Per Week., Disp: 3 mL, Rfl: 2    SUMAtriptan-naproxen (TREXIMET)  MG per tablet, Take 1 tablet by mouth 1 (One) Time As Needed for Migraine. 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  Never actually taken it internally.    Latex Anaphylaxis and Rash    Morphine Rash     Rash (if gets on skin).  Never taken it internally    Morphine And Related Rash     Rash (if gets on skin).  Never taken it internally    Penicillins Shortness Of Breath and Palpitations     Keflex OK  Keflex OK  Keflex OK    Oxycodone Other (See Comments)     Dizziness, confusion      Topiramate Other (See Comments)     Pt states she can take NAME BRAND ONLY.  Generic brand \"doesn't work\"       Objective     Physical Exam:  Vital Signs:   Vitals:    01/08/24 1407   BP: 114/70   Pulse: 82   Temp: 99.3 °F (37.4 °C)   SpO2: 100%   Weight: 68 kg (150 lb)   Height: 162.6 cm (64\")   PainSc: 0-No pain     Body mass index is 25.75 kg/m².    Physical Exam  Constitutional:       Appearance: Normal appearance.   HENT:      Head: Normocephalic and atraumatic.   Eyes:      Extraocular Movements: Extraocular movements intact.      Conjunctiva/sclera: Conjunctivae normal.   Pulmonary:      Effort: Pulmonary effort is normal.   Musculoskeletal:         General: Normal range of motion.   Skin:     General: Skin is warm and dry.   Neurological:      General: No focal deficit present.      Mental Status: " She is alert and oriented to person, place, and time.      Cranial Nerves: Cranial nerves 2-12 are intact. No dysarthria.      Sensory: Sensation is intact.      Motor: Motor function is intact.      Coordination: Coordination is intact.      Gait: Gait is intact.      Comments: She has her 9-month-old grandson with her today   Psychiatric:         Attention and Perception: Attention normal.         Mood and Affect: Mood normal.         Speech: Speech normal.         Behavior: Behavior normal. Behavior is cooperative.         Thought Content: Thought content normal.         Cognition and Memory: Cognition normal.         Judgment: Judgment normal.         Neurologic Exam     Mental Status   Oriented to person, place, and time.   Speech: speech is normal     Cranial Nerves   Cranial nerves II through XII intact.     Gait, Coordination, and Reflexes     Gait  Gait: normal       Assessment / Plan      Assessment/Plan:   Diagnoses and all orders for this visit:    1. Migraine without aura and without status migrainosus, not intractable  -     amitriptyline (ELAVIL) 10 MG tablet; Take 1 tablet by mouth Every Evening.  Dispense: 90 tablet; Refill: 1  -     Atogepant (Qulipta) 60 MG tablet; Take 1 tablet by mouth Daily.  Dispense: 30 tablet; Refill: 5       This is a pleasant 48-year-old female patient here to follow-up on migraines.  She denies having any migraine headaches the past couple of months on her current regimen.  She is here for medication refills.  She does not need a refill of the Treximet at this time.    Indications and side effects discussed with patient she verbalizes understanding.  Patient will call in the interim if she has any questions or concerns or changes.    CT of the head 9/22/2017 was unremarkable; discussed with patient today and she will let us know if she has any changes in symptoms/condition and would consider new imaging at that time.    Follow Up:   Return in about 6 months (around  7/8/2024).    LAURITA Ricks, FNP-[facility]

## 2024-01-10 DIAGNOSIS — F32.A ANXIETY AND DEPRESSION: ICD-10-CM

## 2024-01-10 DIAGNOSIS — F41.9 ANXIETY AND DEPRESSION: ICD-10-CM

## 2024-01-10 RX ORDER — DULOXETIN HYDROCHLORIDE 30 MG/1
30 CAPSULE, DELAYED RELEASE ORAL DAILY
Qty: 90 CAPSULE | Refills: 0 | Status: SHIPPED | OUTPATIENT
Start: 2024-01-10

## 2024-01-10 NOTE — TELEPHONE ENCOUNTER
Rx Refill Note  Requested Prescriptions     Pending Prescriptions Disp Refills    DULoxetine (CYMBALTA) 30 MG capsule 90 capsule 0     Sig: Take 1 capsule by mouth Daily.      Last office visit with prescribing clinician: 12/7/2023   Last telemedicine visit with prescribing clinician: 1/8/2024   Next office visit with prescribing clinician: 2/8/2024                         Would you like a call back once the refill request has been completed: [] Yes [] No    If the office needs to give you a call back, can they leave a voicemail: [] Yes [] No    Pat Zamarripa MA  01/10/24, 13:18 EST

## 2024-01-11 ENCOUNTER — PRIOR AUTHORIZATION (OUTPATIENT)
Dept: BARIATRICS/WEIGHT MGMT | Facility: CLINIC | Age: 49
End: 2024-01-11
Payer: COMMERCIAL

## 2024-02-06 DIAGNOSIS — G43.009 MIGRAINE WITHOUT AURA AND WITHOUT STATUS MIGRAINOSUS, NOT INTRACTABLE: ICD-10-CM

## 2024-02-07 RX ORDER — SUMATRIPTAN AND NAPROXEN SODIUM 85; 500 MG/1; MG/1
1 TABLET, FILM COATED ORAL ONCE AS NEEDED
Qty: 9 TABLET | Refills: 5 | Status: SHIPPED | OUTPATIENT
Start: 2024-02-07

## 2024-03-04 DIAGNOSIS — E66.3 OVERWEIGHT: ICD-10-CM

## 2024-03-04 NOTE — TELEPHONE ENCOUNTER
Rx Refill Note  Requested Prescriptions     Pending Prescriptions Disp Refills    Wegovy 1.7 MG/0.75ML solution auto-injector [Pharmacy Med Name: Wegovy 1.7MG/0.75ML SOAJ] 3 mL 2     Sig: INJECT 0.75 ML UNDER THE SKIN INTO APPROPRIATE AREA AS DIRECTED ONCE WEEKLY      Last office visit with prescribing clinician: 12/7/2023   Last telemedicine visit with prescribing clinician: 1/8/2024   Next office visit with prescribing clinician: 3/7/2024                         Would you like a call back once the refill request has been completed: [] Yes [] No    If the office needs to give you a call back, can they leave a voicemail: [] Yes [] No    Pat Zamarripa MA  03/04/24, 12:14 EST

## 2024-03-05 RX ORDER — SEMAGLUTIDE 1.7 MG/.75ML
INJECTION, SOLUTION SUBCUTANEOUS
Qty: 3 ML | Refills: 2 | Status: SHIPPED | OUTPATIENT
Start: 2024-03-05 | End: 2024-03-07 | Stop reason: SDUPTHER

## 2024-03-07 ENCOUNTER — OFFICE VISIT (OUTPATIENT)
Dept: BARIATRICS/WEIGHT MGMT | Facility: CLINIC | Age: 49
End: 2024-03-07
Payer: COMMERCIAL

## 2024-03-07 DIAGNOSIS — F41.9 ANXIETY AND DEPRESSION: ICD-10-CM

## 2024-03-07 DIAGNOSIS — F32.A ANXIETY AND DEPRESSION: ICD-10-CM

## 2024-03-07 DIAGNOSIS — E78.5 HYPERLIPIDEMIA, UNSPECIFIED HYPERLIPIDEMIA TYPE: ICD-10-CM

## 2024-03-07 DIAGNOSIS — E55.9 HYPOVITAMINOSIS D: ICD-10-CM

## 2024-03-07 DIAGNOSIS — E66.3 OVERWEIGHT: Primary | ICD-10-CM

## 2024-03-07 DIAGNOSIS — R53.83 FATIGUE, UNSPECIFIED TYPE: ICD-10-CM

## 2024-03-07 RX ORDER — SEMAGLUTIDE 1.7 MG/.75ML
1.7 INJECTION, SOLUTION SUBCUTANEOUS WEEKLY
Qty: 9 ML | Refills: 0 | Status: SHIPPED | OUTPATIENT
Start: 2024-03-07

## 2024-03-07 RX ORDER — DULOXETIN HYDROCHLORIDE 30 MG/1
30 CAPSULE, DELAYED RELEASE ORAL DAILY
Qty: 90 CAPSULE | Refills: 0 | Status: SHIPPED | OUTPATIENT
Start: 2024-03-07

## 2024-03-07 RX ORDER — CYANOCOBALAMIN 1000 UG/ML
1000 INJECTION, SOLUTION INTRAMUSCULAR; SUBCUTANEOUS
Status: SHIPPED | OUTPATIENT
Start: 2024-03-07

## 2024-03-07 RX ADMIN — CYANOCOBALAMIN 1000 MCG: 1000 INJECTION, SOLUTION INTRAMUSCULAR; SUBCUTANEOUS at 14:55

## 2024-03-07 NOTE — ASSESSMENT & PLAN NOTE
Patient's depression is a single episode that is mild without psychosis. Depression is in full remission and improving with treatment.    Plan:   Continue current medication therapy     Followup at the next regular appointment.

## 2024-03-07 NOTE — PROGRESS NOTES
[facility]  [address]  [place] [address]     Office Note      Date: 2024  Patient Name: Renae Bolton  MRN: [id number]  : 1975  Subjective  Subjective     Chief Complaint  Obesity Management follow-up          Renae Bolton presents to [facility] for obesity management.   Patient is satisfied with weight loss progress. Appetite is moderately controlled. Reports no side effects of prescribed medications today. The patient is taking multivitamin and is taking fish oil.  The patient is not using a food journal.  Gets steps in every day, taking the stairs at work. Busy after work caring for david. Got a new treadmill.   24 hour recall:   B: 1/2 breakfast bar  L: chicken salad with alexandro crackers  D: grilled cheese sandwich and chicken nuggets  The patient is exercising with a FITT score of:    Frequency Intensity Time Strength Training   [x]   0, none [x]   0 [x]   0 [x]   0   []   1 (1-2x/week) []   1 (light) []   1 (<10 min) []   1 (1x/week)   []   2 (3-5x/week) []   2 (moderate) []   2 (10-20 min) []   2 (2x/week)   []   3 (daily) []   3 (moderately hard)  []   4 (very hard) []   3 (20-30 min)  []   4 (>30 min) []   3 (3-4x/week)       Review of Systems   Constitutional:  Negative for appetite change and fatigue.   Eyes:  Negative for visual disturbance.   Cardiovascular:  Negative for chest pain and palpitations.   Gastrointestinal:  Positive for nausea. Negative for constipation and indigestion.   Neurological:  Negative for light-headedness.         Current Outpatient Medications:     acyclovir (ZOVIRAX) 5 % ointment, Apply topically to the appropriate area as directed Every 3 (Three) Hours., Disp: 30 g, Rfl: 6    amitriptyline (ELAVIL) 10 MG tablet, Take 1 tablet by mouth Every Evening., Disp: 90 tablet, Rfl: 1    Atogepant (Qulipta) 60 MG tablet, Take 1 tablet by mouth Daily., Disp: 30 tablet, Rfl: 5     B Complex Vitamins (vitamin b complex) capsule capsule, Take 1 capsule by mouth Daily., Disp: , Rfl:     Biotin (Biotin 5000) 5 MG capsule, Take 1 capsule by mouth Daily., Disp: , Rfl:     cetirizine (zyrTEC) 10 MG tablet, Take 1 tablet by mouth Daily., Disp: 90 tablet, Rfl: 3    Cholecalciferol (Vitamin D3) 250 MCG (86989 UT) tablet, Take 10,000 Units by mouth Daily., Disp: , Rfl:     cyclobenzaprine (FLEXERIL) 5 MG tablet, Take 1 tablet by mouth 3 (Three) Times a Day As Needed for Muscle spasms., Disp: 90 tablet, Rfl: 3    dexlansoprazole (Dexilant) 60 MG capsule, Take 1 capsule by mouth Daily for 90 days., Disp: 90 capsule, Rfl: 3    DULoxetine (CYMBALTA) 30 MG capsule, Take 1 capsule by mouth Daily., Disp: 90 capsule, Rfl: 0    famciclovir (FAMVIR) 250 MG tablet, Take 1 tablet by mouth 3 (Three) Times a Day., Disp: 270 tablet, Rfl: 4    famotidine (PEPCID) 20 MG tablet, Take 1 tablet by mouth Every Morning., Disp: , Rfl:     MAGNESIUM CITRATE PO, Take 270 mg by mouth Daily. *magnesium citrimate+B6 for migraines, Disp: , Rfl:     Mirabegron ER (Myrbetriq) 25 MG tablet sustained-release 24 hour 24 hr tablet, Take 1 tablet by mouth Daily., Disp: 30 tablet, Rfl: 11    NON FORMULARY, Multivitamin patch daily, Disp: , Rfl:     Omega-3 Fatty Acids (fish oil) 1000 MG capsule capsule, Take 1 capsule by mouth 2 (Two) Times a Day With Meals., Disp: , Rfl:     ondansetron ODT (ZOFRAN-ODT) 4 MG disintegrating tablet, Place 1 tablet on the tongue Every 8 (Eight) Hours As Needed for Nausea or Vomiting., Disp: 12 tablet, Rfl: 0    Probiotic Product (PROBIOTIC PO), Take 1 capsule by mouth Daily., Disp: , Rfl:     promethazine (PHENERGAN) 25 MG tablet, Take 1 tablet by mouth Every 6 (Six) Hours As Needed., Disp: 60 tablet, Rfl: 1    propranolol LA (INDERAL LA) 80 MG 24 hr capsule, Take 1 capsule by mouth Daily., Disp: 90 capsule, Rfl: 3    Semaglutide-Weight Management (Wegovy) 1.7 MG/0.75ML solution auto-injector, Inject 0.75  mL under the skin into the appropriate area as directed 1 (One) Time Per Week., Disp: 9 mL, Rfl: 0    SUMAtriptan-naproxen (TREXIMET)  MG per tablet, Take 1 tablet by mouth 1 (One) Time As Needed for Migraine. May repeat dose one time in 2 hours if headache not relieved., Disp: 9 tablet, Rfl: 5    Vitamin E 400 units tablet, Take 400 Units by mouth Daily., Disp: , Rfl:     naltrexone (DEPADE) 50 MG tablet, Take 1/2 tablet by mouth 2 (two) times a day. (Patient not taking: Reported on 3/7/2024), Disp: 30 tablet, Rfl: 2    phentermine (ADIPEX-P) 37.5 MG tablet, Take 1/2 tablet by mouth at 11am and 1/2 tablet at 3pm. (Patient not taking: Reported on 3/7/2024), Disp: 30 tablet, Rfl: 0    PSYLLIUM HUSK PO, Take 1 capsule by mouth Daily. (Patient not taking: Reported on 3/7/2024), Disp: , Rfl:     Objective   Start weight: 193 pounds.    Total Loss lb/%Loss of beginning body weight (BBW): -48.2lb/-24.97%  Change in weight since last visit: -5.8    There is no height or weight on file to calculate BMI.   Body composition analysis completed and showed:   Body Fat %: 37.5    Measurements (in inches)  Waist Circumference: 33      Vital Signs:   There were no vitals taken for this visit.    Physical Exam   General appears stated age and normal appearance   HEENT PERRLA, EOM intact, and conjunctivae normal   Chest/lungs Normal rate, Regular rhythm, and Breathing is unlabored   Extremities without edema   Neuro Good historian and No focal deficit   Skin Warm, dry, intact   Psych normal behavior, normal thought content, and normal concentration     Result Review :                Assessment / Plan        Diagnoses and all orders for this visit:    1. Overweight (Primary)  Assessment & Plan:  Patient's (There is no height or weight on file to calculate BMI.) indicates that they are overweight with health conditions that include obstructive sleep apnea, dyslipidemias, GERD, and prediabetes  . Weight is improving with  treatment. BMI is is above average; BMI management plan is completed. We discussed low calorie, low carb based diet program, portion control, increasing exercise, management of depression/anxiety/stress to control compensatory eating, pharmacologic options including wegovy, phentermine and naltrexone PRN (hasn't needed in >2 months), and an radha-based approach such as [employer] Pal or Lose It.     I have instructed the patient to continue with pursuit of medical weight loss as a part of this program. Patient does meet criteria for use of anorectics at this time as this medication is indicated for LONG TERM use for management of obesity.     The current plan for this month includes:   - Add resistance training  - Continue nutrition focus  - At treatment goal, continue current treatment for maintenance of weight loss (wegovy 1.7mg q 10 days).   - OK to add back phentermine or naltrexone PRN for cravings, has some at home from previous prescriptions.   - Has follow up with bariatrics upcoming, labs ordered.         Orders:  -     Semaglutide-Weight Management (Wegovy) 1.7 MG/0.75ML solution auto-injector; Inject 0.75 mL under the skin into the appropriate area as directed 1 (One) Time Per Week.  Dispense: 9 mL; Refill: 0  -     Insulin, Total; Future  -     Comprehensive Metabolic Panel; Future  -     Hemoglobin A1c; Future  -     Lipid Panel; Future  -     TSH; Future  -     CBC (No Diff); Future  -     T4, Free; Future  -     Vitamin D,25-Hydroxy; Future  -     Ferritin; Future  -     Iron; Future  -     Vitamin B12; Future  -     Methylmalonic Acid, Serum; Future    2. Anxiety and depression  Assessment & Plan:  Patient's depression is a single episode that is mild without psychosis. Depression is in full remission and improving with treatment.    Plan:   Continue current medication therapy     Followup at the next regular appointment.     Orders:  -     DULoxetine (CYMBALTA) 30 MG capsule; Take 1 capsule by mouth  Daily.  Dispense: 90 capsule; Refill: 0  -     TSH; Future  -     T4, Free; Future    3. Fatigue, unspecified type  Assessment & Plan:  Worsening. Check labs today.     Orders:  -     TSH; Future  -     CBC (No Diff); Future  -     T4, Free; Future  -     Vitamin D,25-Hydroxy; Future  -     Ferritin; Future  -     Iron; Future  -     Vitamin B12; Future  -     Methylmalonic Acid, Serum; Future    4. Hypovitaminosis D  -     Vitamin D,25-Hydroxy; Future    5. Hyperlipidemia, unspecified hyperlipidemia type  -     Lipid Panel; Future          Follow Up   Return in about 3 months (around 6/7/2024) for Next scheduled follow up.  Patient was given instructions and counseling regarding her condition or for health maintenance advice. Please see specific information pulled into the AVS if appropriate.     Bindu Lechuga, LAURITA  03/07/2024

## 2024-03-07 NOTE — ASSESSMENT & PLAN NOTE
Patient's (There is no height or weight on file to calculate BMI.) indicates that they are overweight with health conditions that include obstructive sleep apnea, dyslipidemias, GERD, and prediabetes  . Weight is improving with treatment. BMI is is above average; BMI management plan is completed. We discussed low calorie, low carb based diet program, portion control, increasing exercise, management of depression/anxiety/stress to control compensatory eating, pharmacologic options including wegovy, phentermine and naltrexone PRN (hasn't needed in >2 months), and an radha-based approach such as [employer] Pal or Lose It.     I have instructed the patient to continue with pursuit of medical weight loss as a part of this program. Patient does meet criteria for use of anorectics at this time as this medication is indicated for LONG TERM use for management of obesity.     The current plan for this month includes:   - Add resistance training  - Continue nutrition focus  - At treatment goal, continue current treatment for maintenance of weight loss (wegovy 1.7mg q 10 days).   - OK to add back phentermine or naltrexone PRN for cravings, has some at home from previous prescriptions.   - Has follow up with bariatrics upcoming, labs ordered.

## 2024-03-18 ENCOUNTER — LAB (OUTPATIENT)
Dept: LAB | Facility: HOSPITAL | Age: 49
End: 2024-03-18
Payer: COMMERCIAL

## 2024-03-18 PROCEDURE — 83525 ASSAY OF INSULIN: CPT | Performed by: NURSE PRACTITIONER

## 2024-03-18 PROCEDURE — 84439 ASSAY OF FREE THYROXINE: CPT | Performed by: NURSE PRACTITIONER

## 2024-03-18 PROCEDURE — 83921 ORGANIC ACID SINGLE QUANT: CPT | Performed by: NURSE PRACTITIONER

## 2024-03-18 PROCEDURE — 83540 ASSAY OF IRON: CPT | Performed by: NURSE PRACTITIONER

## 2024-03-18 PROCEDURE — 82607 VITAMIN B-12: CPT | Performed by: NURSE PRACTITIONER

## 2024-03-18 PROCEDURE — 80050 GENERAL HEALTH PANEL: CPT | Performed by: NURSE PRACTITIONER

## 2024-03-18 PROCEDURE — 83036 HEMOGLOBIN GLYCOSYLATED A1C: CPT | Performed by: NURSE PRACTITIONER

## 2024-03-18 PROCEDURE — 80061 LIPID PANEL: CPT | Performed by: NURSE PRACTITIONER

## 2024-03-18 PROCEDURE — 82728 ASSAY OF FERRITIN: CPT | Performed by: NURSE PRACTITIONER

## 2024-03-18 PROCEDURE — 82306 VITAMIN D 25 HYDROXY: CPT | Performed by: NURSE PRACTITIONER

## 2024-04-04 ENCOUNTER — TELEMEDICINE (OUTPATIENT)
Dept: BARIATRICS/WEIGHT MGMT | Facility: CLINIC | Age: 49
End: 2024-04-04
Payer: COMMERCIAL

## 2024-04-04 DIAGNOSIS — E66.3 OVERWEIGHT (BMI 25.0-29.9): Primary | ICD-10-CM

## 2024-04-04 NOTE — PROGRESS NOTES
[facility]  [address]  [address]  [facility] [address] [phone]    This visit was conducted as a video visit, in an effort to limit spread of the novel coronavirus during the COVID-19 pandemic.  The patient gave consent.     Patient Name:  Renae Bolton  :  1975      Date of Visit: 2024      Reason for Visit:   4 years postop      HPI: Renae Bolton is a 48 y.o. female s/p LSG/HHR 19 w/ Dr. Onofre     Doing well. Changed to Wegovy and had some nausea/vomiting. Dose adjusted and much better now. She is pleased with her weight loss since working with medical weight management. She remains on Dexilant and reflux symptoms are well controlled. No issues/concerns. Denies dysphagia, reflux, abdominal pain, diarrhea, constipation, hair loss, memory loss, vision changes, and numbness/tingling.  Getting 90-100g prot/day.  Drinking 64 fluid oz/day. Taking Patches: MVI .  On  Dexilant .  Physical activity: no structured activity.     Presurgery weight: 224 pounds.  Reported weight: 140lbs    Past Medical History:   Diagnosis Date    Allergic rhinitis approx 10 years ago    Anxiety     Arrhythmia     SVT w/ PVCs, on propranolol, follows w/ Dr. Liang    BRCA1 negative     BRCA2 negative     Chronic venous insufficiency     per Dr. Liang note 22    Cluster headache     Depression     Diabetes mellitus     Gestational    Dyspepsia     Dyspnea on exertion     resolved after weight loss/ gastric sleeve    Endometriosis     Fatigue     Female infertility     Fibroid Hysterectomy    Generalized edema     r/t weight gain    Genital HSV     Famvir for prophylaxis    GERD (gastroesophageal reflux disease)     Gestational diabetes     Hard to intubate     states was told this after her hysterectomy.  states procedures following gastric sleeve have been without issues    Head injury     concussion with post concussion syndrome    Headache,  tension-type     Hyperemesis gravidarum     Hyperlipidemia 09/21/2021    Hypertension     resloved after gastric sleeve/weight loss    Interstitial cystitis     Kidney stones     Migraine     occasional    Mitral valve prolapse     pt denies/unaware; mitral valve regurge per Dr. Liang note 8/17/22    Normal vaginal Papanicolaou smear in patient with history of hysterectomy 09/16/2016    Peripheral edema     PONV (postoperative nausea and vomiting)     Post concussion syndrome     s/p fall w/ frontal lobe head injury 9/2017, previously on Elavil    Sleep apnea     resloved after gastric sleeve/weight loss    Stress incontinence     follows w/ Dr. Galvan, on Myrbetriq, s/p cystoscopy w/ bulking agent    SVT (supraventricular tachycardia)     followed by Dr. Liang - see note 8/17/22 scanned into media     Past Surgical History:   Procedure Laterality Date    ANTERIOR AND POSTERIOR VAGINAL REPAIR  06/23/2010    DR EDIN BYRD    BREAST BIOPSY  [id number]    Left    BREAST BIOPSY Left     2022    CATARACT EXTRACTION  Congenitial Cataract Removed January 2023    New lens both eyes    CYSTOSCOPY W/ BULKING AGENT INJECTION N/A 06/25/2019    Procedure: CYSTOSCOPY WITH URETHRAL  BULKING AGENT INJECTION;  Surgeon: Jose Galvan MD;  Location: [facility] OR;  Service: Urology    CYSTOSCOPY, DIMETHYL SULFOXIDE COCKTAIL  03/14/2011    WITH HYDRODISTENTION (DR AVINASH MADDOX)    ENDOSCOPY N/A 12/17/2019    Procedure: ESOPHAGOGASTRODUODENOSCOPY;  Surgeon: Tracee Onofre MD;  Location: [facility] OR;  Service: Bariatric    ENDOSCOPY N/A 10/20/2020    Procedure: ESOPHAGOGASTRODUODENOSCOPY WITH BIOPSY;  Surgeon: Tracee Onofre MD;  Location: [facility] ENDOSCOPY;  Service: General;  Laterality: N/A;    GASTRIC SLEEVE LAPAROSCOPIC N/A 12/17/2019    Procedure: GASTRIC SLEEVE LAPAROSCOPIC;  Surgeon: Tracee Onofre MD;  Location: [facility] OR;  Service: Bariatric    HIATAL HERNIA REPAIR N/A 12/17/2019    Procedure: HIATAL  HERNIA REPAIR LAPAROSCOPIC;  Surgeon: Tracee Onofre MD;  Location: [facility];  Service: Bariatric    HYSTERECTOMY  06/23/2010    [facility] ([facility])    LAPAROSCOPIC CHOLECYSTECTOMY  2013    for stones    TRANSVAGINAL TAPING SUSPENSION WITH OBTURATOR  06/23/2010    DR EDIN BYRD    VAGINAL HYSTERECTOMY      VAGINAL PROLAPSE REPAIR      WISDOM TOOTH EXTRACTION  1990     Outpatient Medications Marked as Taking for the 4/4/24 encounter (Telemedicine) with Leora Ulrich PA   Medication Sig Dispense Refill    acyclovir (ZOVIRAX) 5 % ointment Apply topically to the appropriate area as directed Every 3 (Three) Hours. 30 g 6    amitriptyline (ELAVIL) 10 MG tablet Take 1 tablet by mouth Every Evening. 90 tablet 1    Atogepant (Qulipta) 60 MG tablet Take 1 tablet by mouth Daily. 30 tablet 5    Biotin (Biotin 5000) 5 MG capsule Take 1 capsule by mouth Daily.      cetirizine (zyrTEC) 10 MG tablet Take 1 tablet by mouth Daily. 90 tablet 3    Cholecalciferol (Vitamin D3) 250 MCG (00595 UT) tablet Take 10,000 Units by mouth Daily.      cyclobenzaprine (FLEXERIL) 5 MG tablet Take 1 tablet by mouth 3 (Three) Times a Day As Needed for Muscle spasms. 90 tablet 3    dexlansoprazole (Dexilant) 60 MG capsule Take 1 capsule by mouth Daily for 90 days. 90 capsule 3    DULoxetine (CYMBALTA) 30 MG capsule Take 1 capsule by mouth Daily. 90 capsule 0    famciclovir (FAMVIR) 250 MG tablet Take 1 tablet by mouth 3 (Three) Times a Day. 270 tablet 4    famotidine (PEPCID) 20 MG tablet Take 1 tablet by mouth Every Morning.      Mirabegron ER (Myrbetriq) 25 MG tablet sustained-release 24 hour 24 hr tablet Take 1 tablet by mouth Daily. 30 tablet 11    naltrexone (DEPADE) 50 MG tablet Take 1/2 tablet by mouth 2 (two) times a day. 30 tablet 2    NON FORMULARY Multivitamin patch daily      Omega-3 Fatty Acids (fish oil) 1000 MG capsule capsule Take 1 capsule by mouth 2 (Two) Times a Day With Meals.      ondansetron ODT (ZOFRAN-ODT) 4 MG  disintegrating tablet Place 1 tablet on the tongue Every 8 (Eight) Hours As Needed for Nausea or Vomiting. 12 tablet 0    phentermine (ADIPEX-P) 37.5 MG tablet Take 1/2 tablet by mouth at 11am and 1/2 tablet at 3pm. 30 tablet 0    Probiotic Product (PROBIOTIC PO) Take 1 capsule by mouth Daily.      promethazine (PHENERGAN) 25 MG tablet Take 1 tablet by mouth Every 6 (Six) Hours As Needed. 60 tablet 1    propranolol LA (INDERAL LA) 80 MG 24 hr capsule Take 1 capsule by mouth Daily. 90 capsule 3    PSYLLIUM HUSK PO Take 1 capsule by mouth Daily.      Semaglutide-Weight Management (Wegovy) 1.7 MG/0.75ML solution auto-injector Inject 0.75 mL under the skin into the appropriate area as directed 1 (One) Time Per Week. 9 mL 0    SUMAtriptan-naproxen (TREXIMET)  MG per tablet Take 1 tablet by mouth 1 (One) Time As Needed for Migraine. May repeat dose one time in 2 hours if headache not relieved. 9 tablet 5    Vitamin E 400 units tablet Take 400 Units by mouth Daily.       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  Never actually taken it internally.    Latex Anaphylaxis and Rash    Morphine Rash     Rash (if gets on skin).  Never taken it internally    Morphine And Related Rash     Rash (if gets on skin).  Never taken it internally    Penicillins Shortness Of Breath and Palpitations     Keflex OK  Keflex OK  Keflex OK    Oxycodone Other (See Comments)     Dizziness, confusion      Topiramate Other (See Comments)     Pt states  "she can take NAME BRAND ONLY.  Generic brand \"doesn't work\"       Social History     Socioeconomic History    Marital status:    Tobacco Use    Smoking status: Never    Smokeless tobacco: Never   Vaping Use    Vaping status: Never Used   Substance and Sexual Activity    Alcohol use: No    Drug use: No    Sexual activity: Yes     Partners: Male     Birth control/protection: Surgical, Vasectomy     Social History     Social History Narrative    Living in [place] w/  and 2 children.  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         There were no vitals taken for this visit.    Physical Exam  Constitutional:       General: She is not in acute distress.  Pulmonary:      Effort: Pulmonary effort is normal.   Neurological:      Mental Status: She is alert and oriented to person, place, and time.   Psychiatric:         Mood and Affect: Mood normal.         Behavior: Behavior normal.         Thought Content: Thought content normal.         Judgment: Judgment normal.           Assessment:  4 years s/p LSG/HHR 12/17/19 w/ Dr. Onofre     ICD-10-CM ICD-9-CM   1. Overweight (BMI 25.0-29.9)  E66.3 278.02              Plan:  Doing well. Continue w/ good food choices and healthy habits.  Continue protein >70g/day.  Continue routine physical activity.  Defer bariatric labs. Continue MVI. Call w/ problems/concerns.     The patient was instructed to follow up in 1 year, sooner if needed.          "

## 2024-05-23 ENCOUNTER — TELEMEDICINE (OUTPATIENT)
Dept: BARIATRICS/WEIGHT MGMT | Facility: CLINIC | Age: 49
End: 2024-05-23
Payer: COMMERCIAL

## 2024-05-23 VITALS
SYSTOLIC BLOOD PRESSURE: 111 MMHG | HEIGHT: 64 IN | BODY MASS INDEX: 23.12 KG/M2 | DIASTOLIC BLOOD PRESSURE: 74 MMHG | HEART RATE: 74 BPM | WEIGHT: 135.4 LBS

## 2024-05-23 DIAGNOSIS — F41.9 ANXIETY AND DEPRESSION: ICD-10-CM

## 2024-05-23 DIAGNOSIS — E88.810 METABOLIC SYNDROME: ICD-10-CM

## 2024-05-23 DIAGNOSIS — F32.A ANXIETY AND DEPRESSION: ICD-10-CM

## 2024-05-23 DIAGNOSIS — E66.8 OTHER OBESITY: Primary | ICD-10-CM

## 2024-05-23 PROBLEM — E66.89 OTHER OBESITY: Status: ACTIVE | Noted: 2019-12-17

## 2024-05-23 PROCEDURE — 99214 OFFICE O/P EST MOD 30 MIN: CPT | Performed by: NURSE PRACTITIONER

## 2024-05-23 RX ORDER — DULOXETIN HYDROCHLORIDE 30 MG/1
30 CAPSULE, DELAYED RELEASE ORAL DAILY
Qty: 90 CAPSULE | Refills: 0 | Status: SHIPPED | OUTPATIENT
Start: 2024-05-23

## 2024-05-23 RX ORDER — SEMAGLUTIDE 1 MG/.5ML
1 INJECTION, SOLUTION SUBCUTANEOUS WEEKLY
Qty: 2 ML | Refills: 0 | Status: SHIPPED | OUTPATIENT
Start: 2024-05-23

## 2024-05-23 RX ORDER — SEMAGLUTIDE 1 MG/.5ML
1 INJECTION, SOLUTION SUBCUTANEOUS WEEKLY
Qty: 2 ML | Refills: 0 | Status: SHIPPED | OUTPATIENT
Start: 2024-05-23 | End: 2024-05-23

## 2024-05-23 NOTE — PROGRESS NOTES
"     Office Note      Date: 2024  Patient Name: Renae Bolton  MRN: [id number]  : 1975    This service was conducted via Circular.  Patient is located at her home address.  Provider is located at her home address. The use of a video visit has been reviewed with the patient and informed consent has been obtained.  Subjective  Subjective     Chief Complaint  Obesity Management follow-up    Subjective          Renae Bolton presents to [facility] via telehealth for obesity management. s/p LSG/HHR 19 w/ Dr. Onofre. Pre-surgery weight: 232.5, gonzalez 183lb, final goal 150lb    Patient is satisfied with weight loss progress. Appetite is well controlled. Not taking phentermine or naltrexone at this Taking wegovy 1.7mg every 10-12 days. Reports no side effects of prescribed medications today. She is not keeping a food journal at this time.  24 hour recall:  B: none  L: shaved ham and baby cheese and peas out of the garden and cucumbers with homemade ranch  D: grilled cheese sandwich (35 calorie bread)  S: 1/2 hamburger with 1/4 bun   The patient is exercising, a lot of walking, opened her pool and is doing pool exercise with pool weights.  Leaving for the beach next week, plans to be active. They will cook most meals in the condo.     Objective   Start weight MWM: 193 pounds.    Total weight loss: -58 pounds/-30%  Change in weight since last visit: -9lb    Body mass index is 23.24 kg/m².   Measurements (in inches)     /74   Pulse 74   Ht 162.6 cm (64\")   Wt 61.4 kg (135 lb 6.4 oz)   BMI 23.24 kg/m²       Result Review :                 Assessment and Plan    Diagnoses and all orders for this visit:    1. Other obesity (Primary)  Overview:  Start: 3/31/2021 193lb, goal 155lb  pre-anastacia weight: 232.5, gonzalez 183lb  trokendi: worked well for migraines- no weight loss  : started saxenda  21: p15 added  7/15: at 3.0mg saxenda  Comorbids: MAREN, " hyperlipidemia, GERD, back pain, anxiety and depression, metabolic syndrome    Assessment & Plan:  Patient's (Body mass index is 23.24 kg/m².) is now normal. History of class 3 obesity with health conditions that include dyslipidemias and prediabetes  . Weight is improving with treatment. %fat is above normal. We discussed low calorie, low carb based diet program, portion control, increasing exercise, management of depression/anxiety/stress to control compensatory eating, pharmacologic options including phentermine, wegovy. Not needing phetermine or naltrexone at this time but may add back if appetite increases with wegovy changes. , and an radha-based approach such as [employer] Pal or Lose It.     I have instructed the patient to continue with pursuit of medical weight loss as a part of this program. Patient does meet criteria for use of anorectics at this time as this medication is indicated for LONG TERM use for management of obesity.     The current plan for this month includes:   - Continue current exercise efforts  - Continue to prioritize protein, fiber, and hydration.   - Decrease wegovy to 1mg since insurance will no longer cover after July 31st.   - Treatment goal 155lb        2. Anxiety and depression  Overview:  Prozac: increased depression when taking with cymbalta more recently  took zoloft many years, anxiety started to worsen  cymbalta for 20 years      Orders:  -     DULoxetine (CYMBALTA) 30 MG capsule; Take 1 capsule by mouth Daily.  Dispense: 90 capsule; Refill: 0    3. Metabolic syndrome  Overview:  JOSE LUIS-IR >3.  Repeat labs 9/2021    Assessment & Plan:  Denies hypoglycemia. Continue low carb diet, regular physical activity, and weight reduction of 10-15%. Tapering off of wegovy due to formulary change. Family history of DM2. Consider metformin.         Other orders  -     Discontinue: Semaglutide-Weight Management (Wegovy) 1 MG/0.5ML solution auto-injector; Inject 0.5 mL under the skin into the  appropriate area as directed 1 (One) Time Per Week.  Dispense: 2 mL; Refill: 0  -     Semaglutide-Weight Management (Wegovy) 1 MG/0.5ML solution auto-injector; Inject 0.5 mL under the skin into the appropriate area as directed 1 (One) Time Per Week.  Dispense: 2 mL; Refill: 0          Follow Up   Return in about 2 months (around 7/23/2024) for Next scheduled follow up.  Patient was given instructions and counseling regarding her condition or for health maintenance advice. Please see specific information pulled into the AVS if appropriate.     Bindu Lechuga APRN

## 2024-05-24 NOTE — ASSESSMENT & PLAN NOTE
Patient's (Body mass index is 23.24 kg/m².) is now normal. History of class 3 obesity with health conditions that include dyslipidemias and prediabetes  . Weight is improving with treatment. %fat is above normal. We discussed low calorie, low carb based diet program, portion control, increasing exercise, management of depression/anxiety/stress to control compensatory eating, pharmacologic options including phentermine, wegovy. Not needing phetermine or naltrexone at this time but may add back if appetite increases with wegovy changes. , and an radha-based approach such as Ensa Pal or Lose It.     I have instructed the patient to continue with pursuit of medical weight loss as a part of this program. Patient does meet criteria for use of anorectics at this time as this medication is indicated for LONG TERM use for management of obesity.     The current plan for this month includes:   - Continue current exercise efforts  - Continue to prioritize protein, fiber, and hydration.   - Decrease wegovy to 1mg since insurance will no longer cover after July 31st.   - Treatment goal 155lb

## 2024-05-24 NOTE — ASSESSMENT & PLAN NOTE
Denies hypoglycemia. Continue low carb diet, regular physical activity, and weight reduction of 10-15%. Tapering off of wegovy due to formulary change. Family history of DM2. Consider metformin.

## 2024-06-11 DIAGNOSIS — G43.009 MIGRAINE WITHOUT AURA AND WITHOUT STATUS MIGRAINOSUS, NOT INTRACTABLE: ICD-10-CM

## 2024-06-11 RX ORDER — ATOGEPANT 60 MG/1
60 TABLET ORAL DAILY
Qty: 30 TABLET | Refills: 0 | Status: CANCELLED | OUTPATIENT
Start: 2024-06-11

## 2024-06-21 ENCOUNTER — PATIENT MESSAGE (OUTPATIENT)
Dept: BARIATRICS/WEIGHT MGMT | Facility: CLINIC | Age: 49
End: 2024-06-21
Payer: COMMERCIAL

## 2024-06-24 RX ORDER — SEMAGLUTIDE 1 MG/.5ML
1 INJECTION, SOLUTION SUBCUTANEOUS WEEKLY
Qty: 2 ML | Refills: 0 | Status: SHIPPED | OUTPATIENT
Start: 2024-06-24

## 2024-06-24 NOTE — TELEPHONE ENCOUNTER
From: Renae Bolton  To: Bindu Lechuga  Sent: 6/21/2024 11:58 AM EDT  Subject: Wegovy 1 mg    Bindu  Apparently I did not have a refill for the 1mg wegovy. Would you be able to send in a new prescription for the 1mg dose to [facility] here in [place], please? Thank you!    Renae Bolton   1975

## 2024-07-02 ENCOUNTER — TELEMEDICINE (OUTPATIENT)
Dept: BARIATRICS/WEIGHT MGMT | Facility: CLINIC | Age: 49
End: 2024-07-02
Payer: COMMERCIAL

## 2024-07-02 VITALS
WEIGHT: 131 LBS | DIASTOLIC BLOOD PRESSURE: 64 MMHG | HEART RATE: 83 BPM | HEIGHT: 64 IN | SYSTOLIC BLOOD PRESSURE: 118 MMHG | BODY MASS INDEX: 22.36 KG/M2

## 2024-07-02 DIAGNOSIS — Z71.3 WEIGHT LOSS COUNSELING, ENCOUNTER FOR: Primary | ICD-10-CM

## 2024-07-02 DIAGNOSIS — E88.810 METABOLIC SYNDROME: ICD-10-CM

## 2024-07-02 DIAGNOSIS — E66.8 OTHER OBESITY: ICD-10-CM

## 2024-07-02 RX ADMIN — CYANOCOBALAMIN 1000 MCG: 1000 INJECTION, SOLUTION INTRAMUSCULAR; SUBCUTANEOUS at 10:13

## 2024-07-02 NOTE — ASSESSMENT & PLAN NOTE
We discussed low calorie, low carb based diet program, portion control, increasing exercise, management of depression/anxiety/stress to control compensatory eating, Continue to prioritize protein, fiber and hydration. Increase resistance training.

## 2024-07-02 NOTE — PROGRESS NOTES
"     Office Note      Date: 2024  Patient Name: Renae Bolton  MRN: [id number]  : 1975    This service was conducted via [employer].  Patient is located in [place].  Provider is located at her work address. The use of a video visit has been reviewed with the patient and informed consent has been obtained.  Subjective  Subjective     Chief Complaint  Obesity Management follow-up    Subjective          Renae Bolton presents to [facility]NA[facility] via telehealth for obesity management. s/p LSG/HHR 19 w/ Dr. Onofre. Pre-surgery weight: 232.5.    Patient is satisfied with weight loss progress. Appetite is moderately controlled. She is using the wegovy 1.7mg every 10 days. Is waking up at night hungry and in the morning hungry. Not using phentermine or naltrexone. Reports no side effects of prescribed medications today.   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 Knows she needs to get more strength training.   Has 4 pens of 2.4mg, four of 1mg and 2 boxes of the 1.7mg, also has some saxenda leftover.     Objective   Start weight MWM: 193 pounds.    Total weight loss: -62 pounds/-32%  Change in weight since last visit: -4lb    Body mass index is 22.49 kg/m².   Measurements (in inches)     /64 (BP Location: Right arm, Patient Position: Sitting)   Pulse 83   Ht 162.6 cm (64\")   Wt 59.4 kg (131 lb)   BMI 22.49 kg/m²       Result Review :                 Assessment and Plan    Diagnoses and all orders for this visit:    1. Weight loss counseling, encounter for (Primary)  Assessment & Plan:  We discussed low calorie, " low carb based diet program, portion control, increasing exercise, management of depression/anxiety/stress to control compensatory eating, Continue to prioritize protein, fiber and hydration. Increase resistance training.       2. Other obesity  Overview:  Start: 3/31/2021 193lb, goal 155lb  S/p LSG 12/19/19 presurgery weight: 232.5   gonzalez 131lb, BMI 22.49 (07/02/2024) on wegovy 1.7mg    AOM hx:  trokendi: worked well for migraines- no weight loss  Saxenda worked well, changed to wegovy due to plateau   Phentermine and naltrexone help with cravings    Comorbids: MAREN, hyperlipidemia, GERD, back pain, anxiety and depression, metabolic syndrome    Assessment & Plan:  In remission. Patient's (Body mass index is 22.49 kg/m².) is now normal. History of class 3 obesity with health conditions that include dyslipidemias and prediabetes  . Weight is improving with treatment. %fat is above normal. Pharmacologic management includes phentermine, wegovy. Not needing phetermine or naltrexone at this time but may add back if appetite increases with wegovy changes.     I have instructed the patient to continue with pursuit of medical weight loss as a part of this program. Patient does meet criteria for use of anorectics at this time as BMI > 27 with coexisting and this medication is indicated for LONG TERM use for management of obesity.     The current plan for this month includes:   - Add resistance training  - Continue to prioritize protein, fiber, and hydration.   - Her insurance formulary changed and will no longer cover wegovy, she has several pens leftover from prior prescriptions.   Recommend to dose wegovy as follows:  First complete 2.4mg (has 4 pens)  2nd compelte 1.7mg ( has 8 pens)   3rd complete 1mg (has 4 pens)  Complete saxenda pens  - Treatment goal 130lb.           3. Metabolic syndrome  Overview:  JOSE LUIS-IR >3.      Assessment & Plan:  Improving with treatment of obesity. Continue healthy lifestyle choices. Repeat  labs in 3 mos.             Follow Up   Return in about 6 weeks (around 8/13/2024) for Next scheduled follow up.  Patient was given instructions and counseling regarding her condition or for health maintenance advice. Please see specific information pulled into the AVS if appropriate.     LAURITA Michel

## 2024-07-02 NOTE — ASSESSMENT & PLAN NOTE
In remission. Patient's (Body mass index is 22.49 kg/m².) is now normal. History of class 3 obesity with health conditions that include dyslipidemias and prediabetes  . Weight is improving with treatment. %fat is above normal. Pharmacologic management includes phentermine, wegovy. Not needing phetermine or naltrexone at this time but may add back if appetite increases with wegovy changes.     I have instructed the patient to continue with pursuit of medical weight loss as a part of this program. Patient does meet criteria for use of anorectics at this time as BMI > 27 with coexisting and this medication is indicated for LONG TERM use for management of obesity.     The current plan for this month includes:   - Add resistance training  - Continue to prioritize protein, fiber, and hydration.   - Her insurance formulary changed and will no longer cover wegovy, she has several pens leftover from prior prescriptions.   Recommend to dose wegovy as follows:  First complete 2.4mg (has 4 pens)  2nd compelte 1.7mg ( has 8 pens)   3rd complete 1mg (has 4 pens)  Complete saxenda pens  - Treatment goal 130lb.

## 2024-07-10 ENCOUNTER — OFFICE VISIT (OUTPATIENT)
Dept: NEUROLOGY | Facility: CLINIC | Age: 49
End: 2024-07-10
Payer: COMMERCIAL

## 2024-07-10 VITALS
BODY MASS INDEX: 23.05 KG/M2 | HEIGHT: 64 IN | HEART RATE: 77 BPM | SYSTOLIC BLOOD PRESSURE: 110 MMHG | DIASTOLIC BLOOD PRESSURE: 70 MMHG | TEMPERATURE: 98.2 F | WEIGHT: 135 LBS | OXYGEN SATURATION: 100 %

## 2024-07-10 DIAGNOSIS — G43.009 MIGRAINE WITHOUT AURA AND WITHOUT STATUS MIGRAINOSUS, NOT INTRACTABLE: ICD-10-CM

## 2024-07-10 RX ORDER — ATOGEPANT 60 MG/1
60 TABLET ORAL DAILY
Qty: 30 TABLET | Refills: 11 | Status: SHIPPED | OUTPATIENT
Start: 2024-07-10

## 2024-07-10 RX ORDER — AMITRIPTYLINE HYDROCHLORIDE 10 MG/1
10 TABLET, FILM COATED ORAL EVERY EVENING
Qty: 90 TABLET | Refills: 3 | Status: SHIPPED | OUTPATIENT
Start: 2024-07-10

## 2024-07-10 RX ORDER — SUMATRIPTAN AND NAPROXEN SODIUM 85; 500 MG/1; MG/1
1 TABLET, FILM COATED ORAL ONCE AS NEEDED
Qty: 9 TABLET | Refills: 11 | Status: SHIPPED | OUTPATIENT
Start: 2024-07-10

## 2024-07-10 NOTE — PROGRESS NOTES
Follow Up Office Visit      Patient Name: Renae Bolton  : 1975   MRN: [id number]     Chief Complaint:    Chief Complaint   Patient presents with    Follow-up     Migraine; patient reports 0/30 migraine     She is doing well on cuuirrent refinema and says she never has more than 4 migraines oer month.   History of Present Illness: Renae Bolton is a 49 y.o. female who is here today to follow up with migraines.  She is doing well on current regimen of amitriptyline 10 mg at at bedtime, Qulipta 60 mg daily and Treximet as needed.  She says she never has more than 4 migraines per month.  Her migraines can be on either side of the head but primarily on the left and most often the forehead region.  She continues to have associated photophobia, phonophobia and nausea.  No changes in characteristics, severity or frequency since last office visit.          Subjective      Review of Systems:   Review of Systems   Eyes:  Positive for photophobia.   Gastrointestinal:  Positive for nausea.   Neurological:  Positive for headache.       I have reviewed and the following portions of the patient's history were updated as appropriate: past family history, past medical history, past social history, past surgical history and problem list.    Medications:     Current Outpatient Medications:     acyclovir (ZOVIRAX) 5 % ointment, Apply topically to the appropriate area as directed Every 3 (Three) Hours., Disp: 30 g, Rfl: 6    amitriptyline (ELAVIL) 10 MG tablet, Take 1 tablet by mouth Every Evening., Disp: 90 tablet, Rfl: 3    Atogepant (Qulipta) 60 MG tablet, Take 1 tablet by mouth Daily., Disp: 30 tablet, Rfl: 11    B Complex Vitamins (vitamin b complex) capsule capsule, Take 1 capsule by mouth Daily., Disp: , Rfl:     Biotin (Biotin 5000) 5 MG capsule, Take 1 capsule by mouth Daily., Disp: , Rfl:     cetirizine (zyrTEC) 10 MG tablet, Take 1 tablet by mouth Daily., Disp: 90 tablet, Rfl: 3    Cholecalciferol  (Vitamin D3) 250 MCG (01300 UT) tablet, Take 10,000 Units by mouth Daily., Disp: , Rfl:     cyclobenzaprine (FLEXERIL) 5 MG tablet, Take 1 tablet by mouth 3 (Three) Times a Day As Needed for Muscle spasms., Disp: 90 tablet, Rfl: 3    dexlansoprazole (Dexilant) 60 MG capsule, Take 1 capsule by mouth Daily for 90 days., Disp: 90 capsule, Rfl: 3    DULoxetine (CYMBALTA) 30 MG capsule, Take 1 capsule by mouth Daily., Disp: 90 capsule, Rfl: 0    famciclovir (FAMVIR) 250 MG tablet, Take 1 tablet by mouth 3 (Three) Times a Day., Disp: 270 tablet, Rfl: 4    famotidine (PEPCID) 20 MG tablet, Take 1 tablet by mouth Every Morning., Disp: , Rfl:     MAGNESIUM CITRATE PO, Take 270 mg by mouth Daily. *magnesium citrimate+B6 for migraines, Disp: , Rfl:     Mirabegron ER (Myrbetriq) 25 MG tablet sustained-release 24 hour 24 hr tablet, Take 1 tablet by mouth Daily., Disp: 30 tablet, Rfl: 11    NON FORMULARY, Multivitamin patch daily, Disp: , Rfl:     Omega-3 Fatty Acids (fish oil) 1000 MG capsule capsule, Take 1 capsule by mouth 2 (Two) Times a Day With Meals., Disp: , Rfl:     ondansetron ODT (ZOFRAN-ODT) 4 MG disintegrating tablet, Place 1 tablet under the tongue Every 8 (Eight) Hours As Needed for Nausea., Disp: 60 tablet, Rfl: 2    Probiotic Product (PROBIOTIC PO), Take 1 capsule by mouth Daily., Disp: , Rfl:     promethazine (PHENERGAN) 25 MG tablet, Take 1 tablet by mouth Every 6 (Six) Hours As Needed., Disp: 60 tablet, Rfl: 1    propranolol LA (INDERAL LA) 80 MG 24 hr capsule, Take 1 capsule by mouth Daily., Disp: 90 capsule, Rfl: 3    PSYLLIUM HUSK PO, Take 1 capsule by mouth Daily., Disp: , Rfl:     Semaglutide-Weight Management (Wegovy) 1 MG/0.5ML solution auto-injector, Inject 0.5 mL under the skin into the appropriate area as directed 1 (One) Time Per Week., Disp: 2 mL, Rfl: 0    SUMAtriptan-naproxen (TREXIMET)  MG per tablet, Take 1 tablet by mouth 1 (One) Time As Needed for Migraine. May repeat dose one time in 2  "hours if headache not relieved., Disp: 9 tablet, Rfl: 11    Vitamin E 400 units tablet, Take 400 Units by mouth Daily., Disp: , Rfl:     naltrexone (DEPADE) 50 MG tablet, Take 1/2 tablet by mouth 2 (two) times a day. 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  Never actually taken it internally.    Latex Anaphylaxis and Rash    Morphine Rash     Rash (if gets on skin).  Never taken it internally    Morphine And Codeine Rash     Rash (if gets on skin).  Never taken it internally    Penicillins Shortness Of Breath and Palpitations     Keflex OK  Keflex OK  Keflex OK    Oxycodone Other (See Comments)     Dizziness, confusion      Topiramate Other (See Comments)     Pt states she can take NAME BRAND ONLY.  Generic brand \"doesn't work\"       Objective     Physical Exam:  Vital Signs:   Vitals:    07/10/24 1528   BP: 110/70   Pulse: 77   Temp: 98.2 °F (36.8 °C)   SpO2: 100%   Weight: 61.2 kg (135 lb)   Height: 162.6 cm (64\")   PainSc:   1   PainLoc: Head     Body mass index is 23.17 kg/m².    Physical Exam  Constitutional:       Appearance: Normal appearance.   HENT:      Head: Normocephalic.   Eyes:      Conjunctiva/sclera: Conjunctivae normal.   Pulmonary:      Effort: Pulmonary effort is normal.   Musculoskeletal:         General: Normal range of motion.   Skin:     General: Skin is warm and dry.   Neurological:      General: No focal deficit present.      Mental Status: She is alert and oriented to person, place, and time.      Cranial Nerves: Cranial nerves 2-12 are " intact. No dysarthria.      Motor: Motor function is intact. No tremor.      Coordination: Coordination is intact. Finger-Nose-Finger Test normal.      Gait: Gait is intact. Tandem walk normal.   Psychiatric:         Attention and Perception: Attention normal.         Mood and Affect: Mood and affect normal.         Speech: Speech normal.         Behavior: Behavior normal. Behavior is cooperative.         Thought Content: Thought content normal.         Cognition and Memory: Cognition normal.         Judgment: Judgment normal.         Neurologic Exam     Mental Status   Oriented to person, place, and time.   Attention: normal. Concentration: normal.   Speech: speech is normal   Level of consciousness: alert  Knowledge: good.     Cranial Nerves   Cranial nerves II through XII intact.     CN III, IV, VI   Nystagmus: none     CN VII   Facial expression full, symmetric.     CN XII   Tongue: not atrophic  Fasciculations: absent  Tongue deviation: none    Gait, Coordination, and Reflexes     Gait  Gait: normal    Coordination   Finger to nose coordination: normal  Tandem walking coordination: normal       Assessment / Plan      Assessment/Plan:   Diagnoses and all orders for this visit:    1. Migraine without aura and without status migrainosus, not intractable  -     amitriptyline (ELAVIL) 10 MG tablet; Take 1 tablet by mouth Every Evening.  Dispense: 90 tablet; Refill: 3  -     Atogepant (Qulipta) 60 MG tablet; Take 1 tablet by mouth Daily.  Dispense: 30 tablet; Refill: 11  -     SUMAtriptan-naproxen (TREXIMET)  MG per tablet; Take 1 tablet by mouth 1 (One) Time As Needed for Migraine. May repeat dose one time in 2 hours if headache not relieved.  Dispense: 9 tablet; Refill: 11       Medications have been refilled without change x 1 year.  Patient will call in the interim if she has any questions, concerns or changes.        Follow Up:   Return in about 1 year (around 7/10/2025).    LAURITA Ricks,  FNP-[facility]

## 2024-08-05 RX ORDER — SEMAGLUTIDE 1 MG/.5ML
INJECTION, SOLUTION SUBCUTANEOUS
Qty: 2 ML | Refills: 0 | Status: SHIPPED | OUTPATIENT
Start: 2024-08-05

## 2024-08-05 NOTE — TELEPHONE ENCOUNTER
Rx Refill Note  Requested Prescriptions     Pending Prescriptions Disp Refills    Wegovy 1 MG/0.5ML solution auto-injector [Pharmacy Med Name: Wegovy 1MG/0.5ML SOAJ] 2 mL 0     Sig: INJECT 0.5 ML SUBCUTANEOUSLY ONCE PER WEEK AS DIRECTED      Last office visit with prescribing clinician: 3/7/2024   Last telemedicine visit with prescribing clinician: 7/2/2024   Next office visit with prescribing clinician: 8/13/2024                         Would you like a call back once the refill request has been completed: [] Yes [] No    If the office needs to give you a call back, can they leave a voicemail: [] Yes [] No    aPt Zamarripa MA  08/05/24, 10:23 EDT

## 2024-08-06 ENCOUNTER — PRIOR AUTHORIZATION (OUTPATIENT)
Dept: BARIATRICS/WEIGHT MGMT | Facility: CLINIC | Age: 49
End: 2024-08-06
Payer: COMMERCIAL

## 2024-08-06 NOTE — TELEPHONE ENCOUNTER
Renae Bolton  Nieves: [id number]  Rx #: [id number]  Wegovy 1MG/0.5ML auto-injectors    Submitted to plan 08/06/24 1406    Your prior authorization for Wegovy has been denied.   Message from plan: PA Case: [id number], Status: Denied, Denial Rationale: The requested medication is excluded as a plan benefit.

## 2024-08-13 ENCOUNTER — TELEMEDICINE (OUTPATIENT)
Dept: BARIATRICS/WEIGHT MGMT | Facility: CLINIC | Age: 49
End: 2024-08-13
Payer: COMMERCIAL

## 2024-08-13 VITALS
DIASTOLIC BLOOD PRESSURE: 67 MMHG | BODY MASS INDEX: 22.2 KG/M2 | HEART RATE: 64 BPM | WEIGHT: 130 LBS | HEIGHT: 64 IN | SYSTOLIC BLOOD PRESSURE: 103 MMHG

## 2024-08-13 DIAGNOSIS — K59.04 CHRONIC IDIOPATHIC CONSTIPATION: ICD-10-CM

## 2024-08-13 DIAGNOSIS — Z71.3 WEIGHT LOSS COUNSELING, ENCOUNTER FOR: Primary | ICD-10-CM

## 2024-08-13 DIAGNOSIS — E66.8 OTHER OBESITY: ICD-10-CM

## 2024-08-13 PROCEDURE — 99213 OFFICE O/P EST LOW 20 MIN: CPT | Performed by: NURSE PRACTITIONER

## 2024-08-13 RX ORDER — DOCUSATE SODIUM 250 MG
250 CAPSULE ORAL AS NEEDED
COMMUNITY

## 2024-08-13 NOTE — ASSESSMENT & PLAN NOTE
Continue healthy lifestyle choices.  Counseled on weight maintenance:  Protein- 4 servings a day. Add daily protein shake  Keep sweets limited. Has more cravings, has not needed to add back naltrexone.   Continue adequate hydration of at least 65oz/day  Bring back intentional exercise- has equipment just hasn't made the time with her new schedule. Recommend 150 minutes a week of moderate intensity exercise with 2 resistance training sessions weekly.   Continue to taper wegovy, will be unable to fill again due to cost. Insurance no longer covering.

## 2024-08-13 NOTE — ASSESSMENT & PLAN NOTE
In remission. Patient's (Body mass index is 22.31 kg/m².) is now normal.   History of class 3 obesity with health conditions that include dyslipidemias and prediabetes. Weight is improving with treatment.  Pharmacologic management includes wegovy, weaning off due to cost and formulary change. Not needing phetermine or naltrexone at this time but may add back if appetite increases with wegovy changes.    Waist circumference now normal. Will check % fat at next office visit, was approaching normal.

## 2024-08-13 NOTE — PROGRESS NOTES
"     Office Note      Date: 2024  Patient Name: Renae Bolton  MRN: [id number]  : 1975    This service was conducted via [employer].  Patient is located at her home address.  Provider is located at her work address. The use of a video visit has been reviewed with the patient and informed consent has been obtained.  Subjective  Subjective     Chief Complaint  Obesity Management follow-up    Subjective          Renae Bolton presents to [facility]NA[facility] via telehealth for obesity management. s/p LSG/HHR 19 w/ Dr. Onofre.Pre-surgery weight: 232.5, gonzalez 130lb (24),    Patient is satisfied with weight loss progress. Appetite is well controlled. Reports no side effects of prescribed medications today. Only needing stool softener about once a week since decreasing wegovy. She is completing wegovy 2.4mg every 14 days, last dose is today. Will decrease to 1.7mg after that. There are some days she gets hungry and gets cravings but she is still eating the same. \"I just want things more\" some days.   She is not keeping a food journal at this time.  24 hour recall:  B: 2 sausage links  L: chili  D: turkey sandwich  S: grapes  S: 1 slice of thin crust supreme pizza  Thought about trying protein shakes in the morning, has some fairlife.   The patient is exercising three days walking. No strength training other than lifting grandbabies at this time. 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     Objective   Start weight MWM: 193 pounds.    Total weight loss: -63 pounds/-32%  Change in weight since last visit: -1b    Body mass index is 22.31 kg/m².   Measurements (in inches) Waist Circumference: 30   /67   Pulse 64   Ht 162.6 cm (64\")   Wt 59 kg (130 lb)   BMI 22.31 kg/m²       Result Review :                 Assessment and Plan    Diagnoses and all orders for this visit:    1. Weight loss counseling, encounter for " (Primary)  Assessment & Plan:  Continue healthy lifestyle choices.  Counseled on weight maintenance:  Protein- 4 servings a day. Add daily protein shake  Keep sweets limited. Has more cravings, has not needed to add back naltrexone.   Continue adequate hydration of at least 65oz/day  Bring back intentional exercise- has equipment just hasn't made the time with her new schedule. Recommend 150 minutes a week of moderate intensity exercise with 2 resistance training sessions weekly.   Continue to taper wegovy, will be unable to fill again due to cost. Insurance no longer covering.          2. Other obesity  Overview:  Start: 3/31/2021 193lb, goal 155lb  S/p LSG 12/19/19 presurgery weight: 232.5   gonzalez 131lb, BMI 22.49 (07/02/2024) on wegovy 1.7mg    AOM hx:  trokendi: worked well for migraines- no weight loss  Saxenda worked well, changed to wegovy due to plateau   Phentermine and naltrexone help with cravings    Comorbids: MAREN, hyperlipidemia, GERD, back pain, anxiety and depression, metabolic syndrome    Assessment & Plan:  In remission. Patient's (Body mass index is 22.31 kg/m².) is now normal.   History of class 3 obesity with health conditions that include dyslipidemias and prediabetes. Weight is improving with treatment.  Pharmacologic management includes wegovy, weaning off due to cost and formulary change. Not needing phetermine or naltrexone at this time but may add back if appetite increases with wegovy changes.    Waist circumference now normal. Will check % fat at next office visit, was approaching normal.          3. Chronic idiopathic constipation  Overview:  Added automatically from request for surgery [id number]    Assessment & Plan:  Significantly improved. Only needing stool softener once a week. Continue healthy lifestyle choices including adequate fiber, hydration, and physical activity.             Follow Up   Return in about 2 months (around 10/13/2024) for Next scheduled follow up.  Patient was  given instructions and counseling regarding her condition or for health maintenance advice. Please see specific information pulled into the AVS if appropriate.     LAURITA Michel

## 2024-08-13 NOTE — ASSESSMENT & PLAN NOTE
Significantly improved. Only needing stool softener once a week. Continue healthy lifestyle choices including adequate fiber, hydration, and physical activity.

## 2024-09-08 DIAGNOSIS — B00.9 RECURRENT HSV (HERPES SIMPLEX VIRUS): ICD-10-CM

## 2024-09-08 RX ORDER — FAMCICLOVIR 250 MG/1
250 TABLET ORAL 3 TIMES DAILY
Qty: 270 TABLET | Refills: 4 | Status: CANCELLED | OUTPATIENT
Start: 2024-09-08

## 2024-10-13 DIAGNOSIS — F41.9 ANXIETY AND DEPRESSION: ICD-10-CM

## 2024-10-13 DIAGNOSIS — F32.A ANXIETY AND DEPRESSION: ICD-10-CM

## 2024-10-14 ENCOUNTER — TRANSCRIBE ORDERS (OUTPATIENT)
Dept: ADMINISTRATIVE | Facility: HOSPITAL | Age: 49
End: 2024-10-14
Payer: COMMERCIAL

## 2024-10-14 DIAGNOSIS — Z12.31 SCREENING MAMMOGRAM FOR BREAST CANCER: Primary | ICD-10-CM

## 2024-10-14 DIAGNOSIS — B00.9 RECURRENT HSV (HERPES SIMPLEX VIRUS): ICD-10-CM

## 2024-10-14 RX ORDER — DULOXETIN HYDROCHLORIDE 30 MG/1
30 CAPSULE, DELAYED RELEASE ORAL DAILY
Qty: 90 CAPSULE | Refills: 0 | Status: SHIPPED | OUTPATIENT
Start: 2024-10-14

## 2024-10-14 RX ORDER — FAMCICLOVIR 250 MG/1
250 TABLET ORAL 3 TIMES DAILY
Qty: 270 TABLET | Refills: 4 | Status: SHIPPED | OUTPATIENT
Start: 2024-10-14

## 2024-10-22 ENCOUNTER — OFFICE VISIT (OUTPATIENT)
Dept: BARIATRICS/WEIGHT MGMT | Facility: CLINIC | Age: 49
End: 2024-10-22
Payer: COMMERCIAL

## 2024-10-22 VITALS
BODY MASS INDEX: 21.41 KG/M2 | SYSTOLIC BLOOD PRESSURE: 104 MMHG | WEIGHT: 125.4 LBS | HEIGHT: 64 IN | DIASTOLIC BLOOD PRESSURE: 68 MMHG | HEART RATE: 69 BPM

## 2024-10-22 DIAGNOSIS — F41.9 ANXIETY AND DEPRESSION: ICD-10-CM

## 2024-10-22 DIAGNOSIS — E66.9 OBESITY WITH SERIOUS COMORBIDITY, UNSPECIFIED CLASS, UNSPECIFIED OBESITY TYPE: ICD-10-CM

## 2024-10-22 DIAGNOSIS — Z71.3 WEIGHT LOSS COUNSELING, ENCOUNTER FOR: Primary | ICD-10-CM

## 2024-10-22 DIAGNOSIS — F32.A ANXIETY AND DEPRESSION: ICD-10-CM

## 2024-10-22 PROCEDURE — 99214 OFFICE O/P EST MOD 30 MIN: CPT | Performed by: NURSE PRACTITIONER

## 2024-10-22 PROCEDURE — 96372 THER/PROPH/DIAG INJ SC/IM: CPT | Performed by: NURSE PRACTITIONER

## 2024-10-22 RX ADMIN — CYANOCOBALAMIN 1000 MCG: 1000 INJECTION, SOLUTION INTRAMUSCULAR; SUBCUTANEOUS at 12:11

## 2024-10-22 NOTE — ASSESSMENT & PLAN NOTE
In remission. Patient's (Body mass index is 21.52 kg/m².) is now normal.   History of class 3 obesity with health conditions that include dyslipidemias and prediabetes. Weight is improving with treatment.  Pharmacologic management includes wegovy, weaning off due to cost and formulary change.   OK to add back naltrexone for cravings/comfort eating. If no better after 2 weeks, discontinue and increase frequency of wegovy to 1-2 weeks. Has several months supply left of wegovy 1.7 and 1mg.

## 2024-10-22 NOTE — ASSESSMENT & PLAN NOTE
Reviewed macro goals:  Calories 1200 for maintenance.   Protein minimum of 60g, goal 80-100g/day.   Water at least 60oz a day.   Hitting physical activity goal daily.

## 2024-10-22 NOTE — PROGRESS NOTES
[facility]  [address]  [place] [address]     Office Note      Date: 10/22/2024  Patient Name: Renae Bolton  MRN: [id number]  : 1975  Subjective  Subjective     Chief Complaint  Obesity Management follow-up          Renae Bolton presents to [facility] for obesity management. s/p LSG/HHR 19 w/ Dr. Onofre.  Pre-surgery weight: 232.5, gonzalez 131lb (24), final goal 150lb  Patient is unsure with weight loss progress. Appetite is poorly controlled. Reports no side effects of prescribed medications today. Using wegovy 1.7mg that she has left, taking every 2 to 3 weeks. Gets a little nauseated the day after her injection. Also has some 1mg pens left when she runs out of 1.7mg. The patient is taking multivitamin and is taking fish oil. The patient is not using a food journal.    The patient is exercising with a FITT score of:    Frequency Intensity Time Strength Training   [x]   0, none [x]   0 [x]   0 [x]   0   []   1 (1-2x/week) []   1 (light) []   1 (<10 min) []   1 (1x/week)   []   2 (3-5x/week) []   2 (moderate) []   2 (10-20 min) []   2 (2x/week)   []   3 (daily) []   3 (moderately hard)  []   4 (very hard) []   3 (20-30 min)  []   4 (>30 min) []   3 (3-4x/week)       24 hour recall  Breakfast: none   Snack: cheese and wheat thins  Lunch: 2/3 turkey and swiss sandwich, white bread, yañez lettuce, 3/4 small bag of sour cream and onion chips  Snack: peanut butter and crackers  Dinner: 1/2 c spaghetti w/ 1 meatball and meat sauce, 1 piece garlic bread  Water Intake: 32 oz    Review of Systems   Constitutional:  Positive for appetite change. Negative for fatigue.   Eyes:  Negative for visual disturbance.   Cardiovascular:  Negative for chest pain and palpitations.   Gastrointestinal:  Negative for constipation and indigestion.   Neurological:  Negative for light-headedness.         Current Outpatient  Medications:     acyclovir (ZOVIRAX) 5 % ointment, Apply topically to the appropriate area as directed Every 3 (Three) Hours., Disp: 30 g, Rfl: 6    amitriptyline (ELAVIL) 10 MG tablet, Take 1 tablet by mouth Every Evening., Disp: 90 tablet, Rfl: 3    Atogepant (Qulipta) 60 MG tablet, Take 1 tablet by mouth Daily., Disp: 30 tablet, Rfl: 11    B Complex Vitamins (vitamin b complex) capsule capsule, Take 1 capsule by mouth Daily., Disp: , Rfl:     Biotin (Biotin 5000) 5 MG capsule, Take 1 capsule by mouth Daily., Disp: , Rfl:     cetirizine (zyrTEC) 10 MG tablet, Take 1 tablet by mouth Daily., Disp: 90 tablet, Rfl: 3    Cholecalciferol (Vitamin D3) 250 MCG (00468 UT) tablet, Take 10,000 Units by mouth Daily., Disp: , Rfl:     cyclobenzaprine (FLEXERIL) 5 MG tablet, Take 1 tablet by mouth 3 (Three) Times a Day As Needed for Muscle spasms., Disp: 90 tablet, Rfl: 3    dexlansoprazole (DEXILANT) 60 MG capsule, Take 1 capsule by mouth Daily., Disp: 90 capsule, Rfl: 1    DULoxetine (CYMBALTA) 30 MG capsule, Take 1 capsule by mouth Daily., Disp: 90 capsule, Rfl: 0    famciclovir (FAMVIR) 250 MG tablet, Take 1 tablet by mouth 3 (Three) Times a Day., Disp: 270 tablet, Rfl: 4    famotidine (PEPCID) 20 MG tablet, Take 1 tablet by mouth Every Morning., Disp: , Rfl:     MAGNESIUM CITRATE PO, Take 270 mg by mouth Daily. *magnesium citrimate+B6 for migraines, Disp: , Rfl:     Mirabegron ER (Myrbetriq) 25 MG tablet sustained-release 24 hour 24 hr tablet, Take 1 tablet by mouth Daily., Disp: 30 tablet, Rfl: 11    NON FORMULARY, Multivitamin patch daily, Disp: , Rfl:     Omega-3 Fatty Acids (fish oil) 1000 MG capsule capsule, Take 1 capsule by mouth 2 (Two) Times a Day With Meals., Disp: , Rfl:     ondansetron ODT (ZOFRAN-ODT) 4 MG disintegrating tablet, Place 1 tablet under the tongue Every 8 (Eight) Hours As Needed for Nausea., Disp: 60 tablet, Rfl: 2    Probiotic Product (PROBIOTIC PO), Take 1 capsule by mouth Daily., Disp: , Rfl:  "    promethazine (PHENERGAN) 25 MG tablet, Take 1 tablet by mouth Every 6 (Six) Hours As Needed., Disp: 60 tablet, Rfl: 1    propranolol LA (INDERAL LA) 80 MG 24 hr capsule, Take 1 capsule by mouth Daily., Disp: 90 capsule, Rfl: 3    SUMAtriptan-naproxen (TREXIMET)  MG per tablet, Take 1 tablet by mouth 1 (One) Time As Needed for Migraine. May repeat dose one time in 2 hours if headache not relieved., Disp: 9 tablet, Rfl: 11    Vitamin E 400 units tablet, Take 400 Units by mouth Daily., Disp: , Rfl:     Wegovy 1 MG/0.5ML solution auto-injector, INJECT 0.5 ML SUBCUTANEOUSLY ONCE PER WEEK AS DIRECTED, Disp: 2 mL, Rfl: 0    naltrexone (DEPADE) 50 MG tablet, Take 1/2 tablet by mouth 2 (two) times a day. (Patient not taking: Reported on 10/22/2024), Disp: 30 tablet, Rfl: 2    Current Facility-Administered Medications:     cyanocobalamin injection 1,000 mcg, 1,000 mcg, Intramuscular, Q28 Days, Bindu Lechuga APRN, 1,000 mcg at 10/22/24 1211    Objective   Start weight: 193 pounds.    Total Loss lb/%Loss of beginning body weight (BBW): -67.6lb/-35.02%  Change in weight since last visit: -4.6    Body mass index is 21.52 kg/m².   Body composition analysis completed and showed:   Body Fat %: 29.2    Measurements (in inches)  Waist Circumference: 41      Vital Signs:   /68 (BP Location: Left arm, Patient Position: Sitting)   Pulse 69   Ht 162.6 cm (64\")   Wt 56.9 kg (125 lb 6.4 oz)   BMI 21.52 kg/m²     No LMP recorded. Patient has had a hysterectomy.    Physical Exam   General appears stated age and normal appearance   HEENT PERRLA, EOM intact, and conjunctivae normal   Chest/lungs Normal rate, Regular rhythm, and Breathing is unlabored   Extremities without edema   Neuro Good historian and No focal deficit   Skin Warm, dry, intact   Psych normal behavior, normal thought content, and normal concentration     Result Review :                Assessment / Plan        Diagnoses and all orders for this " visit:    1. Weight loss counseling, encounter for (Primary)  Assessment & Plan:  Reviewed macro goals:  Calories 1200 for maintenance.   Protein minimum of 60g, goal 80-100g/day.   Water at least 60oz a day.   Hitting physical activity goal daily.      2. Obesity with serious comorbidity, unspecified class, unspecified obesity type  Overview:  Start: 3/31/2021 193lb, goal 155lb  S/p LSG 12/19/19 presurgery weight: 232.5   gonzalez 131lb, BMI 22.49 (07/02/2024) on wegovy 1.7mg    AOM hx:  trokendi: worked well for migraines- no weight loss  Saxenda worked well, changed to wegovy due to plateau   Phentermine and naltrexone help with cravings    Comorbids: MAREN, hyperlipidemia, GERD, back pain, anxiety and depression, metabolic syndrome    Assessment & Plan:  In remission. Patient's (Body mass index is 21.52 kg/m².) is now normal.   History of class 3 obesity with health conditions that include dyslipidemias and prediabetes. Weight is improving with treatment.  Pharmacologic management includes wegovy, weaning off due to cost and formulary change.   OK to add back naltrexone for cravings/comfort eating. If no better after 2 weeks, discontinue and increase frequency of wegovy to 1-2 weeks. Has several months supply left of wegovy 1.7 and 1mg.           3. Anxiety and depression  Overview:  Prozac: increased depression when taking with cymbalta more recently  took zoloft many years, anxiety started to worsen  cymbalta for 20 years      Assessment & Plan:  Patient's depression is a recurrent episode that is mild without psychosis. Depression is in partial remission and stable. Reports she is very busy, work is stressful. Has been comfort eating more.     Plan:   Continue current medication therapy     Followup at the next regular appointment.             Follow Up   Return in about 3 months (around 1/22/2025) for Next scheduled follow up.  Patient was given instructions and counseling regarding her condition or for health  maintenance advice. Please see specific information pulled into the AVS if appropriate.     Bindu Lechuga, APRN  10/22/2024

## 2024-10-22 NOTE — ASSESSMENT & PLAN NOTE
Patient's depression is a recurrent episode that is mild without psychosis. Depression is in partial remission and stable. Reports she is very busy, work is stressful. Has been comfort eating more.     Plan:   Continue current medication therapy     Followup at the next regular appointment.

## 2024-11-11 DIAGNOSIS — N39.46 MIXED STRESS AND URGE URINARY INCONTINENCE: ICD-10-CM

## 2024-11-11 DIAGNOSIS — E66.3 OVERWEIGHT: ICD-10-CM

## 2024-11-11 RX ORDER — NALTREXONE HYDROCHLORIDE 50 MG/1
25 TABLET, FILM COATED ORAL 2 TIMES DAILY
Qty: 30 TABLET | Refills: 2 | Status: SHIPPED | OUTPATIENT
Start: 2024-11-11

## 2024-11-11 RX ORDER — ACYCLOVIR 50 MG/G
OINTMENT TOPICAL 4 TIMES DAILY
Qty: 30 G | Refills: 6 | Status: SHIPPED | OUTPATIENT
Start: 2024-11-11

## 2024-11-11 RX ORDER — MIRABEGRON 25 MG/1
25 TABLET, FILM COATED, EXTENDED RELEASE ORAL DAILY
Qty: 30 TABLET | Refills: 11 | OUTPATIENT
Start: 2024-11-11

## 2024-11-28 DIAGNOSIS — G43.009 MIGRAINE WITHOUT AURA AND WITHOUT STATUS MIGRAINOSUS, NOT INTRACTABLE: ICD-10-CM

## 2024-12-02 RX ORDER — SUMATRIPTAN AND NAPROXEN SODIUM 85; 500 MG/1; MG/1
1 TABLET, FILM COATED ORAL ONCE AS NEEDED
Qty: 9 TABLET | Refills: 11 | Status: SHIPPED | OUTPATIENT
Start: 2024-12-02

## 2024-12-02 RX ORDER — ATOGEPANT 60 MG/1
60 TABLET ORAL DAILY
Qty: 30 TABLET | Refills: 11 | Status: SHIPPED | OUTPATIENT
Start: 2024-12-02

## 2024-12-03 DIAGNOSIS — N39.46 MIXED STRESS AND URGE URINARY INCONTINENCE: ICD-10-CM

## 2024-12-04 RX ORDER — MIRABEGRON 25 MG/1
25 TABLET, FILM COATED, EXTENDED RELEASE ORAL DAILY
Qty: 30 TABLET | Refills: 11 | OUTPATIENT
Start: 2024-12-04

## 2024-12-05 ENCOUNTER — OFFICE VISIT (OUTPATIENT)
Dept: OBSTETRICS AND GYNECOLOGY | Facility: CLINIC | Age: 49
End: 2024-12-05
Payer: COMMERCIAL

## 2024-12-05 VITALS
HEIGHT: 64 IN | DIASTOLIC BLOOD PRESSURE: 60 MMHG | BODY MASS INDEX: 22.53 KG/M2 | WEIGHT: 132 LBS | SYSTOLIC BLOOD PRESSURE: 108 MMHG

## 2024-12-05 DIAGNOSIS — Z01.411 ENCOUNTER FOR GYNECOLOGICAL EXAMINATION (GENERAL) (ROUTINE) WITH ABNORMAL FINDINGS: Primary | ICD-10-CM

## 2024-12-05 DIAGNOSIS — N95.1 MENOPAUSAL SYMPTOMS: ICD-10-CM

## 2024-12-05 DIAGNOSIS — N39.46 MIXED STRESS AND URGE URINARY INCONTINENCE: ICD-10-CM

## 2024-12-05 DIAGNOSIS — N95.2 POSTMENOPAUSAL ATROPHIC VAGINITIS: ICD-10-CM

## 2024-12-05 DIAGNOSIS — Z12.31 ENCOUNTER FOR SCREENING MAMMOGRAM FOR BREAST CANCER: ICD-10-CM

## 2024-12-05 DIAGNOSIS — Z80.3 FAMILY HISTORY OF BREAST CANCER: ICD-10-CM

## 2024-12-05 DIAGNOSIS — B00.9 RECURRENT HSV (HERPES SIMPLEX VIRUS): ICD-10-CM

## 2024-12-05 RX ORDER — ESTRADIOL 10 UG/1
1 INSERT VAGINAL 2 TIMES WEEKLY
Qty: 8 TABLET | Refills: 11 | Status: SHIPPED | OUTPATIENT
Start: 2024-12-05

## 2024-12-05 RX ORDER — ESTRADIOL 0.1 MG/G
CREAM VAGINAL
Qty: 42.5 G | Refills: 11 | Status: SHIPPED | OUTPATIENT
Start: 2024-12-05

## 2024-12-05 RX ORDER — MIRABEGRON 25 MG/1
25 TABLET, FILM COATED, EXTENDED RELEASE ORAL DAILY
Qty: 90 TABLET | Refills: 3 | Status: SHIPPED | OUTPATIENT
Start: 2024-12-05

## 2024-12-06 NOTE — PROGRESS NOTES
Chief Complaint  Gynecologic Exam     History of Present Illness:  Patient is 49 y.o.  who presents to [facility] for her annual examination.  Patient had her last Pap smear in 2023.  She is due for her mammogram.  She is in the process of being evaluated for possible colonoscopy.  She has an order for Cologuard.  She has been having significant hot flashes and night sweats.  She has also had significant vaginal dryness.  She has not been on any estrogen cream for several years.  She has been having frequent flareups of her HSV.  She has been having urinary incontinence as well.  She sees Renae Beard for her primary care.  She has had labs with her primary care provider as well as her bariatric surgeon.  Has been evaluated by urology as well.    History  Past Medical History:   Diagnosis Date    Allergic rhinitis approx 10 years ago    Anxiety     Arrhythmia     SVT w/ PVCs, on propranolol, follows w/ Dr. Liang    BRCA1 negative     BRCA2 negative     Chronic venous insufficiency     per Dr. Liang note 22    Cluster headache     Depression     Diabetes mellitus     Gestational    Dyspepsia     Dyspnea on exertion     resolved after weight loss/ gastric sleeve    Endometriosis     Fatigue     Female infertility     Fibroid Hysterectomy    Generalized edema     r/t weight gain    Genital HSV     Famvir for prophylaxis    GERD (gastroesophageal reflux disease)     Gestational diabetes     Hard to intubate     states was told this after her hysterectomy.  states procedures following gastric sleeve have been without issues    Head injury     concussion with post concussion syndrome    Headache, tension-type     Hyperemesis gravidarum     Hyperlipidemia 2021    Hypertension     resloved after gastric sleeve/weight loss    Interstitial cystitis     Kidney stones     Migraine     occasional    Mitral valve prolapse     pt denies/unaware; mitral valve regurge  per Dr. Liang note 8/17/22    Normal vaginal Papanicolaou smear in patient with history of hysterectomy 09/16/2016    Peripheral edema     PONV (postoperative nausea and vomiting)     Post concussion syndrome     s/p fall w/ frontal lobe head injury 9/2017, previously on Elavil    Sleep apnea     resloved after gastric sleeve/weight loss    Stress incontinence     follows w/ Dr. Galvan, on Myrbetriq, s/p cystoscopy w/ bulking agent    SVT (supraventricular tachycardia)     followed by Dr. Liang - see note 8/17/22 scanned into media    Weight loss counseling, encounter for 7/2/2024     Current Outpatient Medications on File Prior to Visit   Medication Sig Dispense Refill    acyclovir (ZOVIRAX) 5 % ointment Apply  topically to the appropriate area as directed 4 (Four) Times a Day. 30 g 6    amitriptyline (ELAVIL) 10 MG tablet Take 1 tablet by mouth Every Evening. 90 tablet 3    Atogepant (Qulipta) 60 MG tablet Take 1 tablet by mouth Daily. 30 tablet 11    B Complex Vitamins (vitamin b complex) capsule capsule Take 1 capsule by mouth Daily.      Biotin (Biotin 5000) 5 MG capsule Take 1 capsule by mouth Daily.      cetirizine (zyrTEC) 10 MG tablet Take 1 tablet by mouth Daily. 90 tablet 3    Cholecalciferol (Vitamin D3) 250 MCG (68176 UT) tablet Take 10,000 Units by mouth Daily.      cyclobenzaprine (FLEXERIL) 5 MG tablet Take 1 tablet by mouth 3 (Three) Times a Day As Needed for Muscle spasms. 90 tablet 3    dexlansoprazole (DEXILANT) 60 MG capsule Take 1 capsule by mouth Daily. 90 capsule 1    DULoxetine (CYMBALTA) 30 MG capsule Take 1 capsule by mouth Daily. 90 capsule 0    famciclovir (FAMVIR) 250 MG tablet Take 1 tablet by mouth 3 (Three) Times a Day. 270 tablet 4    famotidine (PEPCID) 20 MG tablet Take 1 tablet by mouth Every Morning.      MAGNESIUM CITRATE PO Take 270 mg by mouth Daily. *magnesium citrimate+B6 for migraines      naltrexone (DEPADE) 50 MG tablet Take 1/2 tablet by mouth 2 (Two) Times a Day. 30  "tablet 2    NON FORMULARY Multivitamin patch daily      Omega-3 Fatty Acids (fish oil) 1000 MG capsule capsule Take 1 capsule by mouth 2 (Two) Times a Day With Meals.      ondansetron ODT (ZOFRAN-ODT) 4 MG disintegrating tablet Place 1 tablet under the tongue Every 8 (Eight) Hours As Needed for Nausea. 60 tablet 2    Probiotic Product (PROBIOTIC PO) Take 1 capsule by mouth Daily.      promethazine (PHENERGAN) 25 MG tablet Take 1 tablet by mouth Every 6 (Six) Hours As Needed. 60 tablet 1    propranolol LA (INDERAL LA) 80 MG 24 hr capsule Take 1 capsule by mouth Daily. 90 capsule 3    SUMAtriptan-naproxen (TREXIMET)  MG per tablet Take 1 tablet by mouth 1 (One) Time As Needed for Migraine. May repeat dose one time in 2 hours if headache not relieved. 9 tablet 11    Vitamin E 400 units tablet Take 400 Units by mouth Daily.      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  Never actually taken it internally.    Latex Anaphylaxis and Rash    Morphine Rash     Rash (if gets on skin).  Never taken it internally    Morphine And Codeine Rash     Rash (if gets on skin).  Never taken it internally    Penicillins Shortness Of Breath and Palpitations     Keflex OK  Keflex OK  Keflex OK    Oxycodone Other (See Comments)     Dizziness, confusion      Topiramate Other (See Comments)     Pt states she can take NAME BRAND ONLY.  Generic brand \"doesn't work\"     Past Surgical " History:   Procedure Laterality Date    ANTERIOR AND POSTERIOR VAGINAL REPAIR  06/23/2010    DR EDIN BYRD    BREAST BIOPSY  [id number]    Left    BREAST BIOPSY Left     2022    CATARACT EXTRACTION  Congenitial Cataract Removed January 2023    New lens both eyes    CYSTOSCOPY W/ BULKING AGENT INJECTION N/A 06/25/2019    Procedure: CYSTOSCOPY WITH URETHRAL  BULKING AGENT INJECTION;  Surgeon: Jose Galvan MD;  Location: [facility] OR;  Service: Urology    CYSTOSCOPY, DIMETHYL SULFOXIDE COCKTAIL  03/14/2011    WITH HYDRODISTENTION (DR AVINASH MADDOX)    ENDOSCOPY N/A 12/17/2019    Procedure: ESOPHAGOGASTRODUODENOSCOPY;  Surgeon: Tracee Onofre MD;  Location: [facility] OR;  Service: Bariatric    ENDOSCOPY N/A 10/20/2020    Procedure: ESOPHAGOGASTRODUODENOSCOPY WITH BIOPSY;  Surgeon: Tracee Onofre MD;  Location: [facility] ENDOSCOPY;  Service: General;  Laterality: N/A;    GASTRIC SLEEVE LAPAROSCOPIC N/A 12/17/2019    Procedure: GASTRIC SLEEVE LAPAROSCOPIC;  Surgeon: Tracee Onofre MD;  Location: [facility] OR;  Service: Bariatric    HIATAL HERNIA REPAIR N/A 12/17/2019    Procedure: HIATAL HERNIA REPAIR LAPAROSCOPIC;  Surgeon: Tracee Onofre MD;  Location: [facility] OR;  Service: Bariatric    HYSTERECTOMY  06/23/2010    [facility] (DR EDIN BY[facility])    LAPAROSCOPIC CHOLECYSTECTOMY  2013    for stones    TRANSVAGINAL TAPING SUSPENSION WITH OBTURATOR  06/23/2010    DR EDIN BYRD    VAGINAL HYSTERECTOMY      VAGINAL PROLAPSE REPAIR      WISDOM TOOTH EXTRACTION  1990     Family History   Problem Relation Age of Onset    Hypertension Mother     Diabetes Father         Type II    Hypertension Father     Breast cancer Sister 34    Hypertension Maternal Grandmother     Alzheimer's disease Maternal Grandmother     Breast cancer Maternal Grandmother     Heart disease Maternal Grandmother     Arthritis Maternal Grandmother     Cancer Maternal Grandmother         Breast    Depression Maternal Grandmother      "Hyperlipidemia Maternal Grandmother     Breast cancer Maternal Aunt     Breast cancer Paternal Aunt     Stroke Maternal Grandfather     Skin cancer Maternal Grandfather     Hypertension Maternal Grandfather     Arthritis Maternal Grandfather     Cancer Maternal Grandfather     Depression Maternal Grandfather     Hearing loss Maternal Grandfather     Hyperlipidemia Maternal Grandfather     Diabetes Paternal Grandfather     Hypertension Paternal Grandfather     Hyperlipidemia Paternal Grandfather     Hyperlipidemia Other     Gout Other     Migraines Other     Ovarian cancer Neg Hx      Social History     Socioeconomic History    Marital status:    Tobacco Use    Smoking status: Never    Smokeless tobacco: Never   Vaping Use    Vaping status: Never Used   Substance and Sexual Activity    Alcohol use: No    Drug use: No    Sexual activity: Yes     Partners: Male     Birth control/protection: Vasectomy, Surgical       Physical Examination:  Vital Signs: /60   Ht 162.6 cm (64\")   Wt 59.9 kg (132 lb)   BMI 22.66 kg/m²     General Appearance: alert, appears stated age, and cooperative  Breasts: Examined in supine position  Symmetric without masses or skin dimpling  Nipples normal without inversion, lesions or discharge  There are no palpable axillary nodes  Abdomen: no masses, no hepatomegaly, no splenomegaly, soft non-tender, no guarding, and no rebound tenderness  Pelvic: Clinical staff was present for exam; pelvic examination was required for evaluation  External genitalia:  normal appearance of the external genitalia including Bartholin's and Jarales's glands.  :  urethral meatus normal;  Vaginal:  atrophic mucosal changes are present;  Cervix:  absent.  Uterus:  absent.  Adnexa:  non palpable bilaterally.  Pap smear done and specimen sent using Thin-Prep technique    Data Review:  The following data was reviewed by: Shabana Hedrick MD on 12/05/2024:     Labs:  Insulin, Total (03/18/2024 " 10:50)  Comprehensive Metabolic Panel (03/18/2024 10:50)  Hemoglobin A1c (03/18/2024 10:50)  Lipid Panel (03/18/2024 10:50)  TSH (03/18/2024 10:50)  T4, Free (03/18/2024 10:50)  Vitamin D,25-Hydroxy (03/18/2024 10:50)  Ferritin (03/18/2024 10:50)  Iron (03/18/2024 10:50)  Vitamin B12 (03/18/2024 10:50)  Methylmalonic Acid, Serum (03/18/2024 10:50)  CBC (No Diff) (03/18/2024 11:16)  Imaging:  Mammo Diagnostic Digital Tomosynthesis Left With CAD (07/28/2023 13:26)  Mammo Screening Digital Tomosynthesis Bilateral With CAD (12/21/2023 14:27)  Medical Records:  None    Assessment and Plan   1. Encounter for gynecological examination (general) (routine) with abnormal findings  Pap was done today.  If she does not receive the results of the Pap within 2 weeks  time, she was instructed to call to find out the results.  I explained to Renae that the recommendations for Pap smear interval in a low risk patient has lengthened to 3 years time if cytology alone normal or  5 years time if both cytology and HPV testing were normal.  I encouraged her to be seen yearly for a full physical exam including breast and pelvic exam even during the off years when PAP's will not be performed.   - LIQUID-BASED PAP SMEAR WITH HPV GENOTYPING IF ASCUS ([facility])    2. Encounter for screening mammogram for breast cancer  It is recommended per ACOG, for women at average risk to start annual mammogram screening at the age of 40 until the age of 75 and an individualized decision be made for women after age 75.  She was encouraged to continue getting yearly mammograms.  She should report any palpable breast lump(s) or skin changes regardless of mammographic findings.  I explained to Renae that notification regarding her mammogram results will come from the center performing the study.  Our office will not be routinely calling with mammogram results.  It is her responsibility to make sure that the results from the mammogram are communicated to  her by the breast center.  If she has any questions about the results, she is welcome to call our office anytime.  The patient reports she will schedule her mammogram.    Renae was counseled regarding having clinical breast exams and breast self-awareness.  Women aged 29-39 years of age should have clinical breast exams every 1-3 years and yearly aged 40 and older.  The patient was counseled regarding breast self-awareness focusing on having a sense of what is normal for her breasts so that she can tell if there are changes.  Even small changes should be reported to provider.     3. Mixed stress and urge urinary incontinence  Prescription for Myrbetriq's as noted.  Prescription also given for estrogen cream for patient to apply to the periurethral area.  - estradiol (ESTRACE VAGINAL) 0.1 MG/GM vaginal cream; May use 1 gram intravaginal once daily at bedtime for 2 weeks, then 1 gram 2 (Two) Times Week.  Dispense: 42.5 g; Refill: 11  - Mirabegron ER (Myrbetriq) 25 MG tablet sustained-release 24 hour 24 hr tablet; Take 1 tablet by mouth Daily.  Dispense: 90 tablet; Refill: 3    4. Recurrent HSV (herpes simplex virus)  Patient is to continue her Valtrex daily as well as her acyclovir ointment.  Prescriptions have recently been given as noted.    5. Family history of breast cancer  Mammogram as noted.    6. Menopausal symptoms  The various options for the management of menopausal symptoms was discussed.  The medical treatment options discussed include HRT, SSRIs, SSNRIs, clonidine, and gabapentin.  The risks and benefits were discussed including the findings from the WHI study.  The increased risk of breast cancer, CAD, stroke, and VTE events were discussed for combination therapy vs the increased risk of CV events and breast cancer not being seen in the estrogen only group.  The lowest effective dose for the shortest duration of treatment was discussed in regards to HRT.  Other alternatives including otc supplements  and lifestyle changes were also discussed.  Local estrogen therapy to relieve atrophic vaginal symptoms was discussed was well as other alternatives.     7. Postmenopausal atrophic vaginitis  Discussed various options for relief of atrophic vaginal symptoms related to menopause. Discussed local therapy for treatment of vaginal symptoms only.  Discussed the different formulation options including cream, ring, and tablets.  Discussed the low risk of systemic absorption in postmenopausal women with atrophy using 25 mcgs of estradiol on a daily basis.  Recommend low dose use 2-3x/wk for maintenance of treatment.  Other treatment options were discussed including the use of water-based and silicone-based vaginal lubricants and moisturizers.  Also discussed was the FDA approved treatment option of ospemifene for moderate to severe dyspareunia.  Prescription for Vagifem.  Instructions and precautions have been given.  - estradiol (ESTRACE VAGINAL) 0.1 MG/GM vaginal cream; May use 1 gram intravaginal once daily at bedtime for 2 weeks, then 1 gram 2 (Two) Times Week.  Dispense: 42.5 g; Refill: 11  - estradiol (VAGIFEM) 10 MCG tablet vaginal tablet; Insert 1 tablet into the vagina 2 (Two) Times a Week.  Dispense: 8 tablet; Refill: 11    Follow Up/Instructions:  Follow up as noted.  Patient was given instructions and counseling regarding her condition or for health maintenance advice. Please see specific information pulled into the AVS if appropriate.     Note: Speech recognition transcription software may have been used to dictate portions of this document.  An attempt at proofreading has been made though minor errors in transcription may still be present.    This note was electronically signed.  Shabana Hedrick M.D.

## 2024-12-09 LAB — REF LAB TEST METHOD: NORMAL

## 2024-12-10 DIAGNOSIS — F41.9 ANXIETY AND DEPRESSION: ICD-10-CM

## 2024-12-10 DIAGNOSIS — F32.A ANXIETY AND DEPRESSION: ICD-10-CM

## 2024-12-10 RX ORDER — DULOXETIN HYDROCHLORIDE 30 MG/1
30 CAPSULE, DELAYED RELEASE ORAL DAILY
Qty: 90 CAPSULE | Refills: 0 | Status: CANCELLED | OUTPATIENT
Start: 2024-12-10

## 2024-12-11 DIAGNOSIS — F32.A ANXIETY AND DEPRESSION: ICD-10-CM

## 2024-12-11 DIAGNOSIS — F41.9 ANXIETY AND DEPRESSION: ICD-10-CM

## 2024-12-11 RX ORDER — DULOXETIN HYDROCHLORIDE 30 MG/1
30 CAPSULE, DELAYED RELEASE ORAL DAILY
Qty: 90 CAPSULE | Refills: 0 | OUTPATIENT
Start: 2024-12-11

## 2024-12-12 DIAGNOSIS — F41.9 ANXIETY AND DEPRESSION: ICD-10-CM

## 2024-12-12 DIAGNOSIS — F32.A ANXIETY AND DEPRESSION: ICD-10-CM

## 2024-12-16 RX ORDER — DULOXETIN HYDROCHLORIDE 30 MG/1
30 CAPSULE, DELAYED RELEASE ORAL DAILY
Qty: 90 CAPSULE | Refills: 0 | Status: SHIPPED | OUTPATIENT
Start: 2024-12-16

## 2025-01-01 DIAGNOSIS — G43.009 MIGRAINE WITHOUT AURA AND WITHOUT STATUS MIGRAINOSUS, NOT INTRACTABLE: ICD-10-CM

## 2025-01-02 RX ORDER — SUMATRIPTAN AND NAPROXEN SODIUM 85; 500 MG/1; MG/1
1 TABLET, FILM COATED ORAL ONCE AS NEEDED
Qty: 9 TABLET | Refills: 11 | OUTPATIENT
Start: 2025-01-02

## 2025-01-02 RX ORDER — ATOGEPANT 60 MG/1
60 TABLET ORAL DAILY
Qty: 30 TABLET | Refills: 11 | OUTPATIENT
Start: 2025-01-02

## 2025-01-07 ENCOUNTER — HOSPITAL ENCOUNTER (OUTPATIENT)
Dept: MAMMOGRAPHY | Facility: HOSPITAL | Age: 50
Discharge: HOME | End: 2025-01-07
Admitting: OBSTETRICS & GYNECOLOGY
Payer: COMMERCIAL

## 2025-01-07 DIAGNOSIS — Z12.31 SCREENING MAMMOGRAM FOR BREAST CANCER: ICD-10-CM

## 2025-01-07 PROCEDURE — 77063 BREAST TOMOSYNTHESIS BI: CPT

## 2025-01-07 PROCEDURE — 77067 SCR MAMMO BI INCL CAD: CPT

## 2025-01-09 ENCOUNTER — OFFICE VISIT (OUTPATIENT)
Dept: UROLOGY | Facility: CLINIC | Age: 50
End: 2025-01-09
Payer: COMMERCIAL

## 2025-01-09 VITALS
OXYGEN SATURATION: 100 % | WEIGHT: 135 LBS | BODY MASS INDEX: 23.05 KG/M2 | TEMPERATURE: 97 F | DIASTOLIC BLOOD PRESSURE: 70 MMHG | HEIGHT: 64 IN | HEART RATE: 71 BPM | SYSTOLIC BLOOD PRESSURE: 100 MMHG

## 2025-01-09 DIAGNOSIS — N39.46 MIXED STRESS AND URGE URINARY INCONTINENCE: Primary | ICD-10-CM

## 2025-01-09 NOTE — PROGRESS NOTES
Chief Complaint   Patient presents with    bulking agent      HPI  Ms. Bolton is a 49 y.o. female with history below in assessment, who presents for follow up.     The bulking agent has worn off for several years.      Past Medical History:   Diagnosis Date    Allergic rhinitis approx 10 years ago    Anxiety     Arrhythmia     SVT w/ PVCs, on propranolol, follows w/ Dr. Liang    BRCA1 negative     BRCA2 negative     Chronic venous insufficiency     per Dr. Liang note 8/17/22    Cluster headache     Depression     Diabetes mellitus 2001    Gestational    Dyspepsia     Dyspnea on exertion     resolved after weight loss/ gastric sleeve    Endometriosis     Fatigue     Female infertility     Fibroid Hysterectomy    Generalized edema     r/t weight gain    Genital HSV     Famvir for prophylaxis    GERD (gastroesophageal reflux disease)     Gestational diabetes 2001    Hard to intubate     states was told this after her hysterectomy.  states procedures following gastric sleeve have been without issues    Head injury     concussion with post concussion syndrome    Headache, tension-type     Hyperemesis gravidarum     Hyperlipidemia 09/21/2021    Hypertension     resloved after gastric sleeve/weight loss    Interstitial cystitis     Kidney stones     Migraine     occasional    Mitral valve prolapse     pt denies/unaware; mitral valve regurge per Dr. Liang note 8/17/22    Normal vaginal Papanicolaou smear in patient with history of hysterectomy 09/16/2016    Peripheral edema     PONV (postoperative nausea and vomiting)     Post concussion syndrome     s/p fall w/ frontal lobe head injury 9/2017, previously on Elavil    Sleep apnea     resloved after gastric sleeve/weight loss    Stress incontinence     follows w/ Dr. Galvan, on Myrbetriq, s/p cystoscopy w/ bulking agent    SVT (supraventricular tachycardia)     followed by Dr. Liang - see note 8/17/22 scanned into media    Weight loss counseling, encounter for 07/02/2024        Past Surgical History:   Procedure Laterality Date    ANTERIOR AND POSTERIOR VAGINAL REPAIR  06/23/2010    DR EDIN BYRD    BLADDER SURGERY      A&P Repair, Bulking Procedure    BREAST BIOPSY  [id number]    Left    BREAST BIOPSY Left     2022    CATARACT EXTRACTION  Congenitial Cataract Removed January 2023    New lens both eyes    CYSTOSCOPY      CYSTOSCOPY W/ BULKING AGENT INJECTION N/A 06/25/2019    Procedure: CYSTOSCOPY WITH URETHRAL  BULKING AGENT INJECTION;  Surgeon: Jose Galvan MD;  Location: [facility] OR;  Service: Urology    CYSTOSCOPY, DIMETHYL SULFOXIDE COCKTAIL  03/14/2011    WITH HYDRODISTENTION (DR AVINASH MADDOX)    ENDOSCOPY N/A 12/17/2019    Procedure: ESOPHAGOGASTRODUODENOSCOPY;  Surgeon: Tracee Onofre MD;  Location: [facility] OR;  Service: Bariatric    ENDOSCOPY N/A 10/20/2020    Procedure: ESOPHAGOGASTRODUODENOSCOPY WITH BIOPSY;  Surgeon: Tracee Onofre MD;  Location: [facility] ENDOSCOPY;  Service: General;  Laterality: N/A;    GASTRIC SLEEVE LAPAROSCOPIC N/A 12/17/2019    Procedure: GASTRIC SLEEVE LAPAROSCOPIC;  Surgeon: Tracee Onofre MD;  Location: [facility] OR;  Service: Bariatric    HIATAL HERNIA REPAIR N/A 12/17/2019    Procedure: HIATAL HERNIA REPAIR LAPAROSCOPIC;  Surgeon: Tracee Onofre MD;  Location: [facility] OR;  Service: Bariatric    HYSTERECTOMY  06/23/2010    LAVH (DR EDIN BYRD) Ovaries remain    LAPAROSCOPIC CHOLECYSTECTOMY  2013    for stones    TRANSVAGINAL TAPING SUSPENSION WITH OBTURATOR  06/23/2010    DR EDIN BYRD    VAGINAL HYSTERECTOMY      VAGINAL PROLAPSE REPAIR      WISDOM TOOTH EXTRACTION  1990         Current Outpatient Medications:     acyclovir (ZOVIRAX) 5 % ointment, Apply  topically to the appropriate area as directed 4 (Four) Times a Day., Disp: 30 g, Rfl: 6    amitriptyline (ELAVIL) 10 MG tablet, Take 1 tablet by mouth Every Evening., Disp: 90 tablet, Rfl: 3    Atogepant (Qulipta) 60 MG tablet, Take 1 tablet by mouth Daily., Disp:  30 tablet, Rfl: 11    B Complex Vitamins (vitamin b complex) capsule capsule, Take 1 capsule by mouth Daily., Disp: , Rfl:     Biotin (Biotin 5000) 5 MG capsule, Take 1 capsule by mouth Daily., Disp: , Rfl:     cetirizine (zyrTEC) 10 MG tablet, Take 1 tablet by mouth Daily., Disp: 90 tablet, Rfl: 3    Cholecalciferol (Vitamin D3) 250 MCG (38905 UT) tablet, Take 10,000 Units by mouth Daily., Disp: , Rfl:     cyclobenzaprine (FLEXERIL) 5 MG tablet, Take 1 tablet by mouth 3 (Three) Times a Day As Needed for Muscle spasms., Disp: 90 tablet, Rfl: 3    dexlansoprazole (DEXILANT) 60 MG capsule, Take 1 capsule by mouth Daily., Disp: 90 capsule, Rfl: 1    DULoxetine (CYMBALTA) 30 MG capsule, Take 1 capsule by mouth Daily., Disp: 90 capsule, Rfl: 0    estradiol (ESTRACE VAGINAL) 0.1 MG/GM vaginal cream, May use 1 gram intravaginal once daily at bedtime for 2 weeks, then 1 gram 2 (Two) Times Week., Disp: 42.5 g, Rfl: 11    estradiol (VAGIFEM) 10 MCG tablet vaginal tablet, Insert 1 tablet into the vagina 2 (Two) Times a Week., Disp: 8 tablet, Rfl: 11    famciclovir (FAMVIR) 250 MG tablet, Take 1 tablet by mouth 3 (Three) Times a Day., Disp: 270 tablet, Rfl: 4    famotidine (PEPCID) 20 MG tablet, Take 1 tablet by mouth Every Morning., Disp: , Rfl:     MAGNESIUM CITRATE PO, Take 270 mg by mouth Daily. *magnesium citrimate+B6 for migraines, Disp: , Rfl:     Mirabegron ER (Myrbetriq) 25 MG tablet sustained-release 24 hour 24 hr tablet, Take 1 tablet by mouth Daily., Disp: 90 tablet, Rfl: 3    naltrexone (DEPADE) 50 MG tablet, Take 0.5 tablets by mouth 2 (Two) Times a Day., Disp: 30 tablet, Rfl: 2    NON FORMULARY, Multivitamin patch daily, Disp: , Rfl:     Omega-3 Fatty Acids (fish oil) 1000 MG capsule capsule, Take 1 capsule by mouth 2 (Two) Times a Day With Meals., Disp: , Rfl:     ondansetron ODT (ZOFRAN-ODT) 4 MG disintegrating tablet, Place 1 tablet under the tongue Every 8 (Eight) Hours As Needed for Nausea., Disp: 60 tablet,  "Rfl: 2    Probiotic Product (PROBIOTIC PO), Take 1 capsule by mouth Daily., Disp: , Rfl:     promethazine (PHENERGAN) 25 MG tablet, Take 1 tablet by mouth Every 6 (Six) Hours As Needed. (Patient taking differently: Take 1 tablet by mouth Every 6 (Six) Hours As Needed for Nausea.), Disp: 60 tablet, Rfl: 1    propranolol LA (INDERAL LA) 80 MG 24 hr capsule, Take 1 capsule by mouth Daily for 90 days., Disp: 90 capsule, Rfl: 3    SUMAtriptan-naproxen (TREXIMET)  MG per tablet, Take 1 tablet by mouth 1 (One) Time As Needed for Migraine. May repeat dose one time in 2 hours if headache not relieved., Disp: 9 tablet, Rfl: 11    Vitamin E 400 units tablet, Take 400 Units by mouth Daily., Disp: , Rfl:     Wegovy 1 MG/0.5ML solution auto-injector, INJECT 0.5 ML SUBCUTANEOUSLY ONCE PER WEEK AS DIRECTED (Patient not taking: Reported on 1/9/2025), Disp: 2 mL, Rfl: 0    Current Facility-Administered Medications:     cyanocobalamin injection 1,000 mcg, 1,000 mcg, Intramuscular, Q28 Days, Bindu Lechuga, APRN, 1,000 mcg at 10/22/24 1211     Physical Exam  Visit Vitals  /70   Pulse 71   Temp 97 °F (36.1 °C)   Ht 162.6 cm (64\")   Wt 61.2 kg (135 lb)   SpO2 100%   BMI 23.17 kg/m²       Labs  Brief Urine Lab Results       None            Lab Results   Component Value Date    GLUCOSE 89 03/18/2024    CALCIUM 9.9 03/18/2024     03/18/2024    K 4.1 03/18/2024    CO2 25.9 03/18/2024     03/18/2024    BUN 13 03/18/2024    CREATININE 1.04 (H) 03/18/2024    EGFRIFAFRI 79 09/17/2020    EGFRIFNONA 68 10/08/2021    BCR 12.5 03/18/2024    ANIONGAP 11.1 03/18/2024       Lab Results   Component Value Date    WBC 7.42 03/18/2024    HGB 15.5 03/18/2024    HCT 45.3 03/18/2024    MCV 91.9 03/18/2024     03/18/2024            Radiographic Studies  No Images in the past 120 days found..    I have reviewed the above labs and imaging.     Assessment  49 y.o. female with chronic mixed stress and urge incontinence, previously " treated with bulking agent injection, and currently on myrbetriq with medication working so-so and the FELICIANO has returned.    We discussed new bulking agents like Bulkamid, which worked much better and last longer than the Durasphere that she had in the past.  We also discussed third line therapies like Botox, which could be done at the same time as an alternative to chronic oral medications.  We discussed the risks and benefits of both.  We discussed the less than 5% risk of urinary retention with Botox.    Plan  1.  Schedule botox and bulking agent at [facility]

## 2025-01-21 ENCOUNTER — OFFICE VISIT (OUTPATIENT)
Dept: BARIATRICS/WEIGHT MGMT | Facility: CLINIC | Age: 50
End: 2025-01-21
Payer: COMMERCIAL

## 2025-01-21 VITALS
DIASTOLIC BLOOD PRESSURE: 72 MMHG | HEART RATE: 67 BPM | BODY MASS INDEX: 22.23 KG/M2 | SYSTOLIC BLOOD PRESSURE: 110 MMHG | HEIGHT: 64 IN | WEIGHT: 130.2 LBS

## 2025-01-21 DIAGNOSIS — F41.9 ANXIETY AND DEPRESSION: ICD-10-CM

## 2025-01-21 DIAGNOSIS — E78.5 HYPERLIPIDEMIA, UNSPECIFIED HYPERLIPIDEMIA TYPE: ICD-10-CM

## 2025-01-21 DIAGNOSIS — F32.A ANXIETY AND DEPRESSION: ICD-10-CM

## 2025-01-21 DIAGNOSIS — E88.810 METABOLIC SYNDROME: ICD-10-CM

## 2025-01-21 DIAGNOSIS — Z86.69 HISTORY OF OBSTRUCTIVE SLEEP APNEA: ICD-10-CM

## 2025-01-21 DIAGNOSIS — E66.9 OBESITY WITH SERIOUS COMORBIDITY, UNSPECIFIED CLASS, UNSPECIFIED OBESITY TYPE: Primary | ICD-10-CM

## 2025-01-21 DIAGNOSIS — Z51.81 ENCOUNTER FOR THERAPEUTIC DRUG LEVEL MONITORING: ICD-10-CM

## 2025-01-21 DIAGNOSIS — E55.9 HYPOVITAMINOSIS D: ICD-10-CM

## 2025-01-21 DIAGNOSIS — G47.19 EXCESSIVE DAYTIME SLEEPINESS: ICD-10-CM

## 2025-01-21 DIAGNOSIS — Z98.890 S/P GASTRIC SURGERY: ICD-10-CM

## 2025-01-21 RX ORDER — PHENTERMINE HYDROCHLORIDE 37.5 MG/1
TABLET ORAL
Qty: 30 TABLET | Refills: 0 | Status: SHIPPED | OUTPATIENT
Start: 2025-01-21

## 2025-01-21 RX ORDER — NALTREXONE HYDROCHLORIDE 50 MG/1
25 TABLET, FILM COATED ORAL 2 TIMES DAILY
Qty: 30 TABLET | Refills: 2 | Status: SHIPPED | OUTPATIENT
Start: 2025-01-21

## 2025-01-21 RX ADMIN — CYANOCOBALAMIN 1000 MCG: 1000 INJECTION, SOLUTION INTRAMUSCULAR; SUBCUTANEOUS at 13:45

## 2025-01-21 NOTE — PROGRESS NOTES
[facility]  [address]  [place] [address]     Office Note      Date: 2025  Patient Name: Renae Bolton  MRN: [id number]  : 1975  Subjective  Subjective     Chief Complaint  Obesity Management follow-up          Renae Bolton presents to [facility] for obesity management. s/p LSG/HHR 19 w/ Dr. Onofre. Pre-surgery weight: 232.5, gonzalez 131lb (24), final goal 130lb.    Patient is unsure with weight loss progress. Appetite is moderately controlled- some days she's not hungry at all and others she is hungry all day. She has not taken wegovy for several weeks, still has a few 1mg pens left but her stomach issues worsen when she is on it. Only using naltrexone PRN. Reports no other side effects of prescribed medications today. The patient is taking multivitamin and is taking fish oil. The patient is not using a food journal.    Reports that being off of wegovy she has experienced a return of historical chronic symptoms (heart palpitations, trouble sleeping).     She has been having GI issues (she sees GI for this) so she has been careful about what and how much she eats since it causes abdominal pain. She tries to get her protein in and does well most days. She will continue to work on getting in her protein and making mindful choices.    24 hour recall:  Breakfast: 1/2 peanut butter and jelly sandwich (uncrustable)  Lunch: red potatoes, chili w/meat, beans, cheese  Dinner: pork chop, mixed raw veggies, potato wedges  Snack: cheese stix   Water Intake: 32 oz  Other beverages: cranberry juice, ginger ale zero  Alcohol: none    The patient is exercising by walking 3 times per week with a FITT score of:    Frequency Intensity Time Strength Training   []   0, none []   0 []   0 [x]   0   []   1 (1-2x/week) [x]   1 (light) []   1 (<10 min) []   1 (1x/week)   [x]   2 (3-5x/week) []   2 (moderate) []   2 (10-20  min) []   2 (2x/week)   []   3 (daily) []   3 (moderately hard)  []   4 (very hard) []   3 (20-30 min)  [x]   4 (>30 min) []   3 (3-4x/week)       Review of Systems   Constitutional:  Positive for appetite change and fatigue.   Eyes:  Negative for visual disturbance.   Cardiovascular:  Positive for palpitations (since being off Wegovy they have restarted). Negative for chest pain.   Gastrointestinal:  Positive for nausea and indigestion. Negative for constipation.   Neurological:  Negative for light-headedness.   Psychiatric/Behavioral:  Positive for sleep disturbance and stress.          Current Outpatient Medications:     acyclovir (ZOVIRAX) 5 % ointment, Apply  topically to the appropriate area as directed 4 (Four) Times a Day., Disp: 30 g, Rfl: 6    amitriptyline (ELAVIL) 10 MG tablet, Take 1 tablet by mouth Every Evening., Disp: 90 tablet, Rfl: 3    Atogepant (Qulipta) 60 MG tablet, Take 1 tablet by mouth Daily., Disp: 30 tablet, Rfl: 11    B Complex Vitamins (vitamin b complex) capsule capsule, Take 1 capsule by mouth Daily., Disp: , Rfl:     Biotin (Biotin 5000) 5 MG capsule, Take 1 capsule by mouth Daily., Disp: , Rfl:     cetirizine (zyrTEC) 10 MG tablet, Take 1 tablet by mouth Daily., Disp: 90 tablet, Rfl: 3    Cholecalciferol (Vitamin D3) 250 MCG (45905 UT) tablet, Take 10,000 Units by mouth Daily., Disp: , Rfl:     cyclobenzaprine (FLEXERIL) 5 MG tablet, Take 1 tablet by mouth 3 (Three) Times a Day As Needed for Muscle spasms., Disp: 90 tablet, Rfl: 3    dexlansoprazole (DEXILANT) 60 MG capsule, Take 1 capsule by mouth Daily., Disp: 90 capsule, Rfl: 1    DULoxetine (CYMBALTA) 30 MG capsule, Take 1 capsule by mouth Daily., Disp: 90 capsule, Rfl: 0    estradiol (ESTRACE VAGINAL) 0.1 MG/GM vaginal cream, May use 1 gram intravaginal once daily at bedtime for 2 weeks, then 1 gram 2 (Two) Times Week., Disp: 42.5 g, Rfl: 11    estradiol (VAGIFEM) 10 MCG tablet vaginal tablet, Insert 1 tablet into the vagina 2  (Two) Times a Week., Disp: 8 tablet, Rfl: 11    famciclovir (FAMVIR) 250 MG tablet, Take 1 tablet by mouth 3 (Three) Times a Day., Disp: 270 tablet, Rfl: 4    famotidine (PEPCID) 20 MG tablet, Take 1 tablet by mouth Every Morning., Disp: , Rfl:     MAGNESIUM CITRATE PO, Take 270 mg by mouth Daily. *magnesium citrimate+B6 for migraines, Disp: , Rfl:     Mirabegron ER (Myrbetriq) 25 MG tablet sustained-release 24 hour 24 hr tablet, Take 1 tablet by mouth Daily., Disp: 90 tablet, Rfl: 3    naltrexone (DEPADE) 50 MG tablet, Take 0.5 tablets by mouth 2 (Two) Times a Day., Disp: 30 tablet, Rfl: 2    NON FORMULARY, Multivitamin patch daily, Disp: , Rfl:     Omega-3 Fatty Acids (fish oil) 1000 MG capsule capsule, Take 1 capsule by mouth 2 (Two) Times a Day With Meals., Disp: , Rfl:     ondansetron ODT (ZOFRAN-ODT) 4 MG disintegrating tablet, Place 1 tablet under the tongue Every 8 (Eight) Hours As Needed for Nausea., Disp: 60 tablet, Rfl: 2    Probiotic Product (PROBIOTIC PO), Take 1 capsule by mouth Daily., Disp: , Rfl:     promethazine (PHENERGAN) 25 MG tablet, Take 1 tablet by mouth Every 6 (Six) Hours As Needed. (Patient taking differently: Take 1 tablet by mouth Every 6 (Six) Hours As Needed for Nausea.), Disp: 60 tablet, Rfl: 1    propranolol LA (INDERAL LA) 80 MG 24 hr capsule, Take 1 capsule by mouth Daily for 90 days., Disp: 90 capsule, Rfl: 3    SUMAtriptan-naproxen (TREXIMET)  MG per tablet, Take 1 tablet by mouth 1 (One) Time As Needed for Migraine. May repeat dose one time in 2 hours if headache not relieved., Disp: 9 tablet, Rfl: 11    Vitamin E 400 units tablet, Take 400 Units by mouth Daily., Disp: , Rfl:     phentermine (ADIPEX-P) 37.5 MG tablet, Take 1/2 tablet at 11am and 1/2 tablet at 3pm., Disp: 30 tablet, Rfl: 0    Current Facility-Administered Medications:     cyanocobalamin injection 1,000 mcg, 1,000 mcg, Intramuscular, Q28 Days, Bindu Lechuga, LAURITA, 1,000 mcg at 01/21/25 1345    Objective  "  Start weight: 193 pounds.    Total weight loss: -62.8 pounds/-32.53%  Change in weight since last visit: +4.6    Body mass index is 22.35 kg/m².   Body composition analysis completed and showed:   Body Fat %: 31.5    Measurements (in inches)  Waist Circumference: 32      Vital Signs:   /72 (BP Location: Left arm, Patient Position: Sitting)   Pulse 67   Ht 162.6 cm (64\")   Wt 59.1 kg (130 lb 3.2 oz)   BMI 22.35 kg/m²     No LMP recorded. Patient has had a hysterectomy.    Physical Exam   General appears stated age and normal appearance   HEENT PERRLA, EOM intact, and conjunctivae normal   Chest/lungs Normal rate, Regular rhythm, and Breathing is unlabored   Extremities without edema   Neuro Good historian and No focal deficit   Skin Warm, dry, intact   Psych normal behavior, normal thought content, and normal concentration     Result Review :                Assessment / Plan        Diagnoses and all orders for this visit:    1. Obesity with serious comorbidity, unspecified class, unspecified obesity type (Primary)  Overview:  Start: 3/31/2021 193lb, goal 155lb  S/p LSG 12/19/19 presurgery weight: 232.5   gonzalez 131lb, BMI 22.49 (07/02/2024) on wegovy 1.7mg    AOM hx:  trokendi: worked well for migraines- no weight loss  Saxenda worked well, changed to wegovy due to plateau   Phentermine and naltrexone help with cravings    Comorbids: MAREN, hyperlipidemia, GERD, back pain, anxiety and depression, metabolic syndrome    Assessment & Plan:  In remission. History of class 2 obesity.   Patient's (Body mass index is 22.35 kg/m².) is now normal.   % fat above normal at 31. 5%. Worsening since stopping wegovy due to cost.   We discussed low calorie, low carb based diet program, portion control, increasing exercise, management of depression/anxiety/stress to control compensatory eating, and pharmacologic options including phentermine, naltrexone .  Obesity is a chronic disease that often requires long term use of " anti-obesity medication. Wegovy was dropped by her insurance and is no longer affordable. Patient does meet criteria for use of anorectics at this time as body fat percentage >30% and is not at treatment goal.     The current plan for this month includes:   - Continue to work on lifestyle behavioral changes  - Encouraged to start therapy due to increased stress, is having physical symptoms (GI) that she thinks is related.   - Restart phentermine due to weight gain of 4.5lb since last visit.   - Screen for sleep apnea, symptoms did resolve after gastric sleeve but she is having more headaches and fatigue.   - Treatment goal 130lb with normal %fat.          Orders:  -     naltrexone (DEPADE) 50 MG tablet; Take 0.5 tablets by mouth 2 (Two) Times a Day.  Dispense: 30 tablet; Refill: 2  -     phentermine (ADIPEX-P) 37.5 MG tablet; Take 1/2 tablet at 11am and 1/2 tablet at 3pm.  Dispense: 30 tablet; Refill: 0    2. Hyperlipidemia, unspecified hyperlipidemia type  -     Lipid Panel; Future    3. Metabolic syndrome  Overview:  JOSE LUIS-IR >3.      Orders:  -     Insulin, Total; Future    4. Excessive daytime sleepiness  -     Comprehensive Metabolic Panel; Future  -     Hemoglobin A1c; Future  -     TSH; Future  -     CBC (No Diff); Future  -     T4, Free; Future  -     Vitamin D,25-Hydroxy; Future    5. Anxiety and depression  Overview:  Prozac: increased depression when taking with cymbalta more recently  took zoloft many years, anxiety started to worsen  cymbalta for 20 years      Orders:  -     Comprehensive Metabolic Panel; Future  -     TSH; Future  -     T4, Free; Future    6. Hypovitaminosis D  -     Vitamin D,25-Hydroxy; Future    7. S/P gastric surgery  -     Comprehensive Metabolic Panel; Future  -     CBC (No Diff); Future  -     Vitamin D,25-Hydroxy; Future  -     CBC & Differential; Future  -     Iron; Future  -     Methylmalonic Acid, Serum; Future  -     Ferritin; Future    8. Encounter for therapeutic drug  level monitoring  -     Urine Drug Screen - Urine, Clean Catch    9. History of obstructive sleep apnea          Follow Up   Return in about 6 weeks (around 3/4/2025) for Next scheduled follow up.  Patient was given instructions and counseling regarding her condition or for health maintenance advice. Please see specific information pulled into the AVS if appropriate.     Bindu Lechuga, LAURITA  01/21/2025

## 2025-01-21 NOTE — ASSESSMENT & PLAN NOTE
In remission. History of class 2 obesity.   Patient's (Body mass index is 22.35 kg/m².) is now normal.   % fat above normal at 31. 5%. Worsening since stopping wegovy due to cost.   We discussed low calorie, low carb based diet program, portion control, increasing exercise, management of depression/anxiety/stress to control compensatory eating, and pharmacologic options including phentermine, naltrexone .  Obesity is a chronic disease that often requires long term use of anti-obesity medication. Wegovy was dropped by her insurance and is no longer affordable. Patient does meet criteria for use of anorectics at this time as body fat percentage >30% and is not at treatment goal.     The current plan for this month includes:   - Continue to work on lifestyle behavioral changes  - Encouraged to start therapy due to increased stress, is having physical symptoms (GI) that she thinks is related.   - Restart phentermine due to weight gain of 4.5lb since last visit.   - Screen for sleep apnea, symptoms did resolve after gastric sleeve but she is having more headaches and fatigue.   - Treatment goal 130lb with normal %fat.

## 2025-01-24 LAB
AMPHETAMINES UR QL SCN: NEGATIVE NG/ML
BARBITURATES UR QL SCN: NEGATIVE NG/ML
BENZODIAZ UR QL SCN: NEGATIVE NG/ML
BZE UR QL SCN: NEGATIVE NG/ML
CANNABINOIDS UR QL SCN: NEGATIVE NG/ML
CREAT UR-MCNC: 164.6 MG/DL (ref 20–300)
LABORATORY COMMENT REPORT: NORMAL
METHADONE UR QL SCN: NEGATIVE NG/ML
OPIATES UR QL SCN: NEGATIVE NG/ML
OXYCODONE+OXYMORPHONE UR QL SCN: NEGATIVE NG/ML
PCP UR QL: NEGATIVE NG/ML
PH UR: 5.4 [PH] (ref 4.5–8.9)
PROPOXYPH UR QL SCN: NEGATIVE NG/ML

## 2025-02-03 ENCOUNTER — HOSPITAL ENCOUNTER (OUTPATIENT)
Facility: HOSPITAL | Age: 50
Discharge: HOME | End: 2025-02-03
Admitting: FAMILY MEDICINE
Payer: COMMERCIAL

## 2025-02-03 DIAGNOSIS — F32.A ANXIETY AND DEPRESSION: ICD-10-CM

## 2025-02-03 DIAGNOSIS — K21.9 GASTROESOPHAGEAL REFLUX DISEASE, UNSPECIFIED WHETHER ESOPHAGITIS PRESENT: ICD-10-CM

## 2025-02-03 DIAGNOSIS — E88.810 METABOLIC SYNDROME: ICD-10-CM

## 2025-02-03 DIAGNOSIS — E55.9 VITAMIN D DEFICIENCY: Primary | ICD-10-CM

## 2025-02-03 DIAGNOSIS — Z98.890 S/P GASTRIC SURGERY: ICD-10-CM

## 2025-02-03 DIAGNOSIS — E55.9 HYPOVITAMINOSIS D: ICD-10-CM

## 2025-02-03 DIAGNOSIS — E78.5 HYPERLIPIDEMIA, UNSPECIFIED HYPERLIPIDEMIA TYPE: ICD-10-CM

## 2025-02-03 DIAGNOSIS — G47.19 EXCESSIVE DAYTIME SLEEPINESS: ICD-10-CM

## 2025-02-03 DIAGNOSIS — F41.9 ANXIETY AND DEPRESSION: ICD-10-CM

## 2025-02-03 LAB
25(OH)D3 SERPL-MCNC: 80 NG/ML (ref 30–100)
ALBUMIN SERPL-MCNC: 4.3 G/DL (ref 3.5–5.2)
ALBUMIN/GLOB SERPL: 1.6 G/DL
ALP SERPL-CCNC: 63 U/L (ref 39–117)
ALT SERPL W P-5'-P-CCNC: 17 U/L (ref 1–33)
ANION GAP SERPL CALCULATED.3IONS-SCNC: 10 MMOL/L (ref 5–15)
AST SERPL-CCNC: 19 U/L (ref 1–32)
BASOPHILS # BLD AUTO: 0.04 10*3/MM3 (ref 0–0.2)
BASOPHILS NFR BLD AUTO: 0.7 % (ref 0–1.5)
BILIRUB SERPL-MCNC: 0.7 MG/DL (ref 0–1.2)
BUN SERPL-MCNC: 15 MG/DL (ref 6–20)
BUN/CREAT SERPL: 16.9 (ref 7–25)
CALCIUM SPEC-SCNC: 9.7 MG/DL (ref 8.6–10.5)
CHLORIDE SERPL-SCNC: 104 MMOL/L (ref 98–107)
CHOLEST SERPL-MCNC: 166 MG/DL (ref 0–200)
CO2 SERPL-SCNC: 28 MMOL/L (ref 22–29)
CREAT SERPL-MCNC: 0.89 MG/DL (ref 0.57–1)
DEPRECATED RDW RBC AUTO: 41.3 FL (ref 37–54)
EGFRCR SERPLBLD CKD-EPI 2021: 78.6 ML/MIN/1.73
EOSINOPHIL # BLD AUTO: 0.11 10*3/MM3 (ref 0–0.4)
EOSINOPHIL NFR BLD AUTO: 1.8 % (ref 0.3–6.2)
ERYTHROCYTE [DISTWIDTH] IN BLOOD BY AUTOMATED COUNT: 12.1 % (ref 12.3–15.4)
FERRITIN SERPL-MCNC: 90.96 NG/ML (ref 13–150)
FOLATE SERPL-MCNC: 12.5 NG/ML (ref 4.78–24.2)
GLOBULIN UR ELPH-MCNC: 2.7 GM/DL
GLUCOSE SERPL-MCNC: 90 MG/DL (ref 65–99)
HBA1C MFR BLD: 5.2 % (ref 4.8–5.6)
HCT VFR BLD AUTO: 41.7 % (ref 34–46.6)
HDLC SERPL-MCNC: 68 MG/DL (ref 40–60)
HGB BLD-MCNC: 14.5 G/DL (ref 12–15.9)
IMM GRANULOCYTES # BLD AUTO: 0.01 10*3/MM3 (ref 0–0.05)
IMM GRANULOCYTES NFR BLD AUTO: 0.2 % (ref 0–0.5)
IRON 24H UR-MRATE: 136 MCG/DL (ref 37–145)
LDLC SERPL CALC-MCNC: 86 MG/DL (ref 0–100)
LDLC/HDLC SERPL: 1.26 {RATIO}
LYMPHOCYTES # BLD AUTO: 1.41 10*3/MM3 (ref 0.7–3.1)
LYMPHOCYTES NFR BLD AUTO: 23.4 % (ref 19.6–45.3)
MAGNESIUM SERPL-MCNC: 2.1 MG/DL (ref 1.6–2.6)
MCH RBC QN AUTO: 32.1 PG (ref 26.6–33)
MCHC RBC AUTO-ENTMCNC: 34.8 G/DL (ref 31.5–35.7)
MCV RBC AUTO: 92.3 FL (ref 79–97)
MONOCYTES # BLD AUTO: 0.33 10*3/MM3 (ref 0.1–0.9)
MONOCYTES NFR BLD AUTO: 5.5 % (ref 5–12)
NEUTROPHILS NFR BLD AUTO: 4.12 10*3/MM3 (ref 1.7–7)
NEUTROPHILS NFR BLD AUTO: 68.4 % (ref 42.7–76)
NRBC BLD AUTO-RTO: 0 /100 WBC (ref 0–0.2)
PLATELET # BLD AUTO: 292 10*3/MM3 (ref 140–450)
PMV BLD AUTO: 10.2 FL (ref 6–12)
POTASSIUM SERPL-SCNC: 4.1 MMOL/L (ref 3.5–5.2)
PROT SERPL-MCNC: 7 G/DL (ref 6–8.5)
RBC # BLD AUTO: 4.52 10*6/MM3 (ref 3.77–5.28)
SODIUM SERPL-SCNC: 142 MMOL/L (ref 136–145)
T4 FREE SERPL-MCNC: 1.5 NG/DL (ref 0.92–1.68)
TRIGL SERPL-MCNC: 61 MG/DL (ref 0–150)
TSH SERPL DL<=0.05 MIU/L-ACNC: 1.22 UIU/ML (ref 0.27–4.2)
VIT B12 BLD-MCNC: 1063 PG/ML (ref 211–946)
VLDLC SERPL-MCNC: 12 MG/DL (ref 5–40)
WBC NRBC COR # BLD AUTO: 6.02 10*3/MM3 (ref 3.4–10.8)

## 2025-02-03 PROCEDURE — 80050 GENERAL HEALTH PANEL: CPT | Performed by: NURSE PRACTITIONER

## 2025-02-03 PROCEDURE — 83540 ASSAY OF IRON: CPT | Performed by: NURSE PRACTITIONER

## 2025-02-03 PROCEDURE — 80061 LIPID PANEL: CPT | Performed by: NURSE PRACTITIONER

## 2025-02-03 PROCEDURE — 36415 COLL VENOUS BLD VENIPUNCTURE: CPT

## 2025-02-03 PROCEDURE — 84439 ASSAY OF FREE THYROXINE: CPT | Performed by: NURSE PRACTITIONER

## 2025-02-03 PROCEDURE — 82746 ASSAY OF FOLIC ACID SERUM: CPT

## 2025-02-03 PROCEDURE — 83036 HEMOGLOBIN GLYCOSYLATED A1C: CPT | Performed by: NURSE PRACTITIONER

## 2025-02-03 PROCEDURE — 82728 ASSAY OF FERRITIN: CPT | Performed by: NURSE PRACTITIONER

## 2025-02-03 PROCEDURE — 82607 VITAMIN B-12: CPT

## 2025-02-03 PROCEDURE — 82306 VITAMIN D 25 HYDROXY: CPT | Performed by: NURSE PRACTITIONER

## 2025-02-03 PROCEDURE — 83525 ASSAY OF INSULIN: CPT | Performed by: NURSE PRACTITIONER

## 2025-02-03 PROCEDURE — 83735 ASSAY OF MAGNESIUM: CPT

## 2025-02-03 PROCEDURE — 83921 ORGANIC ACID SINGLE QUANT: CPT | Performed by: NURSE PRACTITIONER

## 2025-02-04 LAB — INSULIN SERPL-ACNC: 3.8 UIU/ML (ref 2.6–24.9)

## 2025-02-05 LAB — METHYLMALONATE SERPL-SCNC: 124 NMOL/L (ref 0–378)

## 2025-02-19 NOTE — PAT
Placed call to Gigi Sheehan CRNA regarding 48 y/o pt scheduled for cystoscopy with Botox and bulking agent injection with Dr. Galvan on 2/20/25. Pt prescribed phentermine 37.5mg 1/2 tablet daily; however, pt reports she does not take it daily and has only taken 1/2 tab 3-4 times in past week. Reviewed with Gigi. Per Gigi, pt may proceed with scheduled procedure; no orders received.

## 2025-02-20 ENCOUNTER — HOSPITAL ENCOUNTER (OUTPATIENT)
Facility: HOSPITAL | Age: 50
Setting detail: HOSPITAL OUTPATIENT SURGERY
Discharge: HOME | End: 2025-02-20
Attending: UROLOGY | Admitting: UROLOGY
Payer: COMMERCIAL

## 2025-02-20 ENCOUNTER — ANESTHESIA (OUTPATIENT)
Dept: PERIOP | Facility: HOSPITAL | Age: 50
End: 2025-02-20
Payer: COMMERCIAL

## 2025-02-20 ENCOUNTER — ANESTHESIA EVENT (OUTPATIENT)
Dept: PERIOP | Facility: HOSPITAL | Age: 50
End: 2025-02-20
Payer: COMMERCIAL

## 2025-02-20 VITALS
HEART RATE: 80 BPM | DIASTOLIC BLOOD PRESSURE: 63 MMHG | SYSTOLIC BLOOD PRESSURE: 95 MMHG | RESPIRATION RATE: 17 BRPM | BODY MASS INDEX: 23.17 KG/M2 | WEIGHT: 135 LBS | OXYGEN SATURATION: 99 % | TEMPERATURE: 97.8 F

## 2025-02-20 DIAGNOSIS — N39.46 MIXED STRESS AND URGE URINARY INCONTINENCE: ICD-10-CM

## 2025-02-20 PROCEDURE — 25810000003 LACTATED RINGERS PER 1000 ML: Performed by: NURSE ANESTHETIST, CERTIFIED REGISTERED

## 2025-02-20 PROCEDURE — 51715 ENDOSCOPIC INJECTION/IMPLANT: CPT | Performed by: UROLOGY

## 2025-02-20 PROCEDURE — 25010000002 PROPOFOL 10 MG/ML EMULSION: Performed by: NURSE ANESTHETIST, CERTIFIED REGISTERED

## 2025-02-20 PROCEDURE — L8606 SYNTHETIC IMPLNT URINARY 1ML: HCPCS | Performed by: UROLOGY

## 2025-02-20 PROCEDURE — 25010000002 ONDANSETRON PER 1 MG: Performed by: NURSE ANESTHETIST, CERTIFIED REGISTERED

## 2025-02-20 PROCEDURE — 25010000002 LEVOFLOXACIN PER 250 MG: Performed by: UROLOGY

## 2025-02-20 PROCEDURE — 25010000002 MIDAZOLAM PER 1MG: Performed by: NURSE ANESTHETIST, CERTIFIED REGISTERED

## 2025-02-20 PROCEDURE — S0260 H&P FOR SURGERY: HCPCS | Performed by: UROLOGY

## 2025-02-20 PROCEDURE — 25010000002 FENTANYL CITRATE (PF) 50 MCG/ML SOLUTION: Performed by: NURSE ANESTHETIST, CERTIFIED REGISTERED

## 2025-02-20 DEVICE — BULKAMID URETHRAL BULKING SYSTEM 2ML
Type: IMPLANTABLE DEVICE | Site: URINARY BLADDER | Status: FUNCTIONAL
Brand: BULKAMID

## 2025-02-20 RX ORDER — SODIUM CHLORIDE, SODIUM LACTATE, POTASSIUM CHLORIDE, CALCIUM CHLORIDE 600; 310; 30; 20 MG/100ML; MG/100ML; MG/100ML; MG/100ML
INJECTION, SOLUTION INTRAVENOUS CONTINUOUS PRN
Status: DISCONTINUED | OUTPATIENT
Start: 2025-02-20 | End: 2025-02-20 | Stop reason: SURG

## 2025-02-20 RX ORDER — ACETAMINOPHEN 500 MG
1000 TABLET ORAL EVERY 6 HOURS
Qty: 30 TABLET | Refills: 0 | Status: SHIPPED | OUTPATIENT
Start: 2025-02-20 | End: 2025-02-23

## 2025-02-20 RX ORDER — LEVOFLOXACIN 5 MG/ML
500 INJECTION, SOLUTION INTRAVENOUS ONCE
Status: COMPLETED | OUTPATIENT
Start: 2025-02-20 | End: 2025-02-20

## 2025-02-20 RX ORDER — MIDAZOLAM HYDROCHLORIDE 2 MG/2ML
INJECTION, SOLUTION INTRAMUSCULAR; INTRAVENOUS AS NEEDED
Status: DISCONTINUED | OUTPATIENT
Start: 2025-02-20 | End: 2025-02-20 | Stop reason: SURG

## 2025-02-20 RX ORDER — KETAMINE HYDROCHLORIDE 50 MG/ML
INJECTION, SOLUTION INTRAMUSCULAR; INTRAVENOUS AS NEEDED
Status: DISCONTINUED | OUTPATIENT
Start: 2025-02-20 | End: 2025-02-20 | Stop reason: SURG

## 2025-02-20 RX ORDER — PROPOFOL 10 MG/ML
VIAL (ML) INTRAVENOUS AS NEEDED
Status: DISCONTINUED | OUTPATIENT
Start: 2025-02-20 | End: 2025-02-20 | Stop reason: SURG

## 2025-02-20 RX ORDER — SCOPOLAMINE 1 MG/3D
1 PATCH, EXTENDED RELEASE TRANSDERMAL ONCE
Status: DISCONTINUED | OUTPATIENT
Start: 2025-02-20 | End: 2025-02-20 | Stop reason: HOSPADM

## 2025-02-20 RX ORDER — MAGNESIUM HYDROXIDE 1200 MG/15ML
LIQUID ORAL AS NEEDED
Status: DISCONTINUED | OUTPATIENT
Start: 2025-02-20 | End: 2025-02-20 | Stop reason: HOSPADM

## 2025-02-20 RX ORDER — ONDANSETRON 2 MG/ML
INJECTION INTRAMUSCULAR; INTRAVENOUS AS NEEDED
Status: DISCONTINUED | OUTPATIENT
Start: 2025-02-20 | End: 2025-02-20 | Stop reason: SURG

## 2025-02-20 RX ORDER — PHENAZOPYRIDINE HYDROCHLORIDE 100 MG/1
100 TABLET, FILM COATED ORAL DAILY PRN
Qty: 5 TABLET | Refills: 0 | Status: SHIPPED | OUTPATIENT
Start: 2025-02-20

## 2025-02-20 RX ORDER — FENTANYL CITRATE 50 UG/ML
INJECTION, SOLUTION INTRAMUSCULAR; INTRAVENOUS AS NEEDED
Status: DISCONTINUED | OUTPATIENT
Start: 2025-02-20 | End: 2025-02-20 | Stop reason: SURG

## 2025-02-20 RX ADMIN — SCOPOLAMINE 1 PATCH: 1.5 PATCH, EXTENDED RELEASE TRANSDERMAL at 09:32

## 2025-02-20 RX ADMIN — FENTANYL CITRATE 50 MCG: 50 INJECTION, SOLUTION INTRAMUSCULAR; INTRAVENOUS at 09:39

## 2025-02-20 RX ADMIN — PROPOFOL 50 MG: 10 INJECTION, EMULSION INTRAVENOUS at 09:42

## 2025-02-20 RX ADMIN — PROPOFOL 140 MCG/KG/MIN: 10 INJECTION, EMULSION INTRAVENOUS at 09:42

## 2025-02-20 RX ADMIN — SODIUM CHLORIDE, POTASSIUM CHLORIDE, SODIUM LACTATE AND CALCIUM CHLORIDE: 600; 310; 30; 20 INJECTION, SOLUTION INTRAVENOUS at 09:39

## 2025-02-20 RX ADMIN — MIDAZOLAM HYDROCHLORIDE 2 MG: 1 INJECTION, SOLUTION INTRAMUSCULAR; INTRAVENOUS at 09:39

## 2025-02-20 RX ADMIN — KETAMINE HYDROCHLORIDE 20 MG: 50 INJECTION, SOLUTION INTRAMUSCULAR; INTRAVENOUS at 09:46

## 2025-02-20 RX ADMIN — LEVOFLOXACIN 500 MG: 5 INJECTION, SOLUTION INTRAVENOUS at 09:39

## 2025-02-20 RX ADMIN — ONDANSETRON 4 MG: 2 INJECTION INTRAMUSCULAR; INTRAVENOUS at 09:39

## 2025-02-20 NOTE — ANESTHESIA PREPROCEDURE EVALUATION
Anesthesia Evaluation     Patient summary reviewed and Nursing notes reviewed   history of anesthetic complications:  PONV difficult airway  NPO Solid Status: > 8 hours  NPO Liquid Status: > 8 hours           Airway   Mallampati: II  TM distance: >3 FB  Neck ROM: full  Difficult intubation highly probable  Dental - normal exam     Pulmonary - normal exam   (+) asthma,shortness of breath, sleep apnea  Cardiovascular - normal exam    ECG reviewed  Patient on routine beta blocker    (+) hypertension, valvular problems/murmurs, dysrhythmias Tachycardia, hyperlipidemia      Neuro/Psych  (+) headaches, psychiatric history Depression and Anxiety  GI/Hepatic/Renal/Endo    (+) GERD, renal disease- stones, diabetes mellitus    Musculoskeletal     (+) back pain  Abdominal  - normal exam    Bowel sounds: normal.   Substance History - negative use     OB/GYN    (+) Pregnant        Other                      Anesthesia Plan    ASA 3     MAC     (Severe PONV requiring overnight hospitalization after Lap Brittny    Risks and benefits discussed including risk of aspiration, recall and dental damage. All patient questions answered.    Will continue with plan of care.)  intravenous induction     Anesthetic plan, risks, benefits, and alternatives have been provided, discussed and informed consent has been obtained with: patient.  Pre-procedure education provided  Plan discussed with CRNA.

## 2025-02-20 NOTE — ANESTHESIA POSTPROCEDURE EVALUATION
Patient: Renae Bolton    Procedure Summary       Date: 02/20/25 Room / Location: [facility] /  [facility]    Anesthesia Start: 0939 Anesthesia Stop: 0959    Procedures:       Cystoscopy with bulking agent injection      CYSTOSCOPY BOTOX INJECTION OF BLADDER Diagnosis:       Mixed stress and urge urinary incontinence      (Mixed stress and urge urinary incontinence [N39.46])    Surgeons: Jose Galvan MD Provider: Moiz Keating CRNA    Anesthesia Type: MAC ASA Status: 3            Anesthesia Type: MAC    Vitals  HR 75  Sat 99  /51  Resp 15  Temp 97        Post Anesthesia Care and Evaluation    Patient location during evaluation: bedside  Patient participation: complete - patient participated  Level of consciousness: awake and alert  Pain score: 0  Pain management: adequate    Airway patency: patent  Anesthetic complications: No anesthetic complications  PONV Status: none  Cardiovascular status: acceptable  Respiratory status: acceptable and nasal cannula  Hydration status: acceptable

## 2025-02-20 NOTE — DISCHARGE INSTRUCTIONS
Home Care After Bulking agent injection  The following instructions will help you care for yourself, or be cared for upon your return home today.    These are guidelines for your care right after surgery only.     Diet  Drink plenty of liquids and eat light meals today.    Start your regular diet today    Activity  Start normal activities in twenty-four (24) hours.    Wound Care and Hygiene  No restrictions, start normal routine.    Anesthesia Precautions & Expectations  After anesthesia, rest for 24 hours.    Do not drive, drink alcoholic beverages or make any important decisions during this time.    What to Expect after Surgery  Mild pain with voiding.  Frequency or urgency.  Bladder cramps.  Minimal bleeding with voiding.    Call your Doctor  Passing clots in urine preventing bladder emptying  Severe pain not controlled by oral medication  Temperature above 101.5 degrees  Inability to urinate within eight (8) hours after surgery    Other Contacts  Urology Office:  [id number]   [place] [address] [phone] office  [phone] fax    Follow up Appointment  Please call to schedule follow-up with Dr. Galvan's PA or NP in 2 weeks

## 2025-02-20 NOTE — H&P
CC  No chief complaint on file.       HPI  Renae Bolton is a 49 y.o. with history of   1. Mixed stress and urge urinary incontinence         No recent fevers or new LUTS  Does not take any blood thinners    Past Medical History  Past Medical History:   Diagnosis Date    Allergic rhinitis approx 10 years ago    Anxiety     Arrhythmia     SVT w/ PVCs, on propranolol, follows w/ Dr. Liang    BRCA1 negative     BRCA2 negative     Chronic venous insufficiency     per Dr. Liang note 8/17/22    Cluster headache     Depression     Diabetes mellitus 2001    Gestational    Dyspepsia     Dyspnea on exertion     resolved after weight loss/ gastric sleeve    Endometriosis     Fatigue     Generalized edema     r/t weight gain    Genital HSV     Famvir for prophylaxis    GERD (gastroesophageal reflux disease)     Gestational diabetes 2001    Hard to intubate     states was told this after her hysterectomy.  states procedures following gastric sleeve have been without issues    Head injury     concussion with post concussion syndrome    Headache, tension-type     Hyperemesis gravidarum     Hyperlipidemia 09/21/2021    Hypertension     resloved after gastric sleeve/weight loss    Interstitial cystitis     Kidney stones     Migraine     occasional    Mitral valve prolapse     pt denies/unaware; mitral valve regurge per Dr. Liang note 8/17/22    Normal vaginal Papanicolaou smear in patient with history of hysterectomy 09/16/2016    Peripheral edema     PONV (postoperative nausea and vomiting)     happens frequently - scopalamine patch works well    Post concussion syndrome     s/p fall w/ frontal lobe head injury 9/2017, previously on Elavil    Sleep apnea     resloved after gastric sleeve/weight loss    Stress incontinence     follows w/ Dr. Galvan, on Myrbetriq, s/p cystoscopy w/ bulking agent    SVT (supraventricular tachycardia)     followed by Dr. Liang - see note 8/17/22 scanned into media    Weight loss counseling,  encounter for 07/02/2024       Past Surgical History  Past Surgical History:   Procedure Laterality Date    ANTERIOR AND POSTERIOR VAGINAL REPAIR  06/23/2010    DR EDIN BYRD    BLADDER SURGERY      A&P Repair, Bulking Procedure    BREAST BIOPSY  [id number]    Left    BREAST BIOPSY Left     2022    CATARACT EXTRACTION  Congenitial Cataract Removed January 2023    New lens both eyes    CYSTOSCOPY      CYSTOSCOPY W/ BULKING AGENT INJECTION N/A 06/25/2019    Procedure: CYSTOSCOPY WITH URETHRAL  BULKING AGENT INJECTION;  Surgeon: Jose Galvan MD;  Location: [facility] OR;  Service: Urology    CYSTOSCOPY, DIMETHYL SULFOXIDE COCKTAIL  03/14/2011    WITH HYDRODISTENTION (DR AVINASH MADDOX)    ENDOSCOPY N/A 12/17/2019    Procedure: ESOPHAGOGASTRODUODENOSCOPY;  Surgeon: Tracee Onofre MD;  Location: [facility] OR;  Service: Bariatric    ENDOSCOPY N/A 10/20/2020    Procedure: ESOPHAGOGASTRODUODENOSCOPY WITH BIOPSY;  Surgeon: Tracee Onofre MD;  Location: [facility] ENDOSCOPY;  Service: General;  Laterality: N/A;    GASTRIC SLEEVE LAPAROSCOPIC N/A 12/17/2019    Procedure: GASTRIC SLEEVE LAPAROSCOPIC;  Surgeon: Tracee Onofre MD;  Location: [facility] OR;  Service: Bariatric    HIATAL HERNIA REPAIR N/A 12/17/2019    Procedure: HIATAL HERNIA REPAIR LAPAROSCOPIC;  Surgeon: Tracee Onofre MD;  Location: [facility] OR;  Service: Bariatric    HYSTERECTOMY  06/23/2010    LAVH (DR EDIN BYRD) Ovaries remain    LAPAROSCOPIC CHOLECYSTECTOMY  2013    for stones    TRANSVAGINAL TAPING SUSPENSION WITH OBTURATOR  06/23/2010    DR EDIN BYRD    VAGINAL HYSTERECTOMY      VAGINAL PROLAPSE REPAIR      WISDOM TOOTH EXTRACTION  1990       Medications    Current Facility-Administered Medications:     levoFLOXacin (LEVAQUIN) 500 mg/100 mL D5W (premix) (LEVAQUIN) 500 mg, 500 mg, Intravenous, Once, Jose Galvan MD    scopolamine patch 1 mg/72 hr, 1 patch, Transdermal, Once, Moiz Hawkins CRNA, 1 patch at 02/20/25  0932    Allergies  Allergies   Allergen Reactions    Amoxicillin Shortness Of Breath and Palpitations     Keflex OK    Caffeine Arrhythmia    Demerol [Meperidine] Rash     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  Never actually taken it internally.    Latex Anaphylaxis and Rash    Morphine Rash     Rash (if gets on skin).  Never taken it internally    Penicillins Shortness Of Breath and Palpitations     Keflex OK       Oxycodone Other (See Comments)     Dizziness, confusion         Social History  Social History     Socioeconomic History    Marital status:    Tobacco Use    Smoking status: Never    Smokeless tobacco: Never   Vaping Use    Vaping status: Never Used   Substance and Sexual Activity    Alcohol use: No    Drug use: No    Sexual activity: Yes     Partners: Male     Birth control/protection: Vasectomy, Hysterectomy, Surgical       Review of Systems  Constitutional: No fevers or chills  Skin: Negative for rash  Endocrine: No heat/cold intolerance   Cardiovascular: Negative for chest pain or dyspnea on exertion  Respiratory: Negative for shortness of breath or wheezing  Gastrointestinal: No constipation, nausea or vomiting  Genitourinary: Negative for new lower urinary tract symptoms, current gross hematuria or dysuria.  Musculoskeletal: No flank pain  Neurological:  Negative for frequent headaches or dizziness  Lymph/Heme: Negative for leg swelling or calf pain.    Physical Exam  Visit Vitals  /47 (BP Location: Right arm, Patient Position: Sitting)   Pulse 66   Temp 97.7 °F (36.5 °C) (Tympanic)   Resp 16   Wt 61.2 kg (135 lb)   SpO2 98%   BMI 23.17 kg/m²     Constitutional: NAD, WDWN.   HEENT: NCAT. Conjunctivae normal.  MMM.    Cardiovascular: Regular rate.  Pulmonary/Chest: Respirations are even and unlabored bilaterally.  Abdominal: Soft. No distension, tenderness, masses or guarding. No CVA tenderness.  Neurological: A + O x 3.  Cranial  Nerves II-XII grossly intact. Normal gait.  Extremities: RACHEL x 4, Warm. No clubbing.  No cyanosis.    Skin: Pink, warm and dry.  No rashes noted.  Psychiatric:  Normal mood and affect    Labs & Imaging  Lab Results   Component Value Date    GLUCOSE 90 02/03/2025    CALCIUM 9.7 02/03/2025     02/03/2025    K 4.1 02/03/2025    CO2 28.0 02/03/2025     02/03/2025    BUN 15 02/03/2025    CREATININE 0.89 02/03/2025    EGFRIFAFRI 79 09/17/2020    EGFRIFNONA 68 10/08/2021    BCR 16.9 02/03/2025    ANIONGAP 10.0 02/03/2025     Lab Results   Component Value Date    WBC 6.02 02/03/2025    HGB 14.5 02/03/2025    HCT 41.7 02/03/2025    MCV 92.3 02/03/2025     02/03/2025     Brief Urine Lab Results  (Last result in the past 365 days)        Color   Clarity   Blood   Leuk Est   Nitrite   Protein   CREAT   Urine HCG        01/21/25 1352             164.6                    No results found.        Assessment  Renae Bolton is a 49 y.o. female who presents with the following diagnosis:  1. Mixed stress and urge urinary incontinence         Plan  1. To OR for Cystoscopy with bulking agent injection, CYSTOSCOPY BOTOX INJECTION OF BLADDER     Jose Galvan MD

## 2025-02-20 NOTE — OP NOTE
Preoperative diagnosis  Stress urinary incontinence  Intrinsic sphincter Deficiency     Postoperative diagnosis  Same    Procedure performed  1.  Rigid cystoscopy  2.  Injection of Bulkamid bulking agent, 2 cc  (CPT 74312)     Attending surgeon  Jose Galvan MD    Anesthesia  Monitored anesthesia care    Complications  None    EBL  Minimal    Findings  Cystoscopy revealed no tumors, stones or other mucosal abnormalities.  At the end of the procedure there was excellent coaptation of the urethra    Indications  49 y.o. female agreed to undergo the above named procedure after discussion of the alternatives, risks and benefits.  Informed consent was obtained.      Procedure  The patient was taken to the operating room and placed supine on the operating table.  She was identified by name and medical record number.  Pre-operative antibiotics were administered.  Bilateral lower extremity SCDs were placed.  After induction of anesthesia the patient was positioned in dorsal lithotomy, prepped and draped in a sterile fashion.  A time-out was performed.      A rigid proprietary Bulkamid cystoscope was passed carefully via urethra into the bladder.  Formal cystoscopy was performed to assure there were no abnormalities within the bladder.  Then in the distal mid urethra we created for questions with the ball commit hydrogel by injecting at 2, 5, 7, and 10 o'clock. A total of 2 syringes were used for a total of 2 cc material.  After visualizing that there was excellent coaptation, the procedure was terminated.  We did leave some fluid within the bladder for the patient to perform a void trial later.  The patient tolerated the procedure well.     She will follow-up with me in 2 weeks for a postvoid residual.

## 2025-03-06 ENCOUNTER — OFFICE VISIT (OUTPATIENT)
Dept: UROLOGY | Facility: CLINIC | Age: 50
End: 2025-03-06
Payer: COMMERCIAL

## 2025-03-06 ENCOUNTER — OFFICE VISIT (OUTPATIENT)
Dept: BARIATRICS/WEIGHT MGMT | Facility: CLINIC | Age: 50
End: 2025-03-06
Payer: COMMERCIAL

## 2025-03-06 VITALS
HEART RATE: 78 BPM | DIASTOLIC BLOOD PRESSURE: 76 MMHG | WEIGHT: 136.8 LBS | BODY MASS INDEX: 23.48 KG/M2 | SYSTOLIC BLOOD PRESSURE: 116 MMHG

## 2025-03-06 VITALS
WEIGHT: 136 LBS | HEIGHT: 64 IN | DIASTOLIC BLOOD PRESSURE: 74 MMHG | SYSTOLIC BLOOD PRESSURE: 118 MMHG | OXYGEN SATURATION: 100 % | TEMPERATURE: 98.2 F | BODY MASS INDEX: 23.22 KG/M2 | HEART RATE: 68 BPM

## 2025-03-06 DIAGNOSIS — E66.9 OBESITY WITH SERIOUS COMORBIDITY, UNSPECIFIED CLASS, UNSPECIFIED OBESITY TYPE: Primary | ICD-10-CM

## 2025-03-06 DIAGNOSIS — F32.A ANXIETY AND DEPRESSION: ICD-10-CM

## 2025-03-06 DIAGNOSIS — F41.9 ANXIETY AND DEPRESSION: ICD-10-CM

## 2025-03-06 DIAGNOSIS — N39.46 MIXED STRESS AND URGE URINARY INCONTINENCE: Primary | ICD-10-CM

## 2025-03-06 LAB
BILIRUB BLD-MCNC: NEGATIVE MG/DL
CLARITY, POC: CLEAR
COLOR UR: YELLOW
EXPIRATION DATE: ABNORMAL
GLUCOSE UR STRIP-MCNC: NEGATIVE MG/DL
KETONES UR QL: NEGATIVE
LEUKOCYTE EST, POC: ABNORMAL
Lab: ABNORMAL
NITRITE UR-MCNC: NEGATIVE MG/ML
PH UR: 6 [PH] (ref 5–8)
PROT UR STRIP-MCNC: NEGATIVE MG/DL
RBC # UR STRIP: NEGATIVE /UL
SP GR UR: 1.03 (ref 1–1.03)
UROBILINOGEN UR QL: NORMAL

## 2025-03-06 PROCEDURE — 99214 OFFICE O/P EST MOD 30 MIN: CPT | Performed by: NURSE PRACTITIONER

## 2025-03-06 RX ORDER — DULOXETIN HYDROCHLORIDE 30 MG/1
30 CAPSULE, DELAYED RELEASE ORAL DAILY
Qty: 90 CAPSULE | Refills: 0 | Status: SHIPPED | OUTPATIENT
Start: 2025-03-06

## 2025-03-06 RX ORDER — PHENTERMINE HYDROCHLORIDE 37.5 MG/1
18.25 TABLET ORAL DAILY
Qty: 30 TABLET | Refills: 0 | Status: SHIPPED | OUTPATIENT
Start: 2025-03-06

## 2025-03-06 NOTE — ASSESSMENT & PLAN NOTE
Patient's depression is a recurrent episode that is moderate without psychosis. Depression is in partial remission and stable.    Plan:   Continue current medication therapy   Consider therapist.     Followup in 3 months.

## 2025-03-06 NOTE — ASSESSMENT & PLAN NOTE
In remission. History of class 2 obesity.   Patient's (Body mass index is 23.48 kg/m².) is now normal.   % fat above normal at 33.0%. Worsening since stopping wegovy due to cost and her GI disease is flared up at this time which is likely contributing.  Continue low calorie, low carb based diet program, portion control, increasing exercise, management of depression/anxiety/stress to control compensatory eating, and pharmacologic options including phentermine, naltrexone . Has saxenda and wegovy from previous prescriptions if needed but will hold off at this time due to GI symptoms. Has been off of GLP-1 for at least 3 months.   Obesity is a chronic disease that often requires long term use of anti-obesity medication. Wegovy was dropped by her insurance and is no longer affordable. Patient does meet criteria for use of anorectics at this time as body fat percentage >30% and is not at treatment goal.   Take naltrexone in the morning, taking the whole tablet will prolong the duration. Increase phentermine to daily. Discussed that if her appetite becomes too suppressed and she is unable to get at least 900 calories and 60g of protein she will need to decrease meds.

## 2025-03-06 NOTE — PROGRESS NOTES
[facility]  [address]  [place] [address]     Office Note      Date: 2025  Patient Name: Renae Bolton  MRN: [id number]  : 1975  Subjective  Subjective     Chief Complaint  Obesity Management follow-up          Renae Bolton presents to [facility] for obesity management. s/p LSG/HHR 19 w/ Dr. Onofre.  Pre-surgery weight: 232.5, gonzalez 131lb (24), final goal 150lb  Patient is unsure with weight loss progress. Appetite is poorly controlled. Frequently missing morning dose of naltrexone because she takes her evening dose late and doesn't want to take the morning dose too close to it. We reviewed that it would not cause problems taking the doses too close but it is best to keep the timing consistent. Reports no side effects of prescribed medications today. The patient is taking multivitamin and is taking fish oil.  The patient is not using a food journal.      She continues to have GI upset and had to episodes last week with severe abdominal cramping, nausea, sweating. GI did not have any availability to see her for several months, she was able to get scheduled with her GI surgeon in 2 weeks. History of IBS-C with bloating, cramping.      She does not do intentional exercise but does get lots of steps in at work. She typically does better but since she has been sick she is struggling to get protein.  She does try to make mindful choices when eating.    24 hour recall:  Breakfast: none- she was sick  Lunch: 1/2 turkey sandwich on low calorie wheat bread, 1 tangerine  Dinner: grilled cheese on low wheat bread, chicken noodle soup  Snack: after Sabianism- 2 chicken strips, few fries  Water Intake: 32 oz  Other beverages: orange juice or diet soda- occasionaly  Alcohol: none    The patient is exercising with a FITT score of:    Frequency Intensity Time Strength Training   []   0, none []   0 []   0 [x]   0    []   1 (1-2x/week) [x]   1 (light) []   1 (<10 min) []   1 (1x/week)   [x]   2 (3-5x/week) []   2 (moderate) []   2 (10-20 min) []   2 (2x/week)   []   3 (daily) []   3 (moderately hard)  []   4 (very hard) []   3 (20-30 min)  [x]   4 (>30 min) []   3 (3-4x/week)             Review of Systems   Constitutional:  Negative for appetite change and fatigue.   Eyes:  Negative for visual disturbance.   Cardiovascular:  Negative for chest pain and palpitations.   Gastrointestinal:  Negative for constipation and indigestion.   Neurological:  Negative for light-headedness.   All other systems reviewed and are negative.        Current Outpatient Medications:     acyclovir (ZOVIRAX) 5 % ointment, Apply  topically to the appropriate area as directed 4 (Four) Times a Day. (Patient taking differently: Apply  topically to the appropriate area as directed As Needed.), Disp: 30 g, Rfl: 6    amitriptyline (ELAVIL) 10 MG tablet, Take 1 tablet by mouth Every Evening., Disp: 90 tablet, Rfl: 3    Atogepant (Qulipta) 60 MG tablet, Take 1 tablet by mouth Daily., Disp: 30 tablet, Rfl: 11    B Complex Vitamins (vitamin b complex) capsule capsule, Take 1 capsule by mouth Daily., Disp: , Rfl:     Biotin (Biotin 5000) 5 MG capsule, Take 1 capsule by mouth Daily., Disp: , Rfl:     cetirizine (zyrTEC) 10 MG tablet, Take 1 tablet by mouth Daily., Disp: 90 tablet, Rfl: 3    Cholecalciferol (Vitamin D3) 250 MCG (79923 UT) tablet, Take 10,000 Units by mouth Daily., Disp: , Rfl:     cyclobenzaprine (FLEXERIL) 5 MG tablet, Take 1 tablet by mouth 3 (Three) Times a Day As Needed for Muscle spasms., Disp: 90 tablet, Rfl: 3    dexlansoprazole (DEXILANT) 60 MG capsule, Take 1 capsule by mouth Daily., Disp: 90 capsule, Rfl: 1    DULoxetine (CYMBALTA) 30 MG capsule, Take 1 capsule by mouth Daily., Disp: 90 capsule, Rfl: 0    estradiol (ESTRACE VAGINAL) 0.1 MG/GM vaginal cream, May use 1 gram intravaginal once daily at bedtime for 2 weeks, then 1 gram 2 (Two)  Times Week., Disp: 42.5 g, Rfl: 11    estradiol (VAGIFEM) 10 MCG tablet vaginal tablet, Insert 1 tablet into the vagina 2 (Two) Times a Week., Disp: 8 tablet, Rfl: 11    famciclovir (FAMVIR) 250 MG tablet, Take 1 tablet by mouth 3 (Three) Times a Day. (Patient taking differently: Take 1 tablet by mouth 2 (Two) Times a Day.), Disp: 270 tablet, Rfl: 4    famotidine (PEPCID) 20 MG tablet, Take 1 tablet by mouth As Needed., Disp: , Rfl:     ibuprofen (ADVIL,MOTRIN) 600 MG tablet, Take 1 tablet  by mouth 3 (three) times daily as needed for pain, Disp: 90 tablet, Rfl: 0    MAGNESIUM CITRATE PO, Take 270 mg by mouth Daily. *magnesium citrimate+B6 for migraines, Disp: , Rfl:     naltrexone (DEPADE) 50 MG tablet, Take 0.5 tablet by mouth 2 (Two) Times a Day., Disp: 30 tablet, Rfl: 2    NON FORMULARY, Multivitamin patch daily, Disp: , Rfl:     Omega-3 Fatty Acids (fish oil) 1000 MG capsule capsule, Take 1 capsule by mouth 2 (Two) Times a Day With Meals., Disp: , Rfl:     ondansetron ODT (ZOFRAN-ODT) 4 MG disintegrating tablet, Take 1 tablet (4 mg total) by mouth every 8 (eight) hours as needed for nausea or vomiting., Disp: 60 tablet, Rfl: 2    phentermine (ADIPEX-P) 37.5 MG tablet, Take 0.5 tablets by mouth Daily., Disp: 30 tablet, Rfl: 0    Probiotic Product (PROBIOTIC PO), Take 1 capsule by mouth Daily., Disp: , Rfl:     promethazine (PHENERGAN) 25 MG tablet, Take 1 tablet by mouth Every 6 (Six) Hours As Needed. (Patient taking differently: Take 1 tablet by mouth Every 6 (Six) Hours As Needed for Nausea.), Disp: 60 tablet, Rfl: 1    propranolol LA (INDERAL LA) 80 MG 24 hr capsule, Take 1 capsule by mouth Daily for 90 days., Disp: 90 capsule, Rfl: 3    SUMAtriptan-naproxen (TREXIMET)  MG per tablet, Take 1 tablet by mouth 1 (One) Time As Needed for Migraine. May repeat dose one time in 2 hours if headache not relieved., Disp: 9 tablet, Rfl: 11    Vitamin E 400 units tablet, Take 400 Units by mouth Daily., Disp: , Rfl:      Current Facility-Administered Medications:     cyanocobalamin injection 1,000 mcg, 1,000 mcg, Intramuscular, Q28 Days, Bindu Lechuga, APRN, 1,000 mcg at 01/21/25 1345    Objective   Start weight: 193 pounds.    Total weight loss: -56.2 pounds/-29.1%  Change in weight since last visit: +6.6lb    Body mass index is 23.48 kg/m².   Body composition analysis completed and showed:   Body Fat %: 33    Measurements (in inches)  Waist Circumference: 38    Vital Signs:   /76 (BP Location: Left arm, Patient Position: Sitting)   Pulse 78   Wt 62.1 kg (136 lb 12.8 oz)   BMI 23.48 kg/m²     No LMP recorded. Patient has had a hysterectomy.    Physical Exam   General appears stated age and normal appearance   HEENT PERRLA, EOM intact, and conjunctivae normal   Chest/lungs Normal rate, Regular rhythm, and Breathing is unlabored   Extremities without edema   Neuro Good historian and No focal deficit   Skin Warm, dry, intact   Psych normal behavior, normal thought content, and normal concentration     Result Review :                Assessment / Plan        Diagnoses and all orders for this visit:    1. Obesity with serious comorbidity, unspecified class, unspecified obesity type (Primary)  Overview:  Start: 3/31/2021 193lb, goal 155lb  S/p LSG 12/19/19 presurgery weight: 232.5   gonzalez 131lb, BMI 22.49 (07/02/2024) on wegovy 1.7mg    AOM hx:  trokendi: worked well for migraines- no weight loss  Saxenda worked well, changed to wegovy due to plateau   Phentermine and naltrexone help with cravings    Comorbids: MAREN, hyperlipidemia, GERD, back pain, anxiety and depression, metabolic syndrome    Assessment & Plan:  In remission. History of class 2 obesity.   Patient's (Body mass index is 23.48 kg/m².) is now normal.   % fat above normal at 33.0%. Worsening since stopping wegovy due to cost and her GI disease is flared up at this time which is likely contributing.  Continue low calorie, low carb based diet program, portion  control, increasing exercise, management of depression/anxiety/stress to control compensatory eating, and pharmacologic options including phentermine, naltrexone . Has saxenda and wegovy from previous prescriptions if needed but will hold off at this time due to GI symptoms. Has been off of GLP-1 for at least 3 months.   Obesity is a chronic disease that often requires long term use of anti-obesity medication. Wegovy was dropped by her insurance and is no longer affordable. Patient does meet criteria for use of anorectics at this time as body fat percentage >30% and is not at treatment goal.   Take naltrexone in the morning, taking the whole tablet will prolong the duration. Increase phentermine to daily. Discussed that if her appetite becomes too suppressed and she is unable to get at least 900 calories and 60g of protein she will need to decrease meds.        Orders:  -     phentermine (ADIPEX-P) 37.5 MG tablet; Take 0.5 tablets by mouth Daily.  Dispense: 30 tablet; Refill: 0    2. Anxiety and depression  Overview:  Prozac: increased depression when taking with cymbalta more recently  took zoloft many years, anxiety started to worsen  cymbalta for 20 years      Assessment & Plan:  Patient's depression is a recurrent episode that is moderate without psychosis. Depression is in partial remission and stable.    Plan:   Continue current medication therapy   Consider therapist.     Followup in 3 months.     Orders:  -     DULoxetine (CYMBALTA) 30 MG capsule; Take 1 capsule by mouth Daily.  Dispense: 90 capsule; Refill: 0          Follow Up   Return in about 1 month (around 4/6/2025) for Next scheduled follow up.  Patient was given instructions and counseling regarding her condition or for health maintenance advice. Please see specific information pulled into the AVS if appropriate.     Bindu Lechuga, LAURITA  03/06/2025

## 2025-03-06 NOTE — PROGRESS NOTES
Office Visit     Patient Name: Renae Bolton  : 1975   MRN: [id number]     Patient or patient representative verbalized consent for the use of Ambient Listening during the visit with  LAURITA Pantoja for chart documentation. 3/6/2025  11:58 EST    Chief Complaint:   Chief Complaint   Patient presents with    Follow-up     Mixed stress and urge urinary incontinence         Referring Provider: No ref. provider found    Primary Care Provider: Renae Beard MD     History of Present Illness  The patient is a 49-year-old female here for a follow-up 2 weeks post-Bulkamid procedure.    Urinary Leakage  - Reports satisfactory symptom management but had one episode of urinary leakage after blowing her nose during a cold.  - No leakage has been observed recently, even with actions like coughing or sneezing.  - Uses pads, which are typically dry.  - Urinary frequency is every couple of hours, with occasional nocturia, consistent with her pre-procedure state.  - This is her second bulking agent experience, the first provided relief for nearly 5 years.    Post-Botox Treatment  - Mentions an initial increase in urgency post-Botox treatment, with one or two instances of leakage, possibly due to an overfilled bladder.    Irritable Bowel Syndrome (IBS)  - Has been experiencing a flare-up of irritable bowel syndrome (IBS) for the past few days.      Subjective   Review of System:   As noted in HPI.    Past Medical History:   Diagnosis Date    Allergic rhinitis approx 10 years ago    Anxiety     Arrhythmia     SVT w/ PVCs, on propranolol, follows w/ Dr. Liang    BRCA1 negative     BRCA2 negative     Chronic venous insufficiency     per Dr. Liang note 22    Cluster headache     Depression     Diabetes mellitus     Gestational    Dyspepsia     Dyspnea on exertion     resolved after weight loss/ gastric sleeve    Endometriosis     Fatigue     Generalized edema     r/t weight gain     Genital HSV     Famvir for prophylaxis    GERD (gastroesophageal reflux disease)     Gestational diabetes 2001    Hard to intubate     states was told this after her hysterectomy.  states procedures following gastric sleeve have been without issues    Head injury     concussion with post concussion syndrome    Headache, tension-type     Hyperemesis gravidarum     Hyperlipidemia 09/21/2021    Hypertension     resloved after gastric sleeve/weight loss    Interstitial cystitis     Kidney stones     Migraine     occasional    Mitral valve prolapse     pt denies/unaware; mitral valve regurge per Dr. Liang note 8/17/22    Normal vaginal Papanicolaou smear in patient with history of hysterectomy 09/16/2016    Peripheral edema     PONV (postoperative nausea and vomiting)     happens frequently - scopalamine patch works well    Post concussion syndrome     s/p fall w/ frontal lobe head injury 9/2017, previously on Elavil    Sleep apnea     resloved after gastric sleeve/weight loss    Stress incontinence     follows w/ Dr. Galvan, on Myrbetriq, s/p cystoscopy w/ bulking agent    SVT (supraventricular tachycardia)     followed by Dr. Liang - see note 8/17/22 scanned into media    Weight loss counseling, encounter for 07/02/2024     Past Surgical History:   Procedure Laterality Date    ANTERIOR AND POSTERIOR VAGINAL REPAIR  06/23/2010    DR EDIN BYRD    BLADDER SURGERY      A&P Repair, Bulking Procedure    BREAST BIOPSY  [id number]    Left    BREAST BIOPSY Left     2022    CATARACT EXTRACTION  Congenitial Cataract Removed January 2023    New lens both eyes    CYSTOSCOPY      CYSTOSCOPY BOTOX INJECTION OF BLADDER N/A 2/20/2025    Procedure: CYSTOSCOPY BOTOX INJECTION OF BLADDER;  Surgeon: Jose Galvan MD;  Location: [facility];  Service: Urology;  Laterality: N/A;    CYSTOSCOPY W/ BULKING AGENT INJECTION N/A 06/25/2019    Procedure: CYSTOSCOPY WITH URETHRAL  BULKING AGENT INJECTION;  Surgeon: Jose Galvan MD;   Location: [facility] OR;  Service: Urology    CYSTOSCOPY W/ BULKING AGENT INJECTION N/A 2/20/2025    Procedure: Cystoscopy with bulking agent injection;  Surgeon: Jose Galvan MD;  Location: [facility] OR;  Service: Urology;  Laterality: N/A;    CYSTOSCOPY, DIMETHYL SULFOXIDE COCKTAIL  03/14/2011    WITH HYDRODISTENTION (DR AVINASH MADDOX)    ENDOSCOPY N/A 12/17/2019    Procedure: ESOPHAGOGASTRODUODENOSCOPY;  Surgeon: Tracee Onofre MD;  Location: [facility] OR;  Service: Bariatric    ENDOSCOPY N/A 10/20/2020    Procedure: ESOPHAGOGASTRODUODENOSCOPY WITH BIOPSY;  Surgeon: Tracee Onofre MD;  Location: [facility] ENDOSCOPY;  Service: General;  Laterality: N/A;    GASTRIC SLEEVE LAPAROSCOPIC N/A 12/17/2019    Procedure: GASTRIC SLEEVE LAPAROSCOPIC;  Surgeon: Tracee Onofre MD;  Location: [facility] OR;  Service: Bariatric    HIATAL HERNIA REPAIR N/A 12/17/2019    Procedure: HIATAL HERNIA REPAIR LAPAROSCOPIC;  Surgeon: Tracee Onofre MD;  Location: [facility] OR;  Service: Bariatric    HYSTERECTOMY  06/23/2010    LAVH (DR EDIN BYRD) Ovaries remain    LAPAROSCOPIC CHOLECYSTECTOMY  2013    for stones    TRANSVAGINAL TAPING SUSPENSION WITH OBTURATOR  06/23/2010    DR EDIN BYRD    VAGINAL HYSTERECTOMY      VAGINAL PROLAPSE REPAIR      WISDOM TOOTH EXTRACTION  1990     Family History   Problem Relation Age of Onset    Hypertension Mother     Anesthesia problems Mother     Diabetes Father         Type II    Hypertension Father     Breast cancer Sister 34    Hypertension Maternal Grandmother     Alzheimer's disease Maternal Grandmother     Breast cancer Maternal Grandmother     Heart disease Maternal Grandmother     Arthritis Maternal Grandmother     Cancer Maternal Grandmother         Breast    Depression Maternal Grandmother     Hyperlipidemia Maternal Grandmother     Breast cancer Maternal Aunt     Breast cancer Paternal Aunt     Stroke Maternal Grandfather     Skin cancer Maternal Grandfather      Hypertension Maternal Grandfather     Arthritis Maternal Grandfather     Cancer Maternal Grandfather         Lipoma    Depression Maternal Grandfather     Hearing loss Maternal Grandfather     Hyperlipidemia Maternal Grandfather     Diabetes Paternal Grandfather     Hypertension Paternal Grandfather     Hyperlipidemia Paternal Grandfather     Hyperlipidemia Other     Gout Other     Migraines Other     Ovarian cancer Neg Hx      Social History     Socioeconomic History    Marital status:    Tobacco Use    Smoking status: Never     Passive exposure: Never    Smokeless tobacco: Never   Vaping Use    Vaping status: Never Used   Substance and Sexual Activity    Alcohol use: No    Drug use: No    Sexual activity: Yes     Partners: Male     Birth control/protection: Vasectomy, Hysterectomy, Surgical       Current Outpatient Medications:     acyclovir (ZOVIRAX) 5 % ointment, Apply  topically to the appropriate area as directed 4 (Four) Times a Day. (Patient taking differently: Apply  topically to the appropriate area as directed As Needed.), Disp: 30 g, Rfl: 6    amitriptyline (ELAVIL) 10 MG tablet, Take 1 tablet by mouth Every Evening., Disp: 90 tablet, Rfl: 3    Atogepant (Qulipta) 60 MG tablet, Take 1 tablet by mouth Daily., Disp: 30 tablet, Rfl: 11    B Complex Vitamins (vitamin b complex) capsule capsule, Take 1 capsule by mouth Daily., Disp: , Rfl:     Biotin (Biotin 5000) 5 MG capsule, Take 1 capsule by mouth Daily., Disp: , Rfl:     cetirizine (zyrTEC) 10 MG tablet, Take 1 tablet by mouth Daily., Disp: 90 tablet, Rfl: 3    Cholecalciferol (Vitamin D3) 250 MCG (61420 UT) tablet, Take 10,000 Units by mouth Daily., Disp: , Rfl:     cyclobenzaprine (FLEXERIL) 5 MG tablet, Take 1 tablet by mouth 3 (Three) Times a Day As Needed for Muscle spasms., Disp: 90 tablet, Rfl: 3    dexlansoprazole (DEXILANT) 60 MG capsule, Take 1 capsule by mouth Daily., Disp: 90 capsule, Rfl: 1    DULoxetine (CYMBALTA) 30 MG capsule, Take  1 capsule by mouth Daily., Disp: 90 capsule, Rfl: 0    estradiol (ESTRACE VAGINAL) 0.1 MG/GM vaginal cream, May use 1 gram intravaginal once daily at bedtime for 2 weeks, then 1 gram 2 (Two) Times Week., Disp: 42.5 g, Rfl: 11    estradiol (VAGIFEM) 10 MCG tablet vaginal tablet, Insert 1 tablet into the vagina 2 (Two) Times a Week., Disp: 8 tablet, Rfl: 11    famciclovir (FAMVIR) 250 MG tablet, Take 1 tablet by mouth 3 (Three) Times a Day. (Patient taking differently: Take 1 tablet by mouth 2 (Two) Times a Day.), Disp: 270 tablet, Rfl: 4    famotidine (PEPCID) 20 MG tablet, Take 1 tablet by mouth As Needed., Disp: , Rfl:     ibuprofen (ADVIL,MOTRIN) 600 MG tablet, Take 1 tablet  by mouth 3 (three) times daily as needed for pain, Disp: 90 tablet, Rfl: 0    MAGNESIUM CITRATE PO, Take 270 mg by mouth Daily. *magnesium citrimate+B6 for migraines, Disp: , Rfl:     naltrexone (DEPADE) 50 MG tablet, Take 0.5 tablet by mouth 2 (Two) Times a Day., Disp: 30 tablet, Rfl: 2    NON FORMULARY, Multivitamin patch daily, Disp: , Rfl:     Omega-3 Fatty Acids (fish oil) 1000 MG capsule capsule, Take 1 capsule by mouth 2 (Two) Times a Day With Meals., Disp: , Rfl:     ondansetron ODT (ZOFRAN-ODT) 4 MG disintegrating tablet, Take 1 tablet (4 mg total) by mouth every 8 (eight) hours as needed for nausea or vomiting., Disp: 60 tablet, Rfl: 2    phentermine (ADIPEX-P) 37.5 MG tablet, Take 0.5 tablets by mouth Daily., Disp: 30 tablet, Rfl: 0    Probiotic Product (PROBIOTIC PO), Take 1 capsule by mouth Daily., Disp: , Rfl:     promethazine (PHENERGAN) 25 MG tablet, Take 1 tablet by mouth Every 6 (Six) Hours As Needed. (Patient taking differently: Take 1 tablet by mouth Every 6 (Six) Hours As Needed for Nausea.), Disp: 60 tablet, Rfl: 1    propranolol LA (INDERAL LA) 80 MG 24 hr capsule, Take 1 capsule by mouth Daily for 90 days., Disp: 90 capsule, Rfl: 3    SUMAtriptan-naproxen (TREXIMET)  MG per tablet, Take 1 tablet by mouth 1 (One)  "Time As Needed for Migraine. 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  Never actually taken it internally.    Latex Anaphylaxis and Rash    Morphine Rash     Rash (if gets on skin).  Never taken it internally    Penicillins Shortness Of Breath and Palpitations     Keflex OK       Oxycodone Other (See Comments)     Dizziness, confusion       Objective   Visit Vitals  /74 (BP Location: Left arm, Patient Position: Sitting, Cuff Size: Adult)   Pulse 68   Temp 98.2 °F (36.8 °C) (Temporal)   Ht 162.6 cm (64.02\")   Wt 61.7 kg (136 lb)   SpO2 100%   BMI 23.33 kg/m²        Body mass index is 23.33 kg/m².     Physical Exam  Vitals and nursing note reviewed.   Constitutional:       General: She is not in acute distress.     Appearance: Normal appearance. She is normal weight. She is not ill-appearing.   Pulmonary:      Effort: Pulmonary effort is normal. No respiratory distress.   Skin:     General: Skin is warm and dry.   Neurological:      General: No focal deficit present.      Mental Status: She is alert and oriented to person, place, and time.   Psychiatric:         Mood and Affect: Mood normal.         Behavior: Behavior normal.         Labs  Lab Results   Component Value Date    COLORU Yellow 03/06/2025    CLARITYU Clear 03/06/2025    SPECGRAV 1.030 03/06/2025    PHUR 6.0 03/06/2025    LEUKOCYTESUR Trace (A) 03/06/2025    NITRITE Negative 03/06/2025    PROTEINPOCUA Negative " "03/06/2025    GLUCOSEUR Negative 03/06/2025    KETONESU Negative 03/06/2025    UROBILINOGEN Normal 03/06/2025    BILIRUBINUR Negative 03/06/2025    RBCUR Negative 03/06/2025      Lab Results   Component Value Date    RBCUA 0-3 12/24/2014        Lab Results   Component Value Date    WBC 6.02 02/03/2025    HGB 14.5 02/03/2025    HCT 41.7 02/03/2025    MCV 92.3 02/03/2025     02/03/2025     Lab Results   Component Value Date    GLUCOSE 90 02/03/2025    CALCIUM 9.7 02/03/2025     02/03/2025    K 4.1 02/03/2025    CO2 28.0 02/03/2025     02/03/2025    BUN 15 02/03/2025    BUN 13 03/18/2024    CREATININE 0.89 02/03/2025    CREATININE 1.04 (H) 03/18/2024    EGFR 78.6 02/03/2025    EGFR 65.6 03/18/2024    BCR 16.9 02/03/2025    ANIONGAP 10.0 02/03/2025    ALT 17 02/03/2025    AST 19 02/03/2025     Lab Results   Component Value Date    HGBA1C 5.20 02/03/2025     No results found for: \"URICACIDSTN\", \"CBHR2FHKSBE\", \"LFKA1BFNSP\", \"LABMAGN\"  Lab Results   Component Value Date    TWCP69DL 80.0 02/03/2025     Lab Results   Component Value Date    LABPH 5.4 01/21/2025       No results found for: \"ATOPOBIUMV\", \"BVAB2\", \"MEGASPHAER\", \"CALBICANSN\", \"CGLABRATAN\", \"CPARAPSILOS\", \"CLUSITANIAE\", \"CKRUSEI\", \"TRICHVAG\", \"CHLAMNAA\", \"NGONORRHON\", \"UREAPLASMA\", \"MYCOPLASMAG\"    Lab Results   Component Value Date    FERRITIN 90.96 02/03/2025    FSH 7.7 08/31/2015    TSH 1.220 02/03/2025    FREET4 1.50 02/03/2025    ITJNKHCK01 1,063 (H) 02/03/2025    TETU15NW 80.0 02/03/2025    TESTFRE 0.7 08/31/2015       I have reviewed the above labs.     PVR  Post-void residual performed with ultrasound by staff and interpreted by me - 0 mL    Assessment / Plan      Diagnoses and all orders for this visit:    1. Mixed stress and urge urinary incontinence (Primary)  -     POC Urinalysis Dipstick, Automated       Assessment & Plan  1. Post-Bulkamid and Bladder Botox procedure follow-up: Stable.  - Bladder effectively emptying  - Stress " incontinence mild episode with hard sneeze  - Maintain regular bladder emptying every 2-3 hours to prevent incontinence.  - Increase water intake to 64 ounces daily.  - Cautioned against caffeine and other irritants.  - Schedule cystoscopy with Botox injection with Dr. Galvan in 6 months.  - Return for consultation if issues arise before then.    2. Urge incontinence: Stable.  - Reported occasional urgency and rare leakage post-Botox treatment.  - Explained that overfull bladder might cause leakage even with surgical interventions.  - Monitor symptoms and ensure adequate fluid intake.  - If satisfied with Botox results, schedule another treatment in 6 months.    Follow-up  - Schedule cystoscopy with Botox injection with Dr. Galvan in 6 months.    PROCEDURE  The patient underwent a Bulkamid and Botox procedure 2 weeks ago.       Return in about 6 months (around 9/6/2025) for Cole Cystoscopy with Botox.    Wilfredo Gotti, MSN, APRN, FNP-C  [facility]

## 2025-03-07 NOTE — H&P (VIEW-ONLY)
Subjective   Renae Bolton is a 49 y.o. female.   Chief Complaint   Patient presents with    Abdominal Pain        History of Present Illness The patient is in the office today for a follow up visit for abdominal pain and irritable bowel syndrome. The patient had an EGD and gastric sleeve on 10/24/2019. She also had an EGD on 07/21/2020. The patient states in the last 3 months she has been having some intense abdominal pain .  EGD and colonoscopy have previously been recommended for investigation of these issues, and have been scheduled.  However, , the procedures have been delayed/rescheduled multiple times by the patient.  Patient reports that she is now ready to proceed..    The following portions of the patient's history were reviewed and updated as appropriate: allergies, current medications, past family history, past medical history, past social history, past surgical history, and problem list.      Patient Active Problem List   Diagnosis    Stress incontinence    Fatigue    Dyspepsia    Dyspnea on exertion    Hard to intubate    Nausea    Sleep apnea    Migraines    Interstitial cystitis    GERD (gastroesophageal reflux disease)    Anxiety and depression    Anxiety    Post concussion syndrome    Genital HSV    Arrhythmia    Generalized edema    Aspirin long-term use    Obesity    Back pain    Metabolic syndrome    Seasonal allergies    Primary insomnia    Hyperlipidemia    Allergic rhinitis    Generalized abdominal pain    History of sleeve gastrectomy    Vitamin D deficiency    Constipation    Weight loss counseling, encounter for    Mixed stress and urge urinary incontinence       Past Medical History:   Diagnosis Date    Allergic rhinitis approx 10 years ago    Anxiety     Arrhythmia     SVT w/ PVCs, on propranolol, follows w/ Dr. Liang    BRCA1 negative     BRCA2 negative     Cataract Congenital    Removed January 2023    Cholelithiasis removal 2008?    Chronic venous insufficiency     per Dr. Liang  note 8/17/22    Cluster headache     Depression     Diabetes mellitus 2001    Gestational    Dyspepsia     Dyspnea on exertion     resolved after weight loss/ gastric sleeve    Endometriosis     Fatigue     Fibrocystic breast 2005    Fibroid     Generalized edema     r/t weight gain    Genital HSV     Famvir for prophylaxis    GERD (gastroesophageal reflux disease)     Gestational diabetes 2001    Hard to intubate     states was told this after her hysterectomy.  states procedures following gastric sleeve have been without issues    Head injury     concussion with post concussion syndrome    Headache, tension-type     Hyperemesis gravidarum     Hyperlipidemia 09/21/2021    Hypertension     resloved after gastric sleeve/weight loss    Interstitial cystitis     Kidney stones     Migraine     occasional    Mitral valve prolapse     pt denies/unaware; mitral valve regurge per Dr. Liang note 8/17/22    Normal vaginal Papanicolaou smear in patient with history of hysterectomy 09/16/2016    Peripheral edema     PONV (postoperative nausea and vomiting)     happens frequently - scopalamine patch works well    Post concussion syndrome     s/p fall w/ frontal lobe head injury 9/2017, previously on Elavil    Sleep apnea     resloved after gastric sleeve/weight loss    Sleep apnea, obstructive     Stress incontinence     follows w/ Dr. Galvan, on Myrbetriq, s/p cystoscopy w/ bulking agent    SVT (supraventricular tachycardia)     followed by Dr. Liang - see note 8/17/22 scanned into media    Weight loss counseling, encounter for 07/02/2024       Past Surgical History:   Procedure Laterality Date    ANTERIOR AND POSTERIOR VAGINAL REPAIR  06/23/2010    DR EDIN BYRD    BLADDER SURGERY      A&P Repair, Bulking Procedure    BREAST BIOPSY  [id number]    Left    BREAST BIOPSY Left     2022    CATARACT EXTRACTION  Congenitial Cataract Removed January 2023    New lens both eyes    CYSTOSCOPY      CYSTOSCOPY BOTOX INJECTION OF BLADDER N/A  02/20/2025    Procedure: CYSTOSCOPY BOTOX INJECTION OF BLADDER;  Surgeon: Jose Galvan MD;  Location: [facility] OR;  Service: Urology;  Laterality: N/A;    CYSTOSCOPY W/ BULKING AGENT INJECTION N/A 06/25/2019    Procedure: CYSTOSCOPY WITH URETHRAL  BULKING AGENT INJECTION;  Surgeon: Jose Galvan MD;  Location: [facility] OR;  Service: Urology    CYSTOSCOPY W/ BULKING AGENT INJECTION N/A 02/20/2025    Procedure: Cystoscopy with bulking agent injection;  Surgeon: Jose Galvan MD;  Location: [facility] OR;  Service: Urology;  Laterality: N/A;    CYSTOSCOPY, DIMETHYL SULFOXIDE COCKTAIL  03/14/2011    WITH HYDRODISTENTION (DR AVINASH MADDOX)    ENDOSCOPY N/A 12/17/2019    Procedure: ESOPHAGOGASTRODUODENOSCOPY;  Surgeon: Tracee Onofre MD;  Location: [facility] OR;  Service: Bariatric    ENDOSCOPY N/A 10/20/2020    Procedure: ESOPHAGOGASTRODUODENOSCOPY WITH BIOPSY;  Surgeon: Tracee Onofre MD;  Location: [facility] ENDOSCOPY;  Service: General;  Laterality: N/A;    EYE SURGERY  2023    congenital cataract removal; polyvue lenses    GASTRIC SLEEVE LAPAROSCOPIC N/A 12/17/2019    Procedure: GASTRIC SLEEVE LAPAROSCOPIC;  Surgeon: Tracee Onofre MD;  Location: [facility] OR;  Service: Bariatric    HIATAL HERNIA REPAIR N/A 12/17/2019    Procedure: HIATAL HERNIA REPAIR LAPAROSCOPIC;  Surgeon: Tracee Onofre MD;  Location: [facility] OR;  Service: Bariatric    HYSTERECTOMY  06/23/2010    LAVH (DR EDIN BYRD) Ovaries remain    LAPAROSCOPIC CHOLECYSTECTOMY  2013    for stones    TRANSVAGINAL TAPING SUSPENSION WITH OBTURATOR  06/23/2010    DR EDIN BYRD    VAGINAL HYSTERECTOMY      VAGINAL PROLAPSE REPAIR      WISDOM TOOTH EXTRACTION  1990       Medications:     Current Outpatient Medications:     amitriptyline (ELAVIL) 10 MG tablet, Take 1 tablet by mouth Every Evening., Disp: 90 tablet, Rfl: 3    Atogepant (Qulipta) 60 MG tablet, Take 1 tablet by mouth Daily., Disp: 30 tablet, Rfl: 11    B Complex  Vitamins (vitamin b complex) capsule capsule, Take 1 capsule by mouth Daily., Disp: , Rfl:     Biotin (Biotin 5000) 5 MG capsule, Take 1 capsule by mouth Daily., Disp: , Rfl:     cetirizine (zyrTEC) 10 MG tablet, Take 1 tablet by mouth Daily., Disp: 90 tablet, Rfl: 3    Cholecalciferol (Vitamin D3) 250 MCG (61715 UT) tablet, Take 10,000 Units by mouth Daily., Disp: , Rfl:     cyclobenzaprine (FLEXERIL) 5 MG tablet, Take 1 tablet by mouth 3 (Three) Times a Day As Needed for Muscle spasms., Disp: 90 tablet, Rfl: 3    dexlansoprazole (DEXILANT) 60 MG capsule, Take 1 capsule by mouth Daily., Disp: 90 capsule, Rfl: 1    DULoxetine (CYMBALTA) 30 MG capsule, Take 1 capsule by mouth Daily., Disp: 90 capsule, Rfl: 0    estradiol (ESTRACE VAGINAL) 0.1 MG/GM vaginal cream, May use 1 gram intravaginal once daily at bedtime for 2 weeks, then 1 gram 2 (Two) Times Week., Disp: 42.5 g, Rfl: 11    estradiol (VAGIFEM) 10 MCG tablet vaginal tablet, Insert 1 tablet into the vagina 2 (Two) Times a Week., Disp: 8 tablet, Rfl: 11    famciclovir (FAMVIR) 250 MG tablet, Take 1 tablet by mouth 3 (Three) Times a Day. (Patient taking differently: Take 1 tablet by mouth 2 (Two) Times a Day.), Disp: 270 tablet, Rfl: 4    famotidine (PEPCID) 20 MG tablet, Take 1 tablet by mouth As Needed., Disp: , Rfl:     ibuprofen (ADVIL,MOTRIN) 600 MG tablet, Take 1 tablet  by mouth 3 (three) times daily as needed for pain, Disp: 90 tablet, Rfl: 0    MAGNESIUM CITRATE PO, Take 270 mg by mouth Daily. *magnesium citrimate+B6 for migraines, Disp: , Rfl:     naltrexone (DEPADE) 50 MG tablet, Take 0.5 tablet by mouth 2 (Two) Times a Day., Disp: 30 tablet, Rfl: 2    NON FORMULARY, Multivitamin patch daily, Disp: , Rfl:     Omega-3 Fatty Acids (fish oil) 1000 MG capsule capsule, Take 1 capsule by mouth 2 (Two) Times a Day With Meals., Disp: , Rfl:     ondansetron ODT (ZOFRAN-ODT) 4 MG disintegrating tablet, Take 1 tablet (4 mg total) by mouth every 8 (eight) hours as  needed for nausea or vomiting., Disp: 60 tablet, Rfl: 2    phentermine (ADIPEX-P) 37.5 MG tablet, Take 0.5 tablets by mouth Daily., Disp: 30 tablet, Rfl: 0    Probiotic Product (PROBIOTIC PO), Take 1 capsule by mouth Daily., Disp: , Rfl:     promethazine (PHENERGAN) 25 MG tablet, Take 1 tablet by mouth Every 6 (Six) Hours As Needed. (Patient taking differently: Take 1 tablet by mouth Every 6 (Six) Hours As Needed for Nausea.), Disp: 60 tablet, Rfl: 1    propranolol LA (INDERAL LA) 80 MG 24 hr capsule, Take 1 capsule by mouth Daily for 90 days., Disp: 90 capsule, Rfl: 3    SUMAtriptan-naproxen (TREXIMET)  MG per tablet, Take 1 tablet by mouth 1 (One) Time As Needed for Migraine. May repeat dose one time in 2 hours if headache not relieved., Disp: 9 tablet, Rfl: 11    Vitamin E 400 units tablet, Take 400 Units by mouth Daily., Disp: , Rfl:     acyclovir (ZOVIRAX) 5 % ointment, Apply  topically to the appropriate area as directed 4 (Four) Times a Day. (Patient not taking: Reported on 3/18/2025), Disp: 30 g, Rfl: 6    Current Facility-Administered Medications:     cyanocobalamin injection 1,000 mcg, 1,000 mcg, Intramuscular, Q28 Days, Bindu Lechuga APRN, 1,000 mcg at 01/21/25 1345    Allergies:   Allergies   Allergen Reactions    Amoxicillin Shortness Of Breath and Palpitations     Keflex OK  Beta lactam allergy details  Antibiotic reaction: other  Dose to reaction time: unknown  Reason for antibiotic: other  Epinephrine required for reaction?: unknown  Tolerated antibiotics: other       Caffeine Arrhythmia    Demerol [Meperidine] Rash     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  Never actually taken it internally.    Latex Anaphylaxis and Rash    Morphine Rash     Rash (if gets on skin).  Never taken it internally    Penicillins Shortness Of Breath and Palpitations     Keflex OK       Oxycodone Other (See Comments)     Dizziness, confusion           Family  History   Problem Relation Age of Onset    Hypertension Mother     Anesthesia problems Mother     Hyperlipidemia Mother     Migraines Mother     Diabetes Father         Type II    Hypertension Father     Migraines Father     Breast cancer Sister 34    Miscarriages / Stillbirths Sister     Migraines Sister     Hypertension Maternal Grandmother     Alzheimer's disease Maternal Grandmother     Breast cancer Maternal Grandmother     Heart disease Maternal Grandmother     Arthritis Maternal Grandmother     Cancer Maternal Grandmother         Breast    Depression Maternal Grandmother     Hyperlipidemia Maternal Grandmother     Dementia Maternal Grandmother     Breast cancer Maternal Aunt     Breast cancer Paternal Aunt     Stroke Maternal Grandfather     Skin cancer Maternal Grandfather     Hypertension Maternal Grandfather     Arthritis Maternal Grandfather     Cancer Maternal Grandfather         Lipoma    Depression Maternal Grandfather     Hearing loss Maternal Grandfather     Hyperlipidemia Maternal Grandfather     Diabetes Paternal Grandfather     Hypertension Paternal Grandfather     Hyperlipidemia Paternal Grandfather     Hyperlipidemia Other     Gout Other     Migraines Other     Asthma Daughter     Miscarriages / Stillbirths Sister     Cancer Sister     Migraines Sister     Ovarian cancer Neg Hx        Social History     Socioeconomic History    Marital status:    Tobacco Use    Smoking status: Never     Passive exposure: Never    Smokeless tobacco: Never   Vaping Use    Vaping status: Never Used   Substance and Sexual Activity    Alcohol use: No    Drug use: No    Sexual activity: Yes     Partners: Male     Birth control/protection: Vasectomy, Hysterectomy, Surgical       Review of Systems   Constitutional:  Negative for chills, fever and unexpected weight loss.   HENT:  Negative for hearing loss, trouble swallowing and voice change.    Eyes:  Negative for visual disturbance.   Respiratory:  Negative for  "apnea, cough, chest tightness, shortness of breath and wheezing.    Cardiovascular:  Negative for chest pain, palpitations and leg swelling.   Gastrointestinal:  Positive for abdominal pain, constipation, nausea and vomiting. Negative for abdominal distention, anal bleeding, blood in stool, diarrhea, rectal pain, GERD and indigestion.   Endocrine: Negative for cold intolerance and heat intolerance.   Genitourinary:  Negative for difficulty urinating, dysuria and flank pain.   Musculoskeletal:  Negative for back pain and gait problem.   Skin:  Negative for color change, rash, skin lesions and wound.   Neurological:  Negative for dizziness, syncope, speech difficulty, weakness, light-headedness and numbness.   Hematological:  Negative for adenopathy. Does not bruise/bleed easily.   Psychiatric/Behavioral:  Negative for depressed mood. The patient is not nervous/anxious.    I have reviewed and confirmed the accuracy of the ROS as documented by the MA/LPN/RN Sylvie Palmer MD       Objective   /72   Pulse 77   Ht 162.6 cm (64.02\")   Wt 64.4 kg (142 lb)   SpO2 100%   BMI 24.36 kg/m²     Physical Exam  Constitutional:       Appearance: She is well-developed.   HENT:      Head: Normocephalic and atraumatic.   Eyes:      General: No scleral icterus.  Cardiovascular:      Rate and Rhythm: Regular rhythm.   Pulmonary:      Effort: Pulmonary effort is normal.   Abdominal:      General: There is no distension.      Palpations: Abdomen is soft.      Tenderness: There is no abdominal tenderness.   Musculoskeletal:      Cervical back: Neck supple.   Skin:     General: Skin is warm and dry.   Neurological:      Mental Status: She is alert and oriented to person, place, and time.   Psychiatric:         Behavior: Behavior normal.           Assessment & Plan   Diagnoses and all orders for this visit:    1. Nausea (Primary)    2. Gastroesophageal reflux disease, unspecified whether esophagitis present    3. Generalized " abdominal pain  -     dicyclomine (BENTYL) 10 MG capsule; Take 1 capsule by mouth Every 6 (Six) Hours As Needed for Abdominal Cramping.  Dispense: 120 capsule; Refill: 0    4. Dyspepsia    5. Chronic idiopathic constipation    Other orders  -     bisacodyl 5 MG EC tablet; Take 4 tablets at 1:00pm with 8oz of clear liquids the day before your colonoscopy.  Dispense: 4 tablet; Refill: 0  -     polyethylene glycol (MIRALAX) 17 GM/SCOOP powder; Mix 238g powder with 64 oz of clear liquid at 4:00pm. Drink 8 oz every 10-15 minutes until consumed.  Dispense: 238 g; Refill: 0      We discussed EGD for diagnostic purposes..  We discussed the indications for upper endoscopy as well as the risks, benefits and alternatives to this procedure.   The patient was given an opportunity to ask questions.  The patient verbalized understanding of these recommendations and the plan of care. The patient is willing to proceed with endoscopy and has signed informed consent in the office today.  My office will arrange scheduling for the EGD procedure and pre-admission testing.      We discussed colonoscopy for colon cancer screening and further diagnostic purposes.  We discussed the indications for colonoscopy as well as the risks, benefits and alternatives to this procedure. Risks including but not limited to perforation, bleeding,need for blood transfusion or emergent surgery ,and missed neoplasm were reviewed in detail with the patient. The necessary bowel preparation and pre-procedure clear liquid diet was explained in detail.  A written instructional handout was also provided.  Electronic prescriptions for miralax and dulcolax were sent to the patient's pharmacy.  The patient was given an opportunity to ask questions.  The patient verbalized understanding of these recommendations and the plan of care. The patient is willing to proceed with colonoscopy and has signed informed consent in the office today.  My office will arrange scheduling  for the colonoscopy procedure and pre-admission testing.

## 2025-03-07 NOTE — PROGRESS NOTES
Subjective   Renae Bolton is a 49 y.o. female.   Chief Complaint   Patient presents with    Abdominal Pain        History of Present Illness The patient is in the office today for a follow up visit for abdominal pain and irritable bowel syndrome. The patient had an EGD and gastric sleeve on 10/24/2019. She also had an EGD on 07/21/2020. The patient states in the last 3 months she has been having some intense abdominal pain .     The following portions of the patient's history were reviewed and updated as appropriate: allergies, current medications, past family history, past medical history, past social history, past surgical history, and problem list.      Patient Active Problem List   Diagnosis    Stress incontinence    Fatigue    Dyspepsia    Dyspnea on exertion    Hard to intubate    Nausea    Sleep apnea    Migraines    Interstitial cystitis    GERD (gastroesophageal reflux disease)    Anxiety and depression    Anxiety    Post concussion syndrome    Genital HSV    Arrhythmia    Generalized edema    Aspirin long-term use    Obesity    Back pain    Metabolic syndrome    Seasonal allergies    Primary insomnia    Hyperlipidemia    Allergic rhinitis    Generalized abdominal pain    History of sleeve gastrectomy    Vitamin D deficiency    Constipation    Weight loss counseling, encounter for    Mixed stress and urge urinary incontinence       Past Medical History:   Diagnosis Date    Allergic rhinitis approx 10 years ago    Anxiety     Arrhythmia     SVT w/ PVCs, on propranolol, follows w/ Dr. Liang    BRCA1 negative     BRCA2 negative     Cataract Congenital    Removed January 2023    Cholelithiasis removal 2008?    Chronic venous insufficiency     per Dr. Liang note 8/17/22    Cluster headache     Depression     Diabetes mellitus 2001    Gestational    Dyspepsia     Dyspnea on exertion     resolved after weight loss/ gastric sleeve    Endometriosis     Fatigue     Fibrocystic breast 2005    Fibroid      Generalized edema     r/t weight gain    Genital HSV     Famvir for prophylaxis    GERD (gastroesophageal reflux disease)     Gestational diabetes 2001    Hard to intubate     states was told this after her hysterectomy.  states procedures following gastric sleeve have been without issues    Head injury     concussion with post concussion syndrome    Headache, tension-type     Hyperemesis gravidarum     Hyperlipidemia 09/21/2021    Hypertension     resloved after gastric sleeve/weight loss    Interstitial cystitis     Kidney stones     Migraine     occasional    Mitral valve prolapse     pt denies/unaware; mitral valve regurge per Dr. Liang note 8/17/22    Normal vaginal Papanicolaou smear in patient with history of hysterectomy 09/16/2016    Peripheral edema     PONV (postoperative nausea and vomiting)     happens frequently - scopalamine patch works well    Post concussion syndrome     s/p fall w/ frontal lobe head injury 9/2017, previously on Elavil    Sleep apnea     resloved after gastric sleeve/weight loss    Sleep apnea, obstructive     Stress incontinence     follows w/ Dr. Galvan, on Myrbetriq, s/p cystoscopy w/ bulking agent    SVT (supraventricular tachycardia)     followed by Dr. Liang - see note 8/17/22 scanned into media    Weight loss counseling, encounter for 07/02/2024       Past Surgical History:   Procedure Laterality Date    ANTERIOR AND POSTERIOR VAGINAL REPAIR  06/23/2010    DR EDIN BYRD    BLADDER SURGERY      A&P Repair, Bulking Procedure    BREAST BIOPSY  [id number]    Left    BREAST BIOPSY Left     2022    CATARACT EXTRACTION  Congenitial Cataract Removed January 2023    New lens both eyes    CYSTOSCOPY      CYSTOSCOPY BOTOX INJECTION OF BLADDER N/A 02/20/2025    Procedure: CYSTOSCOPY BOTOX INJECTION OF BLADDER;  Surgeon: Jose Galvan MD;  Location: [facility];  Service: Urology;  Laterality: N/A;    CYSTOSCOPY W/ BULKING AGENT INJECTION N/A 06/25/2019    Procedure: CYSTOSCOPY WITH  URETHRAL  BULKING AGENT INJECTION;  Surgeon: Jose Galvan MD;  Location: [facility] OR;  Service: Urology    CYSTOSCOPY W/ BULKING AGENT INJECTION N/A 02/20/2025    Procedure: Cystoscopy with bulking agent injection;  Surgeon: Jose Galvan MD;  Location: [facility] OR;  Service: Urology;  Laterality: N/A;    CYSTOSCOPY, DIMETHYL SULFOXIDE COCKTAIL  03/14/2011    WITH HYDRODISTENTION (DR AVINASH MADDOX)    ENDOSCOPY N/A 12/17/2019    Procedure: ESOPHAGOGASTRODUODENOSCOPY;  Surgeon: Tracee Onofre MD;  Location: [facility] OR;  Service: Bariatric    ENDOSCOPY N/A 10/20/2020    Procedure: ESOPHAGOGASTRODUODENOSCOPY WITH BIOPSY;  Surgeon: Tracee Onofre MD;  Location: [facility] ENDOSCOPY;  Service: General;  Laterality: N/A;    EYE SURGERY  2023    congenital cataract removal; polyvue lenses    GASTRIC SLEEVE LAPAROSCOPIC N/A 12/17/2019    Procedure: GASTRIC SLEEVE LAPAROSCOPIC;  Surgeon: Tracee Onofre MD;  Location: [facility] OR;  Service: Bariatric    HIATAL HERNIA REPAIR N/A 12/17/2019    Procedure: HIATAL HERNIA REPAIR LAPAROSCOPIC;  Surgeon: Tracee Onofre MD;  Location: [facility] OR;  Service: Bariatric    HYSTERECTOMY  06/23/2010    LAVH (DR EDIN BYRD) Ovaries remain    LAPAROSCOPIC CHOLECYSTECTOMY  2013    for stones    TRANSVAGINAL TAPING SUSPENSION WITH OBTURATOR  06/23/2010    DR EDIN BYRD    VAGINAL HYSTERECTOMY      VAGINAL PROLAPSE REPAIR      WISDOM TOOTH EXTRACTION  1990       Medications:     Current Outpatient Medications:     amitriptyline (ELAVIL) 10 MG tablet, Take 1 tablet by mouth Every Evening., Disp: 90 tablet, Rfl: 3    Atogepant (Qulipta) 60 MG tablet, Take 1 tablet by mouth Daily., Disp: 30 tablet, Rfl: 11    B Complex Vitamins (vitamin b complex) capsule capsule, Take 1 capsule by mouth Daily., Disp: , Rfl:     Biotin (Biotin 5000) 5 MG capsule, Take 1 capsule by mouth Daily., Disp: , Rfl:     cetirizine (zyrTEC) 10 MG tablet, Take 1 tablet by mouth Daily.,  Disp: 90 tablet, Rfl: 3    Cholecalciferol (Vitamin D3) 250 MCG (75620 UT) tablet, Take 10,000 Units by mouth Daily., Disp: , Rfl:     cyclobenzaprine (FLEXERIL) 5 MG tablet, Take 1 tablet by mouth 3 (Three) Times a Day As Needed for Muscle spasms., Disp: 90 tablet, Rfl: 3    dexlansoprazole (DEXILANT) 60 MG capsule, Take 1 capsule by mouth Daily., Disp: 90 capsule, Rfl: 1    DULoxetine (CYMBALTA) 30 MG capsule, Take 1 capsule by mouth Daily., Disp: 90 capsule, Rfl: 0    estradiol (ESTRACE VAGINAL) 0.1 MG/GM vaginal cream, May use 1 gram intravaginal once daily at bedtime for 2 weeks, then 1 gram 2 (Two) Times Week., Disp: 42.5 g, Rfl: 11    estradiol (VAGIFEM) 10 MCG tablet vaginal tablet, Insert 1 tablet into the vagina 2 (Two) Times a Week., Disp: 8 tablet, Rfl: 11    famciclovir (FAMVIR) 250 MG tablet, Take 1 tablet by mouth 3 (Three) Times a Day. (Patient taking differently: Take 1 tablet by mouth 2 (Two) Times a Day.), Disp: 270 tablet, Rfl: 4    famotidine (PEPCID) 20 MG tablet, Take 1 tablet by mouth As Needed., Disp: , Rfl:     ibuprofen (ADVIL,MOTRIN) 600 MG tablet, Take 1 tablet  by mouth 3 (three) times daily as needed for pain, Disp: 90 tablet, Rfl: 0    MAGNESIUM CITRATE PO, Take 270 mg by mouth Daily. *magnesium citrimate+B6 for migraines, Disp: , Rfl:     naltrexone (DEPADE) 50 MG tablet, Take 0.5 tablet by mouth 2 (Two) Times a Day., Disp: 30 tablet, Rfl: 2    NON FORMULARY, Multivitamin patch daily, Disp: , Rfl:     Omega-3 Fatty Acids (fish oil) 1000 MG capsule capsule, Take 1 capsule by mouth 2 (Two) Times a Day With Meals., Disp: , Rfl:     ondansetron ODT (ZOFRAN-ODT) 4 MG disintegrating tablet, Take 1 tablet (4 mg total) by mouth every 8 (eight) hours as needed for nausea or vomiting., Disp: 60 tablet, Rfl: 2    phentermine (ADIPEX-P) 37.5 MG tablet, Take 0.5 tablets by mouth Daily., Disp: 30 tablet, Rfl: 0    Probiotic Product (PROBIOTIC PO), Take 1 capsule by mouth Daily., Disp: , Rfl:      promethazine (PHENERGAN) 25 MG tablet, Take 1 tablet by mouth Every 6 (Six) Hours As Needed. (Patient taking differently: Take 1 tablet by mouth Every 6 (Six) Hours As Needed for Nausea.), Disp: 60 tablet, Rfl: 1    propranolol LA (INDERAL LA) 80 MG 24 hr capsule, Take 1 capsule by mouth Daily for 90 days., Disp: 90 capsule, Rfl: 3    SUMAtriptan-naproxen (TREXIMET)  MG per tablet, Take 1 tablet by mouth 1 (One) Time As Needed for Migraine. May repeat dose one time in 2 hours if headache not relieved., Disp: 9 tablet, Rfl: 11    Vitamin E 400 units tablet, Take 400 Units by mouth Daily., Disp: , Rfl:     acyclovir (ZOVIRAX) 5 % ointment, Apply  topically to the appropriate area as directed 4 (Four) Times a Day. (Patient not taking: Reported on 3/18/2025), Disp: 30 g, Rfl: 6    Current Facility-Administered Medications:     cyanocobalamin injection 1,000 mcg, 1,000 mcg, Intramuscular, Q28 Days, Bindu Lechuga, APRN, 1,000 mcg at 01/21/25 1345    Allergies:   Allergies   Allergen Reactions    Amoxicillin Shortness Of Breath and Palpitations     Keflex OK  Beta lactam allergy details  Antibiotic reaction: other  Dose to reaction time: unknown  Reason for antibiotic: other  Epinephrine required for reaction?: unknown  Tolerated antibiotics: other       Caffeine Arrhythmia    Demerol [Meperidine] Rash     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  Never actually taken it internally.    Latex Anaphylaxis and Rash    Morphine Rash     Rash (if gets on skin).  Never taken it internally    Penicillins Shortness Of Breath and Palpitations     Keflex OK       Oxycodone Other (See Comments)     Dizziness, confusion           Family History   Problem Relation Age of Onset    Hypertension Mother     Anesthesia problems Mother     Hyperlipidemia Mother     Migraines Mother     Diabetes Father         Type II    Hypertension Father     Migraines Father     Breast cancer Sister  34    Miscarriages / Stillbirths Sister     Migraines Sister     Hypertension Maternal Grandmother     Alzheimer's disease Maternal Grandmother     Breast cancer Maternal Grandmother     Heart disease Maternal Grandmother     Arthritis Maternal Grandmother     Cancer Maternal Grandmother         Breast    Depression Maternal Grandmother     Hyperlipidemia Maternal Grandmother     Dementia Maternal Grandmother     Breast cancer Maternal Aunt     Breast cancer Paternal Aunt     Stroke Maternal Grandfather     Skin cancer Maternal Grandfather     Hypertension Maternal Grandfather     Arthritis Maternal Grandfather     Cancer Maternal Grandfather         Lipoma    Depression Maternal Grandfather     Hearing loss Maternal Grandfather     Hyperlipidemia Maternal Grandfather     Diabetes Paternal Grandfather     Hypertension Paternal Grandfather     Hyperlipidemia Paternal Grandfather     Hyperlipidemia Other     Gout Other     Migraines Other     Asthma Daughter     Miscarriages / Stillbirths Sister     Cancer Sister     Migraines Sister     Ovarian cancer Neg Hx        Social History     Socioeconomic History    Marital status:    Tobacco Use    Smoking status: Never     Passive exposure: Never    Smokeless tobacco: Never   Vaping Use    Vaping status: Never Used   Substance and Sexual Activity    Alcohol use: No    Drug use: No    Sexual activity: Yes     Partners: Male     Birth control/protection: Vasectomy, Hysterectomy, Surgical       Review of Systems   Constitutional:  Negative for chills, fever and unexpected weight loss.   HENT:  Negative for hearing loss, trouble swallowing and voice change.    Eyes:  Negative for visual disturbance.   Respiratory:  Negative for apnea, cough, chest tightness, shortness of breath and wheezing.    Cardiovascular:  Negative for chest pain, palpitations and leg swelling.   Gastrointestinal:  Positive for abdominal pain, constipation, nausea and vomiting. Negative for  "abdominal distention, anal bleeding, blood in stool, diarrhea, rectal pain, GERD and indigestion.   Endocrine: Negative for cold intolerance and heat intolerance.   Genitourinary:  Negative for difficulty urinating, dysuria and flank pain.   Musculoskeletal:  Negative for back pain and gait problem.   Skin:  Negative for color change, rash, skin lesions and wound.   Neurological:  Negative for dizziness, syncope, speech difficulty, weakness, light-headedness and numbness.   Hematological:  Negative for adenopathy. Does not bruise/bleed easily.   Psychiatric/Behavioral:  Negative for depressed mood. The patient is not nervous/anxious.    I have reviewed and confirmed the accuracy of the ROS as documented by the MA/LPN/RN Sylvie Palmer MD       Objective   /72   Pulse 77   Ht 162.6 cm (64.02\")   Wt 64.4 kg (142 lb)   SpO2 100%   BMI 24.36 kg/m²     Physical Exam      Assessment & Plan   Diagnoses and all orders for this visit:    1. Nausea (Primary)    2. Gastroesophageal reflux disease, unspecified whether esophagitis present    3. Generalized abdominal pain  -     dicyclomine (BENTYL) 10 MG capsule; Take 1 capsule by mouth Every 6 (Six) Hours As Needed for Abdominal Cramping.  Dispense: 120 capsule; Refill: 0    4. Dyspepsia    5. Chronic idiopathic constipation    Other orders  -     bisacodyl 5 MG EC tablet; Take 4 tablets at 1:00pm with 8oz of clear liquids the day before your colonoscopy.  Dispense: 4 tablet; Refill: 0  -     polyethylene glycol (MIRALAX) 17 GM/SCOOP powder; Mix 238g powder with 64 oz of clear liquid at 4:00pm. Drink 8 oz every 10-15 minutes until consumed.  Dispense: 238 g; Refill: 0                 "

## 2025-03-18 ENCOUNTER — OFFICE VISIT (OUTPATIENT)
Dept: SURGERY | Facility: CLINIC | Age: 50
End: 2025-03-18
Payer: COMMERCIAL

## 2025-03-18 VITALS
HEIGHT: 64 IN | SYSTOLIC BLOOD PRESSURE: 126 MMHG | DIASTOLIC BLOOD PRESSURE: 72 MMHG | HEART RATE: 77 BPM | BODY MASS INDEX: 24.24 KG/M2 | OXYGEN SATURATION: 100 % | WEIGHT: 142 LBS

## 2025-03-18 DIAGNOSIS — R10.84 GENERALIZED ABDOMINAL PAIN: ICD-10-CM

## 2025-03-18 DIAGNOSIS — K21.9 GASTROESOPHAGEAL REFLUX DISEASE, UNSPECIFIED WHETHER ESOPHAGITIS PRESENT: ICD-10-CM

## 2025-03-18 DIAGNOSIS — R10.13 DYSPEPSIA: ICD-10-CM

## 2025-03-18 DIAGNOSIS — R11.0 NAUSEA: Primary | ICD-10-CM

## 2025-03-18 DIAGNOSIS — K59.04 CHRONIC IDIOPATHIC CONSTIPATION: ICD-10-CM

## 2025-03-18 RX ORDER — POLYETHYLENE GLYCOL 3350 17 G/17G
POWDER, FOR SOLUTION ORAL
Qty: 238 G | Refills: 0 | Status: SHIPPED | OUTPATIENT
Start: 2025-03-18

## 2025-03-18 RX ORDER — BISACODYL 5 MG/1
TABLET, DELAYED RELEASE ORAL
Qty: 4 TABLET | Refills: 0 | Status: SHIPPED | OUTPATIENT
Start: 2025-03-18

## 2025-03-19 RX ORDER — DICYCLOMINE HYDROCHLORIDE 10 MG/1
10 CAPSULE ORAL EVERY 6 HOURS PRN
Qty: 120 CAPSULE | Refills: 0 | Status: SHIPPED | OUTPATIENT
Start: 2025-03-19

## 2025-04-01 ENCOUNTER — PATIENT MESSAGE (OUTPATIENT)
Dept: BARIATRICS/WEIGHT MGMT | Facility: CLINIC | Age: 50
End: 2025-04-01
Payer: COMMERCIAL

## 2025-04-01 DIAGNOSIS — G43.909 MIGRAINE WITHOUT STATUS MIGRAINOSUS, NOT INTRACTABLE, UNSPECIFIED MIGRAINE TYPE: Primary | ICD-10-CM

## 2025-04-02 RX ORDER — TOPIRAMATE 25 MG/1
CAPSULE, EXTENDED RELEASE ORAL
Qty: 60 CAPSULE | Refills: 2 | Status: SHIPPED | OUTPATIENT
Start: 2025-04-02

## 2025-04-11 ENCOUNTER — PREP FOR SURGERY (OUTPATIENT)
Dept: OTHER | Facility: HOSPITAL | Age: 50
End: 2025-04-11
Payer: COMMERCIAL

## 2025-04-11 DIAGNOSIS — R10.13 DYSPEPSIA: ICD-10-CM

## 2025-04-11 DIAGNOSIS — K59.04 CHRONIC IDIOPATHIC CONSTIPATION: ICD-10-CM

## 2025-04-11 DIAGNOSIS — R10.84 GENERALIZED ABDOMINAL PAIN: ICD-10-CM

## 2025-04-11 DIAGNOSIS — K21.9 GASTROESOPHAGEAL REFLUX DISEASE, UNSPECIFIED WHETHER ESOPHAGITIS PRESENT: Primary | ICD-10-CM

## 2025-04-11 DIAGNOSIS — R11.0 NAUSEA: ICD-10-CM

## 2025-04-11 RX ORDER — SODIUM CHLORIDE 0.9 % (FLUSH) 0.9 %
10 SYRINGE (ML) INJECTION AS NEEDED
OUTPATIENT
Start: 2025-04-11

## 2025-04-11 RX ORDER — SODIUM CHLORIDE, SODIUM LACTATE, POTASSIUM CHLORIDE, CALCIUM CHLORIDE 600; 310; 30; 20 MG/100ML; MG/100ML; MG/100ML; MG/100ML
50 INJECTION, SOLUTION INTRAVENOUS CONTINUOUS
OUTPATIENT
Start: 2025-04-11 | End: 2025-04-12

## 2025-04-11 NOTE — PRE-PROCEDURE INSTRUCTIONS
PAT phone history completed with patient for upcoming procedure on 4/15/25.    PAT PASS reviewed with patient and he/she verbalized understanding of the following:     Do not eat or drink anything after midnight the night before procedure unless otherwise instructed by physician/surgeon's office, this includes no gum, candy, mints, tobacco products or e-cigarettes.  Do not shave the area to be operated on at least 48 hours prior to procedure.  Do not wear makeup, lotion, hair products, or nail polish.  Do not wear any jewelry and remove all piercings.  Do not wear any adhesive if you wear dentures.  Do not wear contacts; bring in glasses if needed.  Only take medications on the morning of procedure as instructed by PAT nurse per anesthesia guidelines or as instructed by physician's office.   If you are on any blood thinners reach out to the physician/surgeon's office for instructions on when/if they will need to be stopped prior to procedure.   Bring in picture ID and insurance card, advanced directive copies if applicable, CPAP/BIPAP/Inhalers if indicated morning of procedure, leave any other valuables at home.  Ensure you have arranged for someone to drive you home the day of your procedure and someone to care for you at home afterwards. It is recommended that you do not drive, drink alcohol, or make any major legal decisions for at least 24 hours after your procedure is complete.      Instructions given on hospital entrance and registration location.

## 2025-04-15 ENCOUNTER — ANESTHESIA EVENT (OUTPATIENT)
Dept: GASTROENTEROLOGY | Facility: HOSPITAL | Age: 50
End: 2025-04-15
Payer: COMMERCIAL

## 2025-04-15 ENCOUNTER — ANESTHESIA (OUTPATIENT)
Dept: GASTROENTEROLOGY | Facility: HOSPITAL | Age: 50
End: 2025-04-15
Payer: COMMERCIAL

## 2025-04-15 ENCOUNTER — HOSPITAL ENCOUNTER (OUTPATIENT)
Facility: HOSPITAL | Age: 50
Setting detail: HOSPITAL OUTPATIENT SURGERY
Discharge: HOME | End: 2025-04-15
Attending: SURGERY | Admitting: SURGERY
Payer: COMMERCIAL

## 2025-04-15 VITALS
OXYGEN SATURATION: 100 % | HEART RATE: 71 BPM | TEMPERATURE: 98.4 F | SYSTOLIC BLOOD PRESSURE: 103 MMHG | DIASTOLIC BLOOD PRESSURE: 54 MMHG | RESPIRATION RATE: 16 BRPM

## 2025-04-15 DIAGNOSIS — R10.84 GENERALIZED ABDOMINAL PAIN: ICD-10-CM

## 2025-04-15 DIAGNOSIS — R10.13 DYSPEPSIA: ICD-10-CM

## 2025-04-15 DIAGNOSIS — K21.9 GASTROESOPHAGEAL REFLUX DISEASE, UNSPECIFIED WHETHER ESOPHAGITIS PRESENT: ICD-10-CM

## 2025-04-15 DIAGNOSIS — R11.0 NAUSEA: ICD-10-CM

## 2025-04-15 DIAGNOSIS — K59.04 CHRONIC IDIOPATHIC CONSTIPATION: ICD-10-CM

## 2025-04-15 PROCEDURE — 25010000002 LIDOCAINE (CARDIAC): Performed by: NURSE ANESTHETIST, CERTIFIED REGISTERED

## 2025-04-15 PROCEDURE — 25810000003 LACTATED RINGERS PER 1000 ML: Performed by: SURGERY

## 2025-04-15 PROCEDURE — 25010000002 MIDAZOLAM PER 1MG: Performed by: NURSE ANESTHETIST, CERTIFIED REGISTERED

## 2025-04-15 PROCEDURE — 25010000002 FENTANYL CITRATE (PF) 50 MCG/ML SOLUTION: Performed by: NURSE ANESTHETIST, CERTIFIED REGISTERED

## 2025-04-15 PROCEDURE — 25810000003 LACTATED RINGERS PER 1000 ML: Performed by: NURSE ANESTHETIST, CERTIFIED REGISTERED

## 2025-04-15 PROCEDURE — 25010000002 DEXAMETHASONE PER 1 MG: Performed by: NURSE ANESTHETIST, CERTIFIED REGISTERED

## 2025-04-15 PROCEDURE — 25010000002 PROPOFOL 10 MG/ML EMULSION: Performed by: NURSE ANESTHETIST, CERTIFIED REGISTERED

## 2025-04-15 PROCEDURE — 25010000002 ONDANSETRON PER 1 MG: Performed by: NURSE ANESTHETIST, CERTIFIED REGISTERED

## 2025-04-15 PROCEDURE — 25010000002 HALOPERIDOL LACTATE PER 5 MG: Performed by: NURSE ANESTHETIST, CERTIFIED REGISTERED

## 2025-04-15 RX ORDER — HALOPERIDOL 5 MG/ML
INJECTION INTRAMUSCULAR AS NEEDED
Status: DISCONTINUED | OUTPATIENT
Start: 2025-04-15 | End: 2025-04-15 | Stop reason: SURG

## 2025-04-15 RX ORDER — SODIUM CHLORIDE, SODIUM LACTATE, POTASSIUM CHLORIDE, CALCIUM CHLORIDE 600; 310; 30; 20 MG/100ML; MG/100ML; MG/100ML; MG/100ML
INJECTION, SOLUTION INTRAVENOUS CONTINUOUS PRN
Status: DISCONTINUED | OUTPATIENT
Start: 2025-04-15 | End: 2025-04-15 | Stop reason: SURG

## 2025-04-15 RX ORDER — FENTANYL CITRATE 50 UG/ML
INJECTION, SOLUTION INTRAMUSCULAR; INTRAVENOUS AS NEEDED
Status: DISCONTINUED | OUTPATIENT
Start: 2025-04-15 | End: 2025-04-15 | Stop reason: SURG

## 2025-04-15 RX ORDER — SCOPOLAMINE 1 MG/3D
1 PATCH, EXTENDED RELEASE TRANSDERMAL
Status: DISCONTINUED | OUTPATIENT
Start: 2025-04-15 | End: 2025-04-15 | Stop reason: HOSPADM

## 2025-04-15 RX ORDER — DEXAMETHASONE SODIUM PHOSPHATE 4 MG/ML
INJECTION, SOLUTION INTRA-ARTICULAR; INTRALESIONAL; INTRAMUSCULAR; INTRAVENOUS; SOFT TISSUE AS NEEDED
Status: DISCONTINUED | OUTPATIENT
Start: 2025-04-15 | End: 2025-04-15 | Stop reason: SURG

## 2025-04-15 RX ORDER — SODIUM CHLORIDE, SODIUM LACTATE, POTASSIUM CHLORIDE, CALCIUM CHLORIDE 600; 310; 30; 20 MG/100ML; MG/100ML; MG/100ML; MG/100ML
50 INJECTION, SOLUTION INTRAVENOUS CONTINUOUS
Status: DISCONTINUED | OUTPATIENT
Start: 2025-04-15 | End: 2025-04-15 | Stop reason: HOSPADM

## 2025-04-15 RX ORDER — ONDANSETRON 2 MG/ML
INJECTION INTRAMUSCULAR; INTRAVENOUS AS NEEDED
Status: DISCONTINUED | OUTPATIENT
Start: 2025-04-15 | End: 2025-04-15 | Stop reason: SURG

## 2025-04-15 RX ORDER — PROPOFOL 10 MG/ML
VIAL (ML) INTRAVENOUS AS NEEDED
Status: DISCONTINUED | OUTPATIENT
Start: 2025-04-15 | End: 2025-04-15 | Stop reason: SURG

## 2025-04-15 RX ORDER — MIDAZOLAM HYDROCHLORIDE 2 MG/2ML
INJECTION, SOLUTION INTRAMUSCULAR; INTRAVENOUS AS NEEDED
Status: DISCONTINUED | OUTPATIENT
Start: 2025-04-15 | End: 2025-04-15 | Stop reason: SURG

## 2025-04-15 RX ORDER — KETAMINE HCL IN NACL, ISO-OSM 100MG/10ML
SYRINGE (ML) INJECTION AS NEEDED
Status: DISCONTINUED | OUTPATIENT
Start: 2025-04-15 | End: 2025-04-15 | Stop reason: SURG

## 2025-04-15 RX ORDER — ONDANSETRON 2 MG/ML
4 INJECTION INTRAMUSCULAR; INTRAVENOUS ONCE AS NEEDED
Status: DISCONTINUED | OUTPATIENT
Start: 2025-04-15 | End: 2025-04-15 | Stop reason: HOSPADM

## 2025-04-15 RX ORDER — SUCRALFATE ORAL 1 G/10ML
1 SUSPENSION ORAL 2 TIMES DAILY
Qty: 420 ML | Refills: 0 | Status: SHIPPED | OUTPATIENT
Start: 2025-04-15

## 2025-04-15 RX ORDER — SODIUM CHLORIDE 0.9 % (FLUSH) 0.9 %
10 SYRINGE (ML) INJECTION AS NEEDED
Status: DISCONTINUED | OUTPATIENT
Start: 2025-04-15 | End: 2025-04-15 | Stop reason: HOSPADM

## 2025-04-15 RX ADMIN — PROPOFOL 40 MG: 10 INJECTION, EMULSION INTRAVENOUS at 09:18

## 2025-04-15 RX ADMIN — Medication 20 MG: at 09:38

## 2025-04-15 RX ADMIN — PROPOFOL 30 MG: 10 INJECTION, EMULSION INTRAVENOUS at 09:23

## 2025-04-15 RX ADMIN — Medication 10 MG: at 09:32

## 2025-04-15 RX ADMIN — PROPOFOL 30 MG: 10 INJECTION, EMULSION INTRAVENOUS at 09:28

## 2025-04-15 RX ADMIN — DEXAMETHASONE SODIUM PHOSPHATE 4 MG: 4 INJECTION, SOLUTION INTRA-ARTICULAR; INTRALESIONAL; INTRAMUSCULAR; INTRAVENOUS; SOFT TISSUE at 09:12

## 2025-04-15 RX ADMIN — PROPOFOL 30 MG: 10 INJECTION, EMULSION INTRAVENOUS at 09:36

## 2025-04-15 RX ADMIN — PROPOFOL 20 MG: 10 INJECTION, EMULSION INTRAVENOUS at 09:46

## 2025-04-15 RX ADMIN — SCOPOLAMINE 1 PATCH: 1.5 PATCH, EXTENDED RELEASE TRANSDERMAL at 08:03

## 2025-04-15 RX ADMIN — ONDANSETRON 4 MG: 2 INJECTION INTRAMUSCULAR; INTRAVENOUS at 09:12

## 2025-04-15 RX ADMIN — SODIUM CHLORIDE, POTASSIUM CHLORIDE, SODIUM LACTATE AND CALCIUM CHLORIDE: 600; 310; 30; 20 INJECTION, SOLUTION INTRAVENOUS at 08:54

## 2025-04-15 RX ADMIN — PROPOFOL 20 MG: 10 INJECTION, EMULSION INTRAVENOUS at 09:50

## 2025-04-15 RX ADMIN — MIDAZOLAM HYDROCHLORIDE 2 MG: 1 INJECTION, SOLUTION INTRAMUSCULAR; INTRAVENOUS at 09:13

## 2025-04-15 RX ADMIN — PROPOFOL 30 MG: 10 INJECTION, EMULSION INTRAVENOUS at 09:32

## 2025-04-15 RX ADMIN — FENTANYL CITRATE 50 MCG: 50 INJECTION, SOLUTION INTRAMUSCULAR; INTRAVENOUS at 09:14

## 2025-04-15 RX ADMIN — SODIUM CHLORIDE, POTASSIUM CHLORIDE, SODIUM LACTATE AND CALCIUM CHLORIDE 50 ML/HR: 600; 310; 30; 20 INJECTION, SOLUTION INTRAVENOUS at 07:58

## 2025-04-15 RX ADMIN — PROPOFOL 30 MG: 10 INJECTION, EMULSION INTRAVENOUS at 09:41

## 2025-04-15 RX ADMIN — HALOPERIDOL LACTATE 0.5 MG: 5 INJECTION, SOLUTION INTRAMUSCULAR at 09:14

## 2025-04-15 RX ADMIN — LIDOCAINE HYDROCHLORIDE 40 MG: 20 INJECTION, SOLUTION INTRAVENOUS at 09:16

## 2025-04-15 RX ADMIN — Medication 20 MG: at 09:16

## 2025-04-15 NOTE — ANESTHESIA PREPROCEDURE EVALUATION
Anesthesia Evaluation     Patient summary reviewed and Nursing notes reviewed   history of anesthetic complications:  PONV difficult airway  NPO Solid Status: > 8 hours  NPO Liquid Status: > 8 hours           Airway   Mallampati: II  TM distance: >3 FB  Neck ROM: full  Difficult intubation highly probable  Dental - normal exam     Pulmonary - normal exam   (+) asthma,shortness of breath, sleep apnea  (-) not a smoker  Cardiovascular - normal exam    ECG reviewed  Patient on routine beta blocker    (+) hypertension, valvular problems/murmurs, dysrhythmias Tachycardia, PVD, hyperlipidemia      Neuro/Psych  (+) headaches, psychiatric history Depression and Anxiety  GI/Hepatic/Renal/Endo    (+) GERD, renal disease- stones, diabetes mellitus gestational    Musculoskeletal     (+) arthralgias, back pain, chronic pain, myalgias  Abdominal  - normal exam    Bowel sounds: normal.   Substance History - negative use  (-) alcohol use, drug use     OB/GYN    (+) Pregnant        Other                      Anesthesia Plan    ASA 3     MAC     (Severe PONV requiring overnight hospitalization after Lap Brittny    Risks and benefits discussed including risk of aspiration, recall and dental damage. All patient questions answered.    Will continue with plan of care.)  intravenous induction     Anesthetic plan, risks, benefits, and alternatives have been provided, discussed and informed consent has been obtained with: patient.  Pre-procedure education provided  Plan discussed with CRNA.

## 2025-04-15 NOTE — ANESTHESIA POSTPROCEDURE EVALUATION
Patient: Renae Bolton    Procedure Summary       Date: 04/15/25 Room / Location: [facility] ENDOSCOPY 3 / [facility] ENDOSCOPY    Anesthesia Start: 0909 Anesthesia Stop:     Procedures:       COLONOSCOPY WITH BIOPSY      ESOPHAGOGASTRODUODENOSCOPY WITH BIOPSIES (Esophagus) Diagnosis:       Gastroesophageal reflux disease, unspecified whether esophagitis present      Generalized abdominal pain      Dyspepsia      Chronic idiopathic constipation      Nausea      (Gastroesophageal reflux disease, unspecified whether esophagitis present [K21.9])      (Generalized abdominal pain [R10.84])      (Dyspepsia [R10.13])      (Chronic idiopathic constipation [K59.04])      (Nausea [R11.0])    Surgeons: Sylvie Palmer MD Provider: Andrews Yip CRNA    Anesthesia Type: MAC ASA Status: 3            Anesthesia Type: MAC    Vitals  No vitals data found for the desired time range.          Post Anesthesia Care and Evaluation    Patient location during evaluation: PHASE II  Patient participation: complete - patient participated  Level of consciousness: awake  Pain score: 0  Pain management: adequate    Airway patency: patent  Anesthetic complications: No anesthetic complications  PONV Status: none  Cardiovascular status: acceptable  Respiratory status: acceptable  Hydration status: acceptable    Comments: See R.N. note for postop vital signs.vsss resp spont, reflexes intact, responsive, report given to pacu nurse

## 2025-04-16 LAB — REF LAB TEST METHOD: NORMAL

## 2025-05-05 NOTE — PROGRESS NOTES
"Subjective   Renae Bolton is a 50 y.o. female.   Chief Complaint   Patient presents with    Follow-up     EGD/COLONOSCOPY          History of Present Illness   Patient is here for an evaluation following her recent EGD and colonoscopy performed on 04/15. Pathology report did reveal reactive gastropathy, squamous mucosa with mild reactive changes. Polyp removed was hyperplastic.     The following portions of the patient's history were reviewed and updated as appropriate: allergies, current medications, past family history, past medical history, past social history, past surgical history, and problem list.    Review of Systems   Constitutional:  Negative for chills, fever and unexpected weight loss.   HENT:  Negative for hearing loss, trouble swallowing and voice change.    Eyes:  Negative for visual disturbance.   Respiratory:  Negative for apnea, cough, chest tightness, shortness of breath and wheezing.    Cardiovascular:  Negative for chest pain, palpitations and leg swelling.   Gastrointestinal:  Negative for abdominal distention, abdominal pain, anal bleeding, blood in stool, constipation, diarrhea, nausea, rectal pain, vomiting, GERD and indigestion.   Endocrine: Negative for cold intolerance and heat intolerance.   Genitourinary:  Negative for difficulty urinating, dysuria and flank pain.   Musculoskeletal:  Negative for back pain and gait problem.   Skin:  Negative for color change, rash, skin lesions and wound.   Neurological:  Negative for dizziness, syncope, speech difficulty, weakness, light-headedness and numbness.   Hematological:  Negative for adenopathy. Does not bruise/bleed easily.   Psychiatric/Behavioral:  Negative for depressed mood. The patient is not nervous/anxious.        Objective   /60   Pulse 60   Temp 97.5 °F (36.4 °C) (Infrared)   Ht 162.6 cm (64.02\")   Wt 68.5 kg (151 lb)   SpO2 99%   BMI 25.91 kg/m²     Physical Exam  Constitutional:       Appearance: She is " well-developed.   HENT:      Head: Normocephalic and atraumatic.   Eyes:      General: No scleral icterus.  Cardiovascular:      Rate and Rhythm: Regular rhythm.   Pulmonary:      Effort: Pulmonary effort is normal.   Abdominal:      General: There is no distension.      Palpations: Abdomen is soft.      Tenderness: There is no abdominal tenderness.   Skin:     General: Skin is warm and dry.   Neurological:      Mental Status: She is alert and oriented to person, place, and time.   Psychiatric:         Behavior: Behavior normal.           Assessment & Plan   Diagnoses and all orders for this visit:    1. Gastroesophageal reflux disease, unspecified whether esophagitis present (Primary)    2. Colon cancer screening      Results discussed.   Continue PPI therapy.     Colon biopsies negative for microscopic colitis.    Repeat screening exam in 10 years.

## 2025-05-08 ENCOUNTER — OFFICE VISIT (OUTPATIENT)
Dept: BARIATRICS/WEIGHT MGMT | Facility: CLINIC | Age: 50
End: 2025-05-08
Payer: COMMERCIAL

## 2025-05-08 ENCOUNTER — OFFICE VISIT (OUTPATIENT)
Dept: SURGERY | Facility: CLINIC | Age: 50
End: 2025-05-08
Payer: COMMERCIAL

## 2025-05-08 VITALS
HEIGHT: 64 IN | SYSTOLIC BLOOD PRESSURE: 108 MMHG | BODY MASS INDEX: 25.3 KG/M2 | WEIGHT: 148.2 LBS | HEART RATE: 64 BPM | DIASTOLIC BLOOD PRESSURE: 70 MMHG

## 2025-05-08 VITALS
BODY MASS INDEX: 25.78 KG/M2 | OXYGEN SATURATION: 99 % | WEIGHT: 151 LBS | SYSTOLIC BLOOD PRESSURE: 100 MMHG | HEIGHT: 64 IN | DIASTOLIC BLOOD PRESSURE: 60 MMHG | TEMPERATURE: 97.5 F | HEART RATE: 60 BPM

## 2025-05-08 DIAGNOSIS — Z12.11 COLON CANCER SCREENING: ICD-10-CM

## 2025-05-08 DIAGNOSIS — K21.9 GASTROESOPHAGEAL REFLUX DISEASE, UNSPECIFIED WHETHER ESOPHAGITIS PRESENT: Primary | ICD-10-CM

## 2025-05-08 DIAGNOSIS — E66.3 OVERWEIGHT WITH BODY MASS INDEX (BMI) OF 25 TO 25.9 IN ADULT: Primary | ICD-10-CM

## 2025-05-08 DIAGNOSIS — G43.909 MIGRAINE WITHOUT STATUS MIGRAINOSUS, NOT INTRACTABLE, UNSPECIFIED MIGRAINE TYPE: ICD-10-CM

## 2025-05-08 PROCEDURE — 99214 OFFICE O/P EST MOD 30 MIN: CPT | Performed by: NURSE PRACTITIONER

## 2025-05-08 RX ORDER — TOPIRAMATE 25 MG/1
2 CAPSULE, EXTENDED RELEASE ORAL NIGHTLY
Start: 2025-05-08

## 2025-05-08 RX ORDER — AMOXICILLIN 250 MG
1 CAPSULE ORAL DAILY
COMMUNITY

## 2025-05-08 RX ORDER — PHENTERMINE HYDROCHLORIDE 37.5 MG/1
18.25 TABLET ORAL DAILY
Qty: 30 TABLET | Refills: 0 | Status: SHIPPED | OUTPATIENT
Start: 2025-05-08

## 2025-05-08 RX ADMIN — CYANOCOBALAMIN 1000 MCG: 1000 INJECTION, SOLUTION INTRAMUSCULAR; SUBCUTANEOUS at 10:45

## 2025-05-08 NOTE — PATIENT INSTRUCTIONS
Your prescription of Saxenda® contains enough medicine for 30 days.    Below is a dosing schedule to guide you through the starting dosage of 0.6 mg to the dosage of 3 mg.    Week 1: 0.6mg  Week 2: 1.2 mg  Week 3 1.8mg  Week 4: 2.4mg  Week 5: 3.0mg (full dose)    Saxenda® dosing schedule    Use Saxenda® exactly as prescribed by your health care provider. After you start Saxenda®, provided you have no issues with tolerating it, your dose should be increased weekly until you reach the 3-mg dose. After that, do not change your dose unless your health care provider tells you to. If you have any questions, call your health care provider.    If you received a sample of Saxenda®, please note that it will last for 17 days. You will need to start a new prescription on day 18, so be sure to fill your prescription right away.    Nausea is the most common side effect when first starting Saxenda®, but decreases over time in most people as their body gets used to the medicine. For additional side effects, please refer to the Medication Guide.    If you miss your daily dose of Saxenda®, use Saxenda® as soon as you remember. Then, take your next daily dose as usual on the following day. Do not take an extra dose of Saxenda® or increase your dose on the following day to make up for your missed dose. If you miss your dose of Saxenda® for 3 days or more, call your health care provider to talk about how to restart your treatment.

## 2025-05-08 NOTE — ASSESSMENT & PLAN NOTE
Headaches are stable.    Plan:  Continue same medication/s without change.     Discussed medication dosage, use, side effects, and goals of treatment in detail.    Follow up 3 months.

## 2025-05-08 NOTE — ASSESSMENT & PLAN NOTE
Patient's (Body mass index is 25.44 kg/m².) indicates that they are overweight with health conditions that include obstructive sleep apnea, dyslipidemias, and GERD . Weight is worsening. BMI is above average; BMI management plan is completed. We discussed low calorie, low carb based diet program, portion control, increasing exercise, management of depression/anxiety/stress to control compensatory eating, pharmacologic options including phentermine/topamax, naltrexone, saxenda, and an radha-based approach such as [employer] Pal or Lose It.     I have instructed the patient to continue with pursuit of medical weight loss as a part of this program. Patient does meet criteria for use of anorectics at this time as BMI >25 with , hyperlipidemia, and is not at treatment goal.     The current plan for this month includes:   - Adjust exercise as discussed- reviewed the importance of exercise in maintaining weight.   - Continue to prioritize protein, fiber, and hydration.   - Restart saxenda, has 11 pens at home. Titrate up as directed.  - If appetite is well controlled ok to taper off of topiramate.  - Treament goal 135lb    Continue sympathomimetic (medication refilled).  This patient 1) has no evidence of serious cardiovascular disease; 2) does not have serious psychiatric disease or a history of substance abuse; 3) has been informed about weight loss medications that are FDA approved for long-term use and told that these have been all documented to be safe and effective whereas phentermine has not; 4) does not demonstrate a clinically significant increase in pulse or blood pressure when taking phentermine; and 5) demonstrates significant weight loss while using the medication.  Patient understands that all antiobesity medications are contraindicated in pregnancy.  Patient denies a history of glaucoma.  Patient understands that long-term use of phentermine is considered off label use of this medication, however, that the  "endocrine Society and recent research supports that long-term use of phentermine does not appear to have detrimental health effects when used on the appropriate patient.  In addition, a 2019 study published in an obesity journal on 13,972 patient's concluded that \"recommendations to limit phentermine to less than 3 months do not align with current concept of pharmacologic treatment of obesity\", and that \"long-term phentermine users experience greater weight loss without apparent increases in cardiovascular risk\".    We reviewed potential side effects including insomnia, dry mouth, increased heart rate and blood pressure, increased anxiety.  We reviewed reducing caffeine consumption while taking phentermine.  especially if the patient experiences side effects.  The potential risk and benefits of phentermine have been reviewed with the patient, and alternative treatment options were discussed.  All questions were answered, and the patient wishes to move forward with this medication.    "

## 2025-06-01 DIAGNOSIS — G43.909 MIGRAINE WITHOUT STATUS MIGRAINOSUS, NOT INTRACTABLE, UNSPECIFIED MIGRAINE TYPE: ICD-10-CM

## 2025-06-01 DIAGNOSIS — E66.3 OVERWEIGHT WITH BODY MASS INDEX (BMI) OF 25 TO 25.9 IN ADULT: ICD-10-CM

## 2025-06-02 ENCOUNTER — PATIENT MESSAGE (OUTPATIENT)
Dept: BARIATRICS/WEIGHT MGMT | Facility: CLINIC | Age: 50
End: 2025-06-02
Payer: COMMERCIAL

## 2025-06-02 DIAGNOSIS — E66.3 OVERWEIGHT WITH BODY MASS INDEX (BMI) OF 25 TO 25.9 IN ADULT: ICD-10-CM

## 2025-06-02 DIAGNOSIS — G43.909 MIGRAINE WITHOUT STATUS MIGRAINOSUS, NOT INTRACTABLE, UNSPECIFIED MIGRAINE TYPE: ICD-10-CM

## 2025-06-02 RX ORDER — TOPIRAMATE 25 MG/1
2 CAPSULE, EXTENDED RELEASE ORAL NIGHTLY
Qty: 30 CAPSULE
Start: 2025-06-02 | End: 2025-06-03 | Stop reason: SDUPTHER

## 2025-06-02 RX ORDER — TOPIRAMATE 25 MG/1
2 CAPSULE, EXTENDED RELEASE ORAL NIGHTLY
Qty: 30 CAPSULE | Status: CANCELLED
Start: 2025-06-02

## 2025-06-03 RX ORDER — TOPIRAMATE 25 MG/1
2 CAPSULE, EXTENDED RELEASE ORAL NIGHTLY
Qty: 60 CAPSULE | Refills: 2 | Status: SHIPPED | OUTPATIENT
Start: 2025-06-03

## 2025-06-26 ENCOUNTER — OFFICE VISIT (OUTPATIENT)
Dept: BARIATRICS/WEIGHT MGMT | Facility: CLINIC | Age: 50
End: 2025-06-26
Payer: COMMERCIAL

## 2025-06-26 VITALS
SYSTOLIC BLOOD PRESSURE: 108 MMHG | HEART RATE: 70 BPM | DIASTOLIC BLOOD PRESSURE: 78 MMHG | BODY MASS INDEX: 26.19 KG/M2 | WEIGHT: 153.4 LBS | HEIGHT: 64 IN

## 2025-06-26 DIAGNOSIS — R53.83 FATIGUE, UNSPECIFIED TYPE: ICD-10-CM

## 2025-06-26 DIAGNOSIS — Z86.69 HISTORY OF SLEEP APNEA: ICD-10-CM

## 2025-06-26 DIAGNOSIS — R06.83 SNORING: ICD-10-CM

## 2025-06-26 DIAGNOSIS — E66.3 OVERWEIGHT WITH BODY MASS INDEX (BMI) OF 26 TO 26.9 IN ADULT: Primary | ICD-10-CM

## 2025-06-26 RX ORDER — PHENTERMINE HYDROCHLORIDE 37.5 MG/1
18.25 TABLET ORAL DAILY
Qty: 30 TABLET | Refills: 0 | Status: SHIPPED | OUTPATIENT
Start: 2025-06-26

## 2025-06-26 RX ORDER — SEMAGLUTIDE 1 MG/.5ML
1 INJECTION, SOLUTION SUBCUTANEOUS WEEKLY
Qty: 2 ML | Refills: 0 | Status: SHIPPED | OUTPATIENT
Start: 2025-06-26

## 2025-06-26 RX ADMIN — CYANOCOBALAMIN 1000 MCG: 1000 INJECTION, SOLUTION INTRAMUSCULAR; SUBCUTANEOUS at 09:03

## 2025-06-26 NOTE — PROGRESS NOTES
"  [facility]  [address]  [place] [address]     Office Note      Date: 2025  Patient Name: Renae Bolton  MRN: [id number]  : 1975  Subjective  Subjective     Chief Complaint  Obesity Management follow-up          Renae Bolton presents to [facility] for obesity management.   Patient is unsatisfied with weight loss progress. Appetite is poorly controlled. Reports no side effects of prescribed medications today. The patient is taking multivitamin and is taking fish oil.  The patient is not using a food journal. She is feeling stressed and overwhelmed due to all of her life's demands right now.  She is hit or miss on getting in her protein depending on how busy she is. She has been trying to make more mindful choices but she has struggled, is concerned about weight gain. She did restart saxenda 1.8 to 2.4mg. Got too much nausea with the higher dose so she is alternating doses.     24 hour recall:  Breakfast: greek yogurt, nuts, peach  Lunch: slice turkey and mac and cheese cup  Dinner: grilled chicken w/ mushroom, 1/2 cup rice, salad w/ ranch  Snack: peach  Water Intake: 30-40 oz  Other beverages: 1/2 cup orange juice  Alcohol: none    The patient is not exercising, schedule is very busy.     STOP-Bang Score  Have you been diagnosed with Sleep Apnea?: yes  Snoring?: yes  Tired?: yes  Observed?: no  Pressure?: no  Stop Score: 2  Body Mass Index more than 35 kg/m2?: no  Age older than 50 year old?: yes  Neck large? \">17\"/43cm-M, >16\"/41cm-F: no  Gender=Male?: no  Total Stop-Bang Score: 3     Review of Systems   Constitutional:  Positive for fatigue. Negative for appetite change.   Eyes:  Negative for visual disturbance.   Cardiovascular:  Negative for chest pain and palpitations.   Gastrointestinal:  Negative for constipation and indigestion.   Endocrine: Negative for polydipsia, polyphagia and polyuria. "   Neurological:  Negative for light-headedness.   Psychiatric/Behavioral:  Positive for sleep disturbance and stress.          Current Outpatient Medications:     amitriptyline (ELAVIL) 10 MG tablet, Take 1 tablet by mouth Every Evening., Disp: 90 tablet, Rfl: 3    Atogepant (Qulipta) 60 MG tablet, Take 1 tablet by mouth Daily., Disp: 30 tablet, Rfl: 11    B Complex Vitamins (vitamin b complex) capsule capsule, Take 1 capsule by mouth Daily., Disp: , Rfl:     Biotin (Biotin 5000) 5 MG capsule, Take 1 capsule by mouth Daily., Disp: , Rfl:     cetirizine (zyrTEC) 10 MG tablet, Take 1 tablet by mouth Daily., Disp: 90 tablet, Rfl: 3    Cholecalciferol (Vitamin D3) 250 MCG (24601 UT) tablet, Take 10,000 Units by mouth Daily., Disp: , Rfl:     cyclobenzaprine (FLEXERIL) 5 MG tablet, Take 1 tablet by mouth 3 (Three) Times a Day As Needed for Muscle spasms., Disp: 90 tablet, Rfl: 3    dexlansoprazole (DEXILANT) 60 MG capsule, Take 1 capsule by mouth Daily., Disp: 90 capsule, Rfl: 1    dicyclomine (BENTYL) 10 MG capsule, Take 1 capsule by mouth Every 6 (Six) Hours As Needed for Abdominal Cramping., Disp: 120 capsule, Rfl: 0    DULoxetine (CYMBALTA) 30 MG capsule, Take 1 capsule by mouth Daily., Disp: 90 capsule, Rfl: 0    estradiol (VAGIFEM) 10 MCG tablet vaginal tablet, Insert 1 tablet into the vagina 2 (Two) Times a Week., Disp: 8 tablet, Rfl: 11    famciclovir (FAMVIR) 250 MG tablet, Take 1 tablet by mouth 3 (Three) Times a Day. (Patient taking differently: Take 1 tablet by mouth 2 (Two) Times a Day.), Disp: 270 tablet, Rfl: 4    famotidine (PEPCID) 20 MG tablet, Take 1 tablet by mouth As Needed., Disp: , Rfl:     ibuprofen (ADVIL,MOTRIN) 600 MG tablet, Take 1 tablet  by mouth 3 (three) times daily as needed for pain, Disp: 90 tablet, Rfl: 0    MAGNESIUM CITRATE PO, Take 270 mg by mouth Daily. *magnesium citrimate+B6 for migraines, Disp: , Rfl:     naltrexone (DEPADE) 50 MG tablet, Take 0.5 tablet by mouth 2 (Two) Times a  Day., Disp: 30 tablet, Rfl: 2    NON FORMULARY, Multivitamin patch daily, Disp: , Rfl:     Omega-3 Fatty Acids (fish oil) 1000 MG capsule capsule, Take 1 capsule by mouth 2 (Two) Times a Day With Meals., Disp: , Rfl:     ondansetron ODT (ZOFRAN-ODT) 4 MG disintegrating tablet, Place 1 tablet under the tongue Every 8 (Eight) Hours As Needed for nausea or vomiting., Disp: 60 tablet, Rfl: 2    phentermine (ADIPEX-P) 37.5 MG tablet, Take 0.5 tablets by mouth Daily., Disp: 30 tablet, Rfl: 0    Probiotic Product (PROBIOTIC PO), Take 1 capsule by mouth Daily., Disp: , Rfl:     promethazine (PHENERGAN) 25 MG tablet, Take 1 tablet by mouth Every 6 (Six) Hours As Needed. (Patient taking differently: Take 1 tablet by mouth Every 6 (Six) Hours As Needed for Nausea.), Disp: 60 tablet, Rfl: 1    propranolol LA (INDERAL LA) 80 MG 24 hr capsule, Take 1 capsule by mouth Daily for 90 days., Disp: 90 capsule, Rfl: 3    sennosides-docusate (senna-docusate sodium) 8.6-50 MG per tablet, Take 1 tablet by mouth Daily., Disp: , Rfl:     SUMAtriptan-naproxen (TREXIMET)  MG per tablet, Take 1 tablet by mouth 1 (One) Time As Needed for Migraine. May repeat dose one time in 2 hours if headache not relieved., Disp: 9 tablet, Rfl: 11    Topiramate ER (Trokendi XR) 25 MG capsule sustained-release 24 hr, Take 2 capsules by mouth Every Night., Disp: 60 capsule, Rfl: 2    Vitamin E 400 units tablet, Take 400 Units by mouth Daily., Disp: , Rfl:     Semaglutide-Weight Management (Wegovy) 1 MG/0.5ML solution auto-injector, Inject 0.5 mL under the skin into the appropriate area as directed 1 (One) Time Per Week., Disp: 2 mL, Rfl: 0    Current Facility-Administered Medications:     cyanocobalamin injection 1,000 mcg, 1,000 mcg, Intramuscular, Q28 Days, Bindu Lechuga APRN, 1,000 mcg at 06/26/25 0903    Objective   Start weight: 193 pounds.    Total weight loss: -39.6 pounds/-20.51%  Change in weight since last visit: +5.1lb    Body mass index is  "26.33 kg/m².   Body composition analysis completed and showed:   Body Fat %: 35.9    Measurements (in inches)  Waist Circumference: 37      Vital Signs:   /78 (BP Location: Left arm, Patient Position: Sitting)   Pulse 70   Ht 162.6 cm (64\")   Wt 69.6 kg (153 lb 6.4 oz)   BMI 26.33 kg/m²     No LMP recorded. Patient has had a hysterectomy.    Physical Exam   General appears stated age, tearful, and normal appearance   HEENT PERRLA, EOM intact, and conjunctivae normal   Chest/lungs Normal rate, Regular rhythm, and Breathing is unlabored   Extremities without edema   Neuro Good historian and No focal deficit   Skin Warm, dry, intact   Psych normal behavior, normal thought content, and normal concentration     Result Review :                Assessment / Plan        Diagnoses and all orders for this visit:    1. Overweight with body mass index (BMI) of 26 to 26.9 in adult (Primary)  Overview:  Start: 3/31/2021 193lb, goal 155lb  S/p LSG 12/19/19 presurgery weight: 232.5   gonzalez 131lb, BMI 22.49 (07/02/2024) on wegovy 1.7mg    AOM hx:  trokendi: worked well for migraines- no weight loss  Saxenda worked well, changed to wegovy due to plateau   Phentermine and naltrexone help with cravings    Comorbids: MAREN, hyperlipidemia, GERD, back pain, anxiety and depression, metabolic syndrome    Assessment & Plan:  Patient's (Body mass index is 26.33 kg/m².) indicates that they are overweight with health conditions that include obstructive sleep apnea, dyslipidemias, and GERD . Weight is worsening. BMI is above average; BMI management plan is completed. We discussed low calorie, low carb based diet program, portion control, increasing exercise, management of depression/anxiety/stress to control compensatory eating, pharmacologic options including phentermine, wegovy, topamax, and an radha-based approach such as [employer] Pal or Lose It.     I have instructed the patient to continue with pursuit of medical weight loss as a " "part of this program. Patient does meet criteria for use of anorectics at this time as BMI >25 with , impaired fasting glucose, and is not at treatment goal.     The current plan for this month includes:   - Continue to work on lifestyle behavioral changes  - Work on preplanning  - Restart wegovy 1mg, has been taking saxenda 1.8-2.4mg but weight continues to increase. Will take advantage of $199 promotion with wegovy. Has at least 3 month supply at home of different doses.   - DC naltrexone due to history of nausea on wegovy. Con't topamax for now.   - Treatment goal 135lb.     Continue sympathomimetic (medication refilled).  This patient 1) has no evidence of serious cardiovascular disease; 2) does not have serious psychiatric disease or a history of substance abuse; 3) has been informed about weight loss medications that are FDA approved for long-term use and told that these have been all documented to be safe and effective whereas phentermine has not; 4) does not demonstrate a clinically significant increase in pulse or blood pressure when taking phentermine; and 5) demonstrates significant weight loss while using the medication.  Patient understands that all antiobesity medications are contraindicated in pregnancy.  Patient denies a history of glaucoma.  Patient understands that long-term use of phentermine is considered off label use of this medication, however, that the endocrine Society and recent research supports that long-term use of phentermine does not appear to have detrimental health effects when used on the appropriate patient.  In addition, a 2019 study published in an obesity journal on [id number] patient's concluded that \"recommendations to limit phentermine to less than 3 months do not align with current concept of pharmacologic treatment of obesity\", and that \"long-term phentermine users experience greater weight loss without apparent increases in cardiovascular risk\".    We reviewed potential side " "effects including insomnia, dry mouth, increased heart rate and blood pressure, increased anxiety.  We reviewed reducing caffeine consumption while taking phentermine.  especially if the patient experiences side effects.  The potential risk and benefits of phentermine have been reviewed with the patient, and alternative treatment options were discussed.  All questions were answered, and the patient wishes to move forward with this medication.        Orders:  -     phentermine (ADIPEX-P) 37.5 MG tablet; Take 0.5 tablets by mouth Daily.  Dispense: 30 tablet; Refill: 0    2. History of sleep apnea  Overview:  Dx 2019, CPAP compliant  Improved with wt loss, patient hasn't been wearing recently because \"its on the lowest setting and I can't sleep with it on\".     Assessment & Plan:  +snoring, fatigue, age >50. Wore CPAP prior to LSG then discontinued after weight loss. Refer for re-evaluation.       3. Fatigue, unspecified type  Assessment & Plan:  Worsening. Not sleeping enough due to very hectic schedule. Getting about 5 hours a night. Encouraged to re-evaluate priorities and delegate when possible.     Orders:  -     Ambulatory Referral to Sleep Medicine    4. Snoring  -     Ambulatory Referral to Sleep Medicine    Other orders  -     Semaglutide-Weight Management (Wegovy) 1 MG/0.5ML solution auto-injector; Inject 0.5 mL under the skin into the appropriate area as directed 1 (One) Time Per Week.  Dispense: 2 mL; Refill: 0          ICD-10-CM ICD-9-CM   1. Overweight with body mass index (BMI) of 26 to 26.9 in adult  E66.3 278.02    Z68.26 V85.22   2. History of sleep apnea  Z86.69 V13.89   3. Fatigue, unspecified type  R53.83 780.79   4. Snoring  R06.83 786.09          Follow Up   Return in about 1 month (around 7/26/2025) for Next scheduled follow up.  Patient was given instructions and counseling regarding her condition or for health maintenance advice. Please see specific information pulled into the AVS if " appropriate.     Bindu Lechuga, LAURITA  06/26/2025

## 2025-06-26 NOTE — ASSESSMENT & PLAN NOTE
Patient's (Body mass index is 26.33 kg/m².) indicates that they are overweight with health conditions that include obstructive sleep apnea, dyslipidemias, and GERD . Weight is worsening. BMI is above average; BMI management plan is completed. We discussed low calorie, low carb based diet program, portion control, increasing exercise, management of depression/anxiety/stress to control compensatory eating, pharmacologic options including phentermine, wegovy, topamax, and an radha-based approach such as [employer] Pal or Lose It.     I have instructed the patient to continue with pursuit of medical weight loss as a part of this program. Patient does meet criteria for use of anorectics at this time as BMI >25 with , impaired fasting glucose, and is not at treatment goal.     The current plan for this month includes:   - Continue to work on lifestyle behavioral changes  - Work on preplanning  - Restart wegovy 1mg, has been taking saxenda 1.8-2.4mg but weight continues to increase. Will take advantage of $199 promotion with wegovy. Has at least 3 month supply at home of different doses.   - DC naltrexone due to history of nausea on wegovy. Con't topamax for now.   - Treatment goal 135lb.     Continue sympathomimetic (medication refilled).  This patient 1) has no evidence of serious cardiovascular disease; 2) does not have serious psychiatric disease or a history of substance abuse; 3) has been informed about weight loss medications that are FDA approved for long-term use and told that these have been all documented to be safe and effective whereas phentermine has not; 4) does not demonstrate a clinically significant increase in pulse or blood pressure when taking phentermine; and 5) demonstrates significant weight loss while using the medication.  Patient understands that all antiobesity medications are contraindicated in pregnancy.  Patient denies a history of glaucoma.  Patient understands that long-term use of phentermine  "is considered off label use of this medication, however, that the endocrine Society and recent research supports that long-term use of phentermine does not appear to have detrimental health effects when used on the appropriate patient.  In addition, a 2019 study published in an obesity journal on [id number] patient's concluded that \"recommendations to limit phentermine to less than 3 months do not align with current concept of pharmacologic treatment of obesity\", and that \"long-term phentermine users experience greater weight loss without apparent increases in cardiovascular risk\".    We reviewed potential side effects including insomnia, dry mouth, increased heart rate and blood pressure, increased anxiety.  We reviewed reducing caffeine consumption while taking phentermine.  especially if the patient experiences side effects.  The potential risk and benefits of phentermine have been reviewed with the patient, and alternative treatment options were discussed.  All questions were answered, and the patient wishes to move forward with this medication.      "

## 2025-06-26 NOTE — ASSESSMENT & PLAN NOTE
+snoring, fatigue, age >50. Wore CPAP prior to LSG then discontinued after weight loss. Refer for re-evaluation.

## 2025-06-26 NOTE — ASSESSMENT & PLAN NOTE
Worsening. Not sleeping enough due to very hectic schedule. Getting about 5 hours a night. Encouraged to re-evaluate priorities and delegate when possible.

## 2025-07-01 ENCOUNTER — PATIENT MESSAGE (OUTPATIENT)
Dept: BARIATRICS/WEIGHT MGMT | Facility: CLINIC | Age: 50
End: 2025-07-01
Payer: COMMERCIAL

## 2025-07-01 DIAGNOSIS — E66.3 OVERWEIGHT WITH BODY MASS INDEX (BMI) OF 25 TO 25.9 IN ADULT: Primary | ICD-10-CM

## 2025-07-08 RX ORDER — SEMAGLUTIDE 1 MG/.5ML
1 INJECTION, SOLUTION SUBCUTANEOUS WEEKLY
Qty: 2 ML | Refills: 0 | Status: SHIPPED | OUTPATIENT
Start: 2025-07-08

## 2025-07-10 ENCOUNTER — OFFICE VISIT (OUTPATIENT)
Dept: NEUROLOGY | Facility: CLINIC | Age: 50
End: 2025-07-10
Payer: COMMERCIAL

## 2025-07-10 ENCOUNTER — SPECIALTY PHARMACY (OUTPATIENT)
Dept: PHARMACY | Facility: TELEHEALTH | Age: 50
End: 2025-07-10
Payer: COMMERCIAL

## 2025-07-10 VITALS
SYSTOLIC BLOOD PRESSURE: 110 MMHG | TEMPERATURE: 99.1 F | HEART RATE: 72 BPM | DIASTOLIC BLOOD PRESSURE: 70 MMHG | BODY MASS INDEX: 26.46 KG/M2 | OXYGEN SATURATION: 99 % | HEIGHT: 64 IN | WEIGHT: 155 LBS

## 2025-07-10 DIAGNOSIS — G43.009 MIGRAINE WITHOUT AURA AND WITHOUT STATUS MIGRAINOSUS, NOT INTRACTABLE: ICD-10-CM

## 2025-07-10 RX ORDER — AMITRIPTYLINE HYDROCHLORIDE 10 MG/1
10 TABLET ORAL EVERY EVENING
Qty: 90 TABLET | Refills: 3 | Status: SHIPPED | OUTPATIENT
Start: 2025-07-10

## 2025-07-10 RX ORDER — ATOGEPANT 60 MG/1
60 TABLET ORAL DAILY
Qty: 30 TABLET | Refills: 11 | Status: SHIPPED | OUTPATIENT
Start: 2025-07-10

## 2025-07-10 RX ORDER — SUMATRIPTAN AND NAPROXEN SODIUM 85; 500 MG/1; MG/1
1 TABLET, FILM COATED ORAL ONCE AS NEEDED
Qty: 9 TABLET | Refills: 11 | Status: CANCELLED | OUTPATIENT
Start: 2025-07-10

## 2025-07-10 RX ORDER — SUMATRIPTAN AND NAPROXEN SODIUM 85; 500 MG/1; MG/1
1 TABLET, FILM COATED ORAL ONCE AS NEEDED
Qty: 9 TABLET | Refills: 11 | Status: SHIPPED | OUTPATIENT
Start: 2025-07-10

## 2025-07-10 RX ORDER — ATOGEPANT 60 MG/1
60 TABLET ORAL DAILY
Qty: 30 TABLET | Refills: 11 | Status: CANCELLED | OUTPATIENT
Start: 2025-07-10

## 2025-07-10 NOTE — PROGRESS NOTES
Follow Up Office Visit      Patient Name: Renae Bolton  : 1975   MRN: [id number]     Chief Complaint:    Chief Complaint   Patient presents with    Follow-up     Migraine; reports reduction of symptoms with Qulipta       History of Present Illness: Renae Bolton is a 50 y.o. female who is here today to follow up with migraines.  She is currently on Qulipta 60 mg daily, amitriptyline 10 mg at at bedtime and Treximet as needed.  She states she is also on topiramate now but this is to hopefully help with weight loss.  She reports having 1 migraine day per month now on her current regimen and is happy with this.  She said the Qulipta is really helping as she had a stomach virus and missed a couple days dose and she says she could really tell much it helps.  She has associated photophobia phonophobia and nausea when she has her migraines.  Denies any changes since last office visit.         Subjective      Review of Systems:   Review of Systems   Eyes:  Positive for photophobia.   Neurological:  Positive for headache.       I have reviewed and the following portions of the patient's history were updated as appropriate: past family history, past medical history, past social history, past surgical history and problem list.    Medications:     Current Outpatient Medications:     amitriptyline (ELAVIL) 10 MG tablet, Take 1 tablet by mouth Every Evening., Disp: 90 tablet, Rfl: 3    Atogepant (Qulipta) 60 MG tablet, Take 1 tablet by mouth Daily., Disp: 30 tablet, Rfl: 11    B Complex Vitamins (vitamin b complex) capsule capsule, Take 1 capsule by mouth Daily., Disp: , Rfl:     Biotin (Biotin 5000) 5 MG capsule, Take 1 capsule by mouth Daily., Disp: , Rfl:     cetirizine (zyrTEC) 10 MG tablet, Take 1 tablet by mouth Daily., Disp: 90 tablet, Rfl: 3    Cholecalciferol (Vitamin D3) 250 MCG (40435 UT) tablet, Take 10,000 Units by mouth Daily., Disp: , Rfl:     cyclobenzaprine (FLEXERIL) 5 MG tablet, Take 1  tablet by mouth 3 (Three) Times a Day As Needed for Muscle spasms., Disp: 90 tablet, Rfl: 3    dexlansoprazole (DEXILANT) 60 MG capsule, Take 1 capsule by mouth Daily., Disp: 90 capsule, Rfl: 1    dicyclomine (BENTYL) 10 MG capsule, Take 1 capsule by mouth Every 6 (Six) Hours As Needed for Abdominal Cramping., Disp: 120 capsule, Rfl: 0    DULoxetine (CYMBALTA) 30 MG capsule, Take 1 capsule by mouth Daily., Disp: 90 capsule, Rfl: 0    estradiol (VAGIFEM) 10 MCG tablet vaginal tablet, Insert 1 tablet into the vagina 2 (Two) Times a Week., Disp: 8 tablet, Rfl: 11    famciclovir (FAMVIR) 250 MG tablet, Take 1 tablet by mouth 3 (Three) Times a Day. (Patient taking differently: Take 1 tablet by mouth 2 (Two) Times a Day.), Disp: 270 tablet, Rfl: 4    famotidine (PEPCID) 20 MG tablet, Take 1 tablet by mouth As Needed., Disp: , Rfl:     ibuprofen (ADVIL,MOTRIN) 600 MG tablet, Take 1 tablet  by mouth 3 (three) times daily as needed for pain, Disp: 90 tablet, Rfl: 0    MAGNESIUM CITRATE PO, Take 270 mg by mouth Daily. *magnesium citrimate+B6 for migraines, Disp: , Rfl:     naltrexone (DEPADE) 50 MG tablet, Take 0.5 tablet by mouth 2 (Two) Times a Day., Disp: 30 tablet, Rfl: 2    NON FORMULARY, Multivitamin patch daily, Disp: , Rfl:     Omega-3 Fatty Acids (fish oil) 1000 MG capsule capsule, Take 1 capsule by mouth 2 (Two) Times a Day With Meals., Disp: , Rfl:     ondansetron ODT (ZOFRAN-ODT) 4 MG disintegrating tablet, Place 1 tablet under the tongue Every 8 (Eight) Hours As Needed for nausea or vomiting., Disp: 60 tablet, Rfl: 2    phentermine (ADIPEX-P) 37.5 MG tablet, Take 0.5 tablets by mouth Daily., Disp: 30 tablet, Rfl: 0    Probiotic Product (PROBIOTIC PO), Take 1 capsule by mouth Daily., Disp: , Rfl:     promethazine (PHENERGAN) 25 MG tablet, Take 1 tablet by mouth Every 6 (Six) Hours As Needed. (Patient taking differently: Take 1 tablet by mouth Every 6 (Six) Hours As Needed for Nausea.), Disp: 60 tablet, Rfl: 1     "propranolol LA (INDERAL LA) 80 MG 24 hr capsule, Take 1 capsule by mouth Daily for 90 days., Disp: 90 capsule, Rfl: 3    Semaglutide-Weight Management (Wegovy) 1 MG/0.5ML solution auto-injector, Inject 0.5 mL under the skin into the appropriate area as directed 1 (One) Time Per Week., Disp: 2 mL, Rfl: 0    sennosides-docusate (senna-docusate sodium) 8.6-50 MG per tablet, Take 1 tablet by mouth Daily., Disp: , Rfl:     SUMAtriptan-naproxen (TREXIMET)  MG per tablet, Take 1 tablet by mouth 1 (One) Time As Needed for Migraine. May repeat dose one time in 2 hours if headache not relieved., Disp: 9 tablet, Rfl: 11    Topiramate ER (Trokendi XR) 25 MG capsule sustained-release 24 hr, Take 2 capsules by mouth Every Night., Disp: 60 capsule, Rfl: 2    Vitamin E 400 units tablet, Take 400 Units by mouth Daily., Disp: , Rfl:     Current Facility-Administered Medications:     cyanocobalamin injection 1,000 mcg, 1,000 mcg, Intramuscular, Q28 Days, Bindu Lechuga, APRN, 1,000 mcg at 06/26/25 0903    Allergies:   Allergies   Allergen Reactions    Amoxicillin Shortness Of Breath and Palpitations     Keflex OK  Beta lactam allergy details  Antibiotic reaction: other  Dose to reaction time: unknown  Reason for antibiotic: other  Epinephrine required for reaction?: unknown  Tolerated antibiotics: other       Caffeine Arrhythmia    Demerol [Meperidine] Rash     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  Never actually taken it internally.    Latex Anaphylaxis and Rash    Morphine Rash     Rash (if gets on skin).  Never taken it internally    Penicillins Shortness Of Breath and Palpitations     Keflex OK       Oxycodone Other (See Comments)     Dizziness, confusion         Objective     Physical Exam:  Vital Signs:   Vitals:    07/10/25 1531   BP: 110/70   Pulse: 72   Temp: 99.1 °F (37.3 °C)   SpO2: 99%   Weight: 70.3 kg (155 lb)   Height: 162.6 cm (64\")   PainSc: 0-No pain "     Body mass index is 26.61 kg/m².    Physical Exam  Constitutional:       Appearance: Normal appearance.   HENT:      Head: Normocephalic.   Eyes:      Conjunctiva/sclera: Conjunctivae normal.   Pulmonary:      Effort: Pulmonary effort is normal.   Musculoskeletal:         General: Normal range of motion.   Skin:     General: Skin is warm and dry.   Neurological:      General: No focal deficit present.      Mental Status: She is alert and oriented to person, place, and time.      Cranial Nerves: Cranial nerves 2-12 are intact. No dysarthria.      Motor: Motor function is intact.   Psychiatric:         Attention and Perception: Attention normal.         Mood and Affect: Mood and affect normal.         Speech: Speech normal.         Behavior: Behavior normal. Behavior is cooperative.         Thought Content: Thought content normal.         Cognition and Memory: Cognition normal.         Judgment: Judgment normal.         Neurological Exam  Mental Status  Alert. Oriented to person, place, time and situation. Oriented to person, place, and time. Speech is normal. no dysarthria present. Language is fluent with no aphasia.    Gait  Casual gait is normal including stance, stride, and arm swing.      Assessment / Plan      Assessment/Plan:   Diagnoses and all orders for this visit:    1. Migraine without aura and without status migrainosus, not intractable  -     SUMAtriptan-naproxen (TREXIMET)  MG per tablet; Take 1 tablet by mouth 1 (One) Time As Needed for Migraine. May repeat dose one time in 2 hours if headache not relieved.  Dispense: 9 tablet; Refill: 11  -     Atogepant (Qulipta) 60 MG tablet; Take 1 tablet by mouth Daily.  Dispense: 30 tablet; Refill: 11  -     amitriptyline (ELAVIL) 10 MG tablet; Take 1 tablet by mouth Every Evening.  Dispense: 90 tablet; Refill: 3         Medication refilled without change.  Patient is happy with her current regimen. Indications and side effects discussed with patient she  verbalizes understanding.  Patient will call in the interim if she has any questions or concerns or changes.    Follow Up:   Return in about 6 months (around 1/10/2026).    LAURITA Ricks, FNP-[facility]

## 2025-07-14 ENCOUNTER — SPECIALTY PHARMACY (OUTPATIENT)
Dept: PHARMACY | Facility: TELEHEALTH | Age: 50
End: 2025-07-14
Payer: COMMERCIAL

## 2025-07-14 NOTE — PROGRESS NOTES
Specialty Pharmacy Patient Management Program  Initial Assessment     Renae Bolton is a 50 y.o. female with migraine and enrolled in the Patient Management program offered by [facility]. An initial outreach was conducted, including assessment of therapy appropriateness and specialty medication education for Qulipta 60 mg tablet. The patient was introduced to services offered by [facility], including: regular assessments, refill coordination, curbside pick-up or mail order delivery options, prior authorization maintenance, and financial assistance programs as applicable. The patient was also provided with contact information for the pharmacy team.     Insurance Coverage & Financial Support  Affirmed and  copay card     Relevant Past Medical History and Comorbidities  Relevant medical history and concomitant health conditions were discussed with the patient. The patient's chart has been reviewed for relevant past medical history and comorbid health conditions and updated as necessary.   Past Medical History:   Diagnosis Date    Allergic rhinitis approx 10 years ago    Anxiety     Arrhythmia     SVT w/ PVCs, on propranolol, follows w/ Dr. Liang    BRCA1 negative     BRCA2 negative     Cataract Congenital    Removed January 2023    Cholelithiasis removal 2008?    Chronic venous insufficiency     per Dr. Liang note 8/17/22    Cluster headache     Depression     Diabetes mellitus 2001    Gestational    Dyspepsia     Dyspnea on exertion     resolved after weight loss/ gastric sleeve    Endometriosis     Fatigue     Fibrocystic breast 2005    Fibroid     Generalized edema     r/t weight gain    Genital HSV     Famvir for prophylaxis    GERD (gastroesophageal reflux disease)     Gestational diabetes 2001    Hard to intubate     states was told this after her hysterectomy.  states procedures following gastric sleeve have been without issues    Head injury      concussion with post concussion syndrome    Headache, tension-type     Hyperemesis gravidarum     Hyperlipidemia 09/21/2021    Hypertension     resloved after gastric sleeve/weight loss    Interstitial cystitis     Kidney stones     Migraine     occasional    Mitral valve prolapse     pt denies/unaware; mitral valve regurge per Dr. Liang note 8/17/22    Normal vaginal Papanicolaou smear in patient with history of hysterectomy 09/16/2016    Peripheral edema     PONV (postoperative nausea and vomiting)     happens frequently - scopalamine patch works well    Post concussion syndrome     s/p fall w/ frontal lobe head injury 9/2017, previously on Elavil    Sleep apnea     resloved after gastric sleeve/weight loss    Sleep apnea, obstructive     Stress incontinence     follows w/ Dr. Galvan, on Myrbetriq, s/p cystoscopy w/ bulking agent    SVT (supraventricular tachycardia)     followed by Dr. Liang - see note 8/17/22 scanned into media    Weight loss counseling, encounter for 07/02/2024     Social History     Socioeconomic History    Marital status:    Tobacco Use    Smoking status: Never     Passive exposure: Never    Smokeless tobacco: Never   Vaping Use    Vaping status: Never Used   Substance and Sexual Activity    Alcohol use: No    Drug use: No    Sexual activity: Yes     Partners: Male     Birth control/protection: Vasectomy, Hysterectomy, Surgical     Problem list reviewed by Callie Govea, PharmD on 7/14/2025 at  4:53 PM    Allergies  Known allergies and reactions were discussed with the patient. The patient's chart has been reviewed for allergy information and updated as necessary.   Amoxicillin, Caffeine, Demerol [meperidine], Latex, Morphine, Penicillins, and Oxycodone  Allergies reviewed by Callie Govea, PharmD on 7/14/2025 at  4:53 PM    Current Medication List  This medication list has been reviewed with the patient and evaluated for any interactions or necessary modifications/recommendations,  and updated to include all prescription medications, OTC medications, and supplements the patient is currently taking. This list reflects what is contained in the patient's profile, which has also been marked as reviewed to communicate to other providers it is the most up to date version of the patient's current medication therapy.     Current Outpatient Medications:     amitriptyline (ELAVIL) 10 MG tablet, Take 1 tablet by mouth Every Evening., Disp: 90 tablet, Rfl: 3    Atogepant (Qulipta) 60 MG tablet, Take 1 tablet by mouth Daily., Disp: 30 tablet, Rfl: 11    B Complex Vitamins (vitamin b complex) capsule capsule, Take 1 capsule by mouth Daily., Disp: , Rfl:     Biotin (Biotin 5000) 5 MG capsule, Take 1 capsule by mouth Daily., Disp: , Rfl:     cetirizine (zyrTEC) 10 MG tablet, Take 1 tablet by mouth Daily., Disp: 90 tablet, Rfl: 3    Cholecalciferol (Vitamin D3) 250 MCG (19002 UT) tablet, Take 10,000 Units by mouth Daily., Disp: , Rfl:     cyclobenzaprine (FLEXERIL) 5 MG tablet, Take 1 tablet by mouth 3 (Three) Times a Day As Needed for Muscle spasms., Disp: 90 tablet, Rfl: 3    dexlansoprazole (DEXILANT) 60 MG capsule, Take 1 capsule by mouth Daily., Disp: 90 capsule, Rfl: 1    dicyclomine (BENTYL) 10 MG capsule, Take 1 capsule by mouth Every 6 (Six) Hours As Needed for Abdominal Cramping., Disp: 120 capsule, Rfl: 0    DULoxetine (CYMBALTA) 30 MG capsule, Take 1 capsule by mouth Daily., Disp: 90 capsule, Rfl: 0    estradiol (VAGIFEM) 10 MCG tablet vaginal tablet, Insert 1 tablet into the vagina 2 (Two) Times a Week., Disp: 8 tablet, Rfl: 11    famciclovir (FAMVIR) 250 MG tablet, Take 1 tablet by mouth 3 (Three) Times a Day. (Patient taking differently: Take 1 tablet by mouth 2 (Two) Times a Day.), Disp: 270 tablet, Rfl: 4    famotidine (PEPCID) 20 MG tablet, Take 1 tablet by mouth As Needed., Disp: , Rfl:     ibuprofen (ADVIL,MOTRIN) 600 MG tablet, Take 1 tablet  by mouth 3 (three) times daily as needed for  pain, Disp: 90 tablet, Rfl: 0    MAGNESIUM CITRATE PO, Take 270 mg by mouth Daily. *magnesium citrimate+B6 for migraines, Disp: , Rfl:     naltrexone (DEPADE) 50 MG tablet, Take 0.5 tablet by mouth 2 (Two) Times a Day., Disp: 30 tablet, Rfl: 2    NON FORMULARY, Multivitamin patch daily, Disp: , Rfl:     Omega-3 Fatty Acids (fish oil) 1000 MG capsule capsule, Take 1 capsule by mouth 2 (Two) Times a Day With Meals., Disp: , Rfl:     ondansetron ODT (ZOFRAN-ODT) 4 MG disintegrating tablet, Place 1 tablet under the tongue Every 8 (Eight) Hours As Needed for nausea or vomiting., Disp: 60 tablet, Rfl: 2    phentermine (ADIPEX-P) 37.5 MG tablet, Take 0.5 tablets by mouth Daily., Disp: 30 tablet, Rfl: 0    Probiotic Product (PROBIOTIC PO), Take 1 capsule by mouth Daily., Disp: , Rfl:     promethazine (PHENERGAN) 25 MG tablet, Take 1 tablet by mouth Every 6 (Six) Hours As Needed. (Patient taking differently: Take 1 tablet by mouth Every 6 (Six) Hours As Needed for Nausea.), Disp: 60 tablet, Rfl: 1    propranolol LA (INDERAL LA) 80 MG 24 hr capsule, Take 1 capsule by mouth Daily for 90 days., Disp: 90 capsule, Rfl: 3    Semaglutide-Weight Management (Wegovy) 1 MG/0.5ML solution auto-injector, Inject 0.5 mL under the skin into the appropriate area as directed 1 (One) Time Per Week., Disp: 2 mL, Rfl: 0    sennosides-docusate (senna-docusate sodium) 8.6-50 MG per tablet, Take 1 tablet by mouth Daily., Disp: , Rfl:     SUMAtriptan-naproxen (TREXIMET)  MG per tablet, Take 1 tablet by mouth 1 (One) Time As Needed for Migraine. May repeat dose one time in 2 hours if headache not relieved., Disp: 9 tablet, Rfl: 11    Topiramate ER (Trokendi XR) 25 MG capsule sustained-release 24 hr, Take 2 capsules by mouth Every Night., Disp: 60 capsule, Rfl: 2    Vitamin E 400 units tablet, Take 400 Units by mouth Daily., Disp: , Rfl:     Current Facility-Administered Medications:     cyanocobalamin injection 1,000 mcg, 1,000 mcg,  Intramuscular, Q28 Days, AnkushBindu, APRN, 1,000 mcg at 06/26/25 0903  Medicines reviewed by Callie Govea, PharmD on 7/14/2025 at  4:53 PM    Drug Interactions  none     Relevant Laboratory Values  Lab Results   Component Value Date    GLUCOSE 90 02/03/2025    CALCIUM 9.7 02/03/2025     02/03/2025    K 4.1 02/03/2025    CO2 28.0 02/03/2025     02/03/2025    BUN 15 02/03/2025    CREATININE 0.89 02/03/2025    BCR 16.9 02/03/2025    ANIONGAP 10.0 02/03/2025     Lab Results   Component Value Date    WBC 6.02 02/03/2025    HGB 14.5 02/03/2025    HCT 41.7 02/03/2025    MCV 92.3 02/03/2025     02/03/2025     Lab Value Review  The above lab values have been reviewed; the following specialty medication(s) dose adjustment(s) are recommended: none.    Initial Education Provided for Specialty Medication  The patient has been provided with the following education and any applicable administration techniques (i.e. self-injection) have been demonstrated for the therapies indicated. All questions and concerns have been addressed prior to the patient receiving the medication, and the patient has verbalized understanding of the education and any materials provided. Additional patient education shall be provided and documented upon request by the patient, provider or payer.      Atogepant (Qulipta)        Medication Expectations   Why am I taking this medication? You are taking this medication for migraine prophylaxis.   What should I expect while on this medication? You should expect to a decrease in the frequency and severity of your migraines.   How does the medication work? Qulipta is a monoclonal antibody that binds to calcitonin gene-related peptide (CGRP) and blocks its binding to the receptor decreasing the severity of migraines.   How long will I be on this medication for? The amount of time you will be on this medication will be determined by your doctor and your response to the medication.    How  do I take this medication? Take as directed on your prescription label.   Take one tablet daily with or without food.   What are some possible side effects? Potential side effects including, but not limited to nausea, constipation and fatigue. Pt verbalized understanding.   What happens if I miss a dose? If you miss a dose of this medicine, take it as soon as possible. However, if it is almost time for your next dose, skip the missed dose and go back to your regular dosing schedule. Do not double doses.                  Medication Safety   What are things I should warn my doctor immediately about? Hypersensitivity reactions, such as trouble breathing or swallowing.   What are things that I should be cautious of? Be cautious driving until you know how this drug will affect you.   What are some medications that can interact with this one? Ketoconazole, Itraconazole, cyclosporin, clarithromycin, rifampin, carbamazepine, phenytion, Dionte's Wort, efavirenz, and etravine.            Medication Storage/Handling   How should I handle this medication? Keep this medication our of reach of pets/children in original container.   How does this medication need to be stored? Store at room temperature away from heat/cold, sunlight or moisture.   How should I dispose of this medication? There should not be a need to dispose of this medication unless your provider decides to change the dose or therapy. If that is the case, take to your local police station for proper disposal. Some pharmacies also have take-back bins for medication drop-off.             Resources/Support   How can I remind myself to take this medication? You can download reminder apps to help you manage your refills. You may also set an alarm on your phone to remind you. The pharmacy carries pill boxes that you can place next to an area you pass everyday (such as where you place your car keys or where you charge your phone)   Is financial support available?  Yes,  [employer] can provide co-pay cards if you have commercial insurance or patient assistance if you have Medicare or no insurance.    Which vaccines are recommended for me? Talk to your doctor about these vaccines: Flu, Coronavirus (COVID-19), Pneumococcal (pneumonia), Tdap, Hepatitis B, Zoster (shingles)           Adherence, Self-Administration, and Current Therapy Problems  Adherence related to the patient's specialty therapy was discussed with the patient. The Adherence segment of this outreach has been reviewed and updated.          Additional Barriers to Patient Self-Administration: none  Methods for Supporting Patient Self-Administration: n/a    Open Medication Therapy Problems  No medication therapy recommendations to display    Goals of Therapy   Goals Addressed Today        Specialty Pharmacy General Goal      Reduce frequency of migraine by 50%              Reassessment Plan & Follow-Up  Medication Therapy Changes: Patient to continue Qulipta for prevention. First fill managed by call center.  Additional Plans, Therapy Recommendations, or Therapy Problems to Be Addressed: none   Pharmacist to perform regular reassessments no more than (6) months from the previous assessment.  Welcome information and patient satisfaction survey to be sent by retail team with patient's initial fill.  Care Coordinator to set up future refill outreaches, coordinate prescription delivery, and escalate clinical questions to pharmacist.     Attestation  I attest the patient was actively involved in and has agreed to the above plan of care. I attest that the initiated specialty medication(s) are appropriate for the patient based on my assessment. If the prescribed therapy is at any point deemed not appropriate based on the current or future assessments, a consultation will be initiated with the patient's specialty care provider to determine the best course of action. The revised plan of therapy will be documented along with any  reassessments and/or additional patient education provided.     Electronically signed by Callie Govea PharmD, 07/14/25, 4:54 PM EDT.

## 2025-07-24 ENCOUNTER — PROCEDURE VISIT (OUTPATIENT)
Dept: SURGERY | Facility: CLINIC | Age: 50
End: 2025-07-24
Payer: COMMERCIAL

## 2025-07-24 VITALS
SYSTOLIC BLOOD PRESSURE: 112 MMHG | DIASTOLIC BLOOD PRESSURE: 78 MMHG | WEIGHT: 154 LBS | TEMPERATURE: 98.4 F | HEART RATE: 79 BPM | HEIGHT: 64 IN | OXYGEN SATURATION: 100 % | BODY MASS INDEX: 26.29 KG/M2

## 2025-07-24 DIAGNOSIS — L98.9 SKIN LESION: Primary | ICD-10-CM

## 2025-07-26 LAB — REF LAB TEST METHOD: NORMAL

## 2025-07-28 ENCOUNTER — RESULTS FOLLOW-UP (OUTPATIENT)
Dept: SURGERY | Facility: CLINIC | Age: 50
End: 2025-07-28
Payer: COMMERCIAL

## 2025-07-28 DIAGNOSIS — F32.A ANXIETY AND DEPRESSION: ICD-10-CM

## 2025-07-28 DIAGNOSIS — F41.9 ANXIETY AND DEPRESSION: ICD-10-CM

## 2025-07-28 RX ORDER — DULOXETIN HYDROCHLORIDE 30 MG/1
30 CAPSULE, DELAYED RELEASE ORAL DAILY
Qty: 90 CAPSULE | Refills: 0 | Status: SHIPPED | OUTPATIENT
Start: 2025-07-28

## 2025-07-28 NOTE — PROGRESS NOTES
Please let Renae know that the skin lesion removed from the right arm was noncancerous.  Additionally, the skin lesion removed from the right knee area was also noncancerous.  This was a dermatofibroma as I explained to her before the procedure in the office.  No additional follow-up is required for these lesions unless she has a problem with wound healing.

## 2025-07-31 ENCOUNTER — TELEMEDICINE (OUTPATIENT)
Dept: BARIATRICS/WEIGHT MGMT | Facility: CLINIC | Age: 50
End: 2025-07-31
Payer: COMMERCIAL

## 2025-07-31 VITALS
OXYGEN SATURATION: 98 % | SYSTOLIC BLOOD PRESSURE: 108 MMHG | DIASTOLIC BLOOD PRESSURE: 64 MMHG | RESPIRATION RATE: 18 BRPM | WEIGHT: 150.6 LBS | HEART RATE: 70 BPM | BODY MASS INDEX: 25.71 KG/M2 | HEIGHT: 64 IN

## 2025-07-31 DIAGNOSIS — E66.3 OVERWEIGHT WITH BODY MASS INDEX (BMI) 25.0-29.9: Primary | ICD-10-CM

## 2025-07-31 NOTE — ASSESSMENT & PLAN NOTE
Patient's (There is no height or weight on file to calculate BMI.) indicates that they are overweight with health conditions that include obstructive sleep apnea, dyslipidemias, GERD, and metabolic syndrome . Weight is improving with treatment. BMI is above average; BMI management plan is completed. We discussed low calorie, low carb based diet program, portion control, increasing exercise, management of depression/anxiety/stress to control compensatory eating, pharmacologic options including phentermine/topamax, naltrexone wegovy, and an radha-based approach such as Wikia Pal or Lose It.     I have instructed the patient to continue with pursuit of medical weight loss as a part of this program. Patient does meet criteria for use of anorectics at this time as BMI >25 with , hyperlipidemia, and is not at treatment goal.     The current plan for this month includes:   - Adjust exercise as discussed  - Continue to prioritize protein, fiber, and hydration.   - Continue current medications, tolerating without side effects, weight is decreasing again since restarting wegovy. Only taking every other week. Has several pens left at home from previous prescriptions, does desire to eventually decrease and try to come off of it once she is closer to 135lb.     Continue sympathomimetic (medication refilled).  This patient 1) has no evidence of serious cardiovascular disease; 2) does not have serious psychiatric disease or a history of substance abuse; 3) has been informed about weight loss medications that are FDA approved for long-term use and told that these have been all documented to be safe and effective whereas phentermine has not; 4) does not demonstrate a clinically significant increase in pulse or blood pressure when taking phentermine; and 5) demonstrates significant weight loss while using the medication.  Patient understands that all antiobesity medications are contraindicated in pregnancy.  Patient denies a  "history of glaucoma.  Patient understands that long-term use of phentermine is considered off label use of this medication, however, that the endocrine Society and recent research supports that long-term use of phentermine does not appear to have detrimental health effects when used on the appropriate patient.  In addition, a 2019 study published in an obesity journal on [id number] patient's concluded that \"recommendations to limit phentermine to less than 3 months do not align with current concept of pharmacologic treatment of obesity\", and that \"long-term phentermine users experience greater weight loss without apparent increases in cardiovascular risk\".    We reviewed potential side effects including insomnia, dry mouth, increased heart rate and blood pressure, increased anxiety.  We reviewed reducing caffeine consumption while taking phentermine.  especially if the patient experiences side effects.  The potential risk and benefits of phentermine have been reviewed with the patient, and alternative treatment options were discussed.  All questions were answered, and the patient wishes to move forward with this medication.    "

## 2025-07-31 NOTE — PROGRESS NOTES
Office Note      Date: 2025  Patient Name: Renae Bolton  MRN: [id number]  : 1975     Mode of Visit: Video  Location of patient: -HOME-  Location of provider: +[facility]+  You have chosen to receive care through a telehealth visit.  The patient has signed the video visit consent form.  The visit included audio and video interaction. No technical issues occurred during this visit.    Subjective  Subjective     Chief Complaint  Obesity Management follow-up    Subjective          Renae Bolton presents to [facility] via telehealth for obesity management.     Patient is satisfied with weight loss progress. Appetite is well controlled. Reports no side effects of prescribed medications today. Taking wegovy every other week. Gets nausea for 1-2  days after injection but it is better than last time she took wegovy. Only taking one topamax tablet. Forgot to stop the naltrexone which we were going to stop to prevent nausea but she is doing well with it. Taking phentermine daily. She is happy to be fitting in her medium skirts comfortably again.     24 Hour Recall:  Breakfast: nutrigrain bar  Lunch: 1/2 corn beef and cheese on 35 daniel bread, cucumber, strawberries  Dinner: chicken breast and green beans  Snack:3/4 hot dog, mustard, with part of the bun  Water: 40 oz  Beverage: none   Alcohol: none  Is sleeping better.     The patient is not currently exercising but her schedule has slowed down some and she's ready to start incorporating it. Still hasn't moved her treadmill back in the house which her  will have to do but he has been very busy.     Review of Systems   Constitutional:  Positive for fatigue. Negative for appetite change.   Eyes:  Negative for visual disturbance.   Cardiovascular:  Negative for chest pain and palpitations.   Gastrointestinal:  Positive for nausea. Negative for constipation and indigestion.   Neurological:  Negative for  "light-headedness.   Psychiatric/Behavioral:  Negative for sleep disturbance.          Objective   Start weight MWM: 193 pounds.    Total weight loss: -42.4 pounds/-21.97%  Change in weight since last visit: -3lb    Body mass index is 25.85 kg/m².   Measurements (in inches) Waist Circumference: 35   /64   Pulse 70   Resp 18   Ht 162.6 cm (64\")   Wt 68.3 kg (150 lb 9.6 oz)   SpO2 98%   BMI 25.85 kg/m²       Result Review :                 Assessment and Plan    Diagnoses and all orders for this visit:    1. Overweight with body mass index (BMI) 25.0-29.9 (Primary)  Overview:  Start: 3/31/2021 193lb, goal 155lb  S/p LSG 12/19/19 presurgery weight: 232.5   gonzalez 131lb, BMI 22.49 (07/02/2024) on wegovy 1.7mg    AOM hx:  trokendi: worked well for migraines- no weight loss  Saxenda worked well, changed to wegovy due to plateau   Phentermine and naltrexone help with cravings    Comorbids: MAREN, hyperlipidemia, GERD, back pain, anxiety and depression, metabolic syndrome    Assessment & Plan:  Patient's (There is no height or weight on file to calculate BMI.) indicates that they are overweight with health conditions that include obstructive sleep apnea, dyslipidemias, GERD, and metabolic syndrome . Weight is improving with treatment. BMI is above average; BMI management plan is completed. We discussed low calorie, low carb based diet program, portion control, increasing exercise, management of depression/anxiety/stress to control compensatory eating, pharmacologic options including phentermine/topamax, naltrexone wegovy, and an radha-based approach such as Zodio Pal or Lose It.     I have instructed the patient to continue with pursuit of medical weight loss as a part of this program. Patient does meet criteria for use of anorectics at this time as BMI >25 with , hyperlipidemia, and is not at treatment goal.     The current plan for this month includes:   - Adjust exercise as discussed  - Continue to prioritize " "protein, fiber, and hydration.   - Continue current medications, tolerating without side effects, weight is decreasing again since restarting wegovy. Only taking every other week. Has several pens left at home from previous prescriptions, does desire to eventually decrease and try to come off of it once she is closer to 135lb.     Continue sympathomimetic (medication refilled).  This patient 1) has no evidence of serious cardiovascular disease; 2) does not have serious psychiatric disease or a history of substance abuse; 3) has been informed about weight loss medications that are FDA approved for long-term use and told that these have been all documented to be safe and effective whereas phentermine has not; 4) does not demonstrate a clinically significant increase in pulse or blood pressure when taking phentermine; and 5) demonstrates significant weight loss while using the medication.  Patient understands that all antiobesity medications are contraindicated in pregnancy.  Patient denies a history of glaucoma.  Patient understands that long-term use of phentermine is considered off label use of this medication, however, that the endocrine Society and recent research supports that long-term use of phentermine does not appear to have detrimental health effects when used on the appropriate patient.  In addition, a 2019 study published in an obesity journal on [id number] patient's concluded that \"recommendations to limit phentermine to less than 3 months do not align with current concept of pharmacologic treatment of obesity\", and that \"long-term phentermine users experience greater weight loss without apparent increases in cardiovascular risk\".    We reviewed potential side effects including insomnia, dry mouth, increased heart rate and blood pressure, increased anxiety.  We reviewed reducing caffeine consumption while taking phentermine.  especially if the patient experiences side effects.  The potential risk and benefits " of phentermine have been reviewed with the patient, and alternative treatment options were discussed.  All questions were answered, and the patient wishes to move forward with this medication.              Follow Up   Return in about 1 month (around 8/31/2025) for Next scheduled follow up.  Patient was given instructions and counseling regarding her condition or for health maintenance advice. Please see specific information pulled into the AVS if appropriate.     LAURITA Michel

## 2025-08-28 DIAGNOSIS — G43.909 MIGRAINE WITHOUT STATUS MIGRAINOSUS, NOT INTRACTABLE, UNSPECIFIED MIGRAINE TYPE: ICD-10-CM

## 2025-08-28 DIAGNOSIS — E66.3 OVERWEIGHT WITH BODY MASS INDEX (BMI) OF 25 TO 25.9 IN ADULT: ICD-10-CM

## 2025-08-28 RX ORDER — TOPIRAMATE 25 MG/1
2 CAPSULE, EXTENDED RELEASE ORAL NIGHTLY
Qty: 60 CAPSULE | Refills: 2 | Status: SHIPPED | OUTPATIENT
Start: 2025-08-28

## (undated) DEVICE — GLV SURG SENSICARE LT W/ALOE PF LF 7 STRL

## (undated) DEVICE — APPL COTN TP PLSTC 6IN STRL LF PK/2

## (undated) DEVICE — ANTIBACTERIAL VIOLET BRAIDED (POLYGLACTIN 910), SYNTHETIC ABSORBABLE SUTURE: Brand: COATED VICRYL

## (undated) DEVICE — VLV SXN AIR/H2O ORCAPOD3 1P/U STRL

## (undated) DEVICE — INJECTION NEEDLE, PENCIL POINT: Brand: DURASPHERE ®

## (undated) DEVICE — TISSUE RETRIEVAL SYSTEM: Brand: INZII RETRIEVAL SYSTEM

## (undated) DEVICE — FRCP BX RADJAW4 NDL 2.8 240 STD OG

## (undated) DEVICE — SPNG VERSALON 4X4 4PLY NONSTRL LF BG/200

## (undated) DEVICE — Device: Brand: DEFENDO AIR/WATER/SUCTION AND BIOPSY VALVE

## (undated) DEVICE — CUFF SCD HEMOFORCE SEQ CALF STD MD

## (undated) DEVICE — CONMED SCOPE SAVER BITE BLOCK, 20X27 MM: Brand: SCOPE SAVER

## (undated) DEVICE — THE BITE BLOCK MAXI, LATEX FREE STRAP IS USED TO PROTECT THE ENDOSCOPE INSERTION TUBE FROM BEING BITTEN BY THE PATIENT.

## (undated) DEVICE — PK BARIATRIC 10

## (undated) DEVICE — SPNG ENDO BEDSIDE TUB ENZYM

## (undated) DEVICE — PATIENT RETURN ELECTRODE, SINGLE-USE, CONTACT QUALITY MONITORING, ADULT, WITH 9FT CORD, FOR PATIENTS WEIGING OVER 33LBS. (15KG): Brand: MEGADYNE

## (undated) DEVICE — APL DUPLOSPRAYER MIS 40CM

## (undated) DEVICE — SUT SILK 0 SH 30IN K834H

## (undated) DEVICE — CANISTER, RIGID, 2000CC: Brand: MEDLINE INDUSTRIES, INC.

## (undated) DEVICE — Device

## (undated) DEVICE — ENDOPATH XCEL UNIVERSAL TROCAR STABLILITY SLEEVES: Brand: ENDOPATH XCEL

## (undated) DEVICE — CATHETER,FOLEY,SILI-ELAST,LTX,18FR,10ML: Brand: MEDLINE

## (undated) DEVICE — UNDRPD COMFRT GLD DRYPAD 36X57IN

## (undated) DEVICE — SOL NACL 0.9PCT 1000ML

## (undated) DEVICE — GLV SURG SENSICARE MICRO PF LF 6.5 STRL

## (undated) DEVICE — GLV SURG DERMASSURE GRN LF PF 7.0

## (undated) DEVICE — RICH CYSTO: Brand: MEDLINE INDUSTRIES, INC.

## (undated) DEVICE — [HIGH FLOW INSUFFLATOR,  DO NOT USE IF PACKAGE IS DAMAGED,  KEEP DRY,  KEEP AWAY FROM SUNLIGHT,  PROTECT FROM HEAT AND RADIOACTIVE SOURCES.]: Brand: PNEUMOSURE

## (undated) DEVICE — CVR HNDL LIGHT RIGID

## (undated) DEVICE — SYS CLS PORTSITE CT CLOSESURE 5AND10/12

## (undated) DEVICE — CLMP STD 22CM DISP

## (undated) DEVICE — FLTR PLUMEPORT LAP W/CONN STRL

## (undated) DEVICE — ENDOPATH XCEL BLADELESS TROCARS WITH STABILITY SLEEVES: Brand: ENDOPATH XCEL

## (undated) DEVICE — GOWN,SIRUS,NON REINFRCD,LARGE,SET IN SL: Brand: MEDLINE

## (undated) DEVICE — SEALANT HEMOS FLOSEAL MATRX W/MALL TP 10ML EA/6

## (undated) DEVICE — ST FLD IRR WARM

## (undated) DEVICE — SYR LUER SLPTP 50ML

## (undated) DEVICE — CATH URETRL AP 18F

## (undated) DEVICE — SHT AIR TRANSFR COMFRT GLIDE LT LAT 40X80IN

## (undated) DEVICE — MARYLAND JAW LAPAROSCOPIC SEALER/DIVIDER COATED: Brand: LIGASURE

## (undated) DEVICE — LUBE JELLY PK/2.75GM STRL BX/144

## (undated) DEVICE — FRCP BIOP COLD ENDOJAW ALLGTR W/NDL 2.8X2300MM BLU

## (undated) DEVICE — BG TRNSPRT SCOPEVALET RED

## (undated) DEVICE — DRN PENRS 1/2X18IN LTX

## (undated) DEVICE — GLV SURG SENSICARE W/ALOE PF LF 6.5 STRL

## (undated) DEVICE — MARKR SKIN W/RULR

## (undated) DEVICE — TBG FLUID WARM ST SNGL

## (undated) DEVICE — ADAPT UROLOK

## (undated) DEVICE — SOL IRR NACL 0.9PCT 3000ML

## (undated) DEVICE — SOL IRR H2O BTL 1000ML STRL

## (undated) DEVICE — POWER SHELL: Brand: SIGNIA

## (undated) DEVICE — SUT MONOCRYL PLS ANTIB UND 3/0  PS1 27IN

## (undated) DEVICE — DISPOSABLE MONOPOLAR ENDOSCOPIC CORD 10 FT. (3M): Brand: KIRWAN

## (undated) DEVICE — PREP SOL DYNA-HEX CHG LIQ 4% BT 4OZ

## (undated) DEVICE — GLOVE,SURG,SENSICARE,ALOE,LF,PF,6: Brand: MEDLINE

## (undated) DEVICE — SLV SCD CALF HEMOFORCE DVT THERP REPROC MD

## (undated) DEVICE — APPL HEMOS FOR DELIVERY FLOSEAL